# Patient Record
Sex: FEMALE | Race: OTHER | HISPANIC OR LATINO
[De-identification: names, ages, dates, MRNs, and addresses within clinical notes are randomized per-mention and may not be internally consistent; named-entity substitution may affect disease eponyms.]

---

## 2017-01-09 ENCOUNTER — RX RENEWAL (OUTPATIENT)
Age: 47
End: 2017-01-09

## 2017-01-31 ENCOUNTER — APPOINTMENT (OUTPATIENT)
Dept: INFECTIOUS DISEASE | Facility: CLINIC | Age: 47
End: 2017-01-31

## 2017-02-28 ENCOUNTER — LABORATORY RESULT (OUTPATIENT)
Age: 47
End: 2017-02-28

## 2017-02-28 ENCOUNTER — APPOINTMENT (OUTPATIENT)
Dept: INFECTIOUS DISEASE | Facility: CLINIC | Age: 47
End: 2017-02-28

## 2017-02-28 ENCOUNTER — MED ADMIN CHARGE (OUTPATIENT)
Age: 47
End: 2017-02-28

## 2017-02-28 ENCOUNTER — OUTPATIENT (OUTPATIENT)
Dept: OUTPATIENT SERVICES | Facility: HOSPITAL | Age: 47
LOS: 1 days | End: 2017-02-28
Payer: COMMERCIAL

## 2017-02-28 VITALS
TEMPERATURE: 98.1 F | OXYGEN SATURATION: 97 % | HEART RATE: 80 BPM | WEIGHT: 161 LBS | DIASTOLIC BLOOD PRESSURE: 73 MMHG | BODY MASS INDEX: 30.4 KG/M2 | SYSTOLIC BLOOD PRESSURE: 113 MMHG | HEIGHT: 61 IN

## 2017-02-28 DIAGNOSIS — B20 HUMAN IMMUNODEFICIENCY VIRUS [HIV] DISEASE: ICD-10-CM

## 2017-02-28 LAB
24R-OH-CALCIDIOL SERPL-MCNC: 39.3 NG/ML — SIGNIFICANT CHANGE UP (ref 30–100)
AFP-TM SERPL-MCNC: 1.8 NG/ML — SIGNIFICANT CHANGE UP
ALBUMIN SERPL ELPH-MCNC: 4.2 G/DL — SIGNIFICANT CHANGE UP (ref 3.3–5)
ALP SERPL-CCNC: 72 U/L — SIGNIFICANT CHANGE UP (ref 40–120)
ALT FLD-CCNC: 24 U/L — SIGNIFICANT CHANGE UP (ref 10–45)
ANION GAP SERPL CALC-SCNC: 18 MMOL/L — HIGH (ref 5–17)
AST SERPL-CCNC: 30 U/L — SIGNIFICANT CHANGE UP (ref 10–40)
BASOPHILS # BLD AUTO: 0.02 K/UL — SIGNIFICANT CHANGE UP (ref 0–0.2)
BASOPHILS NFR BLD AUTO: 0.4 % — SIGNIFICANT CHANGE UP (ref 0–2)
BILIRUB SERPL-MCNC: 0.3 MG/DL — SIGNIFICANT CHANGE UP (ref 0.2–1.2)
BUN SERPL-MCNC: 19 MG/DL — SIGNIFICANT CHANGE UP (ref 7–23)
CALCIUM SERPL-MCNC: 10.1 MG/DL — SIGNIFICANT CHANGE UP (ref 8.4–10.5)
CHLORIDE SERPL-SCNC: 101 MMOL/L — SIGNIFICANT CHANGE UP (ref 96–108)
CHOLEST SERPL-MCNC: 191 MG/DL — SIGNIFICANT CHANGE UP (ref 10–199)
CO2 SERPL-SCNC: 21 MMOL/L — LOW (ref 22–31)
CREAT ?TM UR-MCNC: 154 MG/DL — SIGNIFICANT CHANGE UP
CREAT SERPL-MCNC: 1.01 MG/DL — SIGNIFICANT CHANGE UP (ref 0.5–1.3)
EOSINOPHIL # BLD AUTO: 0.15 K/UL — SIGNIFICANT CHANGE UP (ref 0–0.5)
EOSINOPHIL NFR BLD AUTO: 2.7 % — SIGNIFICANT CHANGE UP (ref 0–6)
GLUCOSE SERPL-MCNC: 80 MG/DL — SIGNIFICANT CHANGE UP (ref 70–99)
HBA1C BLD-MCNC: 5.9 % — HIGH (ref 4–5.6)
HCT VFR BLD CALC: 38.9 % — SIGNIFICANT CHANGE UP (ref 34.5–45)
HCV AB S/CO SERPL IA: 0.19 S/CO — SIGNIFICANT CHANGE UP
HCV AB SERPL-IMP: SIGNIFICANT CHANGE UP
HDLC SERPL-MCNC: 58 MG/DL — SIGNIFICANT CHANGE UP (ref 40–125)
HGB BLD-MCNC: 13 G/DL — SIGNIFICANT CHANGE UP (ref 11.5–15.5)
IMM GRANULOCYTES NFR BLD AUTO: 0 % — SIGNIFICANT CHANGE UP (ref 0–1.5)
LIPID PNL WITH DIRECT LDL SERPL: 123 MG/DL — SIGNIFICANT CHANGE UP
LYMPHOCYTES # BLD AUTO: 3.42 K/UL — HIGH (ref 1–3.3)
LYMPHOCYTES # BLD AUTO: 61.6 % — HIGH (ref 13–44)
MCHC RBC-ENTMCNC: 30.4 PG — SIGNIFICANT CHANGE UP (ref 27–34)
MCHC RBC-ENTMCNC: 33.4 GM/DL — SIGNIFICANT CHANGE UP (ref 32–36)
MCV RBC AUTO: 91.1 FL — SIGNIFICANT CHANGE UP (ref 80–100)
MONOCYTES # BLD AUTO: 0.55 K/UL — SIGNIFICANT CHANGE UP (ref 0–0.9)
MONOCYTES NFR BLD AUTO: 9.9 % — SIGNIFICANT CHANGE UP (ref 2–14)
NEUTROPHILS # BLD AUTO: 1.41 K/UL — LOW (ref 1.8–7.4)
NEUTROPHILS NFR BLD AUTO: 25.4 % — LOW (ref 43–77)
PLATELET # BLD AUTO: 310 K/UL — SIGNIFICANT CHANGE UP (ref 150–400)
POTASSIUM SERPL-MCNC: 4.4 MMOL/L — SIGNIFICANT CHANGE UP (ref 3.5–5.3)
POTASSIUM SERPL-SCNC: 4.4 MMOL/L — SIGNIFICANT CHANGE UP (ref 3.5–5.3)
PROT ?TM UR-MCNC: 12 MG/DL — SIGNIFICANT CHANGE UP (ref 0–12)
PROT SERPL-MCNC: 8.3 G/DL — SIGNIFICANT CHANGE UP (ref 6–8.3)
PROT/CREAT UR-RTO: 0.1 RATIO — SIGNIFICANT CHANGE UP (ref 0–0.2)
RBC # BLD: 4.27 M/UL — SIGNIFICANT CHANGE UP (ref 3.8–5.2)
RBC # FLD: 14.7 % — HIGH (ref 10.3–14.5)
SODIUM SERPL-SCNC: 140 MMOL/L — SIGNIFICANT CHANGE UP (ref 135–145)
T PALLIDUM AB TITR SER: NEGATIVE — SIGNIFICANT CHANGE UP
TOTAL CHOLESTEROL/HDL RATIO MEASUREMENT: 3.3 RATIO — SIGNIFICANT CHANGE UP (ref 3.3–7.1)
TRIGL SERPL-MCNC: 48 MG/DL — SIGNIFICANT CHANGE UP (ref 10–149)
WBC # BLD: 5.55 K/UL — SIGNIFICANT CHANGE UP (ref 3.8–10.5)
WBC # FLD AUTO: 5.55 K/UL — SIGNIFICANT CHANGE UP (ref 3.8–10.5)

## 2017-03-01 ENCOUNTER — FORM ENCOUNTER (OUTPATIENT)
Age: 47
End: 2017-03-01

## 2017-03-01 LAB
4/8 RATIO: 0.2 RATIO — LOW (ref 0.9–3.6)
ABS CD8: 1018 /UL — HIGH (ref 136–757)
C TRACH RRNA SPEC QL NAA+PROBE: SIGNIFICANT CHANGE UP
CD3 BLASTS SPEC-ACNC: 1216 /UL — SIGNIFICANT CHANGE UP (ref 799–2171)
CD3 BLASTS SPEC-ACNC: 41 % — LOW (ref 59–85)
CD4 %: 7 % — LOW (ref 36–65)
CD8 %: 34 % — SIGNIFICANT CHANGE UP (ref 11–36)
HBV DNA # SERPL NAA+PROBE: SIGNIFICANT CHANGE UP IU/ML
HBV DNA SERPL NAA+PROBE-LOG#: SIGNIFICANT CHANGE UP LOGIU/ML
HIV1 RNA # SERPL NAA+PROBE: 45 — SIGNIFICANT CHANGE UP
HIV1 RNA SERPL NAA+PROBE-LOG#: 1.65 LG10COP/ML — SIGNIFICANT CHANGE UP
M TB TUBERC IFN-G BLD QL: 0.03 IU/ML — SIGNIFICANT CHANGE UP
M TB TUBERC IFN-G BLD QL: 0.07 IU/ML — SIGNIFICANT CHANGE UP
M TB TUBERC IFN-G BLD QL: NEGATIVE — SIGNIFICANT CHANGE UP
MITOGEN IGNF BCKGRD COR BLD-ACNC: >10 IU/ML — SIGNIFICANT CHANGE UP
N GONORRHOEA RRNA SPEC QL NAA+PROBE: SIGNIFICANT CHANGE UP
SPECIMEN SOURCE: SIGNIFICANT CHANGE UP
T-CELL CD4 SUBSET PNL BLD: 203 /UL — LOW (ref 489–1457)

## 2017-03-02 ENCOUNTER — APPOINTMENT (OUTPATIENT)
Dept: MAMMOGRAPHY | Facility: IMAGING CENTER | Age: 47
End: 2017-03-02

## 2017-03-02 DIAGNOSIS — Z12.31 ENCOUNTER FOR SCREENING MAMMOGRAM FOR MALIGNANT NEOPLASM OF BREAST: ICD-10-CM

## 2017-03-02 DIAGNOSIS — Z23 ENCOUNTER FOR IMMUNIZATION: ICD-10-CM

## 2017-03-02 PROCEDURE — 90834 PSYTX W PT 45 MINUTES: CPT

## 2017-03-02 PROCEDURE — 87517 HEPATITIS B DNA QUANT: CPT

## 2017-03-02 PROCEDURE — 77063 BREAST TOMOSYNTHESIS BI: CPT | Mod: 26

## 2017-03-02 PROCEDURE — 80061 LIPID PANEL: CPT

## 2017-03-02 PROCEDURE — 83036 HEMOGLOBIN GLYCOSYLATED A1C: CPT

## 2017-03-02 PROCEDURE — 86360 T CELL ABSOLUTE COUNT/RATIO: CPT

## 2017-03-02 PROCEDURE — 87536 HIV-1 QUANT&REVRSE TRNSCRPJ: CPT

## 2017-03-02 PROCEDURE — 36415 COLL VENOUS BLD VENIPUNCTURE: CPT

## 2017-03-02 PROCEDURE — 86480 TB TEST CELL IMMUN MEASURE: CPT

## 2017-03-02 PROCEDURE — 84156 ASSAY OF PROTEIN URINE: CPT

## 2017-03-02 PROCEDURE — 77063 BREAST TOMOSYNTHESIS BI: CPT

## 2017-03-02 PROCEDURE — 82306 VITAMIN D 25 HYDROXY: CPT

## 2017-03-02 PROCEDURE — 90471 IMMUNIZATION ADMIN: CPT

## 2017-03-02 PROCEDURE — 80053 COMPREHEN METABOLIC PANEL: CPT

## 2017-03-02 PROCEDURE — 82105 ALPHA-FETOPROTEIN SERUM: CPT

## 2017-03-02 PROCEDURE — G0463: CPT | Mod: 25

## 2017-03-02 PROCEDURE — 86780 TREPONEMA PALLIDUM: CPT

## 2017-03-02 PROCEDURE — 86803 HEPATITIS C AB TEST: CPT

## 2017-03-02 PROCEDURE — 90734 MENACWYD/MENACWYCRM VACC IM: CPT

## 2017-03-02 PROCEDURE — 77067 SCR MAMMO BI INCL CAD: CPT

## 2017-03-02 PROCEDURE — G0202: CPT | Mod: 26

## 2017-03-06 ENCOUNTER — FORM ENCOUNTER (OUTPATIENT)
Age: 47
End: 2017-03-06

## 2017-03-06 ENCOUNTER — APPOINTMENT (OUTPATIENT)
Age: 47
End: 2017-03-06

## 2017-03-07 ENCOUNTER — OUTPATIENT (OUTPATIENT)
Dept: OUTPATIENT SERVICES | Facility: HOSPITAL | Age: 47
LOS: 1 days | End: 2017-03-07
Payer: COMMERCIAL

## 2017-03-07 ENCOUNTER — APPOINTMENT (OUTPATIENT)
Dept: MAMMOGRAPHY | Facility: IMAGING CENTER | Age: 47
End: 2017-03-07

## 2017-03-07 DIAGNOSIS — Z00.8 ENCOUNTER FOR OTHER GENERAL EXAMINATION: ICD-10-CM

## 2017-03-07 PROCEDURE — 77065 DX MAMMO INCL CAD UNI: CPT

## 2017-03-20 ENCOUNTER — APPOINTMENT (OUTPATIENT)
Dept: OPHTHALMOLOGY | Facility: CLINIC | Age: 47
End: 2017-03-20

## 2017-03-23 ENCOUNTER — MEDICATION RENEWAL (OUTPATIENT)
Age: 47
End: 2017-03-23

## 2017-05-02 ENCOUNTER — APPOINTMENT (OUTPATIENT)
Dept: DERMATOLOGY | Facility: CLINIC | Age: 47
End: 2017-05-02

## 2017-05-02 VITALS — SYSTOLIC BLOOD PRESSURE: 126 MMHG | DIASTOLIC BLOOD PRESSURE: 80 MMHG

## 2017-05-11 ENCOUNTER — APPOINTMENT (OUTPATIENT)
Dept: DERMATOLOGY | Facility: HOSPITAL | Age: 47
End: 2017-05-11

## 2017-06-05 ENCOUNTER — APPOINTMENT (OUTPATIENT)
Dept: DERMATOLOGY | Facility: CLINIC | Age: 47
End: 2017-06-05

## 2017-06-06 ENCOUNTER — APPOINTMENT (OUTPATIENT)
Dept: INFECTIOUS DISEASE | Facility: CLINIC | Age: 47
End: 2017-06-06

## 2017-06-20 ENCOUNTER — RX RENEWAL (OUTPATIENT)
Age: 47
End: 2017-06-20

## 2017-07-17 ENCOUNTER — RX RENEWAL (OUTPATIENT)
Age: 47
End: 2017-07-17

## 2017-07-27 ENCOUNTER — RX RENEWAL (OUTPATIENT)
Age: 47
End: 2017-07-27

## 2017-08-08 ENCOUNTER — OUTPATIENT (OUTPATIENT)
Dept: OUTPATIENT SERVICES | Facility: HOSPITAL | Age: 47
LOS: 1 days | End: 2017-08-08
Payer: COMMERCIAL

## 2017-08-08 ENCOUNTER — LABORATORY RESULT (OUTPATIENT)
Age: 47
End: 2017-08-08

## 2017-08-08 ENCOUNTER — MED ADMIN CHARGE (OUTPATIENT)
Age: 47
End: 2017-08-08

## 2017-08-08 ENCOUNTER — APPOINTMENT (OUTPATIENT)
Dept: INFECTIOUS DISEASE | Facility: CLINIC | Age: 47
End: 2017-08-08
Payer: MEDICARE

## 2017-08-08 VITALS
DIASTOLIC BLOOD PRESSURE: 82 MMHG | OXYGEN SATURATION: 96 % | HEIGHT: 61 IN | HEART RATE: 84 BPM | TEMPERATURE: 97.1 F | SYSTOLIC BLOOD PRESSURE: 115 MMHG | RESPIRATION RATE: 16 BRPM | WEIGHT: 163 LBS | BODY MASS INDEX: 30.78 KG/M2

## 2017-08-08 DIAGNOSIS — B20 HUMAN IMMUNODEFICIENCY VIRUS [HIV] DISEASE: ICD-10-CM

## 2017-08-08 PROCEDURE — 90734 MENACWYD/MENACWYCRM VACC IM: CPT

## 2017-08-08 PROCEDURE — G0463: CPT | Mod: 25

## 2017-08-08 PROCEDURE — 90471 IMMUNIZATION ADMIN: CPT

## 2017-08-08 PROCEDURE — 99214 OFFICE O/P EST MOD 30 MIN: CPT

## 2017-08-10 DIAGNOSIS — B18.1 CHRONIC VIRAL HEPATITIS B WITHOUT DELTA-AGENT: ICD-10-CM

## 2017-08-16 ENCOUNTER — OTHER (OUTPATIENT)
Age: 47
End: 2017-08-16

## 2017-09-07 ENCOUNTER — MEDICATION RENEWAL (OUTPATIENT)
Age: 47
End: 2017-09-07

## 2017-09-07 ENCOUNTER — RX RENEWAL (OUTPATIENT)
Age: 47
End: 2017-09-07

## 2017-09-19 ENCOUNTER — LABORATORY RESULT (OUTPATIENT)
Age: 47
End: 2017-09-19

## 2017-09-19 ENCOUNTER — APPOINTMENT (OUTPATIENT)
Dept: INFECTIOUS DISEASE | Facility: CLINIC | Age: 47
End: 2017-09-19
Payer: MEDICARE

## 2017-09-19 ENCOUNTER — OUTPATIENT (OUTPATIENT)
Dept: OUTPATIENT SERVICES | Facility: HOSPITAL | Age: 47
LOS: 1 days | End: 2017-09-19
Payer: COMMERCIAL

## 2017-09-19 VITALS
TEMPERATURE: 97.3 F | HEART RATE: 68 BPM | BODY MASS INDEX: 30.4 KG/M2 | OXYGEN SATURATION: 100 % | DIASTOLIC BLOOD PRESSURE: 66 MMHG | HEIGHT: 61 IN | WEIGHT: 161 LBS | SYSTOLIC BLOOD PRESSURE: 109 MMHG

## 2017-09-19 DIAGNOSIS — B20 HUMAN IMMUNODEFICIENCY VIRUS [HIV] DISEASE: ICD-10-CM

## 2017-09-19 PROCEDURE — 99214 OFFICE O/P EST MOD 30 MIN: CPT

## 2017-09-19 PROCEDURE — 90686 IIV4 VACC NO PRSV 0.5 ML IM: CPT

## 2017-09-19 PROCEDURE — G0008: CPT

## 2017-09-19 PROCEDURE — G0463: CPT | Mod: 25

## 2017-09-19 PROCEDURE — 90834 PSYTX W PT 45 MINUTES: CPT

## 2017-09-28 DIAGNOSIS — Z23 ENCOUNTER FOR IMMUNIZATION: ICD-10-CM

## 2017-09-28 DIAGNOSIS — B18.1 CHRONIC VIRAL HEPATITIS B WITHOUT DELTA-AGENT: ICD-10-CM

## 2017-10-26 ENCOUNTER — RX RENEWAL (OUTPATIENT)
Age: 47
End: 2017-10-26

## 2017-11-14 ENCOUNTER — LABORATORY RESULT (OUTPATIENT)
Age: 47
End: 2017-11-14

## 2017-11-14 ENCOUNTER — APPOINTMENT (OUTPATIENT)
Dept: INFECTIOUS DISEASE | Facility: CLINIC | Age: 47
End: 2017-11-14
Payer: MEDICARE

## 2017-11-14 ENCOUNTER — OUTPATIENT (OUTPATIENT)
Dept: OUTPATIENT SERVICES | Facility: HOSPITAL | Age: 47
LOS: 1 days | End: 2017-11-14
Payer: COMMERCIAL

## 2017-11-14 VITALS
TEMPERATURE: 97.1 F | WEIGHT: 160 LBS | BODY MASS INDEX: 30.21 KG/M2 | HEIGHT: 61 IN | HEART RATE: 84 BPM | OXYGEN SATURATION: 100 % | DIASTOLIC BLOOD PRESSURE: 75 MMHG | SYSTOLIC BLOOD PRESSURE: 110 MMHG

## 2017-11-14 DIAGNOSIS — B20 HUMAN IMMUNODEFICIENCY VIRUS [HIV] DISEASE: ICD-10-CM

## 2017-11-14 LAB
HCT VFR BLD CALC: 38.7 % — SIGNIFICANT CHANGE UP (ref 34.5–45)
HGB BLD-MCNC: 12.8 G/DL — SIGNIFICANT CHANGE UP (ref 11.5–15.5)
MCHC RBC-ENTMCNC: 30.8 PG — SIGNIFICANT CHANGE UP (ref 27–34)
MCHC RBC-ENTMCNC: 33.1 GM/DL — SIGNIFICANT CHANGE UP (ref 32–36)
MCV RBC AUTO: 93.3 FL — SIGNIFICANT CHANGE UP (ref 80–100)
PLATELET # BLD AUTO: 348 K/UL — SIGNIFICANT CHANGE UP (ref 150–400)
RBC # BLD: 4.15 M/UL — SIGNIFICANT CHANGE UP (ref 3.8–5.2)
RBC # FLD: 14.8 % — HIGH (ref 10.3–14.5)
WBC # BLD: 6.59 K/UL — SIGNIFICANT CHANGE UP (ref 3.8–10.5)
WBC # FLD AUTO: 6.59 K/UL — SIGNIFICANT CHANGE UP (ref 3.8–10.5)

## 2017-11-14 PROCEDURE — G0463: CPT

## 2017-11-14 PROCEDURE — 87536 HIV-1 QUANT&REVRSE TRNSCRPJ: CPT

## 2017-11-14 PROCEDURE — 82306 VITAMIN D 25 HYDROXY: CPT

## 2017-11-14 PROCEDURE — 87517 HEPATITIS B DNA QUANT: CPT

## 2017-11-14 PROCEDURE — 86360 T CELL ABSOLUTE COUNT/RATIO: CPT

## 2017-11-14 PROCEDURE — 85027 COMPLETE CBC AUTOMATED: CPT

## 2017-11-14 PROCEDURE — 99214 OFFICE O/P EST MOD 30 MIN: CPT

## 2017-11-14 PROCEDURE — 80053 COMPREHEN METABOLIC PANEL: CPT

## 2017-11-15 LAB
24R-OH-CALCIDIOL SERPL-MCNC: 25.4 NG/ML — LOW (ref 30–80)
4/8 RATIO: 0.22 RATIO — LOW (ref 0.9–3.6)
ABS CD8: 1061 /UL — HIGH (ref 136–757)
ALBUMIN SERPL ELPH-MCNC: 4.3 G/DL — SIGNIFICANT CHANGE UP (ref 3.3–5)
ALP SERPL-CCNC: 69 U/L — SIGNIFICANT CHANGE UP (ref 40–120)
ALT FLD-CCNC: 28 U/L — SIGNIFICANT CHANGE UP (ref 10–45)
ANION GAP SERPL CALC-SCNC: 15 MMOL/L — SIGNIFICANT CHANGE UP (ref 5–17)
AST SERPL-CCNC: 28 U/L — SIGNIFICANT CHANGE UP (ref 10–40)
BILIRUB SERPL-MCNC: 0.3 MG/DL — SIGNIFICANT CHANGE UP (ref 0.2–1.2)
BUN SERPL-MCNC: 17 MG/DL — SIGNIFICANT CHANGE UP (ref 7–23)
CALCIUM SERPL-MCNC: 10.4 MG/DL — SIGNIFICANT CHANGE UP (ref 8.4–10.5)
CD3 BLASTS SPEC-ACNC: 1299 /UL — SIGNIFICANT CHANGE UP (ref 799–2171)
CD3 BLASTS SPEC-ACNC: 45 % — LOW (ref 59–85)
CD4 %: 8 % — LOW (ref 36–65)
CD8 %: 36 % — SIGNIFICANT CHANGE UP (ref 11–36)
CHLORIDE SERPL-SCNC: 103 MMOL/L — SIGNIFICANT CHANGE UP (ref 96–108)
CO2 SERPL-SCNC: 23 MMOL/L — SIGNIFICANT CHANGE UP (ref 22–31)
CREAT SERPL-MCNC: 0.93 MG/DL — SIGNIFICANT CHANGE UP (ref 0.5–1.3)
GLUCOSE SERPL-MCNC: 75 MG/DL — SIGNIFICANT CHANGE UP (ref 70–99)
HBV DNA # SERPL NAA+PROBE: SIGNIFICANT CHANGE UP
HBV DNA SERPL NAA+PROBE-LOG#: SIGNIFICANT CHANGE UP LOGIU/ML
HIV-1 VIRAL LOAD RESULT: ABNORMAL
HIV1 RNA # SERPL NAA+PROBE: SIGNIFICANT CHANGE UP
HIV1 RNA SER-IMP: SIGNIFICANT CHANGE UP
HIV1 RNA SERPL NAA+PROBE-ACNC: ABNORMAL
HIV1 RNA SERPL NAA+PROBE-LOG#: ABNORMAL LG COP/ML
POTASSIUM SERPL-MCNC: 4.8 MMOL/L — SIGNIFICANT CHANGE UP (ref 3.5–5.3)
POTASSIUM SERPL-SCNC: 4.8 MMOL/L — SIGNIFICANT CHANGE UP (ref 3.5–5.3)
PROT SERPL-MCNC: 8.5 G/DL — HIGH (ref 6–8.3)
SODIUM SERPL-SCNC: 141 MMOL/L — SIGNIFICANT CHANGE UP (ref 135–145)
T-CELL CD4 SUBSET PNL BLD: 238 /UL — LOW (ref 489–1457)

## 2017-11-24 ENCOUNTER — FORM ENCOUNTER (OUTPATIENT)
Age: 47
End: 2017-11-24

## 2017-11-25 ENCOUNTER — OUTPATIENT (OUTPATIENT)
Dept: OUTPATIENT SERVICES | Facility: HOSPITAL | Age: 47
LOS: 1 days | End: 2017-11-25
Payer: COMMERCIAL

## 2017-11-25 ENCOUNTER — APPOINTMENT (OUTPATIENT)
Dept: ULTRASOUND IMAGING | Facility: IMAGING CENTER | Age: 47
End: 2017-11-25
Payer: MEDICARE

## 2017-11-25 DIAGNOSIS — B18.1 CHRONIC VIRAL HEPATITIS B WITHOUT DELTA-AGENT: ICD-10-CM

## 2017-11-25 PROCEDURE — 76705 ECHO EXAM OF ABDOMEN: CPT

## 2017-11-25 PROCEDURE — 76705 ECHO EXAM OF ABDOMEN: CPT | Mod: 26

## 2017-11-28 DIAGNOSIS — B18.1 CHRONIC VIRAL HEPATITIS B WITHOUT DELTA-AGENT: ICD-10-CM

## 2017-11-29 ENCOUNTER — MEDICATION RENEWAL (OUTPATIENT)
Age: 47
End: 2017-11-29

## 2017-11-30 ENCOUNTER — MEDICATION RENEWAL (OUTPATIENT)
Age: 47
End: 2017-11-30

## 2017-12-19 ENCOUNTER — APPOINTMENT (OUTPATIENT)
Dept: INFECTIOUS DISEASE | Facility: CLINIC | Age: 47
End: 2017-12-19
Payer: MEDICARE

## 2017-12-19 ENCOUNTER — OUTPATIENT (OUTPATIENT)
Dept: OUTPATIENT SERVICES | Facility: HOSPITAL | Age: 47
LOS: 1 days | End: 2017-12-19
Payer: COMMERCIAL

## 2017-12-19 ENCOUNTER — LABORATORY RESULT (OUTPATIENT)
Age: 47
End: 2017-12-19

## 2017-12-19 VITALS
OXYGEN SATURATION: 100 % | DIASTOLIC BLOOD PRESSURE: 79 MMHG | HEIGHT: 61 IN | WEIGHT: 157 LBS | HEART RATE: 80 BPM | SYSTOLIC BLOOD PRESSURE: 113 MMHG | BODY MASS INDEX: 29.64 KG/M2 | TEMPERATURE: 97.8 F

## 2017-12-19 DIAGNOSIS — B20 HUMAN IMMUNODEFICIENCY VIRUS [HIV] DISEASE: ICD-10-CM

## 2017-12-19 PROCEDURE — 87517 HEPATITIS B DNA QUANT: CPT

## 2017-12-19 PROCEDURE — 99214 OFFICE O/P EST MOD 30 MIN: CPT

## 2017-12-19 PROCEDURE — 86360 T CELL ABSOLUTE COUNT/RATIO: CPT

## 2017-12-19 PROCEDURE — G0463: CPT

## 2017-12-19 PROCEDURE — 87536 HIV-1 QUANT&REVRSE TRNSCRPJ: CPT

## 2017-12-20 LAB
4/8 RATIO: 0.23 RATIO — LOW (ref 0.9–3.6)
ABS CD8: 1382 /UL — HIGH (ref 136–757)
CD3 BLASTS SPEC-ACNC: 1681 /UL — SIGNIFICANT CHANGE UP (ref 799–2171)
CD3 BLASTS SPEC-ACNC: 48 % — LOW (ref 59–85)
CD4 %: 9 % — LOW (ref 36–65)
CD8 %: 39 % — HIGH (ref 11–36)
HBV DNA # SERPL NAA+PROBE: SIGNIFICANT CHANGE UP
HBV DNA SERPL NAA+PROBE-LOG#: SIGNIFICANT CHANGE UP LOGIU/ML
HIV-1 VIRAL LOAD RESULT: ABNORMAL
HIV1 RNA # SERPL NAA+PROBE: SIGNIFICANT CHANGE UP
HIV1 RNA SER-IMP: SIGNIFICANT CHANGE UP
HIV1 RNA SERPL NAA+PROBE-ACNC: ABNORMAL
HIV1 RNA SERPL NAA+PROBE-LOG#: ABNORMAL LG COP/ML
T-CELL CD4 SUBSET PNL BLD: 312 /UL — LOW (ref 489–1457)

## 2017-12-29 ENCOUNTER — APPOINTMENT (OUTPATIENT)
Dept: OPHTHALMOLOGY | Facility: CLINIC | Age: 47
End: 2017-12-29
Payer: COMMERCIAL

## 2017-12-29 PROCEDURE — 92014 COMPRE OPH EXAM EST PT 1/>: CPT

## 2017-12-29 PROCEDURE — 92015 DETERMINE REFRACTIVE STATE: CPT

## 2018-01-01 ENCOUNTER — FORM ENCOUNTER (OUTPATIENT)
Age: 48
End: 2018-01-01

## 2018-01-02 ENCOUNTER — OUTPATIENT (OUTPATIENT)
Dept: OUTPATIENT SERVICES | Facility: HOSPITAL | Age: 48
LOS: 1 days | End: 2018-01-02
Payer: COMMERCIAL

## 2018-01-02 ENCOUNTER — APPOINTMENT (OUTPATIENT)
Dept: MAMMOGRAPHY | Facility: IMAGING CENTER | Age: 48
End: 2018-01-02
Payer: MEDICARE

## 2018-01-02 DIAGNOSIS — B18.1 CHRONIC VIRAL HEPATITIS B WITHOUT DELTA-AGENT: ICD-10-CM

## 2018-01-02 PROCEDURE — G0279: CPT

## 2018-01-02 PROCEDURE — G0279: CPT | Mod: 26

## 2018-01-02 PROCEDURE — 77066 DX MAMMO INCL CAD BI: CPT

## 2018-01-02 PROCEDURE — 77066 DX MAMMO INCL CAD BI: CPT | Mod: 26

## 2018-01-25 ENCOUNTER — RX RENEWAL (OUTPATIENT)
Age: 48
End: 2018-01-25

## 2018-03-23 ENCOUNTER — RX RENEWAL (OUTPATIENT)
Age: 48
End: 2018-03-23

## 2018-03-27 ENCOUNTER — OUTPATIENT (OUTPATIENT)
Dept: OUTPATIENT SERVICES | Facility: HOSPITAL | Age: 48
LOS: 1 days | End: 2018-03-27
Payer: COMMERCIAL

## 2018-03-27 ENCOUNTER — LABORATORY RESULT (OUTPATIENT)
Age: 48
End: 2018-03-27

## 2018-03-27 ENCOUNTER — APPOINTMENT (OUTPATIENT)
Dept: INFECTIOUS DISEASE | Facility: CLINIC | Age: 48
End: 2018-03-27
Payer: MEDICARE

## 2018-03-27 ENCOUNTER — APPOINTMENT (OUTPATIENT)
Dept: INFECTIOUS DISEASE | Facility: CLINIC | Age: 48
End: 2018-03-27

## 2018-03-27 VITALS
OXYGEN SATURATION: 99 % | WEIGHT: 158 LBS | HEART RATE: 102 BPM | SYSTOLIC BLOOD PRESSURE: 126 MMHG | BODY MASS INDEX: 29.83 KG/M2 | HEIGHT: 61 IN | DIASTOLIC BLOOD PRESSURE: 88 MMHG | TEMPERATURE: 97.1 F

## 2018-03-27 DIAGNOSIS — B20 HUMAN IMMUNODEFICIENCY VIRUS [HIV] DISEASE: ICD-10-CM

## 2018-03-27 LAB
24R-OH-CALCIDIOL SERPL-MCNC: 31 NG/ML — SIGNIFICANT CHANGE UP (ref 30–80)
ALBUMIN SERPL ELPH-MCNC: 4.1 G/DL — SIGNIFICANT CHANGE UP (ref 3.3–5)
ALP SERPL-CCNC: 77 U/L — SIGNIFICANT CHANGE UP (ref 40–120)
ALT FLD-CCNC: 26 U/L — SIGNIFICANT CHANGE UP (ref 10–45)
ANION GAP SERPL CALC-SCNC: 14 MMOL/L — SIGNIFICANT CHANGE UP (ref 5–17)
APPEARANCE UR: CLEAR — SIGNIFICANT CHANGE UP
AST SERPL-CCNC: 31 U/L — SIGNIFICANT CHANGE UP (ref 10–40)
BACTERIA # UR AUTO: ABNORMAL
BILIRUB SERPL-MCNC: 0.4 MG/DL — SIGNIFICANT CHANGE UP (ref 0.2–1.2)
BILIRUB UR-MCNC: NEGATIVE — SIGNIFICANT CHANGE UP
BUN SERPL-MCNC: 18 MG/DL — SIGNIFICANT CHANGE UP (ref 7–23)
C TRACH RRNA SPEC QL NAA+PROBE: SIGNIFICANT CHANGE UP
CALCIUM SERPL-MCNC: 9.8 MG/DL — SIGNIFICANT CHANGE UP (ref 8.4–10.5)
CHLORIDE SERPL-SCNC: 102 MMOL/L — SIGNIFICANT CHANGE UP (ref 96–108)
CHOLEST SERPL-MCNC: 220 MG/DL — HIGH (ref 10–199)
CO2 SERPL-SCNC: 23 MMOL/L — SIGNIFICANT CHANGE UP (ref 22–31)
COLOR SPEC: YELLOW — SIGNIFICANT CHANGE UP
CREAT ?TM UR-MCNC: 152 MG/DL — SIGNIFICANT CHANGE UP
CREAT SERPL-MCNC: 0.83 MG/DL — SIGNIFICANT CHANGE UP (ref 0.5–1.3)
DIFF PNL FLD: NEGATIVE — SIGNIFICANT CHANGE UP
EPI CELLS # UR: 6 /HPF — HIGH (ref 0–5)
GLUCOSE SERPL-MCNC: 80 MG/DL — SIGNIFICANT CHANGE UP (ref 70–99)
GLUCOSE UR QL: NEGATIVE MG/DL — SIGNIFICANT CHANGE UP
HBA1C BLD-MCNC: 5.5 % — SIGNIFICANT CHANGE UP (ref 4–5.6)
HCT VFR BLD CALC: 36.3 % — SIGNIFICANT CHANGE UP (ref 34.5–45)
HCV AB S/CO SERPL IA: 0.15 S/CO — SIGNIFICANT CHANGE UP
HCV AB SERPL-IMP: SIGNIFICANT CHANGE UP
HDLC SERPL-MCNC: 54 MG/DL — SIGNIFICANT CHANGE UP (ref 40–125)
HGB BLD-MCNC: 12.4 G/DL — SIGNIFICANT CHANGE UP (ref 11.5–15.5)
HYALINE CASTS # UR AUTO: 6 /LPF — SIGNIFICANT CHANGE UP (ref 0–7)
KETONES UR-MCNC: NEGATIVE — SIGNIFICANT CHANGE UP
LEUKOCYTE ESTERASE UR-ACNC: NEGATIVE — SIGNIFICANT CHANGE UP
LIPID PNL WITH DIRECT LDL SERPL: 152 MG/DL — HIGH
MCHC RBC-ENTMCNC: 31.2 PG — SIGNIFICANT CHANGE UP (ref 27–34)
MCHC RBC-ENTMCNC: 34.2 GM/DL — SIGNIFICANT CHANGE UP (ref 32–36)
MCV RBC AUTO: 91.2 FL — SIGNIFICANT CHANGE UP (ref 80–100)
N GONORRHOEA RRNA SPEC QL NAA+PROBE: SIGNIFICANT CHANGE UP
NITRITE UR-MCNC: NEGATIVE — SIGNIFICANT CHANGE UP
NRBC # BLD: 0 /100 WBCS — SIGNIFICANT CHANGE UP (ref 0–0)
PH UR: 5.5 — SIGNIFICANT CHANGE UP (ref 5–8)
PHOSPHATE SERPL-MCNC: 2.8 MG/DL — SIGNIFICANT CHANGE UP (ref 2.5–4.5)
PLATELET # BLD AUTO: 324 K/UL — SIGNIFICANT CHANGE UP (ref 150–400)
POTASSIUM SERPL-MCNC: 4.4 MMOL/L — SIGNIFICANT CHANGE UP (ref 3.5–5.3)
POTASSIUM SERPL-SCNC: 4.4 MMOL/L — SIGNIFICANT CHANGE UP (ref 3.5–5.3)
PROT ?TM UR-MCNC: 14 MG/DL — HIGH (ref 0–12)
PROT SERPL-MCNC: 8.5 G/DL — HIGH (ref 6–8.3)
PROT UR-MCNC: NEGATIVE MG/DL — SIGNIFICANT CHANGE UP
PROT/CREAT UR-RTO: 0.1 RATIO — SIGNIFICANT CHANGE UP (ref 0–0.2)
RBC # BLD: 3.98 M/UL — SIGNIFICANT CHANGE UP (ref 3.8–5.2)
RBC # FLD: 14.9 % — HIGH (ref 10.3–14.5)
RBC CASTS # UR COMP ASSIST: 8 /HPF — HIGH (ref 0–4)
SODIUM SERPL-SCNC: 139 MMOL/L — SIGNIFICANT CHANGE UP (ref 135–145)
SP GR SPEC: 1.03 — HIGH (ref 1.01–1.02)
SPECIMEN SOURCE: SIGNIFICANT CHANGE UP
T PALLIDUM AB TITR SER: NEGATIVE — SIGNIFICANT CHANGE UP
TOTAL CHOLESTEROL/HDL RATIO MEASUREMENT: 4.1 RATIO — SIGNIFICANT CHANGE UP (ref 3.3–7.1)
TRIGL SERPL-MCNC: 71 MG/DL — SIGNIFICANT CHANGE UP (ref 10–149)
UROBILINOGEN FLD QL: NEGATIVE MG/DL — SIGNIFICANT CHANGE UP
WBC # BLD: 9.84 K/UL — SIGNIFICANT CHANGE UP (ref 3.8–10.5)
WBC # FLD AUTO: 9.84 K/UL — SIGNIFICANT CHANGE UP (ref 3.8–10.5)
WBC UR QL: 1 /HPF — SIGNIFICANT CHANGE UP (ref 0–5)

## 2018-03-27 PROCEDURE — 99214 OFFICE O/P EST MOD 30 MIN: CPT

## 2018-03-27 PROCEDURE — 80053 COMPREHEN METABOLIC PANEL: CPT

## 2018-03-27 PROCEDURE — 80061 LIPID PANEL: CPT

## 2018-03-27 PROCEDURE — G0463: CPT

## 2018-03-27 PROCEDURE — 86803 HEPATITIS C AB TEST: CPT

## 2018-03-27 PROCEDURE — 90834 PSYTX W PT 45 MINUTES: CPT | Mod: 25

## 2018-03-27 PROCEDURE — 84156 ASSAY OF PROTEIN URINE: CPT

## 2018-03-27 PROCEDURE — 84100 ASSAY OF PHOSPHORUS: CPT

## 2018-03-27 PROCEDURE — 86480 TB TEST CELL IMMUN MEASURE: CPT

## 2018-03-27 PROCEDURE — 83036 HEMOGLOBIN GLYCOSYLATED A1C: CPT

## 2018-03-27 PROCEDURE — 85027 COMPLETE CBC AUTOMATED: CPT

## 2018-03-27 PROCEDURE — 87517 HEPATITIS B DNA QUANT: CPT

## 2018-03-27 PROCEDURE — 82306 VITAMIN D 25 HYDROXY: CPT

## 2018-03-27 PROCEDURE — 86780 TREPONEMA PALLIDUM: CPT

## 2018-03-27 PROCEDURE — 86360 T CELL ABSOLUTE COUNT/RATIO: CPT

## 2018-03-27 PROCEDURE — 87536 HIV-1 QUANT&REVRSE TRNSCRPJ: CPT

## 2018-03-27 PROCEDURE — 81001 URINALYSIS AUTO W/SCOPE: CPT

## 2018-03-28 LAB
4/8 RATIO: 0.24 RATIO — LOW (ref 0.9–3.6)
ABS CD8: 1151 /UL — HIGH (ref 136–757)
CD3 BLASTS SPEC-ACNC: 1406 /UL — SIGNIFICANT CHANGE UP (ref 799–2171)
CD3 BLASTS SPEC-ACNC: 39 % — LOW (ref 59–85)
CD4 %: 8 % — LOW (ref 36–65)
CD8 %: 32 % — SIGNIFICANT CHANGE UP (ref 11–36)
HBV DNA # SERPL NAA+PROBE: SIGNIFICANT CHANGE UP
HBV DNA SERPL NAA+PROBE-LOG#: SIGNIFICANT CHANGE UP LOGIU/ML
HIV-1 VIRAL LOAD RESULT: ABNORMAL
HIV1 RNA # SERPL NAA+PROBE: SIGNIFICANT CHANGE UP
HIV1 RNA SER-IMP: SIGNIFICANT CHANGE UP
HIV1 RNA SERPL NAA+PROBE-ACNC: ABNORMAL
HIV1 RNA SERPL NAA+PROBE-LOG#: ABNORMAL LG COP/ML
M TB TUBERC IFN-G BLD QL: 0.1 IU/ML — SIGNIFICANT CHANGE UP
M TB TUBERC IFN-G BLD QL: 0.61 IU/ML — SIGNIFICANT CHANGE UP
M TB TUBERC IFN-G BLD QL: NEGATIVE — SIGNIFICANT CHANGE UP
MITOGEN IGNF BCKGRD COR BLD-ACNC: >10 IU/ML — SIGNIFICANT CHANGE UP
T-CELL CD4 SUBSET PNL BLD: 272 /UL — LOW (ref 489–1457)

## 2018-04-02 DIAGNOSIS — B18.1 CHRONIC VIRAL HEPATITIS B WITHOUT DELTA-AGENT: ICD-10-CM

## 2018-04-06 ENCOUNTER — APPOINTMENT (OUTPATIENT)
Dept: INFECTIOUS DISEASE | Facility: CLINIC | Age: 48
End: 2018-04-06

## 2018-04-06 ENCOUNTER — OUTPATIENT (OUTPATIENT)
Dept: OUTPATIENT SERVICES | Facility: HOSPITAL | Age: 48
LOS: 1 days | End: 2018-04-06
Payer: COMMERCIAL

## 2018-04-06 ENCOUNTER — RESULT REVIEW (OUTPATIENT)
Age: 48
End: 2018-04-06

## 2018-04-06 DIAGNOSIS — B20 HUMAN IMMUNODEFICIENCY VIRUS [HIV] DISEASE: ICD-10-CM

## 2018-04-06 PROCEDURE — 88175 CYTOPATH C/V AUTO FLUID REDO: CPT

## 2018-04-06 PROCEDURE — 87624 HPV HI-RISK TYP POOLED RSLT: CPT

## 2018-04-06 PROCEDURE — G0463: CPT

## 2018-04-07 LAB
C TRACH RRNA SPEC QL NAA+PROBE: SIGNIFICANT CHANGE UP
HPV HIGH+LOW RISK DNA PNL CVX: SIGNIFICANT CHANGE UP
N GONORRHOEA RRNA SPEC QL NAA+PROBE: SIGNIFICANT CHANGE UP
SPECIMEN SOURCE: SIGNIFICANT CHANGE UP

## 2018-04-10 DIAGNOSIS — Z01.419 ENCOUNTER FOR GYNECOLOGICAL EXAMINATION (GENERAL) (ROUTINE) WITHOUT ABNORMAL FINDINGS: ICD-10-CM

## 2018-04-10 DIAGNOSIS — N60.19 DIFFUSE CYSTIC MASTOPATHY OF UNSPECIFIED BREAST: ICD-10-CM

## 2018-04-11 LAB — CYTOLOGY SPEC DOC CYTO: SIGNIFICANT CHANGE UP

## 2018-05-22 ENCOUNTER — RX RENEWAL (OUTPATIENT)
Age: 48
End: 2018-05-22

## 2018-06-19 ENCOUNTER — RX RENEWAL (OUTPATIENT)
Age: 48
End: 2018-06-19

## 2018-06-22 ENCOUNTER — APPOINTMENT (OUTPATIENT)
Dept: OPHTHALMOLOGY | Facility: CLINIC | Age: 48
End: 2018-06-22
Payer: MEDICARE

## 2018-06-22 DIAGNOSIS — B20 HUMAN IMMUNODEFICIENCY VIRUS [HIV] DISEASE: ICD-10-CM

## 2018-06-22 PROCEDURE — 92014 COMPRE OPH EXAM EST PT 1/>: CPT

## 2018-06-29 ENCOUNTER — RX RENEWAL (OUTPATIENT)
Age: 48
End: 2018-06-29

## 2018-07-03 ENCOUNTER — LABORATORY RESULT (OUTPATIENT)
Age: 48
End: 2018-07-03

## 2018-07-03 ENCOUNTER — OUTPATIENT (OUTPATIENT)
Dept: OUTPATIENT SERVICES | Facility: HOSPITAL | Age: 48
LOS: 1 days | End: 2018-07-03
Payer: COMMERCIAL

## 2018-07-03 ENCOUNTER — APPOINTMENT (OUTPATIENT)
Dept: INFECTIOUS DISEASE | Facility: CLINIC | Age: 48
End: 2018-07-03
Payer: MEDICARE

## 2018-07-03 VITALS
WEIGHT: 159 LBS | DIASTOLIC BLOOD PRESSURE: 81 MMHG | TEMPERATURE: 98 F | RESPIRATION RATE: 18 BRPM | HEART RATE: 94 BPM | HEIGHT: 61 IN | SYSTOLIC BLOOD PRESSURE: 118 MMHG | OXYGEN SATURATION: 98 % | BODY MASS INDEX: 30.02 KG/M2

## 2018-07-03 DIAGNOSIS — E78.5 HYPERLIPIDEMIA, UNSPECIFIED: ICD-10-CM

## 2018-07-03 DIAGNOSIS — B20 HUMAN IMMUNODEFICIENCY VIRUS [HIV] DISEASE: ICD-10-CM

## 2018-07-03 PROCEDURE — 99214 OFFICE O/P EST MOD 30 MIN: CPT

## 2018-07-03 PROCEDURE — G0463: CPT

## 2018-07-09 ENCOUNTER — FORM ENCOUNTER (OUTPATIENT)
Age: 48
End: 2018-07-09

## 2018-07-10 ENCOUNTER — APPOINTMENT (OUTPATIENT)
Dept: ULTRASOUND IMAGING | Facility: CLINIC | Age: 48
End: 2018-07-10
Payer: MEDICARE

## 2018-07-10 ENCOUNTER — OUTPATIENT (OUTPATIENT)
Dept: OUTPATIENT SERVICES | Facility: HOSPITAL | Age: 48
LOS: 1 days | End: 2018-07-10
Payer: COMMERCIAL

## 2018-07-10 DIAGNOSIS — B20 HUMAN IMMUNODEFICIENCY VIRUS [HIV] DISEASE: ICD-10-CM

## 2018-07-10 DIAGNOSIS — Z00.8 ENCOUNTER FOR OTHER GENERAL EXAMINATION: ICD-10-CM

## 2018-07-10 PROCEDURE — 76705 ECHO EXAM OF ABDOMEN: CPT

## 2018-07-10 PROCEDURE — 76705 ECHO EXAM OF ABDOMEN: CPT | Mod: 26

## 2018-08-07 ENCOUNTER — LABORATORY RESULT (OUTPATIENT)
Age: 48
End: 2018-08-07

## 2018-08-07 ENCOUNTER — APPOINTMENT (OUTPATIENT)
Dept: INFECTIOUS DISEASE | Facility: CLINIC | Age: 48
End: 2018-08-07
Payer: MEDICARE

## 2018-08-07 ENCOUNTER — OUTPATIENT (OUTPATIENT)
Dept: OUTPATIENT SERVICES | Facility: HOSPITAL | Age: 48
LOS: 1 days | End: 2018-08-07
Payer: COMMERCIAL

## 2018-08-07 VITALS
SYSTOLIC BLOOD PRESSURE: 112 MMHG | OXYGEN SATURATION: 98 % | HEIGHT: 61 IN | TEMPERATURE: 97.3 F | WEIGHT: 162 LBS | HEART RATE: 71 BPM | BODY MASS INDEX: 30.58 KG/M2 | DIASTOLIC BLOOD PRESSURE: 72 MMHG

## 2018-08-07 DIAGNOSIS — B20 HUMAN IMMUNODEFICIENCY VIRUS [HIV] DISEASE: ICD-10-CM

## 2018-08-07 LAB
ALBUMIN SERPL ELPH-MCNC: 4.1 G/DL — SIGNIFICANT CHANGE UP (ref 3.3–5)
ALP SERPL-CCNC: 77 U/L — SIGNIFICANT CHANGE UP (ref 30–120)
ALT FLD-CCNC: 28 U/L — SIGNIFICANT CHANGE UP (ref 10–45)
ANION GAP SERPL CALC-SCNC: 13 MMOL/L — SIGNIFICANT CHANGE UP (ref 5–17)
AST SERPL-CCNC: 25 U/L — SIGNIFICANT CHANGE UP (ref 10–40)
BILIRUB SERPL-MCNC: 0.3 MG/DL — SIGNIFICANT CHANGE UP (ref 0.2–1.2)
BUN SERPL-MCNC: 14 MG/DL — SIGNIFICANT CHANGE UP (ref 7–23)
CALCIUM SERPL-MCNC: 9.5 MG/DL — SIGNIFICANT CHANGE UP (ref 8.4–10.5)
CHLORIDE SERPL-SCNC: 103 MMOL/L — SIGNIFICANT CHANGE UP (ref 96–108)
CO2 SERPL-SCNC: 21 MMOL/L — LOW (ref 22–31)
CREAT SERPL-MCNC: 0.75 MG/DL — SIGNIFICANT CHANGE UP (ref 0.5–1.3)
GLUCOSE SERPL-MCNC: 85 MG/DL — SIGNIFICANT CHANGE UP (ref 70–99)
HCT VFR BLD CALC: 37 % — SIGNIFICANT CHANGE UP (ref 34.5–45)
HGB BLD-MCNC: 12.2 G/DL — SIGNIFICANT CHANGE UP (ref 11.5–15.5)
HIV-1 VIRAL LOAD RESULT: ABNORMAL
HIV1 RNA # SERPL NAA+PROBE: SIGNIFICANT CHANGE UP
HIV1 RNA SER-IMP: SIGNIFICANT CHANGE UP
HIV1 RNA SERPL NAA+PROBE-ACNC: ABNORMAL
HIV1 RNA SERPL NAA+PROBE-LOG#: ABNORMAL LG COP/ML
MCHC RBC-ENTMCNC: 30.3 PG — SIGNIFICANT CHANGE UP (ref 27–34)
MCHC RBC-ENTMCNC: 33 GM/DL — SIGNIFICANT CHANGE UP (ref 32–36)
MCV RBC AUTO: 92 FL — SIGNIFICANT CHANGE UP (ref 80–100)
PLATELET # BLD AUTO: 360 K/UL — SIGNIFICANT CHANGE UP (ref 150–400)
POTASSIUM SERPL-MCNC: 4.2 MMOL/L — SIGNIFICANT CHANGE UP (ref 3.5–5.3)
POTASSIUM SERPL-SCNC: 4.2 MMOL/L — SIGNIFICANT CHANGE UP (ref 3.5–5.3)
PROT SERPL-MCNC: 7.9 G/DL — SIGNIFICANT CHANGE UP (ref 6–8.3)
RBC # BLD: 4.02 M/UL — SIGNIFICANT CHANGE UP (ref 3.8–5.2)
RBC # FLD: 14.7 % — HIGH (ref 10.3–14.5)
SODIUM SERPL-SCNC: 137 MMOL/L — SIGNIFICANT CHANGE UP (ref 135–145)
WBC # BLD: 7.38 K/UL — SIGNIFICANT CHANGE UP (ref 3.8–10.5)
WBC # FLD AUTO: 7.38 K/UL — SIGNIFICANT CHANGE UP (ref 3.8–10.5)

## 2018-08-07 PROCEDURE — 80053 COMPREHEN METABOLIC PANEL: CPT

## 2018-08-07 PROCEDURE — 85027 COMPLETE CBC AUTOMATED: CPT

## 2018-08-07 PROCEDURE — 99214 OFFICE O/P EST MOD 30 MIN: CPT

## 2018-08-07 PROCEDURE — 86360 T CELL ABSOLUTE COUNT/RATIO: CPT

## 2018-08-07 PROCEDURE — G0463: CPT

## 2018-08-07 PROCEDURE — 87536 HIV-1 QUANT&REVRSE TRNSCRPJ: CPT

## 2018-08-08 LAB
4/8 RATIO: 0.26 RATIO — LOW (ref 0.9–3.6)
ABS CD8: 1245 /UL — HIGH (ref 136–757)
CD3 BLASTS SPEC-ACNC: 1565 /UL — SIGNIFICANT CHANGE UP (ref 799–2171)
CD3 BLASTS SPEC-ACNC: 41 % — LOW (ref 59–85)
CD4 %: 8 % — LOW (ref 36–65)
CD8 %: 32 % — SIGNIFICANT CHANGE UP (ref 11–36)
T-CELL CD4 SUBSET PNL BLD: 321 /UL — LOW (ref 489–1457)

## 2018-10-16 ENCOUNTER — APPOINTMENT (OUTPATIENT)
Dept: INFECTIOUS DISEASE | Facility: CLINIC | Age: 48
End: 2018-10-16
Payer: MEDICARE

## 2018-10-16 ENCOUNTER — OUTPATIENT (OUTPATIENT)
Dept: OUTPATIENT SERVICES | Facility: HOSPITAL | Age: 48
LOS: 1 days | End: 2018-10-16
Payer: COMMERCIAL

## 2018-10-16 VITALS
BODY MASS INDEX: 30.78 KG/M2 | SYSTOLIC BLOOD PRESSURE: 115 MMHG | OXYGEN SATURATION: 100 % | WEIGHT: 163 LBS | HEART RATE: 77 BPM | TEMPERATURE: 97.2 F | DIASTOLIC BLOOD PRESSURE: 72 MMHG | HEIGHT: 61 IN

## 2018-10-16 DIAGNOSIS — B20 HUMAN IMMUNODEFICIENCY VIRUS [HIV] DISEASE: ICD-10-CM

## 2018-10-16 PROCEDURE — G0008: CPT

## 2018-10-16 PROCEDURE — 99214 OFFICE O/P EST MOD 30 MIN: CPT

## 2018-10-16 PROCEDURE — 90686 IIV4 VACC NO PRSV 0.5 ML IM: CPT

## 2018-10-16 PROCEDURE — G0463: CPT | Mod: 25

## 2018-10-16 RX ORDER — HYPROMELLOSE 0 G/G
0.3 GEL OPHTHALMIC
Qty: 1 | Refills: 5 | Status: DISCONTINUED | COMMUNITY
Start: 2018-06-22 | End: 2018-10-16

## 2018-10-16 RX ORDER — DEXTRAN 70, HYPROMELLOSE 2910 3; 1 MG/ML; MG/ML
0.1-0.3 SOLUTION/ DROPS OPHTHALMIC
Qty: 10 | Refills: 0 | Status: DISCONTINUED | COMMUNITY
Start: 2018-06-22 | End: 2018-10-16

## 2018-10-28 DIAGNOSIS — Z23 ENCOUNTER FOR IMMUNIZATION: ICD-10-CM

## 2018-11-20 ENCOUNTER — APPOINTMENT (OUTPATIENT)
Dept: INFECTIOUS DISEASE | Facility: CLINIC | Age: 48
End: 2018-11-20

## 2018-12-10 ENCOUNTER — RX RENEWAL (OUTPATIENT)
Age: 48
End: 2018-12-10

## 2018-12-10 ENCOUNTER — MOBILE ON CALL (OUTPATIENT)
Age: 48
End: 2018-12-10

## 2018-12-10 ENCOUNTER — MEDICATION RENEWAL (OUTPATIENT)
Age: 48
End: 2018-12-10

## 2018-12-21 ENCOUNTER — APPOINTMENT (OUTPATIENT)
Dept: OPHTHALMOLOGY | Facility: CLINIC | Age: 48
End: 2018-12-21

## 2019-01-05 ENCOUNTER — EMERGENCY (EMERGENCY)
Facility: HOSPITAL | Age: 49
LOS: 1 days | Discharge: HOME | End: 2019-01-05
Attending: EMERGENCY MEDICINE
Payer: MEDICAID

## 2019-01-05 VITALS
HEART RATE: 87 BPM | RESPIRATION RATE: 20 BRPM | OXYGEN SATURATION: 98 % | TEMPERATURE: 98 F | DIASTOLIC BLOOD PRESSURE: 63 MMHG | SYSTOLIC BLOOD PRESSURE: 100 MMHG

## 2019-01-05 VITALS
OXYGEN SATURATION: 97 % | HEART RATE: 110 BPM | SYSTOLIC BLOOD PRESSURE: 120 MMHG | WEIGHT: 119.93 LBS | DIASTOLIC BLOOD PRESSURE: 79 MMHG | TEMPERATURE: 98 F | HEIGHT: 64 IN | RESPIRATION RATE: 20 BRPM

## 2019-01-05 LAB
ALBUMIN SERPL ELPH-MCNC: 3.9 G/DL — SIGNIFICANT CHANGE UP (ref 3.3–5)
ALP SERPL-CCNC: 64 U/L — SIGNIFICANT CHANGE UP (ref 40–120)
ALT FLD-CCNC: 24 U/L — SIGNIFICANT CHANGE UP (ref 10–45)
ANION GAP SERPL CALC-SCNC: 14 MMOL/L — SIGNIFICANT CHANGE UP (ref 5–17)
APPEARANCE UR: ABNORMAL
AST SERPL-CCNC: 26 U/L — SIGNIFICANT CHANGE UP (ref 10–40)
BACTERIA # UR AUTO: ABNORMAL
BASOPHILS # BLD AUTO: 0 K/UL — SIGNIFICANT CHANGE UP (ref 0–0.2)
BASOPHILS NFR BLD AUTO: 0.1 % — SIGNIFICANT CHANGE UP (ref 0–2)
BILIRUB SERPL-MCNC: 0.6 MG/DL — SIGNIFICANT CHANGE UP (ref 0.2–1.2)
BILIRUB UR-MCNC: NEGATIVE — SIGNIFICANT CHANGE UP
BUN SERPL-MCNC: 13 MG/DL — SIGNIFICANT CHANGE UP (ref 7–23)
CALCIUM SERPL-MCNC: 9.6 MG/DL — SIGNIFICANT CHANGE UP (ref 8.4–10.5)
CHLORIDE SERPL-SCNC: 102 MMOL/L — SIGNIFICANT CHANGE UP (ref 96–108)
CO2 SERPL-SCNC: 21 MMOL/L — LOW (ref 22–31)
COLOR SPEC: YELLOW — SIGNIFICANT CHANGE UP
CREAT SERPL-MCNC: 0.8 MG/DL — SIGNIFICANT CHANGE UP (ref 0.5–1.3)
DIFF PNL FLD: ABNORMAL
EOSINOPHIL # BLD AUTO: 0.2 K/UL — SIGNIFICANT CHANGE UP (ref 0–0.5)
EOSINOPHIL NFR BLD AUTO: 1.1 % — SIGNIFICANT CHANGE UP (ref 0–6)
EPI CELLS # UR: 1 /HPF — SIGNIFICANT CHANGE UP
GLUCOSE SERPL-MCNC: 97 MG/DL — SIGNIFICANT CHANGE UP (ref 70–99)
GLUCOSE UR QL: NEGATIVE — SIGNIFICANT CHANGE UP
HCT VFR BLD CALC: 36.6 % — SIGNIFICANT CHANGE UP (ref 34.5–45)
HGB BLD-MCNC: 12.8 G/DL — SIGNIFICANT CHANGE UP (ref 11.5–15.5)
HYALINE CASTS # UR AUTO: 10 /LPF — HIGH (ref 0–2)
KETONES UR-MCNC: ABNORMAL
LDH SERPL L TO P-CCNC: 276 U/L — HIGH (ref 50–242)
LEUKOCYTE ESTERASE UR-ACNC: ABNORMAL
LYMPHOCYTES # BLD AUTO: 21.7 % — SIGNIFICANT CHANGE UP (ref 13–44)
LYMPHOCYTES # BLD AUTO: 3.1 K/UL — SIGNIFICANT CHANGE UP (ref 1–3.3)
MCHC RBC-ENTMCNC: 31.2 PG — SIGNIFICANT CHANGE UP (ref 27–34)
MCHC RBC-ENTMCNC: 35 GM/DL — SIGNIFICANT CHANGE UP (ref 32–36)
MCV RBC AUTO: 89.2 FL — SIGNIFICANT CHANGE UP (ref 80–100)
MONOCYTES # BLD AUTO: 0.9 K/UL — SIGNIFICANT CHANGE UP (ref 0–0.9)
MONOCYTES NFR BLD AUTO: 6.6 % — SIGNIFICANT CHANGE UP (ref 2–14)
NEUTROPHILS # BLD AUTO: 10.2 K/UL — HIGH (ref 1.8–7.4)
NEUTROPHILS NFR BLD AUTO: 70.6 % — SIGNIFICANT CHANGE UP (ref 43–77)
NITRITE UR-MCNC: POSITIVE
PH UR: 7.5 — SIGNIFICANT CHANGE UP (ref 5–8)
PLAT MORPH BLD: NORMAL — SIGNIFICANT CHANGE UP
PLATELET # BLD AUTO: 391 K/UL — SIGNIFICANT CHANGE UP (ref 150–400)
POTASSIUM SERPL-MCNC: 4.4 MMOL/L — SIGNIFICANT CHANGE UP (ref 3.5–5.3)
POTASSIUM SERPL-SCNC: 4.4 MMOL/L — SIGNIFICANT CHANGE UP (ref 3.5–5.3)
PROT SERPL-MCNC: 8.2 G/DL — SIGNIFICANT CHANGE UP (ref 6–8.3)
PROT UR-MCNC: ABNORMAL
RAPID RVP RESULT: SIGNIFICANT CHANGE UP
RBC # BLD: 4.11 M/UL — SIGNIFICANT CHANGE UP (ref 3.8–5.2)
RBC # FLD: 11.3 % — SIGNIFICANT CHANGE UP (ref 10.3–14.5)
RBC BLD AUTO: SIGNIFICANT CHANGE UP
RBC CASTS # UR COMP ASSIST: 22 /HPF — HIGH (ref 0–4)
S PYO AG SPEC QL IA: NEGATIVE — SIGNIFICANT CHANGE UP
SODIUM SERPL-SCNC: 137 MMOL/L — SIGNIFICANT CHANGE UP (ref 135–145)
SP GR SPEC: 1.02 — SIGNIFICANT CHANGE UP (ref 1.01–1.02)
UROBILINOGEN FLD QL: NEGATIVE — SIGNIFICANT CHANGE UP
WBC # BLD: 14.4 K/UL — HIGH (ref 3.8–10.5)
WBC # FLD AUTO: 14.4 K/UL — HIGH (ref 3.8–10.5)
WBC UR QL: 166 /HPF — HIGH (ref 0–5)

## 2019-01-05 PROCEDURE — 87486 CHLMYD PNEUM DNA AMP PROBE: CPT

## 2019-01-05 PROCEDURE — 80053 COMPREHEN METABOLIC PANEL: CPT

## 2019-01-05 PROCEDURE — 99284 EMERGENCY DEPT VISIT MOD MDM: CPT

## 2019-01-05 PROCEDURE — 87581 M.PNEUMON DNA AMP PROBE: CPT

## 2019-01-05 PROCEDURE — 99284 EMERGENCY DEPT VISIT MOD MDM: CPT | Mod: 25

## 2019-01-05 PROCEDURE — 93005 ELECTROCARDIOGRAM TRACING: CPT

## 2019-01-05 PROCEDURE — 87081 CULTURE SCREEN ONLY: CPT

## 2019-01-05 PROCEDURE — 71045 X-RAY EXAM CHEST 1 VIEW: CPT | Mod: 26

## 2019-01-05 PROCEDURE — 87880 STREP A ASSAY W/OPTIC: CPT

## 2019-01-05 PROCEDURE — 71045 X-RAY EXAM CHEST 1 VIEW: CPT

## 2019-01-05 PROCEDURE — 83615 LACTATE (LD) (LDH) ENZYME: CPT

## 2019-01-05 PROCEDURE — 87633 RESP VIRUS 12-25 TARGETS: CPT

## 2019-01-05 PROCEDURE — 94640 AIRWAY INHALATION TREATMENT: CPT

## 2019-01-05 PROCEDURE — 96374 THER/PROPH/DIAG INJ IV PUSH: CPT

## 2019-01-05 PROCEDURE — 87798 DETECT AGENT NOS DNA AMP: CPT

## 2019-01-05 PROCEDURE — 85027 COMPLETE CBC AUTOMATED: CPT

## 2019-01-05 PROCEDURE — 96375 TX/PRO/DX INJ NEW DRUG ADDON: CPT

## 2019-01-05 PROCEDURE — 81001 URINALYSIS AUTO W/SCOPE: CPT

## 2019-01-05 RX ORDER — IPRATROPIUM/ALBUTEROL SULFATE 18-103MCG
3 AEROSOL WITH ADAPTER (GRAM) INHALATION ONCE
Qty: 0 | Refills: 0 | Status: COMPLETED | OUTPATIENT
Start: 2019-01-05 | End: 2019-01-05

## 2019-01-05 RX ORDER — CEFTRIAXONE 500 MG/1
1 INJECTION, POWDER, FOR SOLUTION INTRAMUSCULAR; INTRAVENOUS ONCE
Qty: 0 | Refills: 0 | Status: COMPLETED | OUTPATIENT
Start: 2019-01-05 | End: 2019-01-05

## 2019-01-05 RX ORDER — CEPHALEXIN 500 MG
1 CAPSULE ORAL
Qty: 40 | Refills: 0
Start: 2019-01-05 | End: 2019-01-14

## 2019-01-05 RX ORDER — SODIUM CHLORIDE 9 MG/ML
2000 INJECTION INTRAMUSCULAR; INTRAVENOUS; SUBCUTANEOUS ONCE
Qty: 0 | Refills: 0 | Status: COMPLETED | OUTPATIENT
Start: 2019-01-05 | End: 2019-01-05

## 2019-01-05 RX ORDER — ONDANSETRON 8 MG/1
4 TABLET, FILM COATED ORAL ONCE
Qty: 0 | Refills: 0 | Status: COMPLETED | OUTPATIENT
Start: 2019-01-05 | End: 2019-01-05

## 2019-01-05 RX ADMIN — CEFTRIAXONE 100 GRAM(S): 500 INJECTION, POWDER, FOR SOLUTION INTRAMUSCULAR; INTRAVENOUS at 14:14

## 2019-01-05 RX ADMIN — Medication 3 MILLILITER(S): at 10:30

## 2019-01-05 RX ADMIN — SODIUM CHLORIDE 2000 MILLILITER(S): 9 INJECTION INTRAMUSCULAR; INTRAVENOUS; SUBCUTANEOUS at 10:43

## 2019-01-05 RX ADMIN — Medication 125 MILLIGRAM(S): at 10:42

## 2019-01-05 NOTE — ED ADULT NURSE REASSESSMENT NOTE - NS ED NURSE REASSESS COMMENT FT1
Pt aaox4. Lying comfortable in stretcher. No acute distress at this time. Updated on plan of care. Antibiotics given for UTI

## 2019-01-05 NOTE — ED PROVIDER NOTE - NSFOLLOWUPINSTRUCTIONS_ED_ALL_ED_FT
charo - follow up with id call monday , motrin 800mg every 8 hrs for pain, keflex 4x daily x 10 days and return w worsening symptomns

## 2019-01-05 NOTE — ED ADULT NURSE NOTE - NSIMPLEMENTINTERV_GEN_ALL_ED
Implemented All Universal Safety Interventions:  [place] to call system. Call bell, personal items and telephone within reach. Instruct patient to call for assistance. Room bathroom lighting operational. Non-slip footwear when patient is off stretcher. Physically safe environment: no spills, clutter or unnecessary equipment. Stretcher in lowest position, wheels locked, appropriate side rails in place.

## 2019-01-05 NOTE — ED PROVIDER NOTE - MEDICAL DECISION MAKING DETAILS
charo - 7 days uri s/s assoc w post tussive n/v - pt co cough w pain, also endorses dysuria - in setting of hiv - r/o infection, r/o pna cxr, r/o flu s/s cw viral syndrome -- cj=hk labs - ck ua - pt actively wheezing - will give nebs and steroids, reeval

## 2019-01-05 NOTE — ED PROVIDER NOTE - SECONDARY DIAGNOSIS.
Nausea and vomiting, intractability of vomiting not specified, unspecified vomiting type Pyelonephritis

## 2019-01-05 NOTE — ED PROVIDER NOTE - CARE PLAN
Principal Discharge DX:	Cough  Secondary Diagnosis:	Nausea and vomiting, intractability of vomiting not specified, unspecified vomiting type Principal Discharge DX:	Cough  Secondary Diagnosis:	Nausea and vomiting, intractability of vomiting not specified, unspecified vomiting type  Secondary Diagnosis:	Pyelonephritis

## 2019-01-05 NOTE — ED PROVIDER NOTE - PHYSICAL EXAMINATION
aaox3 nad ncat perrl eomi no sceral icterus , s1s2 tachy wheexing bl abd soft nt nd no cvat - no rash no c/c/e - mild adenapathy cervical bl op no exudate

## 2019-01-05 NOTE — ED PROVIDER NOTE - OBJECTIVE STATEMENT
48 f with hiv now viral load undetetable, unk last cd4 on antiretroviral onl.y no proph abx, with 1 week of cough prodecive sputum assoc with nausa and vomiting 2-3x - no fever, pt endorses sore throat and body aches , pos wheeziong, co chest and back pain only with cough no sick contacts no travel -- pt also endosrse pain on urination no freq no urgency

## 2019-01-05 NOTE — ED ADULT NURSE NOTE - OBJECTIVE STATEMENT
----- Message from Onesimo Jovel DO sent at 3/8/2018  7:53 AM CST -----  Labs look good   48F pt AxOx3 ambulatory to ED c/o cough congestion, chest tightness x1wk, worsening today. Pt states she had fever 3days ago but was relieved w/ Tylenol/Motrin at home. Pt tolerating PO intake well. Pt states chest tightness only w/ cough, productive cough w/ N/V. PMHx HIV, pt states viral load undetectable. Resp even, unlabored. +Productive cough noted. Afebrile. No resp distress noted. MD at bedside for eval. EKG @ bedside.

## 2019-01-05 NOTE — ED PROVIDER NOTE - PSH
History of Cholecystectomy    S/P Bilateral Tubal Ligation    S/P  Section    S/P PEG (Percutaneous Endoscopic Gastrostomy) Placement and Removal

## 2019-01-05 NOTE — ED PROVIDER NOTE - PMH
CMV Infection    Gallstones    Hepatitis B    Histoplasmosis    HIV (Human Immunodeficiency Virus Infection)    Renal Vein Thrombosis  2010, s/p Coumadin for 6 months  Strongyloidosis

## 2019-01-07 LAB
CULTURE RESULTS: SIGNIFICANT CHANGE UP
SPECIMEN SOURCE: SIGNIFICANT CHANGE UP

## 2019-01-08 ENCOUNTER — MOBILE ON CALL (OUTPATIENT)
Age: 49
End: 2019-01-08

## 2019-01-09 ENCOUNTER — MOBILE ON CALL (OUTPATIENT)
Age: 49
End: 2019-01-09

## 2019-01-15 ENCOUNTER — LABORATORY RESULT (OUTPATIENT)
Age: 49
End: 2019-01-15

## 2019-01-15 ENCOUNTER — APPOINTMENT (OUTPATIENT)
Dept: INFECTIOUS DISEASE | Facility: CLINIC | Age: 49
End: 2019-01-15

## 2019-01-15 ENCOUNTER — OUTPATIENT (OUTPATIENT)
Dept: OUTPATIENT SERVICES | Facility: HOSPITAL | Age: 49
LOS: 1 days | End: 2019-01-15
Payer: MEDICAID

## 2019-01-15 ENCOUNTER — APPOINTMENT (OUTPATIENT)
Dept: INFECTIOUS DISEASE | Facility: CLINIC | Age: 49
End: 2019-01-15
Payer: COMMERCIAL

## 2019-01-15 VITALS
HEIGHT: 61 IN | HEART RATE: 95 BPM | TEMPERATURE: 97.2 F | OXYGEN SATURATION: 98 % | WEIGHT: 156 LBS | BODY MASS INDEX: 29.45 KG/M2 | SYSTOLIC BLOOD PRESSURE: 118 MMHG | DIASTOLIC BLOOD PRESSURE: 80 MMHG

## 2019-01-15 DIAGNOSIS — B97.89 ACUTE UPPER RESPIRATORY INFECTION, UNSPECIFIED: ICD-10-CM

## 2019-01-15 DIAGNOSIS — J06.9 ACUTE UPPER RESPIRATORY INFECTION, UNSPECIFIED: ICD-10-CM

## 2019-01-15 DIAGNOSIS — B20 HUMAN IMMUNODEFICIENCY VIRUS [HIV] DISEASE: ICD-10-CM

## 2019-01-15 LAB
24R-OH-CALCIDIOL SERPL-MCNC: 76.1 NG/ML — SIGNIFICANT CHANGE UP (ref 30–80)
4/8 RATIO: 0.27 RATIO — LOW (ref 0.9–3.6)
ABS CD8: 868 /UL — HIGH (ref 142–740)
AFP-TM SERPL-MCNC: 1.6 NG/ML — SIGNIFICANT CHANGE UP
ALBUMIN SERPL ELPH-MCNC: 3.6 G/DL — SIGNIFICANT CHANGE UP (ref 3.3–5)
ALP SERPL-CCNC: 61 U/L — SIGNIFICANT CHANGE UP (ref 40–120)
ALT FLD-CCNC: 37 U/L — SIGNIFICANT CHANGE UP (ref 10–45)
ANION GAP SERPL CALC-SCNC: 12 MMOL/L — SIGNIFICANT CHANGE UP (ref 5–17)
AST SERPL-CCNC: 32 U/L — SIGNIFICANT CHANGE UP (ref 10–40)
BILIRUB SERPL-MCNC: 0.2 MG/DL — SIGNIFICANT CHANGE UP (ref 0.2–1.2)
BUN SERPL-MCNC: 13 MG/DL — SIGNIFICANT CHANGE UP (ref 7–23)
C TRACH RRNA SPEC QL NAA+PROBE: SIGNIFICANT CHANGE UP
CALCIUM SERPL-MCNC: 9.3 MG/DL — SIGNIFICANT CHANGE UP (ref 8.4–10.5)
CD3 BLASTS SPEC-ACNC: 1114 /UL — SIGNIFICANT CHANGE UP (ref 672–1870)
CD3 BLASTS SPEC-ACNC: 42 % — LOW (ref 59–83)
CD4 %: 9 % — LOW (ref 30–62)
CD8 %: 33 % — SIGNIFICANT CHANGE UP (ref 12–36)
CHLORIDE SERPL-SCNC: 103 MMOL/L — SIGNIFICANT CHANGE UP (ref 96–108)
CHOLEST SERPL-MCNC: 212 MG/DL — HIGH (ref 10–199)
CO2 SERPL-SCNC: 22 MMOL/L — SIGNIFICANT CHANGE UP (ref 22–31)
CREAT ?TM UR-MCNC: 180 MG/DL — SIGNIFICANT CHANGE UP
CREAT SERPL-MCNC: 0.76 MG/DL — SIGNIFICANT CHANGE UP (ref 0.5–1.3)
GLUCOSE SERPL-MCNC: 85 MG/DL — SIGNIFICANT CHANGE UP (ref 70–99)
HBA1C BLD-MCNC: 5.8 % — HIGH (ref 4–5.6)
HCT VFR BLD CALC: 35.8 % — SIGNIFICANT CHANGE UP (ref 34.5–45)
HCV AB S/CO SERPL IA: 0.14 S/CO — SIGNIFICANT CHANGE UP
HCV AB SERPL-IMP: SIGNIFICANT CHANGE UP
HDLC SERPL-MCNC: 47 MG/DL — LOW
HGB BLD-MCNC: 11.4 G/DL — LOW (ref 11.5–15.5)
LIPID PNL WITH DIRECT LDL SERPL: 149 MG/DL — HIGH
MCHC RBC-ENTMCNC: 29.6 PG — SIGNIFICANT CHANGE UP (ref 27–34)
MCHC RBC-ENTMCNC: 31.8 GM/DL — LOW (ref 32–36)
MCV RBC AUTO: 93 FL — SIGNIFICANT CHANGE UP (ref 80–100)
N GONORRHOEA RRNA SPEC QL NAA+PROBE: SIGNIFICANT CHANGE UP
PLATELET # BLD AUTO: 401 K/UL — HIGH (ref 150–400)
POTASSIUM SERPL-MCNC: 4.6 MMOL/L — SIGNIFICANT CHANGE UP (ref 3.5–5.3)
POTASSIUM SERPL-SCNC: 4.6 MMOL/L — SIGNIFICANT CHANGE UP (ref 3.5–5.3)
PROT ?TM UR-MCNC: 23 MG/DL — HIGH (ref 0–12)
PROT SERPL-MCNC: 7.3 G/DL — SIGNIFICANT CHANGE UP (ref 6–8.3)
PROT/CREAT UR-RTO: 0.1 RATIO — SIGNIFICANT CHANGE UP (ref 0–0.2)
RBC # BLD: 3.85 M/UL — SIGNIFICANT CHANGE UP (ref 3.8–5.2)
RBC # FLD: 14.3 % — SIGNIFICANT CHANGE UP (ref 10.3–14.5)
SODIUM SERPL-SCNC: 137 MMOL/L — SIGNIFICANT CHANGE UP (ref 135–145)
SPECIMEN SOURCE: SIGNIFICANT CHANGE UP
T PALLIDUM AB TITR SER: NEGATIVE — SIGNIFICANT CHANGE UP
T-CELL CD4 SUBSET PNL BLD: 231 /UL — LOW (ref 489–1457)
TOTAL CHOLESTEROL/HDL RATIO MEASUREMENT: 4.5 RATIO — SIGNIFICANT CHANGE UP (ref 3.3–7.1)
TRIGL SERPL-MCNC: 81 MG/DL — SIGNIFICANT CHANGE UP (ref 10–149)
WBC # BLD: 6.16 K/UL — SIGNIFICANT CHANGE UP (ref 3.8–10.5)
WBC # FLD AUTO: 6.16 K/UL — SIGNIFICANT CHANGE UP (ref 3.8–10.5)

## 2019-01-15 PROCEDURE — G0463: CPT

## 2019-01-15 PROCEDURE — 87536 HIV-1 QUANT&REVRSE TRNSCRPJ: CPT

## 2019-01-15 PROCEDURE — 86803 HEPATITIS C AB TEST: CPT

## 2019-01-15 PROCEDURE — 86360 T CELL ABSOLUTE COUNT/RATIO: CPT

## 2019-01-15 PROCEDURE — 87591 N.GONORRHOEAE DNA AMP PROB: CPT

## 2019-01-15 PROCEDURE — 86480 TB TEST CELL IMMUN MEASURE: CPT

## 2019-01-15 PROCEDURE — 85027 COMPLETE CBC AUTOMATED: CPT

## 2019-01-15 PROCEDURE — 84156 ASSAY OF PROTEIN URINE: CPT

## 2019-01-15 PROCEDURE — 82105 ALPHA-FETOPROTEIN SERUM: CPT

## 2019-01-15 PROCEDURE — 83036 HEMOGLOBIN GLYCOSYLATED A1C: CPT

## 2019-01-15 PROCEDURE — 80053 COMPREHEN METABOLIC PANEL: CPT

## 2019-01-15 PROCEDURE — 80061 LIPID PANEL: CPT

## 2019-01-15 PROCEDURE — 99214 OFFICE O/P EST MOD 30 MIN: CPT

## 2019-01-15 PROCEDURE — 87491 CHLMYD TRACH DNA AMP PROBE: CPT

## 2019-01-15 PROCEDURE — 86780 TREPONEMA PALLIDUM: CPT

## 2019-01-15 PROCEDURE — 82306 VITAMIN D 25 HYDROXY: CPT

## 2019-01-15 PROCEDURE — 82570 ASSAY OF URINE CREATININE: CPT

## 2019-01-15 PROCEDURE — 87517 HEPATITIS B DNA QUANT: CPT

## 2019-01-15 RX ORDER — EMTRICITABINE AND TENOFOVIR ALAFENAMIDE 200; 25 MG/1; MG/1
200-25 TABLET ORAL
Qty: 30 | Refills: 3 | Status: DISCONTINUED | COMMUNITY
Start: 2017-08-08 | End: 2019-01-15

## 2019-01-15 RX ORDER — DARUNAVIR ETHANOLATE AND COBICISTAT 800; 150 MG/1; MG/1
800-150 TABLET, FILM COATED ORAL
Qty: 30 | Refills: 3 | Status: DISCONTINUED | COMMUNITY
Start: 2017-08-08 | End: 2019-01-15

## 2019-01-16 LAB
GAMMA INTERFERON BACKGROUND BLD IA-ACNC: 0.07 IU/ML — SIGNIFICANT CHANGE UP
HBV DNA # SERPL NAA+PROBE: SIGNIFICANT CHANGE UP IU/ML
HBV DNA SERPL NAA+PROBE-LOG#: ABNORMAL LOGIU/ML
HIV-1 VIRAL LOAD RESULT: ABNORMAL
HIV1 RNA # SERPL NAA+PROBE: SIGNIFICANT CHANGE UP
HIV1 RNA SER-IMP: SIGNIFICANT CHANGE UP
HIV1 RNA SERPL NAA+PROBE-ACNC: ABNORMAL
HIV1 RNA SERPL NAA+PROBE-LOG#: ABNORMAL LG COP/ML
M TB IFN-G BLD-IMP: NEGATIVE — SIGNIFICANT CHANGE UP
M TB IFN-G CD4+ BCKGRND COR BLD-ACNC: 0.02 IU/ML — SIGNIFICANT CHANGE UP
M TB IFN-G CD4+CD8+ BCKGRND COR BLD-ACNC: 0.07 IU/ML — SIGNIFICANT CHANGE UP
QUANT TB PLUS MITOGEN MINUS NIL: 6.62 IU/ML — SIGNIFICANT CHANGE UP

## 2019-01-16 NOTE — REVIEW OF SYSTEMS
[Shortness Of Breath] : shortness of breath [Cough] : cough [Sputum] : coughing up ~M sputum [Negative] : Heme/Lymph [Earache] : no earache [Loss Of Hearing] : no hearing loss [Nasal Discharge] : no nasal discharge [Sore Throat] : no sore throat [Hoarseness] : no hoarseness [Wheezing] : no wheezing [SOB on Exertion] : no shortness of breath during exertion [Pleuritic Chest Pain] : no pleuritic chest pain [FreeTextEntry4] : +lip lesion on upper lip on left

## 2019-01-16 NOTE — HISTORY OF PRESENT ILLNESS
[FreeTextEntry1] : 48 year old female with PMH HIV/AIDS, Strongyloides Infection (s/p Treatment in 2014), Disseminated Histoplasmosis (s/p treatment in 2014 & 2015), Splenic Infarct, Renal Vein Thrombosis who presents for followup. Last seen in ID clinic on 10/16/18. Patient was seen in the [facility] ED on 1/5/19 for symptoms of cough, fatigue, chills. Notes cough started first ~2 weeks ago. Chills and fatigue started afterwards. Patient was taking motrin for the chills at home prior to presentation to the ED (did not take her temperature at home) In the ED WBC was 14.4 with 70.6% PMN. CXR was clear. RVP was negative. Diagnosed with likely viral URI not detected by RVP. Patient was noted to have wheezing so apparently someone recommended pulmonology referral. Patient also had dysuria with a U/A with pyuria. In the ED received a dose of Ceftriaxone and a dose of Solumedrol. Discharged with course of Keflex.\par \par Since the ED discharge she notes continued cough which is productive of clear sputum. Notes note more chills or fevers. Notes that she only had dysuria for the day she presented to the ED. Has been taking keflex but only taking once a day (not QID). Notes no dysuria at this time. Patient also noted missing ~1-2 weeks of ART prior to her ED visit due to nausea and vomiting with her medications. Has since restarted her ART without issue.  [Sexually Active] : The patient is not sexually active

## 2019-01-16 NOTE — END OF VISIT
[] : Fellow [FreeTextEntry3] : HIV, Hep B, Histo, H/O intermittent poor adherence. Missed 1-2 weeks of ARV recently due to N/V, now resolved. Check VL, Hep B PCR, encourage adherence, will call if Sx recur, can use zofran for nausea. [Time Spent: ___ minutes] : I have spent [unfilled] minutes of face to face time with the patient

## 2019-01-16 NOTE — PHYSICAL EXAM
[General Appearance - Alert] : alert [General Appearance - In No Acute Distress] : in no acute distress [Sclera] : the sclera and conjunctiva were normal [PERRL With Normal Accommodation] : pupils were equal in size, round, reactive to light [Extraocular Movements] : extraocular movements were intact [Outer Ear] : the ears and nose were normal in appearance [Oropharynx] : the oropharynx was normal with no thrush [Neck Appearance] : the appearance of the neck was normal [Neck Cervical Mass (___cm)] : no neck mass was observed [Jugular Venous Distention Increased] : there was no jugular-venous distention [Auscultation Breath Sounds / Voice Sounds] : lungs were clear to auscultation bilaterally [Heart Rate And Rhythm] : heart rate was normal and rhythm regular [Heart Sounds] : normal S1 and S2 [Heart Sounds Gallop] : no gallops [Murmurs] : no murmurs [Heart Sounds Pericardial Friction Rub] : no pericardial rub [Edema] : there was no peripheral edema [Bowel Sounds] : normal bowel sounds [Abdomen Soft] : soft [Abdomen Tenderness] : non-tender [] : no hepato-splenomegaly [Abdomen Mass (___ Cm)] : no abdominal mass palpated [No Palpable Adenopathy] : no palpable adenopathy [Musculoskeletal - Swelling] : no joint swelling [Nail Clubbing] : no clubbing  or cyanosis of the fingernails [Motor Tone] : muscle strength and tone were normal [Skin Color & Pigmentation] : normal skin color and pigmentation [No Focal Deficits] : no focal deficits [Affect] : the affect was normal [FreeTextEntry1] : +ulcerated/pustular lesion on upper lip on the left - consistent with herpes labialis

## 2019-01-16 NOTE — ASSESSMENT
[FreeTextEntry1] : 48 year-old female with PMH HIV/AIDS, Strongyloides Infection (s/p Treatment in 2014), Disseminated Histoplasmosis (s/p treatment in 2014 & 2015), Splenic Infarct, Renal Vein Thrombosis.\par \par HIV (CD4 321, VL UD - 8/7/18)\par --Treated with Truvada/Isentress until 9/2014. Long term intermittent adherence with ARV and thus had virologic breakthrough. She was nonsuppressive with M184V mutation. Regimen was changed to Truvada and boosted Prezista for higher barrier to resistance.\par --Switched from Truvada/Prezista/Norvir to Descovy/Prezcobix on 8/8/2017.\par --Switched from Descovy/Prezcobix to Symtuza on 10/2018\par --Missed 1-2 weeks of medication due to N/V - stressed importance of adherence\par --Continue Symtuza\par --Annual HIV labs\par \par Viral URI\par --CXR in ED clear. No rales/wheezes/rhonchi on pulm exam today\par --Robiitussin PRN for cough\par --CBC today\par \par Herpes Labialis\par --Valtrex 1g PO Q12H x5 days\par \par ?UTI\par --No symptoms and uncertain if truly UTI or simply pyuria. No symptoms at this time on subtherapeutic keflex.\par --Discontinue Keflex\par \par Hepatitis B\par --Check AFP\par --Check HBV DNA\par --RUQ US (7/10/18)\par --Fibroscan 3/6/2017 - Fibrosis Score 0 (No Fibrosis) - Steatosis Score 0 (No Steatosis)\par --Continue Symtuza as above\par \par Vitamin D Deficiency\par --25 Hydroxy Vitamin D 25.4\par --Continue Vtamin D 50,000U Q7D\par \par Health Maintenance - Immunizations\par --UTD\par --Influenza 10/16/18\par --Menactra 2/28/17, 8/8/17\par --Hepatitis A Immune\par --PPSV 7/28/15\par --PCV 3/10/15\par --Tdap 11/25/14. \par \par Health Care Maintenance - Other\par --Pap Smear - 4/11/18- Negative for Intraepithelial Lesion/Malignancy (negative q1yr x 3 years with negative HPV)\par --Mammography - 2018 - Reportedly WNL - will continue annual mammography\par --Obtain annual labwork\par \par Followup: 1 month (due to missed ART - maintaining close followup)

## 2019-01-25 ENCOUNTER — APPOINTMENT (OUTPATIENT)
Dept: ULTRASOUND IMAGING | Facility: CLINIC | Age: 49
End: 2019-01-25

## 2019-02-12 ENCOUNTER — LABORATORY RESULT (OUTPATIENT)
Age: 49
End: 2019-02-12

## 2019-02-12 ENCOUNTER — APPOINTMENT (OUTPATIENT)
Dept: INFECTIOUS DISEASE | Facility: CLINIC | Age: 49
End: 2019-02-12
Payer: MEDICARE

## 2019-02-12 ENCOUNTER — OUTPATIENT (OUTPATIENT)
Dept: OUTPATIENT SERVICES | Facility: HOSPITAL | Age: 49
LOS: 1 days | End: 2019-02-12
Payer: MEDICAID

## 2019-02-12 VITALS
BODY MASS INDEX: 29.45 KG/M2 | WEIGHT: 156 LBS | TEMPERATURE: 97 F | HEART RATE: 88 BPM | OXYGEN SATURATION: 100 % | SYSTOLIC BLOOD PRESSURE: 114 MMHG | DIASTOLIC BLOOD PRESSURE: 73 MMHG | HEIGHT: 61 IN

## 2019-02-12 DIAGNOSIS — B20 HUMAN IMMUNODEFICIENCY VIRUS [HIV] DISEASE: ICD-10-CM

## 2019-02-12 LAB
4/8 RATIO: 0.26 RATIO — LOW (ref 0.9–3.6)
ABS CD8: 994 /UL — HIGH (ref 142–740)
CD3 BLASTS SPEC-ACNC: 1263 /UL — SIGNIFICANT CHANGE UP (ref 672–1870)
CD3 BLASTS SPEC-ACNC: 45 % — LOW (ref 59–83)
CD4 %: 9 % — LOW (ref 30–62)
CD8 %: 35 % — SIGNIFICANT CHANGE UP (ref 12–36)
HCT VFR BLD CALC: 40 % — SIGNIFICANT CHANGE UP (ref 34.5–45)
HGB BLD-MCNC: 12.6 G/DL — SIGNIFICANT CHANGE UP (ref 11.5–15.5)
HIV-1 VIRAL LOAD RESULT: ABNORMAL
HIV1 RNA # SERPL NAA+PROBE: SIGNIFICANT CHANGE UP
HIV1 RNA SER-IMP: SIGNIFICANT CHANGE UP
HIV1 RNA SERPL NAA+PROBE-ACNC: ABNORMAL
HIV1 RNA SERPL NAA+PROBE-LOG#: ABNORMAL LG COP/ML
MCHC RBC-ENTMCNC: 29.6 PG — SIGNIFICANT CHANGE UP (ref 27–34)
MCHC RBC-ENTMCNC: 31.5 GM/DL — LOW (ref 32–36)
MCV RBC AUTO: 94.1 FL — SIGNIFICANT CHANGE UP (ref 80–100)
PLATELET # BLD AUTO: 452 K/UL — HIGH (ref 150–400)
RBC # BLD: 4.25 M/UL — SIGNIFICANT CHANGE UP (ref 3.8–5.2)
RBC # FLD: 15.5 % — HIGH (ref 10.3–14.5)
T-CELL CD4 SUBSET PNL BLD: 258 /UL — LOW (ref 489–1457)
WBC # BLD: 6.35 K/UL — SIGNIFICANT CHANGE UP (ref 3.8–10.5)
WBC # FLD AUTO: 6.35 K/UL — SIGNIFICANT CHANGE UP (ref 3.8–10.5)

## 2019-02-12 PROCEDURE — 80053 COMPREHEN METABOLIC PANEL: CPT

## 2019-02-12 PROCEDURE — 86360 T CELL ABSOLUTE COUNT/RATIO: CPT

## 2019-02-12 PROCEDURE — G0463: CPT

## 2019-02-12 PROCEDURE — 99214 OFFICE O/P EST MOD 30 MIN: CPT

## 2019-02-12 PROCEDURE — 87536 HIV-1 QUANT&REVRSE TRNSCRPJ: CPT

## 2019-02-12 PROCEDURE — 85027 COMPLETE CBC AUTOMATED: CPT

## 2019-02-12 NOTE — PHYSICAL EXAM
[General Appearance - Alert] : alert [General Appearance - In No Acute Distress] : in no acute distress [Sclera] : the sclera and conjunctiva were normal [PERRL With Normal Accommodation] : pupils were equal in size, round, reactive to light [Extraocular Movements] : extraocular movements were intact [Outer Ear] : the ears and nose were normal in appearance [Oropharynx] : the oropharynx was normal with no thrush [Neck Appearance] : the appearance of the neck was normal [Auscultation Breath Sounds / Voice Sounds] : lungs were clear to auscultation bilaterally [Heart Rate And Rhythm] : heart rate was normal and rhythm regular [Heart Sounds] : normal S1 and S2 [Heart Sounds Gallop] : no gallops [Murmurs] : no murmurs [Heart Sounds Pericardial Friction Rub] : no pericardial rub [Full Pulse] : the pedal pulses are present [Edema] : there was no peripheral edema [Bowel Sounds] : normal bowel sounds [Abdomen Soft] : soft [Abdomen Tenderness] : non-tender [No Palpable Adenopathy] : no palpable adenopathy [Musculoskeletal - Swelling] : no joint swelling [Nail Clubbing] : no clubbing  or cyanosis of the fingernails [Motor Tone] : muscle strength and tone were normal [Skin Color & Pigmentation] : normal skin color and pigmentation [] : no rash [No Focal Deficits] : no focal deficits [Affect] : the affect was normal

## 2019-02-13 LAB
ALBUMIN SERPL ELPH-MCNC: 4.2 G/DL — SIGNIFICANT CHANGE UP (ref 3.3–5)
ALP SERPL-CCNC: 67 U/L — SIGNIFICANT CHANGE UP (ref 40–120)
ALT FLD-CCNC: 24 U/L — SIGNIFICANT CHANGE UP (ref 10–45)
ANION GAP SERPL CALC-SCNC: 11 MMOL/L — SIGNIFICANT CHANGE UP (ref 5–17)
AST SERPL-CCNC: 25 U/L — SIGNIFICANT CHANGE UP (ref 10–40)
BILIRUB SERPL-MCNC: 0.3 MG/DL — SIGNIFICANT CHANGE UP (ref 0.2–1.2)
BUN SERPL-MCNC: 15 MG/DL — SIGNIFICANT CHANGE UP (ref 7–23)
CALCIUM SERPL-MCNC: 9.8 MG/DL — SIGNIFICANT CHANGE UP (ref 8.4–10.5)
CHLORIDE SERPL-SCNC: 103 MMOL/L — SIGNIFICANT CHANGE UP (ref 96–108)
CO2 SERPL-SCNC: 22 MMOL/L — SIGNIFICANT CHANGE UP (ref 22–31)
CREAT SERPL-MCNC: 0.81 MG/DL — SIGNIFICANT CHANGE UP (ref 0.5–1.3)
GLUCOSE SERPL-MCNC: 61 MG/DL — LOW (ref 70–99)
POTASSIUM SERPL-MCNC: 4.6 MMOL/L — SIGNIFICANT CHANGE UP (ref 3.5–5.3)
POTASSIUM SERPL-SCNC: 4.6 MMOL/L — SIGNIFICANT CHANGE UP (ref 3.5–5.3)
PROT SERPL-MCNC: 8 G/DL — SIGNIFICANT CHANGE UP (ref 6–8.3)
SODIUM SERPL-SCNC: 136 MMOL/L — SIGNIFICANT CHANGE UP (ref 135–145)

## 2019-02-14 NOTE — END OF VISIT
[] : Fellow [FreeTextEntry3] : HIV on Symtuza, Hep B +, had period of poor adherence, continue close followup and adherence support. [Time Spent: ___ minutes] : I have spent [unfilled] minutes of face to face time with the patient

## 2019-02-14 NOTE — ASSESSMENT
[FreeTextEntry1] : 48 year-old female with PMH HIV/AIDS, Strongyloides Infection (s/p Treatment in 2014), Disseminated Histoplasmosis (s/p treatment in 2014 & 2015), Splenic Infarct, Renal Vein Thrombosis.\par \par HIV (CD4 231, VL UD - 1/15/19)\par --Treated with Truvada/Isentress until 9/2014. Long term intermittent adherence with ARV and thus had virologic breakthrough. She was nonsuppressive with M184V mutation. Regimen was changed to Truvada and boosted Prezista for higher barrier to resistance.\par --Switched from Truvada/Prezista/Norvir to Descovy/Prezcobix on 8/8/2017.\par --Switched from Descovy/Prezcobix to Symtuza on 10/2018\par --Continue Symtuza\par --Quarterly HIV labs\par \par Hepatitis B\par --AFP 1.6 (1/16/19)\par --HBV DNA <15 (1/16/19)\par --RUQ US Ordered - Awaiting insurance approval\par --Fibroscan 3/6/2017 - Fibrosis Score 0 (No Fibrosis) - Steatosis Score 0 (No Steatosis)\par --Continue Symtuza as above\par \par Vitamin D Deficiency\par --25 Hydroxy Vitamin D 25.4\par --Continue Vtamin D 50,000U Q7D\par \par Health Maintenance - Immunizations\par --UTD\par --Influenza 10/16/18\par --Menactra 2/28/17, 8/8/17\par --Hepatitis A Immune\par --PPSV 7/28/15\par --PCV 3/10/15\par --Tdap 11/25/14. \par \par Health Care Maintenance - Other\par --Pap Smear - 4/11/18- Negative for Intraepithelial Lesion/Malignancy (negative q1yr x 3 years with negative HPV)\par --Mammography - 2018 - Reportedly WNL - will order for mammography\par \par Followup: 3 month \par

## 2019-02-14 NOTE — HISTORY OF PRESENT ILLNESS
[FreeTextEntry1] : 48 year old female with PMH HIV/AIDS, Strongyloides Infection (s/p Treatment in 2014), Disseminated Histoplasmosis (s/p treatment in 2014 & 2015), Splenic Infarct, Renal Vein Thrombosis who presents for followup. Last seen in ID clinic on  1/15/2019. Notes complete compliance with her ART since the last visit. Denies any side effect from her ART. Notes resolution of her nausea and vomiting noted at the last clinic visit.  [Sexually Active] : The patient is not sexually active

## 2019-04-15 ENCOUNTER — RX RENEWAL (OUTPATIENT)
Age: 49
End: 2019-04-15

## 2019-05-02 ENCOUNTER — FORM ENCOUNTER (OUTPATIENT)
Age: 49
End: 2019-05-02

## 2019-05-03 ENCOUNTER — APPOINTMENT (OUTPATIENT)
Dept: ULTRASOUND IMAGING | Facility: CLINIC | Age: 49
End: 2019-05-03
Payer: MEDICARE

## 2019-05-03 ENCOUNTER — OUTPATIENT (OUTPATIENT)
Dept: OUTPATIENT SERVICES | Facility: HOSPITAL | Age: 49
LOS: 1 days | End: 2019-05-03
Payer: COMMERCIAL

## 2019-05-03 ENCOUNTER — APPOINTMENT (OUTPATIENT)
Dept: MAMMOGRAPHY | Facility: CLINIC | Age: 49
End: 2019-05-03
Payer: MEDICARE

## 2019-05-03 DIAGNOSIS — B20 HUMAN IMMUNODEFICIENCY VIRUS [HIV] DISEASE: ICD-10-CM

## 2019-05-03 PROCEDURE — 76705 ECHO EXAM OF ABDOMEN: CPT | Mod: 26

## 2019-05-03 PROCEDURE — 77066 DX MAMMO INCL CAD BI: CPT | Mod: 26

## 2019-05-03 PROCEDURE — G0279: CPT | Mod: 26

## 2019-05-03 PROCEDURE — G0279: CPT

## 2019-05-03 PROCEDURE — 77066 DX MAMMO INCL CAD BI: CPT

## 2019-05-03 PROCEDURE — 76705 ECHO EXAM OF ABDOMEN: CPT

## 2019-05-14 ENCOUNTER — LABORATORY RESULT (OUTPATIENT)
Age: 49
End: 2019-05-14

## 2019-05-14 ENCOUNTER — OUTPATIENT (OUTPATIENT)
Dept: OUTPATIENT SERVICES | Facility: HOSPITAL | Age: 49
LOS: 1 days | End: 2019-05-14
Payer: COMMERCIAL

## 2019-05-14 ENCOUNTER — APPOINTMENT (OUTPATIENT)
Dept: INFECTIOUS DISEASE | Facility: CLINIC | Age: 49
End: 2019-05-14
Payer: MEDICARE

## 2019-05-14 VITALS
SYSTOLIC BLOOD PRESSURE: 120 MMHG | WEIGHT: 160 LBS | HEART RATE: 79 BPM | BODY MASS INDEX: 30.23 KG/M2 | OXYGEN SATURATION: 99 % | DIASTOLIC BLOOD PRESSURE: 73 MMHG | TEMPERATURE: 98.1 F

## 2019-05-14 DIAGNOSIS — B20 HUMAN IMMUNODEFICIENCY VIRUS [HIV] DISEASE: ICD-10-CM

## 2019-05-14 PROCEDURE — G0463: CPT

## 2019-05-14 PROCEDURE — 86360 T CELL ABSOLUTE COUNT/RATIO: CPT

## 2019-05-14 PROCEDURE — 87517 HEPATITIS B DNA QUANT: CPT

## 2019-05-14 PROCEDURE — 99214 OFFICE O/P EST MOD 30 MIN: CPT

## 2019-05-14 PROCEDURE — 85027 COMPLETE CBC AUTOMATED: CPT

## 2019-05-14 PROCEDURE — 80053 COMPREHEN METABOLIC PANEL: CPT

## 2019-05-14 PROCEDURE — 87536 HIV-1 QUANT&REVRSE TRNSCRPJ: CPT

## 2019-05-14 NOTE — REVIEW OF SYSTEMS
[Joint Pain] : joint pain [Negative] : Heme/Lymph [Joint Swelling] : no joint swelling [Limb Pain] : no limb pain [Joint Stiffness] : no joint stiffness [Limb Swelling] : no limb swelling

## 2019-05-14 NOTE — ASSESSMENT
[FreeTextEntry1] : 48 year-old female with PMH HIV/AIDS, Strongyloides Infection (s/p Treatment in 2014), Disseminated Histoplasmosis (s/p treatment in 2014 & 2015), Splenic Infarct, Renal Vein Thrombosis.\par \par HIV (CD4 258, VL UD - 2/12/19)\par --Treated with Truvada/Isentress until 9/2014. Long term intermittent adherence with ARV and thus had virologic breakthrough. She was nonsuppressive with M184V mutation. Regimen was changed to Truvada and boosted Prezista for higher barrier to resistance.\par --Switched from Truvada/Prezista/Norvir to Descovy/Prezcobix on 8/8/2017.\par --Switched from Descovy/Prezcobix to Symtuza on 10/2018\par --Continue Symtuza\par --Quarterly HIV labs\par \par Hepatitis B\par --AFP 1.6 (1/16/19)\par --HBV DNA <15 (1/16/19)\par --RUQ US - 5/3/19 - WNL\par --Fibroscan 3/6/2017 - Fibrosis Score 0 (No Fibrosis) - Steatosis Score 0 (No Steatosis)\par --Continue Symtuza as above\par --Check HBV Serum PCR\par \par Vitamin D Deficiency\par --25 Hydroxy Vitamin D 25.4\par --Continue Vtamin D 50,000U Q7D\par \par Health Maintenance - Immunizations\par --UTD\par --Influenza 10/16/18\par --Menactra 2/28/17, 8/8/17\par --Hepatitis A Immune\par --PPSV 7/28/15\par --PCV 3/10/15\par --Tdap 11/25/14. \par \par Health Care Maintenance - Other\par --Pap Smear - 4/11/18- Negative for Intraepithelial Lesion/Malignancy (negative q1yr x 3 years with negative HPV)\par --Mammography - 5/3/19  - WNL\par \par Followup: 3 month \par .

## 2019-05-14 NOTE — HISTORY OF PRESENT ILLNESS
[Sexually Active] : The patient is not sexually active [FreeTextEntry1] : 48 year old female with PMH HIV/AIDS, Strongyloides Infection (s/p Treatment in 2014), Disseminated Histoplasmosis (s/p treatment in 2014 & 2015), Splenic Infarct, Renal Vein Thrombosis who presents for followup. Last seen in ID clinic on 2/12/2019. Notes complete compliance with her ART since the last visit. Denies any side effect from her ART. \par \par Notes some right knee pain which is worse with walking up hills. Denies significant knee swelling. Has not taken any medication to help with the pain.

## 2019-05-14 NOTE — END OF VISIT
[] : Fellow [FreeTextEntry3] : HIV suppressed, HBV suppressed on ARV. Reports good adherence. Has some knee pain, likely associated with weight gain. Has deferred dietician referral at this time, will try to increase exercise. Continue close monitoring. [Time Spent: ___ minutes] : I have spent [unfilled] minutes of face to face time with the patient

## 2019-05-15 LAB
4/8 RATIO: 0.31 RATIO — LOW (ref 0.9–3.6)
ABS CD8: 951 /UL — HIGH (ref 142–740)
ALBUMIN SERPL ELPH-MCNC: 4.3 G/DL — SIGNIFICANT CHANGE UP (ref 3.3–5)
ALP SERPL-CCNC: 69 U/L — SIGNIFICANT CHANGE UP (ref 40–120)
ALT FLD-CCNC: 33 U/L — SIGNIFICANT CHANGE UP (ref 10–45)
ANION GAP SERPL CALC-SCNC: 11 MMOL/L — SIGNIFICANT CHANGE UP (ref 5–17)
AST SERPL-CCNC: 24 U/L — SIGNIFICANT CHANGE UP (ref 10–40)
BILIRUB SERPL-MCNC: 0.2 MG/DL — SIGNIFICANT CHANGE UP (ref 0.2–1.2)
BUN SERPL-MCNC: 13 MG/DL — SIGNIFICANT CHANGE UP (ref 7–23)
CALCIUM SERPL-MCNC: 9.7 MG/DL — SIGNIFICANT CHANGE UP (ref 8.4–10.5)
CD3 BLASTS SPEC-ACNC: 1252 /UL — SIGNIFICANT CHANGE UP (ref 672–1870)
CD3 BLASTS SPEC-ACNC: 42 % — LOW (ref 59–83)
CD4 %: 10 % — LOW (ref 30–62)
CD8 %: 32 % — SIGNIFICANT CHANGE UP (ref 12–36)
CHLORIDE SERPL-SCNC: 105 MMOL/L — SIGNIFICANT CHANGE UP (ref 96–108)
CO2 SERPL-SCNC: 26 MMOL/L — SIGNIFICANT CHANGE UP (ref 22–31)
CREAT SERPL-MCNC: 0.94 MG/DL — SIGNIFICANT CHANGE UP (ref 0.5–1.3)
GLUCOSE SERPL-MCNC: 92 MG/DL — SIGNIFICANT CHANGE UP (ref 70–99)
HBV DNA # SERPL NAA+PROBE: SIGNIFICANT CHANGE UP
HBV DNA SERPL NAA+PROBE-LOG#: SIGNIFICANT CHANGE UP LOGIU/ML
HCT VFR BLD CALC: 39.6 % — SIGNIFICANT CHANGE UP (ref 34.5–45)
HGB BLD-MCNC: 12.5 G/DL — SIGNIFICANT CHANGE UP (ref 11.5–15.5)
HIV-1 VIRAL LOAD RESULT: SIGNIFICANT CHANGE UP
HIV1 RNA # SERPL NAA+PROBE: SIGNIFICANT CHANGE UP
HIV1 RNA SER-IMP: SIGNIFICANT CHANGE UP
HIV1 RNA SERPL NAA+PROBE-ACNC: SIGNIFICANT CHANGE UP
HIV1 RNA SERPL NAA+PROBE-LOG#: SIGNIFICANT CHANGE UP LG COP/ML
MCHC RBC-ENTMCNC: 29.4 PG — SIGNIFICANT CHANGE UP (ref 27–34)
MCHC RBC-ENTMCNC: 31.6 GM/DL — LOW (ref 32–36)
MCV RBC AUTO: 93.2 FL — SIGNIFICANT CHANGE UP (ref 80–100)
PLATELET # BLD AUTO: 284 K/UL — SIGNIFICANT CHANGE UP (ref 150–400)
POTASSIUM SERPL-MCNC: 4.5 MMOL/L — SIGNIFICANT CHANGE UP (ref 3.5–5.3)
POTASSIUM SERPL-SCNC: 4.5 MMOL/L — SIGNIFICANT CHANGE UP (ref 3.5–5.3)
PROT SERPL-MCNC: 7.6 G/DL — SIGNIFICANT CHANGE UP (ref 6–8.3)
RBC # BLD: 4.25 M/UL — SIGNIFICANT CHANGE UP (ref 3.8–5.2)
RBC # FLD: 13.9 % — SIGNIFICANT CHANGE UP (ref 10.3–14.5)
SODIUM SERPL-SCNC: 142 MMOL/L — SIGNIFICANT CHANGE UP (ref 135–145)
T-CELL CD4 SUBSET PNL BLD: 291 /UL — LOW (ref 489–1457)
WBC # BLD: 5.25 K/UL — SIGNIFICANT CHANGE UP (ref 3.8–10.5)
WBC # FLD AUTO: 5.25 K/UL — SIGNIFICANT CHANGE UP (ref 3.8–10.5)

## 2019-06-19 ENCOUNTER — RX RENEWAL (OUTPATIENT)
Age: 49
End: 2019-06-19

## 2019-06-27 ENCOUNTER — RX RENEWAL (OUTPATIENT)
Age: 49
End: 2019-06-27

## 2019-07-17 ENCOUNTER — RX RENEWAL (OUTPATIENT)
Age: 49
End: 2019-07-17

## 2019-07-19 ENCOUNTER — APPOINTMENT (OUTPATIENT)
Dept: INFECTIOUS DISEASE | Facility: CLINIC | Age: 49
End: 2019-07-19

## 2019-08-20 ENCOUNTER — APPOINTMENT (OUTPATIENT)
Dept: INFECTIOUS DISEASE | Facility: CLINIC | Age: 49
End: 2019-08-20

## 2019-09-17 ENCOUNTER — APPOINTMENT (OUTPATIENT)
Dept: INFECTIOUS DISEASE | Facility: CLINIC | Age: 49
End: 2019-09-17

## 2020-11-04 ENCOUNTER — EMERGENCY (EMERGENCY)
Facility: HOSPITAL | Age: 50
LOS: 1 days | Discharge: HOME | End: 2020-11-04
Attending: STUDENT IN AN ORGANIZED HEALTH CARE EDUCATION/TRAINING PROGRAM
Payer: MEDICARE

## 2020-11-04 VITALS
WEIGHT: 160.06 LBS | RESPIRATION RATE: 16 BRPM | SYSTOLIC BLOOD PRESSURE: 132 MMHG | HEIGHT: 64 IN | OXYGEN SATURATION: 99 % | HEART RATE: 108 BPM | TEMPERATURE: 98 F | DIASTOLIC BLOOD PRESSURE: 80 MMHG

## 2020-11-04 VITALS
RESPIRATION RATE: 18 BRPM | DIASTOLIC BLOOD PRESSURE: 64 MMHG | SYSTOLIC BLOOD PRESSURE: 101 MMHG | HEART RATE: 70 BPM | TEMPERATURE: 98 F | OXYGEN SATURATION: 99 %

## 2020-11-04 LAB
ALBUMIN SERPL ELPH-MCNC: 3.2 G/DL — LOW (ref 3.3–5)
ALBUMIN SERPL ELPH-MCNC: 3.6 G/DL — SIGNIFICANT CHANGE UP (ref 3.3–5)
ALBUMIN SERPL ELPH-MCNC: 4 G/DL — SIGNIFICANT CHANGE UP (ref 3.3–5)
ALP SERPL-CCNC: 103 U/L — SIGNIFICANT CHANGE UP (ref 40–120)
ALP SERPL-CCNC: 114 U/L — SIGNIFICANT CHANGE UP (ref 40–120)
ALP SERPL-CCNC: 91 U/L — SIGNIFICANT CHANGE UP (ref 40–120)
ALT FLD-CCNC: 147 U/L — HIGH (ref 10–45)
ALT FLD-CCNC: 177 U/L — HIGH (ref 10–45)
ALT FLD-CCNC: 196 U/L — HIGH (ref 10–45)
ANION GAP SERPL CALC-SCNC: 12 MMOL/L — SIGNIFICANT CHANGE UP (ref 5–17)
ANION GAP SERPL CALC-SCNC: 9 MMOL/L — SIGNIFICANT CHANGE UP (ref 5–17)
ANION GAP SERPL CALC-SCNC: 9 MMOL/L — SIGNIFICANT CHANGE UP (ref 5–17)
APPEARANCE UR: ABNORMAL
AST SERPL-CCNC: 130 U/L — HIGH (ref 10–40)
AST SERPL-CCNC: 163 U/L — HIGH (ref 10–40)
AST SERPL-CCNC: 182 U/L — HIGH (ref 10–40)
BASE EXCESS BLDV CALC-SCNC: 1 MMOL/L — SIGNIFICANT CHANGE UP (ref -2–2)
BASOPHILS # BLD AUTO: 0 K/UL — SIGNIFICANT CHANGE UP (ref 0–0.2)
BASOPHILS NFR BLD AUTO: 0 % — SIGNIFICANT CHANGE UP (ref 0–2)
BILIRUB SERPL-MCNC: 0.5 MG/DL — SIGNIFICANT CHANGE UP (ref 0.2–1.2)
BILIRUB SERPL-MCNC: 0.5 MG/DL — SIGNIFICANT CHANGE UP (ref 0.2–1.2)
BILIRUB SERPL-MCNC: 0.7 MG/DL — SIGNIFICANT CHANGE UP (ref 0.2–1.2)
BILIRUB UR-MCNC: ABNORMAL
BUN SERPL-MCNC: 13 MG/DL — SIGNIFICANT CHANGE UP (ref 7–23)
BUN SERPL-MCNC: 13 MG/DL — SIGNIFICANT CHANGE UP (ref 7–23)
BUN SERPL-MCNC: 14 MG/DL — SIGNIFICANT CHANGE UP (ref 7–23)
CA-I SERPL-SCNC: 1.1 MMOL/L — LOW (ref 1.12–1.3)
CALCIUM SERPL-MCNC: 8.4 MG/DL — SIGNIFICANT CHANGE UP (ref 8.4–10.5)
CALCIUM SERPL-MCNC: 8.9 MG/DL — SIGNIFICANT CHANGE UP (ref 8.4–10.5)
CALCIUM SERPL-MCNC: 9.4 MG/DL — SIGNIFICANT CHANGE UP (ref 8.4–10.5)
CHLORIDE BLDV-SCNC: 110 MMOL/L — HIGH (ref 96–108)
CHLORIDE SERPL-SCNC: 103 MMOL/L — SIGNIFICANT CHANGE UP (ref 96–108)
CHLORIDE SERPL-SCNC: 105 MMOL/L — SIGNIFICANT CHANGE UP (ref 96–108)
CHLORIDE SERPL-SCNC: 106 MMOL/L — SIGNIFICANT CHANGE UP (ref 96–108)
CO2 BLDV-SCNC: 26 MMOL/L — SIGNIFICANT CHANGE UP (ref 22–30)
CO2 SERPL-SCNC: 21 MMOL/L — LOW (ref 22–31)
CO2 SERPL-SCNC: 22 MMOL/L — SIGNIFICANT CHANGE UP (ref 22–31)
CO2 SERPL-SCNC: 22 MMOL/L — SIGNIFICANT CHANGE UP (ref 22–31)
COLOR SPEC: ABNORMAL
CREAT SERPL-MCNC: 0.67 MG/DL — SIGNIFICANT CHANGE UP (ref 0.5–1.3)
CREAT SERPL-MCNC: 0.7 MG/DL — SIGNIFICANT CHANGE UP (ref 0.5–1.3)
CREAT SERPL-MCNC: 0.71 MG/DL — SIGNIFICANT CHANGE UP (ref 0.5–1.3)
DIFF PNL FLD: NEGATIVE — SIGNIFICANT CHANGE UP
EOSINOPHIL # BLD AUTO: 0.21 K/UL — SIGNIFICANT CHANGE UP (ref 0–0.5)
EOSINOPHIL NFR BLD AUTO: 3 % — SIGNIFICANT CHANGE UP (ref 0–6)
GAS PNL BLDV: 133 MMOL/L — LOW (ref 135–145)
GAS PNL BLDV: SIGNIFICANT CHANGE UP
GAS PNL BLDV: SIGNIFICANT CHANGE UP
GLUCOSE BLDV-MCNC: 99 MG/DL — SIGNIFICANT CHANGE UP (ref 70–99)
GLUCOSE SERPL-MCNC: 81 MG/DL — SIGNIFICANT CHANGE UP (ref 70–99)
GLUCOSE SERPL-MCNC: 85 MG/DL — SIGNIFICANT CHANGE UP (ref 70–99)
GLUCOSE SERPL-MCNC: 94 MG/DL — SIGNIFICANT CHANGE UP (ref 70–99)
GLUCOSE UR QL: NEGATIVE — SIGNIFICANT CHANGE UP
HCG UR QL: NEGATIVE — SIGNIFICANT CHANGE UP
HCO3 BLDV-SCNC: 25 MMOL/L — SIGNIFICANT CHANGE UP (ref 21–29)
HCT VFR BLD CALC: 33.9 % — LOW (ref 34.5–45)
HCT VFR BLDA CALC: 42 % — SIGNIFICANT CHANGE UP (ref 39–50)
HGB BLD CALC-MCNC: 13.6 G/DL — SIGNIFICANT CHANGE UP (ref 11.5–15.5)
HGB BLD-MCNC: 11.3 G/DL — LOW (ref 11.5–15.5)
KETONES UR-MCNC: ABNORMAL
LACTATE BLDV-MCNC: 1.9 MMOL/L — SIGNIFICANT CHANGE UP (ref 0.7–2)
LEUKOCYTE ESTERASE UR-ACNC: NEGATIVE — SIGNIFICANT CHANGE UP
LIDOCAIN IGE QN: 31 U/L — SIGNIFICANT CHANGE UP (ref 7–60)
LYMPHOCYTES # BLD AUTO: 5.23 K/UL — HIGH (ref 1–3.3)
LYMPHOCYTES # BLD AUTO: 73 % — HIGH (ref 13–44)
MANUAL SMEAR VERIFICATION: SIGNIFICANT CHANGE UP
MCHC RBC-ENTMCNC: 29.1 PG — SIGNIFICANT CHANGE UP (ref 27–34)
MCHC RBC-ENTMCNC: 33.3 GM/DL — SIGNIFICANT CHANGE UP (ref 32–36)
MCV RBC AUTO: 87.4 FL — SIGNIFICANT CHANGE UP (ref 80–100)
MONOCYTES # BLD AUTO: 0.29 K/UL — SIGNIFICANT CHANGE UP (ref 0–0.9)
MONOCYTES NFR BLD AUTO: 4 % — SIGNIFICANT CHANGE UP (ref 2–14)
NEUTROPHILS # BLD AUTO: 1.43 K/UL — LOW (ref 1.8–7.4)
NEUTROPHILS NFR BLD AUTO: 20 % — LOW (ref 43–77)
NITRITE UR-MCNC: NEGATIVE — SIGNIFICANT CHANGE UP
NRBC # BLD: 0 /100 — SIGNIFICANT CHANGE UP (ref 0–0)
PCO2 BLDV: 40 MMHG — SIGNIFICANT CHANGE UP (ref 35–50)
PH BLDV: 7.42 — SIGNIFICANT CHANGE UP (ref 7.35–7.45)
PH UR: 6.5 — SIGNIFICANT CHANGE UP (ref 5–8)
PLAT MORPH BLD: NORMAL — SIGNIFICANT CHANGE UP
PLATELET # BLD AUTO: 269 K/UL — SIGNIFICANT CHANGE UP (ref 150–400)
PO2 BLDV: 34 MMHG — SIGNIFICANT CHANGE UP (ref 25–45)
POTASSIUM BLDV-SCNC: 4.4 MMOL/L — SIGNIFICANT CHANGE UP (ref 3.5–5.3)
POTASSIUM SERPL-MCNC: 3.5 MMOL/L — SIGNIFICANT CHANGE UP (ref 3.5–5.3)
POTASSIUM SERPL-MCNC: 4.6 MMOL/L — SIGNIFICANT CHANGE UP (ref 3.5–5.3)
POTASSIUM SERPL-MCNC: 5.2 MMOL/L — SIGNIFICANT CHANGE UP (ref 3.5–5.3)
POTASSIUM SERPL-SCNC: 3.5 MMOL/L — SIGNIFICANT CHANGE UP (ref 3.5–5.3)
POTASSIUM SERPL-SCNC: 4.6 MMOL/L — SIGNIFICANT CHANGE UP (ref 3.5–5.3)
POTASSIUM SERPL-SCNC: 5.2 MMOL/L — SIGNIFICANT CHANGE UP (ref 3.5–5.3)
PROT SERPL-MCNC: 6.1 G/DL — SIGNIFICANT CHANGE UP (ref 6–8.3)
PROT SERPL-MCNC: 7.1 G/DL — SIGNIFICANT CHANGE UP (ref 6–8.3)
PROT SERPL-MCNC: 7.9 G/DL — SIGNIFICANT CHANGE UP (ref 6–8.3)
PROT UR-MCNC: ABNORMAL
RAPID RVP RESULT: SIGNIFICANT CHANGE UP
RBC # BLD: 3.88 M/UL — SIGNIFICANT CHANGE UP (ref 3.8–5.2)
RBC # FLD: 14.3 % — SIGNIFICANT CHANGE UP (ref 10.3–14.5)
RBC BLD AUTO: SIGNIFICANT CHANGE UP
SAO2 % BLDV: 65 % — LOW (ref 67–88)
SARS-COV-2 RNA SPEC QL NAA+PROBE: SIGNIFICANT CHANGE UP
SODIUM SERPL-SCNC: 136 MMOL/L — SIGNIFICANT CHANGE UP (ref 135–145)
SODIUM SERPL-SCNC: 136 MMOL/L — SIGNIFICANT CHANGE UP (ref 135–145)
SODIUM SERPL-SCNC: 137 MMOL/L — SIGNIFICANT CHANGE UP (ref 135–145)
SP GR SPEC: 1.04 — HIGH (ref 1.01–1.02)
TROPONIN T, HIGH SENSITIVITY RESULT: <6 NG/L — SIGNIFICANT CHANGE UP (ref 0–51)
UROBILINOGEN FLD QL: ABNORMAL
WBC # BLD: 7.16 K/UL — SIGNIFICANT CHANGE UP (ref 3.8–10.5)
WBC # FLD AUTO: 7.16 K/UL — SIGNIFICANT CHANGE UP (ref 3.8–10.5)

## 2020-11-04 PROCEDURE — 82947 ASSAY GLUCOSE BLOOD QUANT: CPT

## 2020-11-04 PROCEDURE — 74177 CT ABD & PELVIS W/CONTRAST: CPT | Mod: 26

## 2020-11-04 PROCEDURE — 84295 ASSAY OF SERUM SODIUM: CPT

## 2020-11-04 PROCEDURE — 84702 CHORIONIC GONADOTROPIN TEST: CPT

## 2020-11-04 PROCEDURE — 87086 URINE CULTURE/COLONY COUNT: CPT

## 2020-11-04 PROCEDURE — 83690 ASSAY OF LIPASE: CPT

## 2020-11-04 PROCEDURE — 96374 THER/PROPH/DIAG INJ IV PUSH: CPT | Mod: XU

## 2020-11-04 PROCEDURE — 81025 URINE PREGNANCY TEST: CPT

## 2020-11-04 PROCEDURE — 99284 EMERGENCY DEPT VISIT MOD MDM: CPT | Mod: 25

## 2020-11-04 PROCEDURE — 93005 ELECTROCARDIOGRAM TRACING: CPT

## 2020-11-04 PROCEDURE — 99285 EMERGENCY DEPT VISIT HI MDM: CPT

## 2020-11-04 PROCEDURE — 80053 COMPREHEN METABOLIC PANEL: CPT

## 2020-11-04 PROCEDURE — 96375 TX/PRO/DX INJ NEW DRUG ADDON: CPT

## 2020-11-04 PROCEDURE — 0225U NFCT DS DNA&RNA 21 SARSCOV2: CPT

## 2020-11-04 PROCEDURE — 74177 CT ABD & PELVIS W/CONTRAST: CPT

## 2020-11-04 PROCEDURE — 82330 ASSAY OF CALCIUM: CPT

## 2020-11-04 PROCEDURE — 82435 ASSAY OF BLOOD CHLORIDE: CPT

## 2020-11-04 PROCEDURE — 85014 HEMATOCRIT: CPT

## 2020-11-04 PROCEDURE — 84484 ASSAY OF TROPONIN QUANT: CPT

## 2020-11-04 PROCEDURE — 71045 X-RAY EXAM CHEST 1 VIEW: CPT | Mod: 26

## 2020-11-04 PROCEDURE — 71045 X-RAY EXAM CHEST 1 VIEW: CPT

## 2020-11-04 PROCEDURE — 85025 COMPLETE CBC W/AUTO DIFF WBC: CPT

## 2020-11-04 PROCEDURE — 82803 BLOOD GASES ANY COMBINATION: CPT

## 2020-11-04 PROCEDURE — 84132 ASSAY OF SERUM POTASSIUM: CPT

## 2020-11-04 PROCEDURE — 81001 URINALYSIS AUTO W/SCOPE: CPT

## 2020-11-04 PROCEDURE — 85018 HEMOGLOBIN: CPT

## 2020-11-04 PROCEDURE — 83605 ASSAY OF LACTIC ACID: CPT

## 2020-11-04 PROCEDURE — 93010 ELECTROCARDIOGRAM REPORT: CPT

## 2020-11-04 RX ORDER — FAMOTIDINE 10 MG/ML
20 INJECTION INTRAVENOUS ONCE
Refills: 0 | Status: COMPLETED | OUTPATIENT
Start: 2020-11-04 | End: 2020-11-04

## 2020-11-04 RX ORDER — ONDANSETRON 8 MG/1
4 TABLET, FILM COATED ORAL ONCE
Refills: 0 | Status: COMPLETED | OUTPATIENT
Start: 2020-11-04 | End: 2020-11-04

## 2020-11-04 RX ORDER — FAMOTIDINE 10 MG/ML
1 INJECTION INTRAVENOUS
Qty: 14 | Refills: 0
Start: 2020-11-04 | End: 2020-11-17

## 2020-11-04 RX ORDER — SODIUM CHLORIDE 9 MG/ML
1000 INJECTION INTRAMUSCULAR; INTRAVENOUS; SUBCUTANEOUS ONCE
Refills: 0 | Status: COMPLETED | OUTPATIENT
Start: 2020-11-04 | End: 2020-11-04

## 2020-11-04 RX ADMIN — Medication 30 MILLILITER(S): at 12:14

## 2020-11-04 RX ADMIN — FAMOTIDINE 20 MILLIGRAM(S): 10 INJECTION INTRAVENOUS at 12:14

## 2020-11-04 RX ADMIN — ONDANSETRON 4 MILLIGRAM(S): 8 TABLET, FILM COATED ORAL at 12:10

## 2020-11-04 RX ADMIN — SODIUM CHLORIDE 1000 MILLILITER(S): 9 INJECTION INTRAMUSCULAR; INTRAVENOUS; SUBCUTANEOUS at 12:09

## 2020-11-04 NOTE — ED PROVIDER NOTE - PROGRESS NOTE DETAILS
Patient is reassessed, states feeling much better at this time. Abdomen, soft non tender. Results discussed with patient, PO challenge and if tolerated, discharge with precautions. RGUJRAL Pt reevaluated, feeling well, abd soft NT, tolerating PO, would like to be discharged. labs, images and plan d/w pt. all questions answered. pt ready and stable for DC. -Eros Daley PA-C

## 2020-11-04 NOTE — ED ADULT NURSE NOTE - VIRAL LOAD - INTERNAL RECORD
COVID-19 Telephone Triage  Has the patient tested positive for Coronavirus within the past month or in the process of testing for Coronavirus now?    NO - Has the patient or anyone in the patient's household, or if the patient will require a person to accompany them in the exam room (patient is a minor, disabled, etc.) has that person tested positive for Coronavirus within the past month? No - Identify symptoms and obtain travel and exposure history:     Does the patient or any member of the patient's household, or the person who will accompany the patient to their appointment have a fever, sore throat, cough, shortness of breath, runny nose, congestion, nausea, vomiting, diarrhea, fatigue, body aches, headache shaking or chills or new onset of loss of taste or smell? no    In the past 14 days has the patient or the person who will accompany the patient to their appointment, had close contact with an individual outside of their household who has received a Coronavirus diagnosis or had contact with anyone who is in the process of testing? no  If all the questions in the screening were NO-   Clinic visit can proceed as scheduled or be scheduled per current scheduling protocols.     Pierce was advised of increased risk of COVID-19 exposure due to contact with communal spaces and additional persons. Patient Proceed with appointment as scheduled. .             If the patient needs to be seen in the ED for any reason:  Call the ED of patient’s choice to let them know of a patient with a positive COVID-19 screen is being sent to the ED for further evaluation.    Have the ED clarify the location the patient should go once they arrive at the ED. [place] of any instructions givento you by the ED.    Communicate instructions provided by the ED to the patient.  Inform the patient they must put on a mask once they arrive to the ED.     NOT DET.

## 2020-11-04 NOTE — ED PROVIDER NOTE - PHYSICAL EXAMINATION
GEN: Pt in NAD, A&O x3.  PSYCH: Affect appropriate.  EYES: Sclera white w/o injection.   ENT: Head NCAT. Mucous membranes dry. Neck supple FROM, negative kernig and brudzinski.  RESP: CTA b/l, no wheezes, rales, or rhonchi.   CARDIAC: RRR, clear distinct S1, S2, no appreciable murmurs.  ABD: Abdomen soft, +epigastric tenderness, no rebound or guarding. No CVAT b/l.  VASC: 2+ radial and dorsalis pedis pulses b/l. No edema or calf tenderness.  SKIN: No rashes on the trunk.

## 2020-11-04 NOTE — ED PROVIDER NOTE - OBJECTIVE STATEMENT
49 y/o F PMHx of HIV not currently on meds, ID Dr. Guillermo (last seen 05/2019; CD4-291; VL-UD), Histoplasmosis, Strongyloides, CMV Viremia, Hepatitis B Virus, and Renal Vein Thrombosis previously on Coumadin presents c/o abd pain, n/v x3 days. Pt notes pain in the epigastic region only with vomiting, nausea and vomiting associated with eating (~1hr after meals). Abd pain is "very strong" unable able to describe character, no radiation, provoked with vomit, relieved immediately after. No abd pain at this time. Pt also notes tightness in her shoulders and her head while vomiting which resolves after vomiting. Last BM this morning, normal Pt denies f/c, melena, hematochezia, hematemesis, urinary sxs, recent abx, flank pain, etoh use, known sick/COVID conitact, loss of taste/smell, abnl vaginal bleeding/vaginal DC, obstipation. LMP age 48. NO pain meds/antiemetic PTA. 51 y/o F PMHx of HIV (not currently on medications, ID clinic patient, pt reports last seen summer 2019 (August or September); CD4-291; VL-UD), Histoplasmosis, Strongyloides, CMV Viremia, Hepatitis B Virus, and Renal Vein Thrombosis 2010 previously on Coumadin presents c/o abd pain, n/v x3 days. Pt notes pain in the epigastic region only with vomiting, nausea and vomiting associated with eating (~1hr after meals). Abd pain is "very strong" unable able to describe character, no radiation, intermittent, provoked with vomit, relieved immediately after. No active abd pain at this time. Pt also notes tightness in her shoulders and her head while vomiting which resolves after vomiting. Last BM this morning, normal Pt denies f/c, melena, hematochezia, hematemesis, urinary sxs, recent abx, flank pain, etoh use, known sick/COVID conitact, loss of taste/smell, abnl vaginal bleeding/vaginal DC, obstipation. LMP age 48. NO pain meds/antiemetic PTA. 51 y/o F PMHx of HIV (not currently on medications, ID clinic patient, pt reports last seen summer 2019 (August or September); CD4-291; VL-UD), Histoplasmosis, Strongyloides, CMV Viremia, Hepatitis B Virus, and Renal Vein Thrombosis 2010 previously on Coumadin presents c/o abd pain, n/v x3 days. Pt notes pain in the epigastric- region only with vomiting, nausea and vomiting associated with eating (~1hr after meals). Abd pain is "very strong" unable able to describe character, no radiation, intermittent, provoked with vomit, relieved immediately after. No active abd pain at this time. Pt also notes tightness in her shoulders and her head while vomiting which resolves after vomiting. Last BM this morning, normal Pt denies f/c, melena, hematochezia, hematemesis, urinary sxs, recent abx, flank pain, etoh use, known sick/COVID contact, loss of taste/smell, abnl vaginal bleeding/vaginal DC, obstipation. LMP age 48. NO pain meds/antiemetic PTA.

## 2020-11-04 NOTE — ED ADULT NURSE REASSESSMENT NOTE - NS ED NURSE REASSESS COMMENT FT1
received report from Arely BARRIENTOS upon return from break coverage. pt resting comfortably in stretcher. A&Ox4. VSS. pt medicated. blood work at lab for analysis. pending urine sample. pt aware. CT scan ordered. NAD noted. plan of care discussed. safety and comfort measures maintained.

## 2020-11-04 NOTE — ED PROVIDER NOTE - ATTENDING CONTRIBUTION TO CARE
48 F w/ PMH of CMV, HIV unknown cd4, gallstones, cholecystecomy, tubal ligation, histoplasmosis, strongylogies, renal vein thrombosis on coumadin for 6 months, presents to the ER w/ nausea, vomiting and epigastric abd pain. Pt is currently off of HIV therapy due to inability to get medications from the . PT states that for the patst 24 hours, she has not been able to have any PO intake since every time she eats she is more nauseous. No cp, no sob, no lightheadedness, no diarrhea, no black/dark stools  I have reviewed the triage vital signs.  Const: Well-nourished, Well-developed, appearing stated age  Head: atraumatic, normocephalic  Eyes: no conjunctival injection and no scleral icterus  ENMT: Atraumatic external nose and ears, Moist mucus membranes  Neck: Symmetric, trachea midline,  CVS: +S1/S2, dorsalis pedis/radial pulse 2+ bilaterally  RESP: Unlabored respiratory effort, Clear to auscultation bilaterally  GI: Nontender/Nondistended, soft abdomen  MSK: Extremities w/o deformity or ttp   Skin: Warm, Dry w/ no rashes  Neuro: GCS=15, CNs II-XII grossly intact, motor in all 4 extremities equal, Sensation grossly intact   Psych: Awake, Alert, & Orientedx3;  Appropriate mood and affect, cooperative  Nontoxic appearing will have labs and reassessment.

## 2020-11-04 NOTE — ED PROVIDER NOTE - CLINICAL SUMMARY MEDICAL DECISION MAKING FREE TEXT BOX
50 F h/o HIV VL-UD, CD4-291, no on HAART currently, prior viremia, previous renal vein thrombosis (s/p coumadin x6mo), s/p cholecytectomy and b/l tubal ligation presents c/o n/v epigastric abd pain, unable to tolerate PO. No f/c, known sick/COVID contacts, or URI sxs. No urinary sxs or flank pain. Epigastric tenderness no rebound or guarding. Given complex medical hx will obtain CT abd/pelvis to r/o intraabdominal pathology including enteritis/duodenitis, r/o pancreatitis, possible PUD/gastritis, lower suspicoion for  pathology or atypical ACS, do not suspect aortic dissection. Plan for antiemetics, H2B, antacids, IV fluids, labs including troponin and lipase, CT abd/pelvis, upright CXR, and reevaluate. Dispo pending w/u. -Eros Daley PA-C 50 F h/o HIV VL-UD, CD4-291, no on HAART currently, prior viremia, previous renal vein thrombosis (s/p coumadin x6mo), s/p cholecytectomy and b/l tubal ligation presents c/o n/v epigastric abd pain, unable to tolerate PO. No f/c, known sick/COVID contacts, or URI sxs. No urinary sxs or flank pain. Epigastric tenderness no rebound or guarding. Given complex medical hx will obtain CT abd/pelvis to r/o intraabdominal pathology including enteritis/duodenitis, r/o pancreatitis, possible PUD/gastritis, lower suspicion for  pathology, atypical ACS, URI/COVID, do not suspect aortic dissection. Plan for antiemetics, H2B, antacids, IV fluids, labs including troponin and lipase, EKG, RVP/COVID, CT abd/pelvis, upright CXR, and reevaluate. Dispo pending w/u. -Eros Daley PA-C

## 2020-11-04 NOTE — ED PROVIDER NOTE - NSFOLLOWUPCLINICS_GEN_ALL_ED_FT
[facility] - Infectious Disease  Infectious Disease  [address], [place] [address]  Phone: [phone]  Fax:   Follow Up Time: 4-6 Days

## 2020-11-04 NOTE — ED PROVIDER NOTE - NSFOLLOWUPINSTRUCTIONS_ED_ALL_ED_FT
1) Follow-up with your primary care within 2-3 days      Follow-up with your Infectious disease specialist in the clinic within 1 week.      Bring all printed results to discuss including your elevated liver function test and abnormal urinalysis.    2) Take Pepcid as directed for gastritis.    3) Rest and drink plenty of fluids. Avoid spicy food, acidic foods (citrus, fruit juices, tomato sauce), coffee, tea, chocolate, alcohol.    4) Return to the ER if you experience any new or worsening symptoms including but not limited to severe abdominal pain, vomiting blood, bloody stools, loss of consciousness (fainting), chest pain, shortness of breath, fever >100.4, severe headache, or if any other concerns.

## 2020-11-04 NOTE — ED PROVIDER NOTE - PATIENT PORTAL LINK FT
You can access the FollowMyHealth Patient Portal offered by [facility] by registering at the following website: [email or web address]. By joining [employer]’s FollowMyHealth portal, you will also be able to view your health information using other applications (apps) compatible with our system.

## 2020-11-04 NOTE — ED ADULT NURSE NOTE - OBJECTIVE STATEMENT
49 y/o F PMHx of HIV (not currently on medications, ID clinic patient, pt reports last seen summer 2019 (August or September); Viral load undected , CMV  Hepatitis B Virus, and Renal Vein Thrombosis 2010 previously on Coumadin presents c/o abd pain, n/v x3 days. Pt notes pain in the epigastric region only with vomiting, nausea and vomiting associated with eating (~1hr after meals). Abd pain is "very strong" unable able to describe character, no radiation, intermittent, provoked with vomit, relieved immediately after. No active abd pain at this time. Pt also notes tightness in her shoulders and her head while vomiting which resolves after vomiting. Last BM this morning, normal  IVL placed and bloods sent as ordered Pt swabbed for RSV and Covid

## 2020-11-06 ENCOUNTER — NON-APPOINTMENT (OUTPATIENT)
Age: 50
End: 2020-11-06

## 2020-11-06 LAB
CULTURE RESULTS: SIGNIFICANT CHANGE UP
SPECIMEN SOURCE: SIGNIFICANT CHANGE UP

## 2020-11-07 NOTE — ED POST DISCHARGE NOTE - DETAILS
left message for pt for callback to inform her that urine culture contaminated and will need repeat outpt by her doctor left message for pt for callback to inform her that urine culture contaminated and will need repeat outpt by her doctor. v/m left via  #[phone] tosham to call back admin line with  David [phone] - Elvira Ott PA-C 11/10/20: Pt contacted using  [facility] ID #[id number], informed her of ucx contamination result and need to have repeat testing. Pt states she saw her doctor yesterday and already had repeat ua/ucx and blood work done. Pt appropriately followed up, no further ED management at this time. Pt appreciative of call. - Dougie Eugene PA-C

## 2020-11-08 ENCOUNTER — FORM ENCOUNTER (OUTPATIENT)
Age: 50
End: 2020-11-08

## 2020-11-09 ENCOUNTER — APPOINTMENT (OUTPATIENT)
Dept: INFECTIOUS DISEASE | Facility: CLINIC | Age: 50
End: 2020-11-09
Payer: COMMERCIAL

## 2020-11-09 ENCOUNTER — NON-APPOINTMENT (OUTPATIENT)
Age: 50
End: 2020-11-09

## 2020-11-09 VITALS
DIASTOLIC BLOOD PRESSURE: 83 MMHG | TEMPERATURE: 97.5 F | HEART RATE: 83 BPM | WEIGHT: 156 LBS | OXYGEN SATURATION: 100 % | HEIGHT: 61 IN | SYSTOLIC BLOOD PRESSURE: 116 MMHG | BODY MASS INDEX: 29.45 KG/M2

## 2020-11-09 DIAGNOSIS — Z12.31 ENCOUNTER FOR SCREENING MAMMOGRAM FOR MALIGNANT NEOPLASM OF BREAST: ICD-10-CM

## 2020-11-09 DIAGNOSIS — Z01.00 ENCOUNTER FOR EXAMINATION OF EYES AND VISION W/OUT ABNORMAL FINDINGS: ICD-10-CM

## 2020-11-09 DIAGNOSIS — Z01.20 ENCOUNTER FOR DENTAL EXAMINATION AND CLEANING W/OUT ABNORMAL FINDINGS: ICD-10-CM

## 2020-11-09 PROCEDURE — 99214 OFFICE O/P EST MOD 30 MIN: CPT | Mod: 25

## 2020-11-09 PROCEDURE — 99072 ADDL SUPL MATRL&STAF TM PHE: CPT

## 2020-11-09 PROCEDURE — 90686 IIV4 VACC NO PRSV 0.5 ML IM: CPT

## 2020-11-09 PROCEDURE — G0008: CPT

## 2020-11-09 NOTE — PHYSICAL EXAM
[General Appearance - Alert] : alert [General Appearance - In No Acute Distress] : in no acute distress [Sclera] : the sclera and conjunctiva were normal [PERRL With Normal Accommodation] : pupils were equal in size, round, reactive to light [Outer Ear] : the ears and nose were normal in appearance [Examination Of The Oral Cavity] : the lips and gums were normal [Oropharynx] : the oropharynx was normal with no thrush [Neck Appearance] : the appearance of the neck was normal [] : no respiratory distress [Auscultation Breath Sounds / Voice Sounds] : lungs were clear to auscultation bilaterally [Heart Rate And Rhythm] : heart rate was normal and rhythm regular [Heart Sounds] : normal S1 and S2 [Heart Sounds Gallop] : no gallops [Murmurs] : no murmurs [Heart Sounds Pericardial Friction Rub] : no pericardial rub [Bowel Sounds] : normal bowel sounds [Abdomen Soft] : soft [Abdomen Tenderness] : non-tender [No Palpable Adenopathy] : no palpable adenopathy [Musculoskeletal - Swelling] : no joint swelling [Motor Tone] : muscle strength and tone were normal [Skin Color & Pigmentation] : normal skin color and pigmentation [No Focal Deficits] : no focal deficits [Oriented To Time, Place, And Person] : oriented to person, place, and time [Affect] : the affect was normal

## 2020-11-09 NOTE — ASSESSMENT
[FreeTextEntry1] : *HIV:\par -Routine and annual labs today\par -Rx: Symtuza renewed and sent to [facility]\par -I discussed importance of adherence and continuity of care\par \par *Gastritis:\par -Rx: Zofran, use as directed\par -Continue use of Pepcid\par -Avoid spicy, greasy, heavy foods and beverages as directed by ER\par -JORJE diet discussed in detail\par -If symptoms still persist will refer to GI\par \par *HCM:\par -Flu vaccine today\par -GYN: last seen 2018, referral given \par -Dentist: 1 year ago, referral given \par -Eye exam: 1 year ago \par -Pt met with Priscilla from  \par -All questions answered\par \par *Follow up in 1 month  [Treatment Education] : treatment education [Treatment Adherence] : treatment adherence [Risk Reduction] : risk reduction [Medical Care Issues] : medical care issues [HIV Education] : HIV Education [Anticipatory Guidance] : anticipatory guidance

## 2020-11-09 NOTE — HISTORY OF PRESENT ILLNESS
[FreeTextEntry1] : 51 y/o female patient presents for annual visit. Patient has not been seen in clinic for 1 year due to not having insurance. She also has not been taking her Symtuza for about 11 months due to no insurance. She is getting assistance with [facility] in order for her to do labs and get her medication. She will be apart of a radha to be able to receive care. \par Patient was seen at [facility] on 11/4 for Gastritis. Her diagnostic testing was normal, she was prescribed Pepcid which she has been taking and it is effective. Today she complains of nausea without vomiting. Over the weekend she had some diarrhea and vomiting.   \par Otherwise she gives no other complaints or concerns. \par  [Sexually Active] : The patient is not sexually active

## 2020-11-10 LAB
25(OH)D3 SERPL-MCNC: 25.1 NG/ML
ALBUMIN SERPL ELPH-MCNC: 4.3 G/DL
ALP BLD-CCNC: 128 U/L
ALT SERPL-CCNC: 190 U/L
ANION GAP SERPL CALC-SCNC: 13 MMOL/L
APPEARANCE: ABNORMAL
AST SERPL-CCNC: 195 U/L
BACTERIA: NEGATIVE
BASOPHILS # BLD AUTO: 0.01 K/UL
BASOPHILS NFR BLD AUTO: 0.1 %
BILIRUB SERPL-MCNC: 0.5 MG/DL
BILIRUBIN URINE: NEGATIVE
BLOOD URINE: NEGATIVE
BUN SERPL-MCNC: 14 MG/DL
C TRACH RRNA SPEC QL NAA+PROBE: NOT DETECTED
CALCIUM OXALATE CRYSTALS: ABNORMAL
CALCIUM SERPL-MCNC: 9.6 MG/DL
CD3 CELLS # BLD: 1641 /UL
CD3 CELLS NFR BLD: 40 %
CD3+CD4+ CELLS # BLD: 191 /UL
CD3+CD4+ CELLS NFR BLD: 5 %
CD3+CD4+ CELLS/CD3+CD8+ CLL SPEC: 0.13 RATIO
CD3+CD8+ CELLS # SPEC: 1424 /UL
CD3+CD8+ CELLS NFR BLD: 35 %
CHLORIDE SERPL-SCNC: 104 MMOL/L
CHOLEST SERPL-MCNC: 176 MG/DL
CO2 SERPL-SCNC: 22 MMOL/L
COLOR: YELLOW
CREAT SERPL-MCNC: 0.71 MG/DL
EOSINOPHIL # BLD AUTO: 0.21 K/UL
EOSINOPHIL NFR BLD AUTO: 3.1 %
ESTIMATED AVERAGE GLUCOSE: 120 MG/DL
GLUCOSE QUALITATIVE U: NEGATIVE
GLUCOSE SERPL-MCNC: 90 MG/DL
HBA1C MFR BLD HPLC: 5.8 %
HCT VFR BLD CALC: 44.4 %
HCV AB SER QL: NONREACTIVE
HCV S/CO RATIO: 0.48 S/CO
HDLC SERPL-MCNC: 35 MG/DL
HGB BLD-MCNC: 14 G/DL
HYALINE CASTS: 0 /LPF
IMM GRANULOCYTES NFR BLD AUTO: 0.1 %
KETONES URINE: NORMAL
LDLC SERPL CALC-MCNC: 127 MG/DL
LEUKOCYTE ESTERASE URINE: NEGATIVE
LYMPHOCYTES # BLD AUTO: 3.9 K/UL
LYMPHOCYTES NFR BLD AUTO: 58.1 %
MAN DIFF?: NORMAL
MCHC RBC-ENTMCNC: 28.9 PG
MCHC RBC-ENTMCNC: 31.5 GM/DL
MCV RBC AUTO: 91.5 FL
MICROSCOPIC-UA: NORMAL
MONOCYTES # BLD AUTO: 0.54 K/UL
MONOCYTES NFR BLD AUTO: 8 %
N GONORRHOEA RRNA SPEC QL NAA+PROBE: NOT DETECTED
NEUTROPHILS # BLD AUTO: 2.04 K/UL
NEUTROPHILS NFR BLD AUTO: 30.6 %
NITRITE URINE: NEGATIVE
NONHDLC SERPL-MCNC: 142 MG/DL
PH URINE: 6
PLATELET # BLD AUTO: 297 K/UL
POTASSIUM SERPL-SCNC: 4.5 MMOL/L
PROT SERPL-MCNC: 7.9 G/DL
PROTEIN URINE: ABNORMAL
RBC # BLD: 4.85 M/UL
RBC # FLD: 15.4 %
RED BLOOD CELLS URINE: 4 /HPF
SODIUM SERPL-SCNC: 138 MMOL/L
SOURCE AMPLIFICATION: NORMAL
SPECIFIC GRAVITY URINE: 1.03
SQUAMOUS EPITHELIAL CELLS: 6 /HPF
T PALLIDUM AB SER QL IA: NEGATIVE
TRIGL SERPL-MCNC: 71 MG/DL
URINE COMMENTS: NORMAL
UROBILINOGEN URINE: NORMAL
WBC # FLD AUTO: 6.71 K/UL
WHITE BLOOD CELLS URINE: 2 /HPF

## 2020-11-12 ENCOUNTER — NON-APPOINTMENT (OUTPATIENT)
Age: 50
End: 2020-11-12

## 2020-11-12 LAB
HIV1 RNA # SERPL NAA+PROBE: ABNORMAL
HIV1 RNA # SERPL NAA+PROBE: NORMAL
M TB IFN-G BLD-IMP: NEGATIVE
QUANTIFERON TB PLUS MITOGEN MINUS NIL: 7.21 IU/ML
QUANTIFERON TB PLUS NIL: 0.08 IU/ML
QUANTIFERON TB PLUS TB1 MINUS NIL: 0.02 IU/ML
QUANTIFERON TB PLUS TB2 MINUS NIL: 0.14 IU/ML
VIRAL LOAD INTERP: NORMAL
VIRAL LOAD LOG: 4.14

## 2020-11-16 ENCOUNTER — NON-APPOINTMENT (OUTPATIENT)
Age: 50
End: 2020-11-16

## 2020-11-17 ENCOUNTER — NON-APPOINTMENT (OUTPATIENT)
Age: 50
End: 2020-11-17

## 2020-11-17 ENCOUNTER — APPOINTMENT (OUTPATIENT)
Dept: INFECTIOUS DISEASE | Facility: CLINIC | Age: 50
End: 2020-11-17
Payer: COMMERCIAL

## 2020-11-17 VITALS
HEIGHT: 61 IN | OXYGEN SATURATION: 98 % | BODY MASS INDEX: 29.45 KG/M2 | DIASTOLIC BLOOD PRESSURE: 79 MMHG | TEMPERATURE: 98.3 F | SYSTOLIC BLOOD PRESSURE: 119 MMHG | HEART RATE: 98 BPM | WEIGHT: 156 LBS

## 2020-11-17 PROCEDURE — 99214 OFFICE O/P EST MOD 30 MIN: CPT

## 2020-11-19 LAB
C TRACH RRNA SPEC QL NAA+PROBE: NOT DETECTED
HPV HIGH+LOW RISK DNA PNL CVX: NOT DETECTED
N GONORRHOEA RRNA SPEC QL NAA+PROBE: NOT DETECTED
SOURCE AMPLIFICATION: NORMAL

## 2020-11-21 ENCOUNTER — EMERGENCY (EMERGENCY)
Facility: HOSPITAL | Age: 50
LOS: 1 days | Discharge: HOME | End: 2020-11-21
Attending: STUDENT IN AN ORGANIZED HEALTH CARE EDUCATION/TRAINING PROGRAM
Payer: MEDICARE

## 2020-11-21 VITALS
DIASTOLIC BLOOD PRESSURE: 81 MMHG | OXYGEN SATURATION: 100 % | HEART RATE: 85 BPM | RESPIRATION RATE: 18 BRPM | TEMPERATURE: 98 F | SYSTOLIC BLOOD PRESSURE: 120 MMHG

## 2020-11-21 VITALS
TEMPERATURE: 98 F | OXYGEN SATURATION: 100 % | RESPIRATION RATE: 20 BRPM | HEART RATE: 107 BPM | SYSTOLIC BLOOD PRESSURE: 121 MMHG | HEIGHT: 64 IN | WEIGHT: 169.98 LBS | DIASTOLIC BLOOD PRESSURE: 89 MMHG

## 2020-11-21 LAB
ALBUMIN SERPL ELPH-MCNC: 3.6 G/DL — SIGNIFICANT CHANGE UP (ref 3.3–5)
ALP SERPL-CCNC: 94 U/L — SIGNIFICANT CHANGE UP (ref 40–120)
ALT FLD-CCNC: 166 U/L — HIGH (ref 10–45)
ANION GAP SERPL CALC-SCNC: 9 MMOL/L — SIGNIFICANT CHANGE UP (ref 5–17)
APPEARANCE UR: CLEAR — SIGNIFICANT CHANGE UP
AST SERPL-CCNC: 193 U/L — HIGH (ref 10–40)
BACTERIA # UR AUTO: ABNORMAL
BASOPHILS # BLD AUTO: 0.02 K/UL — SIGNIFICANT CHANGE UP (ref 0–0.2)
BASOPHILS NFR BLD AUTO: 0.3 % — SIGNIFICANT CHANGE UP (ref 0–2)
BILIRUB SERPL-MCNC: 0.8 MG/DL — SIGNIFICANT CHANGE UP (ref 0.2–1.2)
BILIRUB UR-MCNC: NEGATIVE — SIGNIFICANT CHANGE UP
BUN SERPL-MCNC: 16 MG/DL — SIGNIFICANT CHANGE UP (ref 7–23)
CALCIUM SERPL-MCNC: 9.2 MG/DL — SIGNIFICANT CHANGE UP (ref 8.4–10.5)
CHLORIDE SERPL-SCNC: 102 MMOL/L — SIGNIFICANT CHANGE UP (ref 96–108)
CO2 SERPL-SCNC: 24 MMOL/L — SIGNIFICANT CHANGE UP (ref 22–31)
COLOR SPEC: YELLOW — SIGNIFICANT CHANGE UP
CREAT SERPL-MCNC: 0.83 MG/DL — SIGNIFICANT CHANGE UP (ref 0.5–1.3)
DIFF PNL FLD: NEGATIVE — SIGNIFICANT CHANGE UP
EOSINOPHIL # BLD AUTO: 0.18 K/UL — SIGNIFICANT CHANGE UP (ref 0–0.5)
EOSINOPHIL NFR BLD AUTO: 2.7 % — SIGNIFICANT CHANGE UP (ref 0–6)
EPI CELLS # UR: 6 /HPF — HIGH
GAS PNL BLDV: SIGNIFICANT CHANGE UP
GLUCOSE SERPL-MCNC: 88 MG/DL — SIGNIFICANT CHANGE UP (ref 70–99)
GLUCOSE UR QL: NEGATIVE — SIGNIFICANT CHANGE UP
HCG SERPL-ACNC: 4 MIU/ML — SIGNIFICANT CHANGE UP
HCG UR QL: NEGATIVE — SIGNIFICANT CHANGE UP
HCT VFR BLD CALC: 38 % — SIGNIFICANT CHANGE UP (ref 34.5–45)
HGB BLD-MCNC: 12.4 G/DL — SIGNIFICANT CHANGE UP (ref 11.5–15.5)
HYALINE CASTS # UR AUTO: 1 /LPF — SIGNIFICANT CHANGE UP (ref 0–2)
IMM GRANULOCYTES NFR BLD AUTO: 0.2 % — SIGNIFICANT CHANGE UP (ref 0–1.5)
KETONES UR-MCNC: ABNORMAL
LEUKOCYTE ESTERASE UR-ACNC: ABNORMAL
LYMPHOCYTES # BLD AUTO: 4.35 K/UL — HIGH (ref 1–3.3)
LYMPHOCYTES # BLD AUTO: 65.6 % — HIGH (ref 13–44)
MCHC RBC-ENTMCNC: 28.7 PG — SIGNIFICANT CHANGE UP (ref 27–34)
MCHC RBC-ENTMCNC: 32.6 GM/DL — SIGNIFICANT CHANGE UP (ref 32–36)
MCV RBC AUTO: 88 FL — SIGNIFICANT CHANGE UP (ref 80–100)
MONOCYTES # BLD AUTO: 0.68 K/UL — SIGNIFICANT CHANGE UP (ref 0–0.9)
MONOCYTES NFR BLD AUTO: 10.3 % — SIGNIFICANT CHANGE UP (ref 2–14)
NEUTROPHILS # BLD AUTO: 1.39 K/UL — LOW (ref 1.8–7.4)
NEUTROPHILS NFR BLD AUTO: 20.9 % — LOW (ref 43–77)
NITRITE UR-MCNC: NEGATIVE — SIGNIFICANT CHANGE UP
NRBC # BLD: 0 /100 WBCS — SIGNIFICANT CHANGE UP (ref 0–0)
PH UR: 6.5 — SIGNIFICANT CHANGE UP (ref 5–8)
PLATELET # BLD AUTO: 282 K/UL — SIGNIFICANT CHANGE UP (ref 150–400)
POTASSIUM SERPL-MCNC: 5.1 MMOL/L — SIGNIFICANT CHANGE UP (ref 3.5–5.3)
POTASSIUM SERPL-SCNC: 5.1 MMOL/L — SIGNIFICANT CHANGE UP (ref 3.5–5.3)
PROT SERPL-MCNC: 7.4 G/DL — SIGNIFICANT CHANGE UP (ref 6–8.3)
PROT UR-MCNC: ABNORMAL
RBC # BLD: 4.32 M/UL — SIGNIFICANT CHANGE UP (ref 3.8–5.2)
RBC # FLD: 14.7 % — HIGH (ref 10.3–14.5)
RBC CASTS # UR COMP ASSIST: 1 /HPF — SIGNIFICANT CHANGE UP (ref 0–4)
SODIUM SERPL-SCNC: 135 MMOL/L — SIGNIFICANT CHANGE UP (ref 135–145)
SP GR SPEC: 1.02 — SIGNIFICANT CHANGE UP (ref 1.01–1.02)
UROBILINOGEN FLD QL: ABNORMAL
WBC # BLD: 6.63 K/UL — SIGNIFICANT CHANGE UP (ref 3.8–10.5)
WBC # FLD AUTO: 6.63 K/UL — SIGNIFICANT CHANGE UP (ref 3.8–10.5)
WBC UR QL: 8 /HPF — HIGH (ref 0–5)

## 2020-11-21 PROCEDURE — 80053 COMPREHEN METABOLIC PANEL: CPT

## 2020-11-21 PROCEDURE — 93976 VASCULAR STUDY: CPT | Mod: 26

## 2020-11-21 PROCEDURE — 87086 URINE CULTURE/COLONY COUNT: CPT

## 2020-11-21 PROCEDURE — 93005 ELECTROCARDIOGRAM TRACING: CPT

## 2020-11-21 PROCEDURE — 85025 COMPLETE CBC W/AUTO DIFF WBC: CPT

## 2020-11-21 PROCEDURE — 83690 ASSAY OF LIPASE: CPT

## 2020-11-21 PROCEDURE — 93010 ELECTROCARDIOGRAM REPORT: CPT

## 2020-11-21 PROCEDURE — 84702 CHORIONIC GONADOTROPIN TEST: CPT

## 2020-11-21 PROCEDURE — 80074 ACUTE HEPATITIS PANEL: CPT

## 2020-11-21 PROCEDURE — 96375 TX/PRO/DX INJ NEW DRUG ADDON: CPT

## 2020-11-21 PROCEDURE — 99285 EMERGENCY DEPT VISIT HI MDM: CPT

## 2020-11-21 PROCEDURE — 81025 URINE PREGNANCY TEST: CPT

## 2020-11-21 PROCEDURE — 99284 EMERGENCY DEPT VISIT MOD MDM: CPT | Mod: 25

## 2020-11-21 PROCEDURE — 81001 URINALYSIS AUTO W/SCOPE: CPT

## 2020-11-21 PROCEDURE — 96374 THER/PROPH/DIAG INJ IV PUSH: CPT

## 2020-11-21 PROCEDURE — 93976 VASCULAR STUDY: CPT

## 2020-11-21 RX ORDER — SODIUM CHLORIDE 9 MG/ML
1000 INJECTION INTRAMUSCULAR; INTRAVENOUS; SUBCUTANEOUS ONCE
Refills: 0 | Status: COMPLETED | OUTPATIENT
Start: 2020-11-21 | End: 2020-11-21

## 2020-11-21 RX ORDER — FAMOTIDINE 10 MG/ML
20 INJECTION INTRAVENOUS ONCE
Refills: 0 | Status: COMPLETED | OUTPATIENT
Start: 2020-11-21 | End: 2020-11-21

## 2020-11-21 RX ORDER — ONDANSETRON 8 MG/1
4 TABLET, FILM COATED ORAL ONCE
Refills: 0 | Status: COMPLETED | OUTPATIENT
Start: 2020-11-21 | End: 2020-11-21

## 2020-11-21 RX ADMIN — SODIUM CHLORIDE 1000 MILLILITER(S): 9 INJECTION INTRAMUSCULAR; INTRAVENOUS; SUBCUTANEOUS at 11:27

## 2020-11-21 RX ADMIN — FAMOTIDINE 20 MILLIGRAM(S): 10 INJECTION INTRAVENOUS at 11:16

## 2020-11-21 RX ADMIN — ONDANSETRON 4 MILLIGRAM(S): 8 TABLET, FILM COATED ORAL at 11:15

## 2020-11-21 RX ADMIN — Medication 30 MILLILITER(S): at 11:27

## 2020-11-21 NOTE — ED PROVIDER NOTE - PROGRESS NOTE DETAILS
Purcell DO: pt reassessed and feels improved, tolerated po, likely side effect of restarting haart therapy, will dc w/ zofran and outpt ID/ pmd f/u

## 2020-11-21 NOTE — ED PROVIDER NOTE - CLINICAL SUMMARY MEDICAL DECISION MAKING FREE TEXT BOX
51 y/o F PMHx of HIV (recently started Symtuza, CD4 191, [place] [address] in 11/2020), prior histoplasmosis, strongyloides, CMV viremia, HBV, and renal vein thrombosis (2010, previously on Coumadin), presented to ED with vomiting x 1 day. DDx includes gastritis, ART side effect. 51 y/o F PMHx of HIV (recently started Symtuza, CD4 191, [place] [address] in 11/2020), prior histoplasmosis, strongyloides, CMV viremia, HBV, and renal vein thrombosis (2010, previously on Coumadin), presented to ED with vomiting x 1 day. DDx includes gastritis, ART side effect. Will check labs, treat symptomatically. Will defer imaging given stability and lack of abdominal pain/tenderness. 49 y/o F PMHx of HIV (recently started Symtuza, CD4 191, VL [address] in 11/2020), prior histoplasmosis, strongyloides, CMV viremia, HBV, and renal vein thrombosis (2010, previously on Coumadin), presented to ED with vomiting x 1 day. DDx includes viral gastroenteritis, gastritis, vs ART side effect. Will check labs, treat symptomatically. Will defer imaging given stability and lack of abdominal pain/tenderness. 51 y/o F PMHx of HIV (recently started Symtuza, CD4 191, [place] [address] in 11/2020), prior histoplasmosis, strongyloides, CMV viremia, HBV, and renal vein thrombosis (2010, previously on Coumadin), presented to ED with vomiting x 2 days. DDx includes viral gastroenteritis, gastritis, vs ART side effect. Will check labs, treat symptomatically. Will defer imaging given stability and lack of abdominal pain/tenderness.

## 2020-11-21 NOTE — ED PROVIDER NOTE - OBJECTIVE STATEMENT
49 y/o F PMHx of HIV (recently started Symtuza, CD4 191, [place] [address] in 11/2020), prior histoplasmosis, strongyloides, CMV viremia, HBV, and renal vein thrombosis (2010, previously on Coumadin), presented to ED with vomiting x 1 day. Patient reports she had vomiting 3-4x yesterday and once today. Has had several loose BMs over last day. No abdominal pain, denies dysuria, fever/chills, CP, SOB, rash, or headache. Recently restarted Symtuza, was seen in ID clinic last week, labs notable for new transaminitis. 51 y/o F PMHx of HIV (recently started Symtuza, CD4 191, [place] [address] in 11/2020), prior histoplasmosis, strongyloides, CMV viremia, HBV, and renal vein thrombosis (2010, previously on Coumadin), presented to ED with vomiting x 2 days. Patient reports she had vomiting 3-4x yesterday and once today. Has had several loose BMs over last day. No abdominal pain, denies dysuria, fever/chills, CP, SOB, rash, or headache. Recently restarted Symtuza, was seen in ID clinic last week, labs notable for new transaminitis.

## 2020-11-21 NOTE — ED PROVIDER NOTE - ATTENDING CONTRIBUTION TO CARE
Purcell DO: I have personally performed a face to face medical and diagnostic evaluation of the patient. I have discussed with and reviewed the resident's note and agree with the History, ROS, Physical Exam and MDM unless otherwise indicated. A brief summary of my personal evaluation and impression can be found below.    51 y/o F PMHx of HIV (recently started Symtuza, CD4 191, [place] [address] in 11/2020), prior histoplasmosis, strongyloides, CMV viremia, HBV, and renal vein thrombosis (2010, previously on Coumadin), w/ nausea/vomiting x 2 days. On assessment well appearing, abd nontender, soft and nondistended. No symptoms suggestive of obstruction as pt with bowel movements and passing flatus. Emesis is not post prandial. Patient recent restarted on symtuza which can cause gastrointestinal side effects. No reported fevers. Outpt labs demonstrate slight increase in LFTs. Consider viral gastroenteritis, gastritis, vs ART side effect. Will check labs, treat symptomatically. Will obtain US to ensure no portal vein thrombosis. Defer CT scan given recent imaging 2 weeks ago for similar complain and lack of new abdominal pain/tenderness. If symptoms not relieved with supportive care, will consider expanding work up.

## 2020-11-21 NOTE — ED PROVIDER NOTE - CARE PROVIDER_API CALL
Ashish Starkey  INFECTIOUS DISEASE  [address] [place] [id number]  Phone: [phone]  Fax: [phone]  Follow Up Time:

## 2020-11-21 NOTE — ED PROVIDER NOTE - PATIENT PORTAL LINK FT
You can access the FollowMyHealth Patient Portal offered by [facility] by registering at the following website: [email or web address]. By joining [facility]’s FollowMyHealth portal, you will also be able to view your health information using other applications (apps) compatible with our system.

## 2020-11-21 NOTE — ED PROVIDER NOTE - NSFOLLOWUPINSTRUCTIONS_ED_ALL_ED_FT
Please follow up with your infectious disease doctor after discharge. Please  anti-nausea medication from your pharmacy.

## 2020-11-21 NOTE — ED ADULT NURSE NOTE - OBJECTIVE STATEMENT
51 yo female complaining of vomiting for 3 days, right flank pain (not at this moment) and dizziness. "I don't feel pain right now but I feel so much nausea and dizziness" Pt alerted and oriented x3, no signs of acute distress noted at this moment, vitals WNL. MD at the bedside with a , no edema noted at this moment. Pt denies pain a this moment. Pt able to ambulate without assistance, heart sounds normal,. lungs clear, abdomen soft, non distended. will continue to monitor closely

## 2020-11-21 NOTE — ED ADULT NURSE NOTE - NSIMPLEMENTINTERV_GEN_ALL_ED
Implemented All Universal Safety Interventions:  [place] to call system. Call bell, personal items and telephone within reach. Instruct patient to call for assistance. Room bathroom lighting operational. Non-slip footwear when patient is off stretcher. Physically safe environment: no spills, clutter or unnecessary equipment. Stretcher in lowest position, wheels locked, appropriate side rails in place.

## 2020-11-22 LAB
CULTURE RESULTS: SIGNIFICANT CHANGE UP
HAV IGM SER-ACNC: SIGNIFICANT CHANGE UP
HBV CORE IGM SER-ACNC: SIGNIFICANT CHANGE UP
HBV SURFACE AG SER-ACNC: REACTIVE
HCV AB S/CO SERPL IA: 0.33 S/CO — SIGNIFICANT CHANGE UP (ref 0–0.99)
HCV AB SERPL-IMP: SIGNIFICANT CHANGE UP
SPECIMEN SOURCE: SIGNIFICANT CHANGE UP

## 2020-11-22 NOTE — ED POST DISCHARGE NOTE - DETAILS
Spoke to pt w/  id [id number]. Discussed results. Pt asymptomatic and does not require retesting at this time. Advised to resample if sxs develop. - Blanca Cheng PA-C

## 2020-11-22 NOTE — ED POST DISCHARGE NOTE - REASON FOR FOLLOW-UP
Other Hep B surface Ag [employer], pt with known history of Hep B, no follow up required at this time.  Jaspal

## 2020-11-23 ENCOUNTER — NON-APPOINTMENT (OUTPATIENT)
Age: 50
End: 2020-11-23

## 2020-11-24 ENCOUNTER — NON-APPOINTMENT (OUTPATIENT)
Age: 50
End: 2020-11-24

## 2020-11-24 DIAGNOSIS — D73.5 INFARCTION OF SPLEEN: ICD-10-CM

## 2020-11-24 LAB — CYTOLOGY CVX/VAG DOC THIN PREP: ABNORMAL

## 2020-11-25 ENCOUNTER — NON-APPOINTMENT (OUTPATIENT)
Age: 50
End: 2020-11-25

## 2020-12-01 ENCOUNTER — NON-APPOINTMENT (OUTPATIENT)
Age: 50
End: 2020-12-01

## 2020-12-02 ENCOUNTER — LABORATORY RESULT (OUTPATIENT)
Age: 50
End: 2020-12-02

## 2020-12-02 ENCOUNTER — NON-APPOINTMENT (OUTPATIENT)
Age: 50
End: 2020-12-02

## 2020-12-02 ENCOUNTER — OUTPATIENT (OUTPATIENT)
Dept: OUTPATIENT SERVICES | Facility: HOSPITAL | Age: 50
LOS: 1 days | End: 2020-12-02
Payer: COMMERCIAL

## 2020-12-02 ENCOUNTER — APPOINTMENT (OUTPATIENT)
Dept: INFECTIOUS DISEASE | Facility: CLINIC | Age: 50
End: 2020-12-02

## 2020-12-02 VITALS
WEIGHT: 151 LBS | OXYGEN SATURATION: 98 % | BODY MASS INDEX: 28.51 KG/M2 | DIASTOLIC BLOOD PRESSURE: 76 MMHG | HEART RATE: 106 BPM | SYSTOLIC BLOOD PRESSURE: 104 MMHG | TEMPERATURE: 97.2 F | HEIGHT: 61 IN

## 2020-12-02 DIAGNOSIS — E55.9 VITAMIN D DEFICIENCY, UNSPECIFIED: ICD-10-CM

## 2020-12-02 DIAGNOSIS — B20 HUMAN IMMUNODEFICIENCY VIRUS [HIV] DISEASE: ICD-10-CM

## 2020-12-02 PROCEDURE — G0463: CPT

## 2020-12-02 RX ORDER — PROMETHAZINE HYDROCHLORIDE 12.5 MG/1
12.5 TABLET ORAL EVERY 6 HOURS
Qty: 28 | Refills: 0 | Status: DISCONTINUED | COMMUNITY
Start: 2020-11-19 | End: 2020-12-02

## 2020-12-02 RX ORDER — METRONIDAZOLE 500 MG/1
500 TABLET ORAL
Qty: 14 | Refills: 0 | Status: DISCONTINUED | COMMUNITY
Start: 2020-11-25 | End: 2020-12-02

## 2020-12-02 RX ADMIN — Medication 15 MILLIGRAM(S): at 01:00

## 2020-12-02 NOTE — ASSESSMENT
[FreeTextEntry1] : *HIV:\par -Routine labs today, check renal fxn, and LFT's \par -Continue Symtuza daily, adherence reiterated\par \par * Nausea & Vomiting:\par -Rx: Zofran refilled\par -Referral given for GI; pt does not have any insurance, I advised her she has been having vomiting episodes for too long of a duration and needs to be seen for underlying issue. \par -I discussed speaking with SW regarding Medicaid, needs to be seen ASAP\par \par *Vitamin D def:\par -Rx: Vitamin D [id number] weekly\par \par *BV:\par -I switched oral Flagyl to vaginal inserts nightly, patient could not tolerate the oral medications\par \par *All questions answered, I instructed her if she needs anything before next visit to contact me\par \par *Follow up in 1 month  [Treatment Education] : treatment education [Risk Reduction] : risk reduction [Medical Care Issues] : medical care issues [Anticipatory Guidance] : anticipatory guidance

## 2020-12-02 NOTE — HISTORY OF PRESENT ILLNESS
[FreeTextEntry1] : 49 y/o female patient presents for follow up visit. Patient is taking Symtuza daily without any missed doses. \par Patient still complains of nausea with vomiting. She was seen recently at [facility] on 11/21 for this issue. She had an US of abdomen and pelvis which should no evidence of portal vein thrombus. Her LFT's were also elevated. She was treated with IV Zofran, prescribed oral Zofran and discharged. Today she is asking for more Zofran as she is still having vomiting episodes about 2-3 times a day. She tries to eat lightly and it will come back up. \par She denies fever, chills, body aches, chest pain, headache, or dyspnea. \par

## 2020-12-03 DIAGNOSIS — N76.0 ACUTE VAGINITIS: ICD-10-CM

## 2020-12-03 DIAGNOSIS — E55.9 VITAMIN D DEFICIENCY, UNSPECIFIED: ICD-10-CM

## 2020-12-03 DIAGNOSIS — R11.2 NAUSEA WITH VOMITING, UNSPECIFIED: ICD-10-CM

## 2020-12-03 LAB
ALBUMIN SERPL ELPH-MCNC: 2.8 G/DL
ALP BLD-CCNC: 155 U/L
ALT SERPL-CCNC: 382 U/L
ANION GAP SERPL CALC-SCNC: 10 MMOL/L
AST SERPL-CCNC: 624 U/L
BASOPHILS # BLD AUTO: 0.01 K/UL
BASOPHILS NFR BLD AUTO: 0.1 %
BILIRUB SERPL-MCNC: 1.9 MG/DL
BUN SERPL-MCNC: 12 MG/DL
CALCIUM SERPL-MCNC: 8.4 MG/DL
CD3 CELLS # BLD: 1758 /UL
CD3 CELLS NFR BLD: 43 %
CD3+CD4+ CELLS # BLD: 226 /UL
CD3+CD4+ CELLS NFR BLD: 5 %
CD3+CD4+ CELLS/CD3+CD8+ CLL SPEC: 0.15 RATIO
CD3+CD8+ CELLS # SPEC: 1503 /UL
CD3+CD8+ CELLS NFR BLD: 37 %
CHLORIDE SERPL-SCNC: 99 MMOL/L
CO2 SERPL-SCNC: 22 MMOL/L
CREAT SERPL-MCNC: 0.84 MG/DL
EOSINOPHIL # BLD AUTO: 0.3 K/UL
EOSINOPHIL NFR BLD AUTO: 3.8 %
GLUCOSE SERPL-MCNC: 91 MG/DL
HCT VFR BLD CALC: 42.8 %
HGB BLD-MCNC: 14.4 G/DL
HIV1 RNA # SERPL NAA+PROBE: 71
HIV1 RNA # SERPL NAA+PROBE: ABNORMAL
IMM GRANULOCYTES NFR BLD AUTO: 0.1 %
LYMPHOCYTES # BLD AUTO: 4.98 K/UL
LYMPHOCYTES NFR BLD AUTO: 63.4 %
MAN DIFF?: NORMAL
MCHC RBC-ENTMCNC: 29.1 PG
MCHC RBC-ENTMCNC: 33.6 GM/DL
MCV RBC AUTO: 86.5 FL
MONOCYTES # BLD AUTO: 0.46 K/UL
MONOCYTES NFR BLD AUTO: 5.9 %
NEUTROPHILS # BLD AUTO: 2.09 K/UL
NEUTROPHILS NFR BLD AUTO: 26.7 %
PLATELET # BLD AUTO: 349 K/UL
POTASSIUM SERPL-SCNC: 5 MMOL/L
PROT SERPL-MCNC: 6.2 G/DL
RBC # BLD: 4.95 M/UL
RBC # FLD: 17.6 %
SODIUM SERPL-SCNC: 131 MMOL/L
VIRAL LOAD INTERP: NORMAL
VIRAL LOAD LOG: 1.85
WBC # FLD AUTO: 7.85 K/UL

## 2020-12-14 ENCOUNTER — NON-APPOINTMENT (OUTPATIENT)
Age: 50
End: 2020-12-14

## 2020-12-21 ENCOUNTER — NON-APPOINTMENT (OUTPATIENT)
Age: 50
End: 2020-12-21

## 2020-12-21 PROBLEM — J06.9 VIRAL URI WITH COUGH: Status: RESOLVED | Noted: 2019-01-15 | Resolved: 2020-12-21

## 2020-12-22 ENCOUNTER — NON-APPOINTMENT (OUTPATIENT)
Age: 50
End: 2020-12-22

## 2020-12-22 ENCOUNTER — INPATIENT (INPATIENT)
Facility: HOSPITAL | Age: 50
LOS: 21 days | Discharge: ANOTHER HOSPITAL | DRG: 974 | End: 2021-01-13
Attending: INTERNAL MEDICINE | Admitting: INTERNAL MEDICINE
Payer: MEDICARE

## 2020-12-22 VITALS
HEIGHT: 64 IN | HEART RATE: 130 BPM | WEIGHT: 162.92 LBS | TEMPERATURE: 98 F | RESPIRATION RATE: 20 BRPM | OXYGEN SATURATION: 99 %

## 2020-12-22 DIAGNOSIS — R74.01 ELEVATION OF LEVELS OF LIVER TRANSAMINASE LEVELS: ICD-10-CM

## 2020-12-22 LAB
4/8 RATIO: 0.21 RATIO — LOW (ref 0.9–3.6)
ABS CD8: 553 /UL — SIGNIFICANT CHANGE UP (ref 142–740)
ALBUMIN SERPL ELPH-MCNC: 1.1 G/DL — LOW (ref 3.3–5)
ALP SERPL-CCNC: 278 U/L — HIGH (ref 40–120)
ALT FLD-CCNC: 582 U/L — HIGH (ref 10–45)
ANION GAP SERPL CALC-SCNC: 7 MMOL/L — SIGNIFICANT CHANGE UP (ref 5–17)
APAP SERPL-MCNC: <15 UG/ML — SIGNIFICANT CHANGE UP (ref 10–30)
APPEARANCE UR: ABNORMAL
APTT BLD: 35.1 SEC — SIGNIFICANT CHANGE UP (ref 27.5–35.5)
AST SERPL-CCNC: 1621 U/L — HIGH (ref 10–40)
BACTERIA # UR AUTO: NEGATIVE — SIGNIFICANT CHANGE UP
BASE EXCESS BLDV CALC-SCNC: -0.7 MMOL/L — SIGNIFICANT CHANGE UP (ref -2–2)
BASOPHILS # BLD AUTO: 0.02 K/UL — SIGNIFICANT CHANGE UP (ref 0–0.2)
BASOPHILS NFR BLD AUTO: 0.3 % — SIGNIFICANT CHANGE UP (ref 0–2)
BILIRUB DIRECT SERPL-MCNC: 7.9 MG/DL — HIGH (ref 0–0.2)
BILIRUB INDIRECT FLD-MCNC: 1.7 MG/DL — HIGH (ref 0.2–1)
BILIRUB SERPL-MCNC: 7.9 MG/DL — HIGH (ref 0.2–1.2)
BILIRUB SERPL-MCNC: 9.6 MG/DL — HIGH (ref 0.2–1.2)
BILIRUB UR-MCNC: ABNORMAL
BLD GP AB SCN SERPL QL: NEGATIVE — SIGNIFICANT CHANGE UP
BUN SERPL-MCNC: 10 MG/DL — SIGNIFICANT CHANGE UP (ref 7–23)
CA-I SERPL-SCNC: 0.98 MMOL/L — LOW (ref 1.12–1.3)
CALCIUM SERPL-MCNC: 7.1 MG/DL — LOW (ref 8.4–10.5)
CD16+CD56+ CELLS NFR BLD: 18 % — SIGNIFICANT CHANGE UP (ref 5–23)
CD16+CD56+ CELLS NFR SPEC: 266 /UL — SIGNIFICANT CHANGE UP (ref 71–410)
CD19 BLASTS SPEC-ACNC: 17 % — SIGNIFICANT CHANGE UP (ref 6–24)
CD19 BLASTS SPEC-ACNC: 250 /UL — SIGNIFICANT CHANGE UP (ref 84–469)
CD3 BLASTS SPEC-ACNC: 61 % — SIGNIFICANT CHANGE UP (ref 59–83)
CD3 BLASTS SPEC-ACNC: 778 /UL — SIGNIFICANT CHANGE UP (ref 672–1870)
CD4 %: 10 % — LOW (ref 30–62)
CD8 %: 50 % — HIGH (ref 12–36)
CHLORIDE BLDV-SCNC: 105 MMOL/L — SIGNIFICANT CHANGE UP (ref 96–108)
CHLORIDE SERPL-SCNC: 99 MMOL/L — SIGNIFICANT CHANGE UP (ref 96–108)
CHLORIDE UR-SCNC: 50 MMOL/L — SIGNIFICANT CHANGE UP
CO2 BLDV-SCNC: 26 MMOL/L — SIGNIFICANT CHANGE UP (ref 22–30)
CO2 SERPL-SCNC: 21 MMOL/L — LOW (ref 22–31)
COLOR SPEC: ABNORMAL
CREAT ?TM UR-MCNC: 45 MG/DL — SIGNIFICANT CHANGE UP
CREAT SERPL-MCNC: 0.87 MG/DL — SIGNIFICANT CHANGE UP (ref 0.5–1.3)
CRP SERPL-MCNC: 1.53 MG/DL — HIGH (ref 0–0.4)
D DIMER BLD IA.RAPID-MCNC: 368 NG/ML DDU — HIGH
DIFF PNL FLD: NEGATIVE — SIGNIFICANT CHANGE UP
EOSINOPHIL # BLD AUTO: 0.27 K/UL — SIGNIFICANT CHANGE UP (ref 0–0.5)
EOSINOPHIL NFR BLD AUTO: 3.7 % — SIGNIFICANT CHANGE UP (ref 0–6)
EPI CELLS # UR: 2 /HPF — SIGNIFICANT CHANGE UP
GAS PNL BLDV: 124 MMOL/L — LOW (ref 135–145)
GAS PNL BLDV: SIGNIFICANT CHANGE UP
GLUCOSE BLDV-MCNC: 80 MG/DL — SIGNIFICANT CHANGE UP (ref 70–99)
GLUCOSE SERPL-MCNC: 81 MG/DL — SIGNIFICANT CHANGE UP (ref 70–99)
GLUCOSE UR QL: NEGATIVE — SIGNIFICANT CHANGE UP
HCG SERPL-ACNC: 4.6 MIU/ML — HIGH
HCO3 BLDV-SCNC: 24 MMOL/L — SIGNIFICANT CHANGE UP (ref 21–29)
HCT VFR BLD CALC: 44.3 % — SIGNIFICANT CHANGE UP (ref 34.5–45)
HCT VFR BLDA CALC: 45 % — SIGNIFICANT CHANGE UP (ref 39–50)
HGB BLD CALC-MCNC: 14.5 G/DL — SIGNIFICANT CHANGE UP (ref 11.5–15.5)
HGB BLD-MCNC: 15.8 G/DL — HIGH (ref 11.5–15.5)
HYALINE CASTS # UR AUTO: 2 /LPF — SIGNIFICANT CHANGE UP (ref 0–2)
IMM GRANULOCYTES NFR BLD AUTO: 0.6 % — SIGNIFICANT CHANGE UP (ref 0–1.5)
INR BLD: 1.52 RATIO — HIGH (ref 0.88–1.16)
KETONES UR-MCNC: NEGATIVE — SIGNIFICANT CHANGE UP
LACTATE BLDV-MCNC: 1.8 MMOL/L — SIGNIFICANT CHANGE UP (ref 0.7–2)
LDH SERPL L TO P-CCNC: 449 U/L — HIGH (ref 50–242)
LEUKOCYTE ESTERASE UR-ACNC: NEGATIVE — SIGNIFICANT CHANGE UP
LIDOCAIN IGE QN: 23 U/L — SIGNIFICANT CHANGE UP (ref 7–60)
LYMPHOCYTES # BLD AUTO: 3.37 K/UL — HIGH (ref 1–3.3)
LYMPHOCYTES # BLD AUTO: 46.4 % — HIGH (ref 13–44)
MCHC RBC-ENTMCNC: 30.6 PG — SIGNIFICANT CHANGE UP (ref 27–34)
MCHC RBC-ENTMCNC: 35.7 GM/DL — SIGNIFICANT CHANGE UP (ref 32–36)
MCV RBC AUTO: 85.9 FL — SIGNIFICANT CHANGE UP (ref 80–100)
MONOCYTES # BLD AUTO: 0.52 K/UL — SIGNIFICANT CHANGE UP (ref 0–0.9)
MONOCYTES NFR BLD AUTO: 7.2 % — SIGNIFICANT CHANGE UP (ref 2–14)
NEUTROPHILS # BLD AUTO: 3.05 K/UL — SIGNIFICANT CHANGE UP (ref 1.8–7.4)
NEUTROPHILS NFR BLD AUTO: 41.8 % — LOW (ref 43–77)
NITRITE UR-MCNC: NEGATIVE — SIGNIFICANT CHANGE UP
NRBC # BLD: 0 /100 WBCS — SIGNIFICANT CHANGE UP (ref 0–0)
OSMOLALITY SERPL: 272 MOSMOL/KG — LOW (ref 275–300)
OSMOLALITY UR: 256 MOS/KG — LOW (ref 300–900)
PCO2 BLDV: 44 MMHG — SIGNIFICANT CHANGE UP (ref 35–50)
PH BLDV: 7.36 — SIGNIFICANT CHANGE UP (ref 7.35–7.45)
PH UR: 7 — SIGNIFICANT CHANGE UP (ref 5–8)
PLATELET # BLD AUTO: 409 K/UL — HIGH (ref 150–400)
PO2 BLDV: 28 MMHG — SIGNIFICANT CHANGE UP (ref 25–45)
POTASSIUM BLDV-SCNC: 3.9 MMOL/L — SIGNIFICANT CHANGE UP (ref 3.5–5.3)
POTASSIUM SERPL-MCNC: 3.8 MMOL/L — SIGNIFICANT CHANGE UP (ref 3.5–5.3)
POTASSIUM SERPL-SCNC: 3.8 MMOL/L — SIGNIFICANT CHANGE UP (ref 3.5–5.3)
POTASSIUM UR-SCNC: 39 MMOL/L — SIGNIFICANT CHANGE UP
PROT ?TM UR-MCNC: 20 MG/DL — HIGH (ref 0–12)
PROT SERPL-MCNC: 4.1 G/DL — LOW (ref 6–8.3)
PROT UR-MCNC: NEGATIVE — SIGNIFICANT CHANGE UP
PROTHROM AB SERPL-ACNC: 17.9 SEC — HIGH (ref 10.6–13.6)
RBC # BLD: 5.16 M/UL — SIGNIFICANT CHANGE UP (ref 3.8–5.2)
RBC # FLD: 20.1 % — HIGH (ref 10.3–14.5)
RBC CASTS # UR COMP ASSIST: 4 /HPF — SIGNIFICANT CHANGE UP (ref 0–4)
RH IG SCN BLD-IMP: POSITIVE — SIGNIFICANT CHANGE UP
SALICYLATES SERPL-MCNC: <2 MG/DL — LOW (ref 15–30)
SAO2 % BLDV: 44 % — LOW (ref 67–88)
SARS-COV-2 RNA SPEC QL NAA+PROBE: DETECTED
SODIUM SERPL-SCNC: 127 MMOL/L — LOW (ref 135–145)
SODIUM UR-SCNC: 39 MMOL/L — SIGNIFICANT CHANGE UP
SP GR SPEC: 1.02 — SIGNIFICANT CHANGE UP (ref 1.01–1.02)
T-CELL CD4 SUBSET PNL BLD: 116 /UL — LOW (ref 489–1457)
URATE UR-MCNC: 23.4 MG/DL — SIGNIFICANT CHANGE UP
UROBILINOGEN FLD QL: NEGATIVE — SIGNIFICANT CHANGE UP
WBC # BLD: 7.27 K/UL — SIGNIFICANT CHANGE UP (ref 3.8–10.5)
WBC # FLD AUTO: 7.27 K/UL — SIGNIFICANT CHANGE UP (ref 3.8–10.5)
WBC UR QL: 1 /HPF — SIGNIFICANT CHANGE UP (ref 0–5)

## 2020-12-22 PROCEDURE — 71045 X-RAY EXAM CHEST 1 VIEW: CPT | Mod: 26

## 2020-12-22 PROCEDURE — 99223 1ST HOSP IP/OBS HIGH 75: CPT | Mod: GC

## 2020-12-22 PROCEDURE — 74177 CT ABD & PELVIS W/CONTRAST: CPT | Mod: 26

## 2020-12-22 PROCEDURE — 76705 ECHO EXAM OF ABDOMEN: CPT | Mod: 26

## 2020-12-22 PROCEDURE — 99285 EMERGENCY DEPT VISIT HI MDM: CPT

## 2020-12-22 RX ORDER — ONDANSETRON 8 MG/1
1 TABLET, FILM COATED ORAL
Qty: 20 | Refills: 0

## 2020-12-22 RX ORDER — ENOXAPARIN SODIUM 100 MG/ML
40 INJECTION SUBCUTANEOUS EVERY 12 HOURS
Refills: 0 | Status: DISCONTINUED | OUTPATIENT
Start: 2020-12-22 | End: 2021-01-01

## 2020-12-22 RX ORDER — FENTANYL CITRATE 50 UG/ML
50 INJECTION INTRAVENOUS ONCE
Refills: 0 | Status: DISCONTINUED | OUTPATIENT
Start: 2020-12-22 | End: 2020-12-22

## 2020-12-22 RX ORDER — BICTEGRAVIR SODIUM, EMTRICITABINE, AND TENOFOVIR ALAFENAMIDE FUMARATE 30; 120; 15 MG/1; MG/1; MG/1
1 TABLET ORAL DAILY
Refills: 0 | Status: DISCONTINUED | OUTPATIENT
Start: 2020-12-22 | End: 2020-12-23

## 2020-12-22 RX ORDER — SODIUM CHLORIDE 9 MG/ML
1000 INJECTION INTRAMUSCULAR; INTRAVENOUS; SUBCUTANEOUS ONCE
Refills: 0 | Status: COMPLETED | OUTPATIENT
Start: 2020-12-22 | End: 2020-12-22

## 2020-12-22 RX ORDER — ONDANSETRON 8 MG/1
4 TABLET, FILM COATED ORAL ONCE
Refills: 0 | Status: COMPLETED | OUTPATIENT
Start: 2020-12-22 | End: 2020-12-22

## 2020-12-22 RX ORDER — SODIUM CHLORIDE 9 MG/ML
1000 INJECTION INTRAMUSCULAR; INTRAVENOUS; SUBCUTANEOUS
Refills: 0 | Status: DISCONTINUED | OUTPATIENT
Start: 2020-12-22 | End: 2020-12-23

## 2020-12-22 RX ORDER — ONDANSETRON 8 MG/1
4 TABLET, FILM COATED ORAL EVERY 8 HOURS
Refills: 0 | Status: DISCONTINUED | OUTPATIENT
Start: 2020-12-22 | End: 2021-01-13

## 2020-12-22 RX ADMIN — SODIUM CHLORIDE 75 MILLILITER(S): 9 INJECTION INTRAMUSCULAR; INTRAVENOUS; SUBCUTANEOUS at 17:38

## 2020-12-22 RX ADMIN — FENTANYL CITRATE 50 MICROGRAM(S): 50 INJECTION INTRAVENOUS at 09:22

## 2020-12-22 RX ADMIN — FENTANYL CITRATE 50 MICROGRAM(S): 50 INJECTION INTRAVENOUS at 10:00

## 2020-12-22 RX ADMIN — ONDANSETRON 4 MILLIGRAM(S): 8 TABLET, FILM COATED ORAL at 19:21

## 2020-12-22 RX ADMIN — SODIUM CHLORIDE 1000 MILLILITER(S): 9 INJECTION INTRAMUSCULAR; INTRAVENOUS; SUBCUTANEOUS at 09:15

## 2020-12-22 RX ADMIN — ONDANSETRON 4 MILLIGRAM(S): 8 TABLET, FILM COATED ORAL at 09:14

## 2020-12-22 RX ADMIN — ENOXAPARIN SODIUM 40 MILLIGRAM(S): 100 INJECTION SUBCUTANEOUS at 17:37

## 2020-12-22 NOTE — ED PROVIDER NOTE - PROGRESS NOTE DETAILS
Attending Annetta Cox: pt afebrile rectally. blood work showed sig transaminitis and hyperbilirubinemia. pt with sig abdominal pain. pt denies any ingestions, consider possible medications. will need GI an dID eval. plan to admit Attending Annetta Cox: covid swab returned positive. will plcace in isolation

## 2020-12-22 NOTE — CONSULT NOTE ADULT - SUBJECTIVE AND OBJECTIVE BOX
· Subjective and Objective:   Patient is a 50 y old female who presents with a chief complaint of nausea and vomiting.     HPI:  The patient is a 50 year old female with past medical history of HIV, CMV, HBV, histoplasmosis, renal vein thrombosis s/p coumadin for 6 months, and strongyloidiasis who presents with abdominal pain. She reports constant upper abdominal pain described as constant in nature. She reports three episodes of non-bloody vomiting today without diarrhea. She denies recent travel, sick contacts, or pets at home. She denies fever. CBC showed reactive thrombocytosis. CMP showed hyponatremia with mild hypocalcemia. Elevated total bilirubinemia with cholestatic liver pattern was noted with elevated LFTs, AST/ALT > 2:1. Positive procalcitonin and beta HCG was detected. COVID-19 PCR was positive. HBsAg was positive. CD4 count was 116 with baseline CD4 count around 200-215. She reports visiting the Infectious disease clinic one month ago where she noted to have nausea and vomiting. She was born in [facility], but has been living in the [place] for some time.  CT abdomen and pelvis with contrast showed small bilateral pleural effusions, moderate volume ascites, new, and prominent submucosal fat deposition in the ascending, transverse, and proximal descending colon, suggestive of chronic inflammation. Abdominal ultrasound showed free fluid in the abdomen. CXR is clear. Blood cultures and urine cultures are pending. Infectious disease was consulted for positive COVID-19 PCR test and HIV management.       prior hospital charts reviewed [x]  primary team notes reviewed [x]  other consultant notes reviewed [x]    PAST MEDICAL & SURGICAL HISTORY:  Renal Vein Thrombosis  , s/p Coumadin for 6 months  CMV Infection  Hepatitis B  Strongyloidosis  Histoplasmosis  HIV (Human Immunodeficiency Virus Infection)  Gallstones  S/P  Section  S/P Bilateral Tubal Ligation  S/P PEG (Percutaneous Endoscopic Gastrostomy) Placement and Removal  History of Cholecystectomy        Allergies  No Known Allergies    ANTIMICROBIALS (past 90 days)  MEDICATIONS  (STANDING):          OTHER MEDS: MEDICATIONS  (STANDING):    SOCIAL HISTORY:  Denies smoking, alcohol, or recreational drug use    FAMILY HISTORY: Unknown     REVIEW OF SYSTEMS  [  ] ROS unobtainable because:    [x] All other systems negative except as noted below:	    Constitutional:  [ ] fever [ ] chills  [ ] weight loss  [ ] weakness  Skin:  [ ] rash [ ] phlebitis	  Eyes: [ ] icterus [ ] pain  [ ] discharge	  ENMT: [ ] sore throat  [ ] thrush [ ] ulcers [ ] exudates  Respiratory: [ ] dyspnea [ ] hemoptysis [ ] cough [ ] sputum	  Cardiovascular:  [ ] chest pain [ ] palpitations [ ] edema	  Gastrointestinal:  [x] nausea [x] vomiting [x] diarrhea [ ] constipation [x] pain	  Genitourinary:  [ ] dysuria [ ] frequency [ ] hematuria [ ] discharge [ ] flank pain  [ ] incontinence  Musculoskeletal:  [ ] myalgias [ ] arthralgias [ ] arthritis  [ ] back pain  Neurological:  [ ] headache [ ] seizures  [ ] confusion/altered mental status  Psychiatric:  [ ] anxiety [ ] depression	  Hematology/Lymphatics:  [ ] lymphadenopathy  Endocrine:  [ ] adrenal [ ] thyroid  Allergic/Immunologic:	 [ ] transplant [ ] seasonal    Vital Signs Last 24 Hrs  T(F): 97.4 (20 @ 14:11), Max: 99.7 (20 @ 09:17)  Vital Signs Last 24 Hrs  HR: 100 (20 @ 14:11) (100 - 130)  BP: 106/70 (20 @ 14:11) (91/69 - 106/70)  RR: 18 (20 @ 14:11)  SpO2: 100% (20 @ 14:11) (96% - 100%)  Wt(kg): --    PHYSICAL EXAM:  Constitutional: non-toxic, no distress  HEAD/EYES: anicteric, no conjunctival injection  ENT:  supple, no thrush  Cardiovascular:  normal S1, S2, no murmur, no edema  Respiratory:  clear BS bilaterally, no wheezes, no rales  GI:  soft, non-tender, normal bowel sounds, + former PEG site without purulence   :  no salinas, no CVA tenderness  Musculoskeletal:  no synovitis, normal ROM  Neurologic: awake and alert, normal strength, no focal findings  Skin:  no rash, no erythema, no phlebitis  Heme/Onc: no lymphadenopathy   Psychiatric:  awake, alert, appropriate mood                            15.8   7.27  )-----------( 409      ( 22 Dec 2020 09:09 )             44.3   12-    127<L>  |  99  |  10  ----------------------------<  81  3.8   |  21<L>  |  0.87    Ca    7.1<L>      22 Dec 2020 13:57    TPro  4.1<L>  /  Alb  1.1<L>  /  TBili  7.9<H>  /  DBili  x   /  AST  1621<H>  /  ALT  582<H>  /  AlkPhos  278<H>      Urinalysis Basic - ( 22 Dec 2020 10:45 )    Color: Dark Yellow / Appearance: Slightly Turbid / S.022 / pH: x  Gluc: x / Ketone: Negative  / Bili: Large / Urobili: Negative   Blood: x / Protein: Negative / Nitrite: Negative   Leuk Esterase: Negative / RBC: 4 /hpf / WBC 1 /HPF   Sq Epi: x / Non Sq Epi: 2 /hpf / Bacteria: Negative    MICROBIOLOGY:    + HBsAg  + COVID-19 PCR  CD4 count: 116  Blood cultures x 2 pending  Urine cultures pending  Tick borne panel, hepatitis panel, leptospirosis Ab, CMV PCR and HIV RNA pending         RADIOLOGY:    < from: Xray Chest 1 View AP/PA (20 @ 11:08) >  Impression:    The heart is normal in size. Thelungs are clear. No pleural effusion. No pneumothorax. No free air is seen in the peritoneal cavity. No acute bony pathology could be identified.    < end of copied text >  < from: US Abdomen Limited (ED) (20 @ 09:45) >    INTERPRETATION:A Focused Assessment with Sonography for Trauma was performed (FAST).  Thurston's Pouch contained     free fluid  The Splenorenal Recess contained  free fluid  The Pelvis contained      free fluid  Bilateral pleural effusions seen  The IVC was flat  The spleen appeared heterogenous    IMPRESSION: Free fluid seen within the abdomen.    < end of copied text >    < from: CT Abdomen and Pelvis w/ IV Cont (20 @ 09:45) >  IMPRESSION:  Small bilateral pleural effusions.    Moderate volume ascites, new since 2020.    Prominent submucosal fat deposition in the ascending, transverse, and proximal descending colon, suggestive of chronic inflammation.    Nonspecific mesenteric and pelvic lymphadenopathy, unchanged.        < end of copied text >   : Mr. Aldridge ID# [id number]      · Subjective and Objective:   Patient is a 50 y old female who presents with a chief complaint of nausea and vomiting.     HPI:  The patient is a 50 year old female with past medical history of HIV, CMV, HBV, histoplasmosis, renal vein thrombosis s/p coumadin for 6 months, and strongyloidiasis who presents with abdominal pain. She reports constant upper abdominal pain described as constant in nature. She reports three episodes of non-bloody vomiting today without diarrhea. She denies recent travel, sick contacts, or pets at home. She denies fever. CBC showed reactive thrombocytosis. CMP showed hyponatremia with mild hypocalcemia. Elevated total bilirubinemia with cholestatic liver pattern was noted with elevated LFTs, AST/ALT > 2:1. Positive procalcitonin and beta HCG was detected. COVID-19 PCR was positive. HBsAg was positive. CD4 count was 116 with baseline CD4 count around 200-215. She reports visiting the Infectious disease clinic one month ago where she noted to have nausea and vomiting. She was born in [facility], but has been living in the [place] for some time.  CT abdomen and pelvis with contrast showed small bilateral pleural effusions, moderate volume ascites, new, and prominent submucosal fat deposition in the ascending, transverse, and proximal descending colon, suggestive of chronic inflammation. Abdominal ultrasound showed free fluid in the abdomen. CXR is clear. Blood cultures and urine cultures are pending. Infectious disease was consulted for positive COVID-19 PCR test and HIV management.       prior hospital charts reviewed [x]  primary team notes reviewed [x]  other consultant notes reviewed [x]    PAST MEDICAL & SURGICAL HISTORY:  Renal Vein Thrombosis  , s/p Coumadin for 6 months  CMV Infection  Hepatitis B  Strongyloidosis  Histoplasmosis  HIV (Human Immunodeficiency Virus Infection)  Gallstones  S/P  Section  S/P Bilateral Tubal Ligation  S/P PEG (Percutaneous Endoscopic Gastrostomy) Placement and Removal  History of Cholecystectomy        Allergies  No Known Allergies    ANTIMICROBIALS (past 90 days)  MEDICATIONS  (STANDING):          OTHER MEDS: MEDICATIONS  (STANDING):    SOCIAL HISTORY:  Denies smoking, alcohol, or recreational drug use    FAMILY HISTORY: Unknown     REVIEW OF SYSTEMS  [  ] ROS unobtainable because:    [x] All other systems negative except as noted below:	    Constitutional:  [ ] fever [ ] chills  [ ] weight loss  [ ] weakness  Skin:  [ ] rash [ ] phlebitis	  Eyes: [ ] icterus [ ] pain  [ ] discharge	  ENMT: [ ] sore throat  [ ] thrush [ ] ulcers [ ] exudates  Respiratory: [ ] dyspnea [ ] hemoptysis [ ] cough [ ] sputum	  Cardiovascular:  [ ] chest pain [ ] palpitations [ ] edema	  Gastrointestinal:  [x] nausea [x] vomiting [x] diarrhea [ ] constipation [x] pain	  Genitourinary:  [ ] dysuria [ ] frequency [ ] hematuria [ ] discharge [ ] flank pain  [ ] incontinence  Musculoskeletal:  [ ] myalgias [ ] arthralgias [ ] arthritis  [ ] back pain  Neurological:  [ ] headache [ ] seizures  [ ] confusion/altered mental status  Psychiatric:  [ ] anxiety [ ] depression	  Hematology/Lymphatics:  [ ] lymphadenopathy  Endocrine:  [ ] adrenal [ ] thyroid  Allergic/Immunologic:	 [ ] transplant [ ] seasonal    Vital Signs Last 24 Hrs  T(F): 97.4 (20 @ 14:11), Max: 99.7 (20 @ 09:17)  Vital Signs Last 24 Hrs  HR: 100 (20 @ 14:11) (100 - 130)  BP: 106/70 (20 @ 14:11) (91/69 - 106/70)  RR: 18 (20 @ 14:11)  SpO2: 100% (20 @ 14:11) (96% - 100%)  Wt(kg): --    PHYSICAL EXAM:  Constitutional: non-toxic, no distress  HEAD/EYES: anicteric, no conjunctival injection  ENT:  supple, no thrush  Cardiovascular:  normal S1, S2, no murmur, no edema  Respiratory:  clear BS bilaterally, no wheezes, no rales  GI:  soft, non-tender, normal bowel sounds, + former PEG site without purulence   :  no salinas, no CVA tenderness  Musculoskeletal:  no synovitis, normal ROM  Neurologic: awake and alert, normal strength, no focal findings  Skin:  no rash, no erythema, no phlebitis  Heme/Onc: no lymphadenopathy   Psychiatric:  awake, alert, appropriate mood                            15.8   7.27  )-----------( 409      ( 22 Dec 2020 09:09 )             44.3   12    127<L>  |  99  |  10  ----------------------------<  81  3.8   |  21<L>  |  0.87    Ca    7.1<L>      22 Dec 2020 13:57    TPro  4.1<L>  /  Alb  1.1<L>  /  TBili  7.9<H>  /  DBili  x   /  AST  1621<H>  /  ALT  582<H>  /  AlkPhos  278<H>  12    Urinalysis Basic - ( 22 Dec 2020 10:45 )    Color: Dark Yellow / Appearance: Slightly Turbid / S.022 / pH: x  Gluc: x / Ketone: Negative  / Bili: Large / Urobili: Negative   Blood: x / Protein: Negative / Nitrite: Negative   Leuk Esterase: Negative / RBC: 4 /hpf / WBC 1 /HPF   Sq Epi: x / Non Sq Epi: 2 /hpf / Bacteria: Negative    MICROBIOLOGY:    + HBsAg  + COVID-19 PCR  CD4 count: 116  Blood cultures x 2 pending  Urine cultures pending  Tick borne panel, hepatitis panel, leptospirosis Ab, CMV PCR and HIV RNA pending         RADIOLOGY:    < from: Xray Chest 1 View AP/PA (20 @ 11:08) >  Impression:    The heart is normal in size. Thelungs are clear. No pleural effusion. No pneumothorax. No free air is seen in the peritoneal cavity. No acute bony pathology could be identified.    < end of copied text >  < from: US Abdomen Limited (ED) (20 @ 09:45) >    INTERPRETATION:A Focused Assessment with Sonography for Trauma was performed (FAST).  Thurston's Pouch contained     free fluid  The Splenorenal Recess contained  free fluid  The Pelvis contained      free fluid  Bilateral pleural effusions seen  The IVC was flat  The spleen appeared heterogenous    IMPRESSION: Free fluid seen within the abdomen.    < end of copied text >    < from: CT Abdomen and Pelvis w/ IV Cont (20 @ 09:45) >  IMPRESSION:  Small bilateral pleural effusions.    Moderate volume ascites, new since 2020.    Prominent submucosal fat deposition in the ascending, transverse, and proximal descending colon, suggestive of chronic inflammation.    Nonspecific mesenteric and pelvic lymphadenopathy, unchanged.        < end of copied text >

## 2020-12-22 NOTE — ED ADULT NURSE NOTE - OBJECTIVE STATEMENT
49 y/o female patient presents ambulatory to ED 51 y/o female patient presents ambulatory to ED with c/o abdominal pain associated with nausea and vomiting x 4 days. 49 y/o female patient presents ambulatory to ED with c/o abdominal pain associated with nausea and 3 episodes of vomiting x 4 days. Patient states the pain is located in the right upper quadrant of abdomen; patient noticed she was turning yellow which brought her to the ED. Patient denies diarrhea; afebrile in ED. NO recent travel or sick contacts at home. PMHX HIV, hepatitis 51 y/o female patient presents ambulatory to ED with c/o abdominal pain associated with nausea and 3 episodes of vomiting x 4 days. Patient states the pain is located in the right upper quadrant of abdomen; patient noticed she was "turning yellow" which brought her to the ED. Patient denies diarrhea; afebrile in ED. NO recent travel or sick contacts at home. PMHX HIV, hepatitis. LMP approx 1 year

## 2020-12-22 NOTE — H&P ADULT - HISTORY OF PRESENT ILLNESS
49 y/o female h/o HIV, hepatitis in the past s/p cholecystecomy presenting with abdominal pain. pain located in upper abdomen and constant. no fevers or chills. pt reports 3 episode of nonbloody vomiting today. no diarrhea. no recent travel or sick contacts. no fevers at home. has not tried anything for pain. noticed that she was turning yellow and came to the ED. no new medications. no recent falls or trauma. no sick contacts

## 2020-12-22 NOTE — ED ADULT NURSE NOTE - ED STAT RN HANDOFF DETAILS
Bedside report given to on coming nurse ANDREINA, Understands pmh, medications given and plan of care for patient. Patient in stable condition, vital signs updated, has no complaints at this time and has been updated on care plan. Explained to patient that it is change of shift and new nurse is taking over, pt verbalized understanding.

## 2020-12-22 NOTE — ED ADULT NURSE REASSESSMENT NOTE - NS ED NURSE REASSESS COMMENT FT1
Patient ambulated to the bathroom with steady gait and without difficulty. Patient resting comfortably in bed, VSS, pain controlled with medication.

## 2020-12-22 NOTE — ED PROVIDER NOTE - CLINICAL SUMMARY MEDICAL DECISION MAKING FREE TEXT BOX
Attending Annetta Cox: 49 y/o female h/o HIV, hepatitis in the past s/p cholecystectomy presenting with epigastric abdominal pain, jaundice for 5 days. Pt hemodynamically stable, afebrile. Tenderness of RUQ on exam with jaundice and dark urine noted concerning for biliary obstruction, possible choledocholithiasis, cancer. No signs of cholangitis. Will get labs, UA, hemolysis labs, CT A/P, admit 49 y/o female h/o HIV, hepatitis in the past s/p cholecystectomy presenting with epigastric abdominal pain, jaundice for 5 days. Pt hemodynamically stable, afebrile. Tenderness of RUQ on exam with jaundice and dark urine noted concerning for biliary obstruction, possible choledocholithiasis, cancer. No signs of cholangitis. Will get labs, UA, hemolysis labs, CT A/P, admit 51 y/o female h/o HIV, hepatitis in the past s/p cholecystectomy presenting with epigastric abdominal pain, jaundice for 5 days. Pt hemodynamically stable, afebrile. Tenderness of RUQ on exam with jaundice and dark urine noted concerning for biliary obstruction, possible choledocholithiasis, cancer. No signs of cholangitis. Will get labs, UA, hemolysis labs, CT A/P, admit  Attending Annetta Cox: 51 y/o female h/o hepatitis in the past, was previuosly being treated however off medications due to insurance issues, h/o HIV presenting with abdominal pain and n/v and developing jaundice. upon arrival pt afebrile with abdominal discomfort. sclera icteric. blood work performed showing worsening transmaninits and hyperbilirubinemia. pt denies any ingestion or sig tylenol use. concern for psosible hepatitis infection. will send levels and d/w Gi hepatology. ct scan performed showing evidence of ascites. no clear pocket for drainage at this time and less likely SBP based on exam. pt cultured as is immunosuppressed. ID consulted as well. pt will need admission. consider NAC, infectious work up and viral studies pending

## 2020-12-22 NOTE — H&P ADULT - NSHPLABSRESULTS_GEN_ALL_CORE
LABS:                        15.8   7.27  )-----------( 409      ( 22 Dec 2020 09:09 )             44.3         127<L>  |  99  |  10  ----------------------------<  81  3.8   |  21<L>  |  0.87    Ca    7.1<L>      22 Dec 2020 13:57    TPro  4.1<L>  /  Alb  1.1<L>  /  TBili  7.9<H>  /  DBili  x   /  AST  1621<H>  /  ALT  582<H>  /  AlkPhos  278<H>      PT/INR - ( 22 Dec 2020 09:09 )   PT: 17.9 sec;   INR: 1.52 ratio         PTT - ( 22 Dec 2020 09:09 )  PTT:35.1 sec  Urinalysis Basic - ( 22 Dec 2020 10:45 )    Color: Dark Yellow / Appearance: Slightly Turbid / S.022 / pH: x  Gluc: x / Ketone: Negative  / Bili: Large / Urobili: Negative   Blood: x / Protein: Negative / Nitrite: Negative   Leuk Esterase: Negative / RBC: 4 /hpf / WBC 1 /HPF   Sq Epi: x / Non Sq Epi: 2 /hpf / Bacteria: Negative            RADIOLOGY & ADDITIONAL TESTS:

## 2020-12-22 NOTE — CONSULT NOTE ADULT - ASSESSMENT
Assessment:  The patient is a 50 year old female with past medical history of HIV, CMV, HBV, histoplasmosis, renal vein thrombosis s/p coumadin for 6 months, and strongyloidiasis who presents with abdominal pain. She reports constant upper abdominal pain described as constant in nature. She reports three episodes of non-bloody vomiting today without diarrhea. She denies recent travel, sick contacts, or pets at home. She denies fever. CBC showed reactive thrombocytosis. CMP showed hyponatremia with mild hypocalcemia. She was found to be COVID-19 PCR positive. Infectious disease was consulted for HIV and COVID-19 management.      Plan:  # COVID-19 disease  - Obtain liver consult stat - worsening transaminitis likely related to COVID-19 exacerbating pre-existing hepatitis B infection   - Send COVID-19 Ab  - Send CRP, Ferritin, D-dimer today and trend q48 hours  - Monitor oxygen saturation - patient saturating well on room air  - Await final blood and urine cultures    # AIDS  - In light of significant transaminitis, hold symtuza and start oral biktarvy today   - CD4 116 consistent with AIDS  - RNA viral load in process - will clarify with liver transplant in terms of opportunistic infection prophylaxis in light of worsening transaminitis   - Send HIV genosure resistance testing  - Follow leptospirosis Ab, Tick borne panel    # History of HBV infection  - Send HBV resistance testing  - HBV DNA and viral load  - Follow rest of hepatitis panel  - Send tylelenol and salicylate levels    # History of CMV viremia  - CMV PCR pending   - Trend CBC and CMP daily  - Monitor fever curve  - ID will continue to follow  - Plan of care discussed with primary team    Case seen and discussed with MD Yvon Perea MD PGY-4   Fellow, Infectious Diseases   Pager: [phone]  If no response, after 5pm and on weekends: Call [phone]

## 2020-12-22 NOTE — ED PROVIDER NOTE - PHYSICAL EXAMINATION
Attending Annetta Cox: Gen: NAD, heent: atrauamtic, eomi, sclera icteric, mmm, op pink, uvula midline, neck; nttp, no nuchal rigidity, chest: nttp, no crepitus, cv: tachycardic, lungs: ctab, abd: soft, ttp epigastric and RUQ area, no peritoneal signs, +BS, no guarding, ext: wwp, neg homans, skin: multiple plantar warts to right finger tips, neuro: awake and alert, following commands, speech clear, sensation and strength intact, no focal deficits

## 2020-12-22 NOTE — H&P ADULT - NSICDXPASTSURGICALHX_GEN_ALL_CORE_FT
PAST SURGICAL HISTORY:  History of Cholecystectomy     S/P Bilateral Tubal Ligation     S/P  Section     S/P PEG (Percutaneous Endoscopic Gastrostomy) Placement and Removal

## 2020-12-22 NOTE — H&P ADULT - NSICDXPASTMEDICALHX_GEN_ALL_CORE_FT
PAST MEDICAL HISTORY:  CMV Infection     Gallstones     Hepatitis B     Histoplasmosis     HIV (Human Immunodeficiency Virus Infection)     Renal Vein Thrombosis 2010, s/p Coumadin for 6 months    Strongyloidosis

## 2020-12-22 NOTE — H&P ADULT - ASSESSMENT
The patient is a 50 year old female with past medical history of HIV, CMV, HBV, histoplasmosis, renal vein thrombosis s/p coumadin for 6 months, and strongyloidiasis who presents with abdominal pain. She reports constant upper abdominal pain described as constant in nature. She reports three episodes of non-bloody vomiting today without diarrhea. She denies recent travel, sick contacts, or pets at home. She denies fever. CBC showed reactive thrombocytosis. CMP showed hyponatremia with mild hypocalcemia. She was found to be COVID-19 PCR positive.     jaundice  - may be 2/2 covid  - abdominal US  - GI consult was called by ER    HIV  - ID following    DVT px  - Lovenox bid      The patient is a 50 year old female with past medical history of HIV, CMV, HBV, histoplasmosis, renal vein thrombosis s/p coumadin for 6 months, and strongyloidiasis who presents with abdominal pain. She reports constant upper abdominal pain described as constant in nature. She reports three episodes of non-bloody vomiting today without diarrhea. She denies recent travel, sick contacts, or pets at home. She denies fever. CBC showed reactive thrombocytosis. CMP showed hyponatremia with mild hypocalcemia. She was found to be COVID-19 PCR positive.     jaundice  - may be 2/2 covid  - abdominal US  - GI consult was called by ER    hyponatremia  - urine lyres and osm  - start iv fluids    HIV  - ID following    DVT px  - Lovenox bid     The patient is a 50 year old female with past medical history of HIV, CMV, HBV, histoplasmosis, renal vein thrombosis s/p coumadin for 6 months, and strongyloidiasis who presents with abdominal pain. She reports constant upper abdominal pain described as constant in nature. She reports three episodes of non-bloody vomiting today without diarrhea. She denies recent travel, sick contacts, or pets at home. She denies fever. CBC showed reactive thrombocytosis. CMP showed hyponatremia with mild hypocalcemia. She was found to be COVID-19 PCR positive.     Jaundice  - may be 2/2 covid  - abdominal US  - GI consult placed. F/u recs    Hyponatremia  - urine lyres and osm  - start iv fluids    HIV  - ID following  - Hold symtuza, c/w oral biktarvy    DVT px  - Lovenox bid

## 2020-12-22 NOTE — CONSULT NOTE ADULT - SUBJECTIVE AND OBJECTIVE BOX
Patient is a 50 y old female who presents with a chief complaint of     HPI:  The patient is a 50 year old female with past medical history of HIV, CMV, HBV, histoplasmosis, renal vein thrombosis s/p coumadin for 6 months, and strongyloidiasis who presents with abdominal pain. She reports constant upper abdominal pain described as constant in nature. She reports three episodes of non-bloody vomiting today without diarrhea. She denies recent travel, sick contacts, or pets at home. She denies fever. CBC showed reactive thrombocytosis. CMP showed hyponatremia with mild hypocalcemia. Elevated total bilirubinemia with cholestatic liver pattern was noted with elevated LFTs, AST/ALT > 2:1. Positive procalcitonin and beta HCG was detected. COVID-19 PCR was positive. HBsAg was positive. CD4 count was 116. CT abdomen and pelvis with contrast showed small bilateral pleural effusions, moderate volume ascites, new, and prominent submucosal fat deposition in the ascending, transverse, and proximal descending colon, suggestive of chronic inflammation. Abdominal ultrasound showed free fluid in the abdomen. CXR is clear.       prior hospital charts reviewed [x]  primary team notes reviewed [x]  other consultant notes reviewed [x]    PAST MEDICAL & SURGICAL HISTORY:  Renal Vein Thrombosis  , s/p Coumadin for 6 months  CMV Infection  Hepatitis B  Strongyloidosis  Histoplasmosis  HIV (Human Immunodeficiency Virus Infection)  Gallstones  S/P  Section  S/P Bilateral Tubal Ligation  S/P PEG (Percutaneous Endoscopic Gastrostomy) Placement and Removal  History of Cholecystectomy        Allergies  No Known Allergies    ANTIMICROBIALS (past 90 days)  MEDICATIONS  (STANDING):          OTHER MEDS: MEDICATIONS  (STANDING):    SOCIAL HISTORY:  Denies smoking, alcohol, or recreational drug use    FAMILY HISTORY: Unknown     REVIEW OF SYSTEMS  [  ] ROS unobtainable because:    [x] All other systems negative except as noted below:	    Constitutional:  [ ] fever [ ] chills  [ ] weight loss  [ ] weakness  Skin:  [ ] rash [ ] phlebitis	  Eyes: [ ] icterus [ ] pain  [ ] discharge	  ENMT: [ ] sore throat  [ ] thrush [ ] ulcers [ ] exudates  Respiratory: [ ] dyspnea [ ] hemoptysis [ ] cough [ ] sputum	  Cardiovascular:  [ ] chest pain [ ] palpitations [ ] edema	  Gastrointestinal:  [x] nausea [x] vomiting [x] diarrhea [ ] constipation [x] pain	  Genitourinary:  [ ] dysuria [ ] frequency [ ] hematuria [ ] discharge [ ] flank pain  [ ] incontinence  Musculoskeletal:  [ ] myalgias [ ] arthralgias [ ] arthritis  [ ] back pain  Neurological:  [ ] headache [ ] seizures  [ ] confusion/altered mental status  Psychiatric:  [ ] anxiety [ ] depression	  Hematology/Lymphatics:  [ ] lymphadenopathy  Endocrine:  [ ] adrenal [ ] thyroid  Allergic/Immunologic:	 [ ] transplant [ ] seasonal    Vital Signs Last 24 Hrs  T(F): 97.4 (20 @ 14:11), Max: 99.7 (20 @ 09:17)  Vital Signs Last 24 Hrs  HR: 100 (20 @ 14:11) (100 - 130)  BP: 106/70 (20 @ 14:11) (91/69 - 106/70)  RR: 18 (20 @ 14:11)  SpO2: 100% (20 @ 14:11) (96% - 100%)  Wt(kg): --    PHYSICAL EXAM:  Constitutional: non-toxic, no distress  HEAD/EYES: anicteric, no conjunctival injection  ENT:  supple, no thrush  Cardiovascular:   normal S1, S2, no murmur, no edema  Respiratory:  clear BS bilaterally, no wheezes, no rales  GI:  soft, non-tender, normal bowel sounds  :  no salinas, no CVA tenderness  Musculoskeletal:  no synovitis, normal ROM  Neurologic: awake and alert, normal strength, no focal findings  Skin:  no rash, no erythema, no phlebitis  Heme/Onc: no lymphadenopathy   Psychiatric:  awake, alert, appropriate mood                            15.8   7.27  )-----------( 409      ( 22 Dec 2020 09:09 )             44.3       127<L>  |  99  |  10  ----------------------------<  81  3.8   |  21<L>  |  0.87    Ca    7.1<L>      22 Dec 2020 13:57    TPro  4.1<L>  /  Alb  1.1<L>  /  TBili  7.9<H>  /  DBili  x   /  AST  1621<H>  /  ALT  582<H>  /  AlkPhos  278<H>  12    Urinalysis Basic - ( 22 Dec 2020 10:45 )    Color: Dark Yellow / Appearance: Slightly Turbid / S.022 / pH: x  Gluc: x / Ketone: Negative  / Bili: Large / Urobili: Negative   Blood: x / Protein: Negative / Nitrite: Negative   Leuk Esterase: Negative / RBC: 4 /hpf / WBC 1 /HPF   Sq Epi: x / Non Sq Epi: 2 /hpf / Bacteria: Negative    MICROBIOLOGY:    + HBsAg  + COVID-19 PCR  CD4 count: 116  HIV VL detected          RADIOLOGY:    < from: Xray Chest 1 View AP/PA (20 @ 11:08) >  Impression:    The heart is normal in size. Thelungs are clear. No pleural effusion. No pneumothorax. No free air is seen in the peritoneal cavity. No acute bony pathology could be identified.    < end of copied text >  < from: US Abdomen Limited (ED) (20 @ 09:45) >    INTERPRETATION:A Focused Assessment with Sonography for Trauma was performed (FAST).  Thurston's Pouch contained     free fluid  The Splenorenal Recess contained  free fluid  The Pelvis contained      free fluid  Bilateral pleural effusions seen  The IVC was flat  The spleen appeared heterogenous    IMPRESSION: Free fluid seen within the abdomen.    < end of copied text >    < from: CT Abdomen and Pelvis w/ IV Cont (20 @ 09:45) >  IMPRESSION:  Small bilateral pleural effusions.    Moderate volume ascites, new since 2020.    Prominent submucosal fat deposition in the ascending, transverse, and proximal descending colon, suggestive of chronic inflammation.    Nonspecific mesenteric and pelvic lymphadenopathy, unchanged.        < end of copied text >   Patient is a 50 y old female who presents with a chief complaint of nausea and vomiting.     HPI:  The patient is a 50 year old female with past medical history of HIV, CMV, HBV, histoplasmosis, renal vein thrombosis s/p coumadin for 6 months, and strongyloidiasis who presents with abdominal pain. She reports constant upper abdominal pain described as constant in nature. She reports three episodes of non-bloody vomiting today without diarrhea. She denies recent travel, sick contacts, or pets at home. She denies fever. CBC showed reactive thrombocytosis. CMP showed hyponatremia with mild hypocalcemia. Elevated total bilirubinemia with cholestatic liver pattern was noted with elevated LFTs, AST/ALT > 2:1. Positive procalcitonin and beta HCG was detected. COVID-19 PCR was positive. HBsAg was positive. CD4 count was 116 with baseline CD4 count around 200-215. She reports visiting the Infectious disease clinic one month ago where she noted to have nausea and vomiting. She was born in [facility], but has been living in the [place] for some time.  CT abdomen and pelvis with contrast showed small bilateral pleural effusions, moderate volume ascites, new, and prominent submucosal fat deposition in the ascending, transverse, and proximal descending colon, suggestive of chronic inflammation. Abdominal ultrasound showed free fluid in the abdomen. CXR is clear. Blood cultures and urine cultures are pending. Infectious disease was consulted for positive COVID-19 PCR test and HIV management.       prior hospital charts reviewed [x]  primary team notes reviewed [x]  other consultant notes reviewed [x]    PAST MEDICAL & SURGICAL HISTORY:  Renal Vein Thrombosis  , s/p Coumadin for 6 months  CMV Infection  Hepatitis B  Strongyloidosis  Histoplasmosis  HIV (Human Immunodeficiency Virus Infection)  Gallstones  S/P  Section  S/P Bilateral Tubal Ligation  S/P PEG (Percutaneous Endoscopic Gastrostomy) Placement and Removal  History of Cholecystectomy        Allergies  No Known Allergies    ANTIMICROBIALS (past 90 days)  MEDICATIONS  (STANDING):          OTHER MEDS: MEDICATIONS  (STANDING):    SOCIAL HISTORY:  Denies smoking, alcohol, or recreational drug use    FAMILY HISTORY: Unknown     REVIEW OF SYSTEMS  [  ] ROS unobtainable because:    [x] All other systems negative except as noted below:	    Constitutional:  [ ] fever [ ] chills  [ ] weight loss  [ ] weakness  Skin:  [ ] rash [ ] phlebitis	  Eyes: [ ] icterus [ ] pain  [ ] discharge	  ENMT: [ ] sore throat  [ ] thrush [ ] ulcers [ ] exudates  Respiratory: [ ] dyspnea [ ] hemoptysis [ ] cough [ ] sputum	  Cardiovascular:  [ ] chest pain [ ] palpitations [ ] edema	  Gastrointestinal:  [x] nausea [x] vomiting [x] diarrhea [ ] constipation [x] pain	  Genitourinary:  [ ] dysuria [ ] frequency [ ] hematuria [ ] discharge [ ] flank pain  [ ] incontinence  Musculoskeletal:  [ ] myalgias [ ] arthralgias [ ] arthritis  [ ] back pain  Neurological:  [ ] headache [ ] seizures  [ ] confusion/altered mental status  Psychiatric:  [ ] anxiety [ ] depression	  Hematology/Lymphatics:  [ ] lymphadenopathy  Endocrine:  [ ] adrenal [ ] thyroid  Allergic/Immunologic:	 [ ] transplant [ ] seasonal    Vital Signs Last 24 Hrs  T(F): 97.4 (20 @ 14:11), Max: 99.7 (20 @ 09:17)  Vital Signs Last 24 Hrs  HR: 100 (20 @ 14:11) (100 - 130)  BP: 106/70 (20 @ 14:11) (91/69 - 106/70)  RR: 18 (20 @ 14:11)  SpO2: 100% (20 @ 14:11) (96% - 100%)  Wt(kg): --    PHYSICAL EXAM:  Constitutional: non-toxic, no distress  HEAD/EYES: anicteric, no conjunctival injection  ENT:  supple, no thrush  Cardiovascular:  normal S1, S2, no murmur, no edema  Respiratory:  clear BS bilaterally, no wheezes, no rales  GI:  soft, non-tender, normal bowel sounds, + former PEG site without purulence   :  no salinas, no CVA tenderness  Musculoskeletal:  no synovitis, normal ROM  Neurologic: awake and alert, normal strength, no focal findings  Skin:  no rash, no erythema, no phlebitis  Heme/Onc: no lymphadenopathy   Psychiatric:  awake, alert, appropriate mood                            15.8   7.27  )-----------( 409      ( 22 Dec 2020 09:09 )             44.3   12    127<L>  |  99  |  10  ----------------------------<  81  3.8   |  21<L>  |  0.87    Ca    7.1<L>      22 Dec 2020 13:57    TPro  4.1<L>  /  Alb  1.1<L>  /  TBili  7.9<H>  /  DBili  x   /  AST  1621<H>  /  ALT  582<H>  /  AlkPhos  278<H>      Urinalysis Basic - ( 22 Dec 2020 10:45 )    Color: Dark Yellow / Appearance: Slightly Turbid / S.022 / pH: x  Gluc: x / Ketone: Negative  / Bili: Large / Urobili: Negative   Blood: x / Protein: Negative / Nitrite: Negative   Leuk Esterase: Negative / RBC: 4 /hpf / WBC 1 /HPF   Sq Epi: x / Non Sq Epi: 2 /hpf / Bacteria: Negative    MICROBIOLOGY:    + HBsAg  + COVID-19 PCR  CD4 count: 116  HIV VL detected        RADIOLOGY:    < from: Xray Chest 1 View AP/PA (20 @ 11:08) >  Impression:    The heart is normal in size. Thelungs are clear. No pleural effusion. No pneumothorax. No free air is seen in the peritoneal cavity. No acute bony pathology could be identified.    < end of copied text >  < from: US Abdomen Limited (ED) (20 @ 09:45) >    INTERPRETATION:A Focused Assessment with Sonography for Trauma was performed (FAST).  Thurston's Pouch contained     free fluid  The Splenorenal Recess contained  free fluid  The Pelvis contained      free fluid  Bilateral pleural effusions seen  The IVC was flat  The spleen appeared heterogenous    IMPRESSION: Free fluid seen within the abdomen.    < end of copied text >    < from: CT Abdomen and Pelvis w/ IV Cont (20 @ 09:45) >  IMPRESSION:  Small bilateral pleural effusions.    Moderate volume ascites, new since 2020.    Prominent submucosal fat deposition in the ascending, transverse, and proximal descending colon, suggestive of chronic inflammation.    Nonspecific mesenteric and pelvic lymphadenopathy, unchanged.        < end of copied text >

## 2020-12-22 NOTE — ED PROVIDER NOTE - OBJECTIVE STATEMENT
Attending Annetta Cox: 49 y/o female h/o HIV, hepatitis in the past s/p cholecystecomy presenting with abdominal pain. pain located in upper abdomen and constant. no fevers or chills. pt reports 3 episode of nonbloody vomiting today. no diarrhea. no recent travel or sick contacts. no fevers at home. has not tried anything for pain. noticed that she was turning yellow and came to the ED. no new medications. no recent falls or trauma. no sick contacts

## 2020-12-22 NOTE — ED PROVIDER NOTE - ATTENDING CONTRIBUTION TO CARE
Attending MD Annetta Cox:  I personally have seen and examined this patient.  Resident note reviewed and agree on plan of care and except where noted.  See HPI, PE, and MDM for details.

## 2020-12-22 NOTE — CONSULT NOTE ADULT - ASSESSMENT
Assessment:  The patient is a 50 year old female with past medical history of HIV, CMV, HBV, histoplasmosis, renal vein thrombosis s/p coumadin for 6 months, and strongyloidiasis who presents with abdominal pain. She reports constant upper abdominal pain described as constant in nature. She reports three episodes of non-bloody vomiting today without diarrhea. She denies recent travel, sick contacts, or pets at home. She denies fever. CBC showed reactive thrombocytosis. CMP showed hyponatremia with mild hypocalcemia. She was found to be COVID-19 PCR positive. Infectious disease was consulted for HIV and COVID-19 management.      Plan:  # COVID-19 disease        # HIV infection        # History of CMV and HBV infection        Yvon Culp MD PGY-4   Fellow, Infectious Diseases   Pager: [phone]  If no response, after 5pm and on weekends: Call [phone]   Assessment:  The patient is a 50 year old female with past medical history of HIV, CMV, HBV, histoplasmosis, renal vein thrombosis s/p coumadin for 6 months, and strongyloidiasis who presents with abdominal pain. She reports constant upper abdominal pain described as constant in nature. She reports three episodes of non-bloody vomiting today without diarrhea. She denies recent travel, sick contacts, or pets at home. She denies fever. CBC showed reactive thrombocytosis. CMP showed hyponatremia with mild hypocalcemia. She was found to be COVID-19 PCR positive. Infectious disease was consulted for HIV and COVID-19 management.      Plan:  # COVID-19 disease  - Obtain liver consult stat - worsening transaminitis likely related to COVID-19 exacerbating pre-existing hepatitis B infection   - Send COVID-19 Ab  - Send CRP, Ferritin, D-dimer today and trend q48 hours  - Monitor oxygen saturation - patient saturating well on room air  - Await final blood and urine cultures    # AIDS  - In light of significant transaminitis, hold symtuza and start oral biktarvy today   - CD4 116 consistent with AIDS  - RNA viral load in process - will clarify with liver transplant in terms of opportunistic infection prophylaxis in light of worsening transaminitis   - Send HIV genosure resistance testing  - Follow leptospirosis Ab, Tick borne panel    # History of HBV infection  - Send HBV resistance testing  - HBV DNA and viral load  - Follow rest of hepatitis panel  - Send tylelenol and salicylate levels    # History of CMV viremia  - CMV PCR pending   - Trend CBC and CMP daily  - Monitor fever curve  - ID will continue to follow  - Plan of care discussed with primary team    Case seen and discussed with MD vYon Perea MD PGY-4   Fellow, Infectious Diseases   Pager: [phone]  If no response, after 5pm and on weekends: Call [phone]

## 2020-12-23 ENCOUNTER — NON-APPOINTMENT (OUTPATIENT)
Age: 50
End: 2020-12-23

## 2020-12-23 ENCOUNTER — TRANSCRIPTION ENCOUNTER (OUTPATIENT)
Age: 50
End: 2020-12-23

## 2020-12-23 ENCOUNTER — APPOINTMENT (OUTPATIENT)
Dept: GASTROENTEROLOGY | Facility: CLINIC | Age: 50
End: 2020-12-23

## 2020-12-23 LAB
ALBUMIN SERPL ELPH-MCNC: 1 G/DL — LOW (ref 3.3–5)
ALBUMIN SERPL ELPH-MCNC: 1.1 G/DL — LOW (ref 3.3–5)
ALBUMIN SERPL ELPH-MCNC: 1.1 G/DL — LOW (ref 3.3–5)
ALBUMIN SERPL ELPH-MCNC: 1.4 G/DL — LOW (ref 3.3–5)
ALP SERPL-CCNC: 293 U/L — HIGH (ref 40–120)
ALP SERPL-CCNC: 297 U/L — HIGH (ref 40–120)
ALP SERPL-CCNC: 314 U/L — HIGH (ref 40–120)
ALP SERPL-CCNC: 319 U/L — HIGH (ref 40–120)
ALT FLD-CCNC: 596 U/L — HIGH (ref 10–45)
ALT FLD-CCNC: 600 U/L — HIGH (ref 10–45)
ALT FLD-CCNC: 615 U/L — HIGH (ref 10–45)
ALT FLD-CCNC: 630 U/L — HIGH (ref 10–45)
AMMONIA BLD-MCNC: 49 UMOL/L — SIGNIFICANT CHANGE UP (ref 11–55)
ANION GAP SERPL CALC-SCNC: 10 MMOL/L — SIGNIFICANT CHANGE UP (ref 5–17)
ANION GAP SERPL CALC-SCNC: 10 MMOL/L — SIGNIFICANT CHANGE UP (ref 5–17)
ANION GAP SERPL CALC-SCNC: 6 MMOL/L — SIGNIFICANT CHANGE UP (ref 5–17)
ANION GAP SERPL CALC-SCNC: 9 MMOL/L — SIGNIFICANT CHANGE UP (ref 5–17)
APTT BLD: 43.5 SEC — HIGH (ref 27.5–35.5)
AST SERPL-CCNC: 1701 U/L — HIGH (ref 10–40)
AST SERPL-CCNC: 1723 U/L — HIGH (ref 10–40)
AST SERPL-CCNC: 1848 U/L — HIGH (ref 10–40)
AST SERPL-CCNC: 1854 U/L — HIGH (ref 10–40)
BASOPHILS # BLD AUTO: 0.01 K/UL — SIGNIFICANT CHANGE UP (ref 0–0.2)
BASOPHILS NFR BLD AUTO: 0.1 % — SIGNIFICANT CHANGE UP (ref 0–2)
BILIRUB DIRECT SERPL-MCNC: 6.4 MG/DL — HIGH (ref 0–0.2)
BILIRUB DIRECT SERPL-MCNC: 6.8 MG/DL — HIGH (ref 0–0.2)
BILIRUB DIRECT SERPL-MCNC: 7.2 MG/DL — HIGH (ref 0–0.2)
BILIRUB INDIRECT FLD-MCNC: 1.8 MG/DL — HIGH (ref 0.2–1)
BILIRUB SERPL-MCNC: 7.4 MG/DL — HIGH (ref 0.2–1.2)
BILIRUB SERPL-MCNC: 8.1 MG/DL — HIGH (ref 0.2–1.2)
BILIRUB SERPL-MCNC: 8.2 MG/DL — HIGH (ref 0.2–1.2)
BILIRUB SERPL-MCNC: 8.5 MG/DL — HIGH (ref 0.2–1.2)
BUN SERPL-MCNC: 11 MG/DL — SIGNIFICANT CHANGE UP (ref 7–23)
CALCIUM SERPL-MCNC: 6.8 MG/DL — LOW (ref 8.4–10.5)
CALCIUM SERPL-MCNC: 7 MG/DL — LOW (ref 8.4–10.5)
CALCIUM SERPL-MCNC: 7.2 MG/DL — LOW (ref 8.4–10.5)
CALCIUM SERPL-MCNC: 7.2 MG/DL — LOW (ref 8.4–10.5)
CHLORIDE SERPL-SCNC: 98 MMOL/L — SIGNIFICANT CHANGE UP (ref 96–108)
CHLORIDE SERPL-SCNC: 98 MMOL/L — SIGNIFICANT CHANGE UP (ref 96–108)
CHLORIDE SERPL-SCNC: 99 MMOL/L — SIGNIFICANT CHANGE UP (ref 96–108)
CHLORIDE SERPL-SCNC: 99 MMOL/L — SIGNIFICANT CHANGE UP (ref 96–108)
CO2 SERPL-SCNC: 15 MMOL/L — LOW (ref 22–31)
CO2 SERPL-SCNC: 18 MMOL/L — LOW (ref 22–31)
CO2 SERPL-SCNC: 20 MMOL/L — LOW (ref 22–31)
CO2 SERPL-SCNC: 22 MMOL/L — SIGNIFICANT CHANGE UP (ref 22–31)
CREAT SERPL-MCNC: 0.73 MG/DL — SIGNIFICANT CHANGE UP (ref 0.5–1.3)
CREAT SERPL-MCNC: 0.78 MG/DL — SIGNIFICANT CHANGE UP (ref 0.5–1.3)
CREAT SERPL-MCNC: 0.88 MG/DL — SIGNIFICANT CHANGE UP (ref 0.5–1.3)
CREAT SERPL-MCNC: 0.91 MG/DL — SIGNIFICANT CHANGE UP (ref 0.5–1.3)
CULTURE RESULTS: SIGNIFICANT CHANGE UP
EOSINOPHIL # BLD AUTO: 0.24 K/UL — SIGNIFICANT CHANGE UP (ref 0–0.5)
EOSINOPHIL NFR BLD AUTO: 3.3 % — SIGNIFICANT CHANGE UP (ref 0–6)
FERRITIN SERPL-MCNC: 708 NG/ML — HIGH (ref 15–150)
FOLATE SERPL-MCNC: 16.6 NG/ML — SIGNIFICANT CHANGE UP
GLUCOSE BLDC GLUCOMTR-MCNC: 183 MG/DL — HIGH (ref 70–99)
GLUCOSE SERPL-MCNC: 110 MG/DL — HIGH (ref 70–99)
GLUCOSE SERPL-MCNC: 72 MG/DL — SIGNIFICANT CHANGE UP (ref 70–99)
GLUCOSE SERPL-MCNC: 91 MG/DL — SIGNIFICANT CHANGE UP (ref 70–99)
GLUCOSE SERPL-MCNC: 92 MG/DL — SIGNIFICANT CHANGE UP (ref 70–99)
HAPTOGLOB SERPL-MCNC: 46 MG/DL — SIGNIFICANT CHANGE UP (ref 34–200)
HAV IGM SER-ACNC: SIGNIFICANT CHANGE UP
HBV CORE AB SER-ACNC: REACTIVE
HBV CORE IGM SER-ACNC: ABNORMAL
HBV CORE IGM SER-ACNC: ABNORMAL
HBV DNA # SERPL NAA+PROBE: SIGNIFICANT CHANGE UP IU/ML
HBV DNA SERPL NAA+PROBE-LOG#: 8.17 LOG10IU/ML — SIGNIFICANT CHANGE UP
HBV SURFACE AB SER-ACNC: <3 MIU/ML — LOW
HBV SURFACE AB SER-ACNC: SIGNIFICANT CHANGE UP
HBV SURFACE AG SER-ACNC: REACTIVE
HCT VFR BLD CALC: 40.5 % — SIGNIFICANT CHANGE UP (ref 34.5–45)
HCT VFR BLD CALC: 41.4 % — SIGNIFICANT CHANGE UP (ref 34.5–45)
HCT VFR BLD CALC: 42.3 % — SIGNIFICANT CHANGE UP (ref 34.5–45)
HCV AB S/CO SERPL IA: 0.08 S/CO — SIGNIFICANT CHANGE UP (ref 0–0.99)
HCV AB SERPL-IMP: SIGNIFICANT CHANGE UP
HGB BLD-MCNC: 13.8 G/DL — SIGNIFICANT CHANGE UP (ref 11.5–15.5)
HGB BLD-MCNC: 13.9 G/DL — SIGNIFICANT CHANGE UP (ref 11.5–15.5)
HGB BLD-MCNC: 14.3 G/DL — SIGNIFICANT CHANGE UP (ref 11.5–15.5)
HIV-1 VIRAL LOAD RESULT: ABNORMAL
HIV1 RNA # SERPL NAA+PROBE: 57 — SIGNIFICANT CHANGE UP
HIV1 RNA SER-IMP: SIGNIFICANT CHANGE UP
HIV1 RNA SERPL NAA+PROBE-ACNC: ABNORMAL
HIV1 RNA SERPL NAA+PROBE-LOG#: 1.76 — SIGNIFICANT CHANGE UP
IMM GRANULOCYTES NFR BLD AUTO: 0.5 % — SIGNIFICANT CHANGE UP (ref 0–1.5)
INR BLD: 1.99 RATIO — HIGH (ref 0.88–1.16)
INR BLD: 1.99 RATIO — HIGH (ref 0.88–1.16)
INR BLD: 2.03 RATIO — HIGH (ref 0.88–1.16)
LACTATE SERPL-SCNC: 1.7 MMOL/L — SIGNIFICANT CHANGE UP (ref 0.7–2)
LYMPHOCYTES # BLD AUTO: 3.29 K/UL — SIGNIFICANT CHANGE UP (ref 1–3.3)
LYMPHOCYTES # BLD AUTO: 45.2 % — HIGH (ref 13–44)
MAGNESIUM SERPL-MCNC: 1.6 MG/DL — SIGNIFICANT CHANGE UP (ref 1.6–2.6)
MAGNESIUM SERPL-MCNC: 1.6 MG/DL — SIGNIFICANT CHANGE UP (ref 1.6–2.6)
MAGNESIUM SERPL-MCNC: 1.7 MG/DL — SIGNIFICANT CHANGE UP (ref 1.6–2.6)
MCHC RBC-ENTMCNC: 29.4 PG — SIGNIFICANT CHANGE UP (ref 27–34)
MCHC RBC-ENTMCNC: 29.6 PG — SIGNIFICANT CHANGE UP (ref 27–34)
MCHC RBC-ENTMCNC: 30.3 PG — SIGNIFICANT CHANGE UP (ref 27–34)
MCHC RBC-ENTMCNC: 33.6 GM/DL — SIGNIFICANT CHANGE UP (ref 32–36)
MCHC RBC-ENTMCNC: 33.8 GM/DL — SIGNIFICANT CHANGE UP (ref 32–36)
MCHC RBC-ENTMCNC: 34.1 GM/DL — SIGNIFICANT CHANGE UP (ref 32–36)
MCV RBC AUTO: 87.5 FL — SIGNIFICANT CHANGE UP (ref 80–100)
MCV RBC AUTO: 87.6 FL — SIGNIFICANT CHANGE UP (ref 80–100)
MCV RBC AUTO: 89 FL — SIGNIFICANT CHANGE UP (ref 80–100)
MONOCYTES # BLD AUTO: 0.66 K/UL — SIGNIFICANT CHANGE UP (ref 0–0.9)
MONOCYTES NFR BLD AUTO: 9.1 % — SIGNIFICANT CHANGE UP (ref 2–14)
NEUTROPHILS # BLD AUTO: 3.04 K/UL — SIGNIFICANT CHANGE UP (ref 1.8–7.4)
NEUTROPHILS NFR BLD AUTO: 41.8 % — LOW (ref 43–77)
NRBC # BLD: 0 /100 WBCS — SIGNIFICANT CHANGE UP (ref 0–0)
PHOSPHATE SERPL-MCNC: 3.2 MG/DL — SIGNIFICANT CHANGE UP (ref 2.5–4.5)
PHOSPHATE SERPL-MCNC: 3.4 MG/DL — SIGNIFICANT CHANGE UP (ref 2.5–4.5)
PHOSPHATE SERPL-MCNC: 3.9 MG/DL — SIGNIFICANT CHANGE UP (ref 2.5–4.5)
PLATELET # BLD AUTO: 279 K/UL — SIGNIFICANT CHANGE UP (ref 150–400)
PLATELET # BLD AUTO: 369 K/UL — SIGNIFICANT CHANGE UP (ref 150–400)
PLATELET # BLD AUTO: 384 K/UL — SIGNIFICANT CHANGE UP (ref 150–400)
POTASSIUM SERPL-MCNC: 3.8 MMOL/L — SIGNIFICANT CHANGE UP (ref 3.5–5.3)
POTASSIUM SERPL-MCNC: 3.9 MMOL/L — SIGNIFICANT CHANGE UP (ref 3.5–5.3)
POTASSIUM SERPL-MCNC: 4 MMOL/L — SIGNIFICANT CHANGE UP (ref 3.5–5.3)
POTASSIUM SERPL-MCNC: 4.3 MMOL/L — SIGNIFICANT CHANGE UP (ref 3.5–5.3)
POTASSIUM SERPL-SCNC: 3.8 MMOL/L — SIGNIFICANT CHANGE UP (ref 3.5–5.3)
POTASSIUM SERPL-SCNC: 3.9 MMOL/L — SIGNIFICANT CHANGE UP (ref 3.5–5.3)
POTASSIUM SERPL-SCNC: 4 MMOL/L — SIGNIFICANT CHANGE UP (ref 3.5–5.3)
POTASSIUM SERPL-SCNC: 4.3 MMOL/L — SIGNIFICANT CHANGE UP (ref 3.5–5.3)
PROT SERPL-MCNC: 4 G/DL — LOW (ref 6–8.3)
PROT SERPL-MCNC: 4.1 G/DL — LOW (ref 6–8.3)
PROT SERPL-MCNC: 4.2 G/DL — LOW (ref 6–8.3)
PROT SERPL-MCNC: 4.3 G/DL — LOW (ref 6–8.3)
PROTHROM AB SERPL-ACNC: 23.1 SEC — HIGH (ref 10.6–13.6)
PROTHROM AB SERPL-ACNC: 23.1 SEC — HIGH (ref 10.6–13.6)
PROTHROM AB SERPL-ACNC: 23.5 SEC — HIGH (ref 10.6–13.6)
RBC # BLD: 4.55 M/UL — SIGNIFICANT CHANGE UP (ref 3.8–5.2)
RBC # BLD: 4.73 M/UL — SIGNIFICANT CHANGE UP (ref 3.8–5.2)
RBC # BLD: 4.83 M/UL — SIGNIFICANT CHANGE UP (ref 3.8–5.2)
RBC # FLD: 20.1 % — HIGH (ref 10.3–14.5)
RBC # FLD: 20.4 % — HIGH (ref 10.3–14.5)
RBC # FLD: 21.3 % — HIGH (ref 10.3–14.5)
SODIUM SERPL-SCNC: 124 MMOL/L — LOW (ref 135–145)
SODIUM SERPL-SCNC: 126 MMOL/L — LOW (ref 135–145)
SODIUM SERPL-SCNC: 126 MMOL/L — LOW (ref 135–145)
SODIUM SERPL-SCNC: 128 MMOL/L — LOW (ref 135–145)
SPECIMEN SOURCE: SIGNIFICANT CHANGE UP
TSH SERPL-MCNC: 3.09 UIU/ML — SIGNIFICANT CHANGE UP (ref 0.27–4.2)
VIT B12 SERPL-MCNC: >2000 PG/ML — HIGH (ref 232–1245)
WBC # BLD: 5.8 K/UL — SIGNIFICANT CHANGE UP (ref 3.8–10.5)
WBC # BLD: 7.28 K/UL — SIGNIFICANT CHANGE UP (ref 3.8–10.5)
WBC # BLD: 7.43 K/UL — SIGNIFICANT CHANGE UP (ref 3.8–10.5)
WBC # FLD AUTO: 5.8 K/UL — SIGNIFICANT CHANGE UP (ref 3.8–10.5)
WBC # FLD AUTO: 7.28 K/UL — SIGNIFICANT CHANGE UP (ref 3.8–10.5)
WBC # FLD AUTO: 7.43 K/UL — SIGNIFICANT CHANGE UP (ref 3.8–10.5)

## 2020-12-23 PROCEDURE — 99223 1ST HOSP IP/OBS HIGH 75: CPT | Mod: GC

## 2020-12-23 PROCEDURE — 93010 ELECTROCARDIOGRAM REPORT: CPT

## 2020-12-23 PROCEDURE — 99255 IP/OBS CONSLTJ NEW/EST HI 80: CPT | Mod: GC

## 2020-12-23 PROCEDURE — 99232 SBSQ HOSP IP/OBS MODERATE 35: CPT

## 2020-12-23 RX ORDER — FENTANYL CITRATE 50 UG/ML
50 INJECTION INTRAVENOUS ONCE
Refills: 0 | Status: DISCONTINUED | OUTPATIENT
Start: 2020-12-23 | End: 2020-12-23

## 2020-12-23 RX ORDER — ONDANSETRON 8 MG/1
4 TABLET, FILM COATED ORAL ONCE
Refills: 0 | Status: COMPLETED | OUTPATIENT
Start: 2020-12-23 | End: 2020-12-23

## 2020-12-23 RX ORDER — ENTECAVIR 0.5 MG/1
0.5 TABLET ORAL DAILY
Refills: 0 | Status: DISCONTINUED | OUTPATIENT
Start: 2020-12-23 | End: 2021-01-01

## 2020-12-23 RX ORDER — ACETYLCYSTEINE 200 MG/ML
7 VIAL (ML) MISCELLANEOUS ONCE
Refills: 0 | Status: COMPLETED | OUTPATIENT
Start: 2020-12-23 | End: 2020-12-24

## 2020-12-23 RX ORDER — DOLUTEGRAVIR SODIUM 25 MG/1
50 TABLET, FILM COATED ORAL DAILY
Refills: 0 | Status: DISCONTINUED | OUTPATIENT
Start: 2020-12-23 | End: 2021-01-13

## 2020-12-23 RX ORDER — PANTOPRAZOLE SODIUM 20 MG/1
40 TABLET, DELAYED RELEASE ORAL
Refills: 0 | Status: DISCONTINUED | OUTPATIENT
Start: 2020-12-23 | End: 2021-01-01

## 2020-12-23 RX ORDER — SODIUM CHLORIDE 9 MG/ML
500 INJECTION INTRAMUSCULAR; INTRAVENOUS; SUBCUTANEOUS ONCE
Refills: 0 | Status: COMPLETED | OUTPATIENT
Start: 2020-12-23 | End: 2020-12-23

## 2020-12-23 RX ORDER — MIDODRINE HYDROCHLORIDE 2.5 MG/1
10 TABLET ORAL ONCE
Refills: 0 | Status: COMPLETED | OUTPATIENT
Start: 2020-12-23 | End: 2020-12-23

## 2020-12-23 RX ORDER — ACETYLCYSTEINE 200 MG/ML
3.7 VIAL (ML) MISCELLANEOUS ONCE
Refills: 0 | Status: COMPLETED | OUTPATIENT
Start: 2020-12-23 | End: 2020-12-23

## 2020-12-23 RX ORDER — SODIUM CHLORIDE 9 MG/ML
1000 INJECTION INTRAMUSCULAR; INTRAVENOUS; SUBCUTANEOUS
Refills: 0 | Status: DISCONTINUED | OUTPATIENT
Start: 2020-12-23 | End: 2020-12-23

## 2020-12-23 RX ORDER — EMTRICITABINE AND TENOFOVIR DISOPROXIL FUMARATE 200; 300 MG/1; MG/1
1 TABLET, FILM COATED ORAL DAILY
Refills: 0 | Status: DISCONTINUED | OUTPATIENT
Start: 2020-12-23 | End: 2021-01-13

## 2020-12-23 RX ORDER — PANTOPRAZOLE SODIUM 20 MG/1
40 TABLET, DELAYED RELEASE ORAL ONCE
Refills: 0 | Status: COMPLETED | OUTPATIENT
Start: 2020-12-23 | End: 2020-12-23

## 2020-12-23 RX ORDER — MORPHINE SULFATE 50 MG/1
2 CAPSULE, EXTENDED RELEASE ORAL ONCE
Refills: 0 | Status: DISCONTINUED | OUTPATIENT
Start: 2020-12-23 | End: 2020-12-23

## 2020-12-23 RX ORDER — SODIUM CHLORIDE 9 MG/ML
1000 INJECTION INTRAMUSCULAR; INTRAVENOUS; SUBCUTANEOUS ONCE
Refills: 0 | Status: DISCONTINUED | OUTPATIENT
Start: 2020-12-23 | End: 2020-12-23

## 2020-12-23 RX ORDER — ONDANSETRON 8 MG/1
4 TABLET, FILM COATED ORAL ONCE
Refills: 0 | Status: DISCONTINUED | OUTPATIENT
Start: 2020-12-23 | End: 2020-12-23

## 2020-12-23 RX ORDER — ALBUMIN HUMAN 25 %
100 VIAL (ML) INTRAVENOUS EVERY 8 HOURS
Refills: 0 | Status: COMPLETED | OUTPATIENT
Start: 2020-12-23 | End: 2020-12-25

## 2020-12-23 RX ORDER — IBUPROFEN 200 MG
600 TABLET ORAL EVERY 6 HOURS
Refills: 0 | Status: DISCONTINUED | OUTPATIENT
Start: 2020-12-23 | End: 2021-01-02

## 2020-12-23 RX ORDER — ACETYLCYSTEINE 200 MG/ML
11 VIAL (ML) MISCELLANEOUS ONCE
Refills: 0 | Status: COMPLETED | OUTPATIENT
Start: 2020-12-23 | End: 2020-12-23

## 2020-12-23 RX ADMIN — ENOXAPARIN SODIUM 40 MILLIGRAM(S): 100 INJECTION SUBCUTANEOUS at 17:29

## 2020-12-23 RX ADMIN — PANTOPRAZOLE SODIUM 40 MILLIGRAM(S): 20 TABLET, DELAYED RELEASE ORAL at 21:22

## 2020-12-23 RX ADMIN — SODIUM CHLORIDE 500 MILLILITER(S): 9 INJECTION INTRAMUSCULAR; INTRAVENOUS; SUBCUTANEOUS at 16:21

## 2020-12-23 RX ADMIN — ENOXAPARIN SODIUM 40 MILLIGRAM(S): 100 INJECTION SUBCUTANEOUS at 05:48

## 2020-12-23 RX ADMIN — PANTOPRAZOLE SODIUM 40 MILLIGRAM(S): 20 TABLET, DELAYED RELEASE ORAL at 17:29

## 2020-12-23 RX ADMIN — SODIUM CHLORIDE 75 MILLILITER(S): 9 INJECTION INTRAMUSCULAR; INTRAVENOUS; SUBCUTANEOUS at 05:48

## 2020-12-23 RX ADMIN — Medication 600 MILLIGRAM(S): at 17:29

## 2020-12-23 RX ADMIN — ONDANSETRON 4 MILLIGRAM(S): 8 TABLET, FILM COATED ORAL at 05:48

## 2020-12-23 RX ADMIN — MIDODRINE HYDROCHLORIDE 10 MILLIGRAM(S): 2.5 TABLET ORAL at 21:21

## 2020-12-23 RX ADMIN — SODIUM CHLORIDE 500 MILLILITER(S): 9 INJECTION INTRAMUSCULAR; INTRAVENOUS; SUBCUTANEOUS at 21:22

## 2020-12-23 RX ADMIN — ENTECAVIR 0.5 MILLIGRAM(S): 0.5 TABLET ORAL at 13:20

## 2020-12-23 RX ADMIN — BICTEGRAVIR SODIUM, EMTRICITABINE, AND TENOFOVIR ALAFENAMIDE FUMARATE 1 TABLET(S): 30; 120; 15 TABLET ORAL at 13:20

## 2020-12-23 RX ADMIN — ONDANSETRON 4 MILLIGRAM(S): 8 TABLET, FILM COATED ORAL at 15:22

## 2020-12-23 RX ADMIN — Medication 305 GRAM(S): at 17:57

## 2020-12-23 RX ADMIN — ONDANSETRON 4 MILLIGRAM(S): 8 TABLET, FILM COATED ORAL at 21:21

## 2020-12-23 RX ADMIN — Medication 30 MILLILITER(S): at 13:23

## 2020-12-23 RX ADMIN — Medication 50 MILLILITER(S): at 21:39

## 2020-12-23 RX ADMIN — Medication 129.63 GRAM(S): at 20:07

## 2020-12-23 NOTE — RAPID RESPONSE TEAM SUMMARY - NSSITUATIONBACKGROUNDRRT_GEN_ALL_CORE
The patient is a 50 year old female with past medical history of HIV, CMV, HBV, histoplasmosis, renal vein thrombosis s/p coumadin for 6 months, and strongyloidiasis who presents with abdominal pain. She reports constant upper abdominal pain described as constant in nature. She reports three episodes of non-bloody vomiting today without diarrhea. She denies recent travel, sick contacts, or pets at home. She denies fever. CBC showed reactive thrombocytosis. CMP showed hyponatremia with mild hypocalcemia. She was found to be COVID-19 PCR positive.     RRT called for hypotension. Patient alert and awake. Responding appropriately to questions. Started 1L fluid bolus. Endorsing nausea. Gave Zofran 4 mg, protonix 40 mg and midodrine 10 mg. Repeat BP improved to 106/77.

## 2020-12-23 NOTE — DISCHARGE NOTE PROVIDER - CARE PROVIDERS DIRECT ADDRESSES
[email or web address]

## 2020-12-23 NOTE — DISCHARGE NOTE PROVIDER - HOSPITAL COURSE
HPI:   49 y/o female h/o HIV, hepatitis in the past s/p cholecystectomy presenting with abdominal pain. pain located in upper abdomen and constant. no fevers or chills. pt reports 3 episode of nonbloody vomiting today. no diarrhea. no recent travel or sick contacts. no fevers at home. has not tried anything for pain. noticed that she was turning yellow and came to the ED. no new medications. no recent falls or trauma. no sick contacts (22 Dec 2020 16:03)     HPI:   49 y/o female h/o HIV, hepatitis in the past s/p cholecystectomy presenting with abdominal pain. pain located in upper abdomen and constant. no fevers or chills. pt reports 3 episode of nonbloody vomiting today. no diarrhea. no recent travel or sick contacts. no fevers at home. has not tried anything for pain. noticed that she was turning yellow and came to the ED. no new medications. no recent falls or trauma. no sick contacts (22 Dec 2020 16:03)    Hospital Course:   Patient with predominantly hepatocellular liver injury, with AST >1800, Alt 600. Also w/ evidence of hepatic synthetic function, with elevated INR, decreased albumin. Etiology likely from HBV, which was previously being treated w/ Symtuza (tenofovir 10mg qd) but patient had been off medication for almost 1 year (until 11/2020) due to insurance lapse. Her outpatient viral load >400,000,000 corroborates this. Hepatitis delta coinfection has been ruled out as outpatient. Patient will require immediate treatment of her HBV, now on biktarvy (tenofovir 25mg). Currently patient is not in acute liver failure, she is mentating well, able to report months backwards in [place] but is having worsening INR which is concerning. She was continued on biktarvy per ID and entecavir for HBV per hepatology.      HPI:   49 y/o female h/o HIV, hepatitis in the past s/p cholecystectomy presenting with abdominal pain. pain located in upper abdomen and constant. no fevers or chills. pt reports 3 episode of nonbloody vomiting today. no diarrhea. no recent travel or sick contacts. no fevers at home. has not tried anything for pain. noticed that she was turning yellow and came to the ED. no new medications. no recent falls or trauma. no sick contacts (22 Dec 2020 16:03)    Hospital Course:   Patient with predominantly hepatocellular liver injury, with AST >1800, Alt 600. Also w/ evidence of hepatic synthetic function, with elevated INR, decreased albumin. Etiology likely from HBV, which was previously being treated w/ Symtuza (tenofovir 10mg qd) but patient had been off medication for almost 1 year (until 11/2020) due to insurance lapse. Her outpatient viral load >400,000,000 corroborates this. Hepatitis delta coinfection has been ruled out as outpatient. Patient will require immediate treatment of her HBV, now on biktarvy (tenofovir 25mg). Currently patient is not in acute liver failure, she is mentating well, able to report months backwards in [place]ian but is having worsening INR which is concerning. She was continued on biktarvy per ID and entecavir for HBV per hepatology. N-acetylcysteine and albumin started. Hypotensive and tachycardic requiring potential transfer to [facility] ICU.    HPI:   49 y/o female h/o HIV, hepatitis in the past s/p cholecystectomy presenting with abdominal pain. pain located in upper abdomen and constant. no fevers or chills. pt reports 3 episode of nonbloody vomiting today. no diarrhea. no recent travel or sick contacts. no fevers at home. has not tried anything for pain. noticed that she was turning yellow and came to the ED. no new medications. no recent falls or trauma. no sick contacts (22 Dec 2020 16:03)    Hospital Course:   Patient with predominantly hepatocellular liver injury, with AST >1800, Alt 600. Also w/ evidence of hepatic synthetic dysfunction, with elevated INR, and decreased albumin. Etiology likely from HBV, which was previously being treated w/ Symtuza (tenofovir 10mg qd) but patient had been off medication for almost 1 year (until 11/2020) due to insurance lapse. Her outpatient viral load >400,000,000 corroborates this. Unclear role of COVID-19 infection and HIV co-infection. Hepatitis delta coinfection has been ruled out as outpatient. Patient will require immediate treatment of her HBV. Now on Truvada, Tivicay and entecavir for HIV/HBV infections. Currently patient is mentating well, able to report months backwards in [place] but is having worsening INR which is concerning for acute liver failure. N-acetylcysteine protocol was initiated, and IVF and albumin were given for hypotension and tachycardia. Uptriage of care was required due to difficulty obtaining IV access and for requirement of frequent monitoring/lab work. Patient will be transferred to [facility] MICU for further management.    HPI:   49 y/o female h/o HIV, hepatitis in the past s/p cholecystectomy presenting with abdominal pain. pain located in upper abdomen and constant. no fevers or chills. pt reports 3 episode of nonbloody vomiting today. no diarrhea. no recent travel or sick contacts. no fevers at home. has not tried anything for pain. noticed that she was turning yellow and came to the ED. no new medications. no recent falls or trauma. no sick contacts (22 Dec 2020 16:03)    Hospital Course:   Patient with predominantly hepatocellular liver injury, with AST >1800, Alt 600. Also w/ evidence of hepatic synthetic dysfunction, with elevated INR, and decreased albumin. Etiology likely from HBV, which was previously being treated w/ Symtuza (tenofovir 10mg qd) but patient had been off medication for almost 1 year (until 11/2020) due to insurance lapse. Her outpatient viral load >400,000,000 corroborates this. Unclear role of COVID-19 infection and HIV co-infection. Hepatitis delta coinfection has been ruled out as outpatient. Patient will require immediate treatment of her HBV. Now on Truvada, Tivicay and entecavir for HIV/HBV infections. Patient is mentating well, but is having worsening INR which is concerning for acute liver failure. N-acetylcysteine protocol was initiated, and IVF and albumin were given for hypotension and tachycardia. Uptriage of care was required due to difficulty obtaining IV access and for requirement of frequent monitoring/lab work. Patient required a PICC line to be placed due to difficulty with access. Her LFTs began downtrending with continued NAC. HPI:   49 y/o female h/o HIV, hepatitis in the past s/p cholecystectomy presenting with abdominal pain. pain located in upper abdomen and constant. no fevers or chills. pt reports 3 episode of nonbloody vomiting today. no diarrhea. no recent travel or sick contacts. no fevers at home. has not tried anything for pain. noticed that she was turning yellow and came to the ED. no new medications. no recent falls or trauma. no sick contacts (22 Dec 2020 16:03)    Hospital Course:   Patient with predominantly hepatocellular liver injury, with AST >1800, Alt 600. Also w/ evidence of hepatic synthetic dysfunction, with elevated INR, and decreased albumin. Etiology likely from HBV, which was previously being treated w/ Symtuza (tenofovir 10mg qd) but patient had been off medication for almost 1 year (until 11/2020) due to insurance lapse. Her outpatient viral load >400,000,000 corroborates this. Unclear role of COVID-19 infection and HIV co-infection. Hepatitis delta coinfection has been ruled out as outpatient. Patient will require immediate treatment of her HBV. Now on Truvada, Tivicay and entecavir for HIV/HBV infections. Patient is mentating well, but is having worsening INR which is concerning for acute liver failure. N-acetylcysteine protocol was initiated (12/23- ), and IVF and albumin were given for hypotension and tachycardia. Uptriage of care was required due to difficulty obtaining IV access and for requirement of frequent monitoring/lab work. Patient required a PICC line to be placed due to difficulty with access. Her LFTs began downtrending until 12/26, when they started rising. Given worsening labs, c/f SBP, started on vanc (12/30-1/1), zosyn (12/30-1/3). She had a para 12/31- 120 mL removed and cx grew Klebsiella oxytoca/Raoutella ornithinolytica. On 1/1 her labs were c/f DIC and she noted multiple bloody BM, BRBPR noted on exam. She was given cryo, FFP, pRBC as appropriate. Sensitivities came back on ascites cx 1/3 and she was transitioned to ceftriaxone (1/3- ). Repeat para 1/5 removed 2.7 L. Pt clinically improved and repeat Hep B PCR showed _____. HPI:   51 y/o female h/o HIV, hepatitis in the past s/p cholecystectomy presenting with abdominal pain. pain located in upper abdomen and constant. no fevers or chills. pt reports 3 episode of nonbloody vomiting today. no diarrhea. no recent travel or sick contacts. no fevers at home. has not tried anything for pain. noticed that she was turning yellow and came to the ED. no new medications. no recent falls or trauma. no sick contacts (22 Dec 2020 16:03)    Hospital Course:   Patient with predominantly hepatocellular liver injury, with AST >1800, Alt 600. Also w/ evidence of hepatic synthetic dysfunction, with elevated INR, and decreased albumin. Etiology likely from HBV, which was previously being treated w/ Symtuza (tenofovir 10mg qd) but patient had been off medication for almost 1 year (until 11/2020) due to insurance lapse. Her outpatient viral load >400,000,000 corroborates this. Unclear role of COVID-19 infection and HIV co-infection. Hepatitis delta coinfection has been ruled out as outpatient. Patient will require immediate treatment of her HBV. Now on Truvada, Tivicay and entecavir for HIV/HBV infections. Patient is mentating well, but is having worsening INR which is concerning for acute liver failure. N-acetylcysteine protocol was initiated (12/23- ), and IVF and albumin were given for hypotension and tachycardia. Uptriage of care was required due to difficulty obtaining IV access and for requirement of frequent monitoring/lab work. Patient required a PICC line to be placed due to difficulty with access. Her LFTs began downtrending until 12/26, when they started rising. Given worsening labs, c/f SBP, started on vanc (12/30-1/1), zosyn (12/30-1/3). She had a para 12/31- 120 mL removed and cx grew Klebsiella oxytoca/Raoutella ornithinolytica. On 1/1 her labs were c/f DIC and she noted multiple bloody BM, BRBPR noted on exam. She was given cryo, FFP, pRBC as appropriate. Sensitivities came back on ascites cx 1/3 and she was transitioned to ceftriaxone (1/3-1/6) and broadened to cefepime (1/6- ). Repeat para 1/5 removed 2.7 L. Pt clinically improved and repeat Hep B PCR showed viral load decrease to 21 million copies (down from >400 million outpt). Pt continued to clinically improve and hepatology noted pt may be OLT candidate via [facility] at participating center. HPI:   49 y/o female h/o HIV, hepatitis in the past s/p cholecystectomy presenting with abdominal pain. pain located in upper abdomen and constant. no fevers or chills. pt reports 3 episode of nonbloody vomiting today. no diarrhea. no recent travel or sick contacts. no fevers at home. has not tried anything for pain. noticed that she was turning yellow and came to the ED. no new medications. no recent falls or trauma. no sick contacts (22 Dec 2020 16:03)    Hospital Course:   Patient with predominantly hepatocellular liver injury, with AST >1800, Alt 600. Also w/ evidence of hepatic synthetic dysfunction, with elevated INR, and decreased albumin. Etiology likely from HBV, which was previously being treated w/ Symtuza (tenofovir 10mg qd) but patient had been off medication for almost 1 year (until 11/2020) due to insurance lapse. Her outpatient viral load >400,000,000 corroborates this. Unclear role of COVID-19 infection and HIV co-infection. Hepatitis delta coinfection has been ruled out as outpatient. Patient will require immediate treatment of her HBV. Now on Truvada, Tivicay and entecavir for HIV/HBV infections. Patient is mentating well, but is having worsening INR which is concerning for acute liver failure. N-acetylcysteine protocol was initiated on 12/23 and continued through admission, w/ pt currently on regimen of IV Acetadote 7g in D5 1000 ml qd at 4 AM, infuse over 16 hours since 1/5. IVF and albumin were given for hypotension and tachycardia. Uptriage of care was required due to difficulty obtaining IV access and for requirement of frequent monitoring/lab work. Patient required a PICC line to be placed due to difficulty with access. Her LFTs began downtrending until 12/26, when they started rising. Given worsening labs, c/f SBP, started on vanc (12/30-1/1), zosyn (12/30-1/3). She had a para 12/31- 120 mL removed and cx grew Klebsiella oxytoca/Raoutella ornithinolytica. On 1/1 her labs were c/f DIC and she noted multiple bloody BM, BRBPR noted on exam. She was given cryo, FFP, pRBC as appropriate, w/ fibrinogen trended and cryo being given if fibrinogen <150 and FFP if continuing to bleed. Sensitivities came back on ascites cx 1/3 and she was transitioned to ceftriaxone (1/3-1/6) and broadened to cefepime (1/6- 1/10). Repeat para 1/5 removed 2.7 L. Pt clinically improved and repeat Hep B PCR showed viral load decrease to 21 million copies (down from >400 million outpt). Pt noted to develop rapid LE swelling 2/2 third spacing, w/ 2+ pitting edema, and she was started on IV lasix 80 mg BID. Pt continued to clinically improve and hepatology noted pt may be OLT candidate via [facility] at participating center. Pt was accepted to [facility] on 1/11 for possible liver transplantation, and is stable for transfer to their facility at this time.

## 2020-12-23 NOTE — DISCHARGE NOTE PROVIDER - NSDCCPCAREPLAN_GEN_ALL_CORE_FT
PRINCIPAL DISCHARGE DIAGNOSIS  Diagnosis: Transaminitis  Assessment and Plan of Treatment:       SECONDARY DISCHARGE DIAGNOSES  Diagnosis: Shortness of breath with exposure to COVID-19 virus  Assessment and Plan of Treatment:     Diagnosis: Hyperbilirubinemia  Assessment and Plan of Treatment:      PRINCIPAL DISCHARGE DIAGNOSIS  Diagnosis: Transaminitis  Assessment and Plan of Treatment:       SECONDARY DISCHARGE DIAGNOSES  Diagnosis: Shortness of breath with exposure to COVID-19 virus  Assessment and Plan of Treatment:      PRINCIPAL DISCHARGE DIAGNOSIS  Diagnosis: Sepsis with acute liver failure  Assessment and Plan of Treatment: You came to the hospital with significantly elevated liver enzymes, and there was a concern for liver failure. Your hepatitis B virus load was very high on your outpatient test, and it was thought the liver injury was due to reactivation of your hepatitis B viral infection. You also have COVID-19 infection, and HIV infection, and it was unclear whether these infections also contributed to your liver injury. You required frequent testing and monitoring for you liver function, and it was deemed necessary to transfer you to a medical intensive care unit for further management. Please take medications as prescribed for your hepatitis B infection.      SECONDARY DISCHARGE DIAGNOSES  Diagnosis: Sepsis due to COVID-19  Assessment and Plan of Treatment: You are tested positive for COVID-19 infection, but didn't require oxygen use while in the hospital. Due to your liver injury and active hepatitis infection, you can not receive treatments for the COVID-19 infection. You will be further monitored in a medical ICU setting for your liver condition, as well as your COVID-19 infection.    Diagnosis: AIDS  Assessment and Plan of Treatment: You have AIDS, and your CD4 cell count is low. Your AIDS medications were changed while in the hospital because of the liver injury. Please take medications as prescribed, and follow up with the infectious disease clinic as scheduled to further monitor and manage your disease.     PRINCIPAL DISCHARGE DIAGNOSIS  Diagnosis: Sepsis with acute liver failure  Assessment and Plan of Treatment: You came to the hospital with significantly elevated liver enzymes, and there was a concern for liver failure. Your hepatitis B virus load was very high on your outpatient test, and it was thought the liver injury was due to reactivation of your hepatitis B viral infection. You also have COVID-19 infection, and HIV infection, and it was unclear whether these infections also contributed to your liver injury. You required frequent testing and monitoring for your liver function, but your liver enzymes began downtrending and are now stable. Please continue to take medications as prescribed for your hepatitis B infection.      SECONDARY DISCHARGE DIAGNOSES  Diagnosis: AIDS  Assessment and Plan of Treatment: You have AIDS, and your CD4 cell count is low. Your AIDS medications were changed while in the hospital because of the liver injury. Please take medications as prescribed, and follow up with the infectious disease clinic as scheduled to further monitor and manage your disease.    Diagnosis: Sepsis due to COVID-19  Assessment and Plan of Treatment: You are tested positive for COVID-19 infection, but didn't require oxygen use while in the hospital. Due to your liver injury and active hepatitis infection, you did not receive treatments for the COVID-19 infection. Your respiratory status was stable on room air. Please continue to follow protocols and safely socially distance to prevent the spread of COVID-19 infection. If you feel shortness of breath, difficulty breathing, fever or chills, please return to the Emergency Room.     PRINCIPAL DISCHARGE DIAGNOSIS  Diagnosis: Sepsis with acute liver failure  Assessment and Plan of Treatment: You came to the hospital with significantly elevated liver enzymes, and there was a concern for liver failure. Your hepatitis B virus load was very high on your outpatient test, and it was thought the liver injury was due to reactivation of your hepatitis B viral infection. You also have COVID-19 infection, and HIV infection, and it was unclear whether these infections also contributed to your liver injury. You required frequent testing and monitoring for your liver function, but your liver enzymes began downtrending and are now stable. Please continue to take medications as prescribed for your hepatitis B infection. You were also determined to be a liver transplant candidate, and at this time, you will be transferred to [place] for liver transplantation.      SECONDARY DISCHARGE DIAGNOSES  Diagnosis: AIDS  Assessment and Plan of Treatment: You have AIDS, and your CD4 cell count is low. Your AIDS medications were changed while in the hospital because of the liver injury. Please take medications as prescribed, and follow up with the infectious disease clinic as scheduled to further monitor and manage your disease.    Diagnosis: Sepsis due to COVID-19  Assessment and Plan of Treatment: You are tested positive for COVID-19 infection, but didn't require oxygen use while in the hospital. Due to your liver injury and active hepatitis infection, you did not receive treatments for the COVID-19 infection. Your respiratory status was stable on room air. Please continue to follow protocols and safely socially distance to prevent the spread of COVID-19 infection. If you feel shortness of breath, difficulty breathing, fever or chills, please return to the Emergency Room.

## 2020-12-23 NOTE — PROGRESS NOTE ADULT - SUBJECTIVE AND OBJECTIVE BOX
INFECTIOUS DISEASES FOLLOW UP-- Hollie Mcadams  [phone]    This is a follow up note for this  50yFemale with  High liver transaminase level  no significant improvement in LFTS  mentation is fine/baseline A and O x3  breathing room air without difficulty        ROS:  CONSTITUTIONAL:  No fever,   CARDIOVASCULAR:  No chest pain or palpitations  RESPIRATORY:  No dyspnea at rest  GASTROINTESTINAL:  c/o nausea, no vomiting no diarrhea  GENITOURINARY:  No dysuria  NEUROLOGIC:  No headache,     Allergies    No Known Allergies    Intolerances        ANTIBIOTICS/RELEVANT:  antimicrobials  dolutegravir 50 milliGRAM(s) Oral daily  emtricitabine 200 mG/tenofovir 300 mG (TRUVADA) 1 Tablet(s) Oral daily  entecavir 0.5 milliGRAM(s) Oral daily    immunologic:    OTHER:  acetylcysteine IVPB 3.7 Gram(s) IV Intermittent once  acetylcysteine IVPB 7 Gram(s) IV Intermittent once  albumin human 25% IVPB 100 milliLiter(s) IV Intermittent every 8 hours  aluminum hydroxide/magnesium hydroxide/simethicone Suspension 30 milliLiter(s) Oral every 6 hours PRN  enoxaparin Injectable 40 milliGRAM(s) SubCutaneous every 12 hours  ibuprofen  Tablet. 600 milliGRAM(s) Oral every 6 hours PRN  ondansetron Injectable 4 milliGRAM(s) IV Push every 8 hours PRN  pantoprazole    Tablet 40 milliGRAM(s) Oral before breakfast      Objective:  Vital Signs Last 24 Hrs  T(C): 36.7 (23 Dec 2020 17:32), Max: 36.7 (22 Dec 2020 21:17)  T(F): 98 (23 Dec 2020 17:32), Max: 98 (22 Dec 2020 21:17)  HR: 101 (23 Dec 2020 17:32) (71 - 116)  BP: 100/67 (23 Dec 2020 17:32) (86/67 - 102/72)  BP(mean): --  RR: 20 (23 Dec 2020 16:15) (18 - 20)  SpO2: 100% (23 Dec 2020 17:32) (99% - 100%)    PHYSICAL EXAM:  Constitutional:no acute distress breathing RA  Eyes:PIPO, EOMI, icteric  Ear/Nose/Throat: no oral lesions, 	  Respiratory: clear BL  Cardiovascular: S1S2  Gastrointestinal:soft, (+) BS,  tender RUQ  Extremities:no e/e/c  No Lymphadenopathy  IV sites not inflammed.  unable to place a second IV for albumin infusion    LABS:                        13.8   5.80  )-----------( 279      ( 23 Dec 2020 16:46 )             40.5         124<L>  |  99  |  11  ----------------------------<  110<H>  3.9   |  15<L>  |  0.73    Ca    6.8<L>      23 Dec 2020 16:46  Phos  3.4       Mg     1.6         TPro  4.1<L>  /  Alb  1.0<L>  /  TBili  7.4<H>  /  DBili  x   /  AST  1723<H>  /  ALT  596<H>  /  AlkPhos  297<H>      PT/INR - ( 23 Dec 2020 18:07 )   PT: 23.5 sec;   INR: 2.03 ratio         PTT - ( 23 Dec 2020 18:07 )  PTT:43.5 sec  Urinalysis Basic - ( 22 Dec 2020 10:45 )    Color: Dark Yellow / Appearance: Slightly Turbid / S.022 / pH: x  Gluc: x / Ketone: Negative  / Bili: Large / Urobili: Negative   Blood: x / Protein: Negative / Nitrite: Negative   Leuk Esterase: Negative / RBC: 4 /hpf / WBC 1 /HPF   Sq Epi: x / Non Sq Epi: 2 /hpf / Bacteria: Negative        MICROBIOLOGY:    HIV-1 RNA Quantitative, Viral Load:   57 (20 @ 14:37)    hep B out pt PCR was in the many millions        RECENT CULTURES:   @ 18:26  .Urine Clean Catch (Midstream)  --  --  --    <10,000 CFU/mL Normal Urogenital Celestina  --   @ 13:03  .Blood Blood  --  --  --    No growth to date.  --      RADIOLOGY & ADDITIONAL STUDIES:    < from: Xray Chest 1 View AP/PA (20 @ 11:08) >  Impression:    The heart is normal in size. Thelungs are clear. No pleural effusion. No pneumothorax. No free air is seen in the peritoneal cavity. No acute bony pathology could be identified.    < end of copied text >

## 2020-12-23 NOTE — CONSULT NOTE ADULT - SUBJECTIVE AND OBJECTIVE BOX
Chief Complaint:  Patient is a 50y old  Female who presents with a chief complaint of abdominal pain (23 Dec 2020 07:34)      HPI:  49 yo F w/ PMHx HIV/AIDS, HBV, previous renal vein thrombus s/p coumadin x 6m (~), histoplasmosis presenting w/ abdominal pain x 4 days. Patient reports abdominal pain x 4 days, initially in epigastric region, and now migrating to RUQ. Associated w/ some nausea and vomiting. Also noticed during this time jaundice. Denies any fever, no chills, no cough, no diarrhea or constipation. Denies any sick contacts. Denies any herbal supplements, no tylenol, no NSAIDs. Has known history of HIV, unclear of history of HBV.     Patient not entirely clear about her medical history. She reports being diagnosed with HIV about 10 years ago after  for her child was complicated by hemorrhage requiring blood transfusions. 6 months later, her PCP told her she has HIV, likely acquired from transfusion. Denies any IVDU, monogamous with . At around that time she became very ill, malnourished. She weighed as low as 75 lbs. She had renal vein thrombus and was treated w/ coumadin for 6 months. She also had CMV and histoplasmosis, all likely attributed to poorly controlled AIDS. She had a PEG tube placed and her nutrition improved. She established care with infectious disease, and has been treated for her HIV. More recently, she was taking Symtuza, which was treating both her HIV and her known HBV (contains tenofovir 10mg qd). However between 2019-2020 patient was lost to follow up and was not taking her Symtuza due to no insurance. She presented to [facility] ED  for abdominal pain, was diagnosed with gastritis and prescribed pepcid and discharged. She then reestablished care with ID (2020) and was restarted on symtuza. Her liver enzymes from the ED visit 20 were elevated to AST//166. On follow up clinic visit with ID, she had her HBV viral load checked () was >400,000,000.     On presentation to ED, patient tachcyardic (), BP wnl. Patient diagnosed w/ COVID. Labs notable for hyponatremia (128), Cr 1.02. AST/ALT 1814/621, Alk phos 330, T bili 9.6, alb 1.3. Procal elevated, HBVsAg positive. Underwent CT A/P notable for normal liver, normal bile duct, submucosal fat deposition in colon, and ascites. Patient started on biktarvy and admitted to medicine.     On interview patient reports abdominal pain is improving since presentation, though still present. Having a couple of episodes of nausea and vomiting since admission, nonbloody.     Allergies:  No Known Allergies      Home Medications:    Hospital Medications:  bictegravir 50 mG/emtricitabine 200 mG/tenofovir alafenamide 25 mG (BIKTARVY) 1 Tablet(s) Oral daily  enoxaparin Injectable 40 milliGRAM(s) SubCutaneous every 12 hours  ondansetron Injectable 4 milliGRAM(s) IV Push every 8 hours PRN  sodium chloride 0.9%. 1000 milliLiter(s) IV Continuous <Continuous>      PMHX/PSHX:  Renal Vein Thrombosis    CMV Infection    Hepatitis B    Strongyloidosis    Histoplasmosis    HIV (Human Immunodeficiency Virus Infection)    Gallstones    S/P  Section    S/P Bilateral Tubal Ligation    S/P PEG (Percutaneous Endoscopic Gastrostomy) Placement and Removal    History of Cholecystectomy        Family history:      Denies family history of colon cancer/polyps, stomach cancer/polyps, pancreatic cancer/masses, liver cancer/disease, ovarian cancer and endometrial cancer.    Social History:   Tob: Denies  EtOH: Denies  Illicit Drugs: Denies    ROS:     General:  No wt loss, fevers, chills, night sweats, fatigue  Eyes:  Good vision, no reported pain  ENT:  No sore throat, pain, runny nose, dysphagia  CV:  No pain, palpitations, hypo/hypertension  Pulm:  No dyspnea, cough, tachypnea, wheezing  GI:  No pain, No nausea, No vomiting, No diarrhea, No constipation, No weight loss, No fever, No pruritis, No rectal bleeding, No tarry stools, No dysphagia,  :  No pain, bleeding, incontinence, nocturia  Muscle:  No pain, weakness  Neuro:  No weakness, tingling, memory problems  Psych:  No fatigue, insomnia, mood problems, depression  Endocrine:  No polyuria, polydipsia, cold/heat intolerance  Heme:  No petechiae, ecchymosis, easy bruisability  Skin:  No rash, tattoos, scars, edema    PHYSICAL EXAM:     GENERAL:  No acute distress  HEENT:  Normocephalic/atraumatic, + scleral icterus  CHEST:  no resp distress  HEART:  Regular rate and rhythm,  ABDOMEN:  Soft, distended, ttp in RUQ/epigastric, no r/g  EXTREMITIES: No cyanosis, clubbing, or edema  SKIN:  No rash/erythema/ecchymoses/petechiae/wounds/abscess/warm/dry  NEURO:  Alert and oriented x 3, no asterixis, normal serial 7s    Vital Signs:  Vital Signs Last 24 Hrs  T(C): 36.4 (23 Dec 2020 04:46), Max: 36.7 (22 Dec 2020 21:17)  T(F): 97.6 (23 Dec 2020 04:46), Max: 98 (22 Dec 2020 21:17)  HR: 94 (23 Dec 2020 04:46) (94 - 113)  BP: 101/71 (23 Dec 2020 04:46) (90/68 - 106/76)  BP(mean): --  RR: 18 (23 Dec 2020 04:46) (18 - 18)  SpO2: 100% (23 Dec 2020 04:46) (99% - 100%)  Daily Height in cm: 165.1 (22 Dec 2020 15:59)    Daily Weight in k.1 (22 Dec 2020 15:59)    LABS:                        15.8   7.27  )-----------( 409      ( 22 Dec 2020 09:09 )             44.3       12-    128<L>  |  99  |  11  ----------------------------<  72  4.3   |  20<L>  |  0.91    Ca    7.2<L>      23 Dec 2020 07:08  Phos  3.9     12  Mg     1.6     12    TPro  4.0<L>  /  Alb  1.1<L>  /  TBili  8.2<H>  /  DBili  6.4<H>  /  AST  1701<H>  /  ALT  600<H>  /  AlkPhos  293<H>      LIVER FUNCTIONS - ( 23 Dec 2020 07:08 )  Alb: 1.1 g/dL / Pro: 4.0 g/dL / ALK PHOS: 293 U/L / ALT: 600 U/L / AST: 1701 U/L / GGT: x           PT/INR - ( 22 Dec 2020 09:09 )   PT: 17.9 sec;   INR: 1.52 ratio         PTT - ( 22 Dec 2020 09:09 )  PTT:35.1 sec  Urinalysis Basic - ( 22 Dec 2020 10:45 )    Color: Dark Yellow / Appearance: Slightly Turbid / S.022 / pH: x  Gluc: x / Ketone: Negative  / Bili: Large / Urobili: Negative   Blood: x / Protein: Negative / Nitrite: Negative   Leuk Esterase: Negative / RBC: 4 /hpf / WBC 1 /HPF   Sq Epi: x / Non Sq Epi: 2 /hpf / Bacteria: Negative                              15.8   7.27  )-----------( 409      ( 22 Dec 2020 09:09 )             44.3       Imaging:  CT A/P  FINDINGS:  LOWER CHEST: Small bilateral pleural effusions, new since 2020. Partially imaged calcified subcarinal lymph nodes.    LIVER: Within normal limits.  BILE DUCTS: Normal caliber.  GALLBLADDER: Status post cholecystectomy.  SPLEEN: Heterogeneous peripherally calcified spleen, unchanged in appearance. A small residual splenule is noted.  PANCREAS: Within normal limits.  ADRENALS: Within normal limits.  KIDNEYS/URETERS: No hydronephrosis. Subcentimeter hypodense foci, too small to characterize, but unchanged.    BLADDER: Underdistended.  REPRODUCTIVE ORGANS: Uterus and adnexa are within normal limits.    BOWEL: No bowel obstruction. Appendix is within normal limits. Scattered colonic diverticulosis. Prominent submucosal fat deposition in the ascending, transverse, and proximal descending colon, similar in appearance to prior CT dated 2020 and suggestive of chronic inflammation. Small hiatal hernia. Small periampullary duodenal diverticulum.  PERITONEUM: Moderate volume ascites.  VESSELS: Within normal limits.  RETROPERITONEUM/LYMPH NODES: Nonspecific mesenteric and pelvic lymph nodes are again noted. A previously noted left external iliac lymph node (2:95) measures 1 cm, previously 0.9 cm.  ABDOMINAL WALL: Subcutaneous edema.  BONES: Within normal limits.    IMPRESSION:  Small bilateral pleural effusions.    Moderate volume ascites, new since 2020.    Prominent submucosal fat deposition in the ascending, transverse, and proximal descending colon, suggestive of chronic inflammation.    Nonspecific mesenteric and pelvic lymphadenopathy, unchanged.           Chief Complaint:  Patient is a 50y old  Female who presents with a chief complaint of abdominal pain (23 Dec 2020 07:34)      HPI:  49 yo F w/ PMHx HIV/AIDS, HBV, previous renal vein thrombus s/p coumadin x 6m (~), histoplasmosis presenting w/ abdominal pain x 4 days. Patient reports abdominal pain x 4 days, initially in epigastric region, and now migrating to RUQ. Associated w/ some nausea and vomiting. Also noticed during this time jaundice. Denies any fever, no chills, no cough, no diarrhea or constipation. Denies any sick contacts. Denies any herbal supplements, no tylenol, no NSAIDs. Has known history of HIV, unclear of history of HBV.     Patient not entirely clear about her medical history. She reports being diagnosed with HIV about 10 years ago after  for her child was complicated by hemorrhage requiring blood transfusions. 6 months later, her PCP told her she has HIV, likely acquired from transfusion. Denies any IVDU, monogamous with . At around that time she became very ill, malnourished. She weighed as low as 75 lbs. She had renal vein thrombus and was treated w/ coumadin for 6 months. She also had CMV and histoplasmosis, all likely attributed to poorly controlled AIDS. She had a PEG tube placed and her nutrition improved. She established care with infectious disease, and has been treated for her HIV. More recently, she was taking Symtuza, which was treating both her HIV and her known HBV (contains tenofovir 10mg qd). However between 2019-2020 patient was lost to follow up and was not taking her Symtuza due to no insurance. She presented to [facility] ED  for abdominal pain, was diagnosed with gastritis and prescribed pepcid and discharged. She then reestablished care with ID (2020) and was restarted on symtuza. Her liver enzymes from the ED visit 20 were elevated to AST//166. On follow up clinic visit with ID, she had her HBV viral load checked () was >400,000,000.     On presentation to ED, patient tachcyardic (), BP wnl. Patient diagnosed w/ COVID. Labs notable for hyponatremia (128), Cr 1.02. AST/ALT 1814/621, Alk phos 330, T bili 9.6, alb 1.3. Procal elevated, HBVsAg positive. Underwent CT A/P notable for normal liver, normal bile duct, submucosal fat deposition in colon, and ascites. Patient started on biktarvy and admitted to medicine.     On interview patient reports abdominal pain is improving since presentation, though still present. Having a couple of episodes of nausea and vomiting since admission, nonbloody.     Allergies:  No Known Allergies      Home Medications:    Hospital Medications:  bictegravir 50 mG/emtricitabine 200 mG/tenofovir alafenamide 25 mG (BIKTARVY) 1 Tablet(s) Oral daily  enoxaparin Injectable 40 milliGRAM(s) SubCutaneous every 12 hours  ondansetron Injectable 4 milliGRAM(s) IV Push every 8 hours PRN  sodium chloride 0.9%. 1000 milliLiter(s) IV Continuous <Continuous>      PMHX/PSHX:  Renal Vein Thrombosis    CMV Infection    Hepatitis B    Strongyloidosis    Histoplasmosis    HIV (Human Immunodeficiency Virus Infection)    Gallstones    S/P  Section    S/P Bilateral Tubal Ligation    S/P PEG (Percutaneous Endoscopic Gastrostomy) Placement and Removal    History of Cholecystectomy        Family history:      Denies family history of colon cancer/polyps, stomach cancer/polyps, pancreatic cancer/masses, liver cancer/disease, ovarian cancer and endometrial cancer.    Social History:   Tob: Denies  EtOH: Denies  Illicit Drugs: Denies    ROS:     General:  No wt loss, fevers, chills, night sweats, fatigue  Eyes:  Good vision, no reported pain  ENT:  No sore throat, pain, runny nose, dysphagia  CV:  No pain, palpitations, hypo/hypertension  Pulm:  No dyspnea, cough, tachypnea, wheezing  GI:  No pain, No nausea, No vomiting, No diarrhea, No constipation, No weight loss, No fever, No pruritis, No rectal bleeding, No tarry stools, No dysphagia,  :  No pain, bleeding, incontinence, nocturia  Muscle:  No pain, weakness  Neuro:  No weakness, tingling, memory problems  Psych:  No fatigue, insomnia, mood problems, depression  Endocrine:  No polyuria, polydipsia, cold/heat intolerance  Heme:  No petechiae, ecchymosis, easy bruisability  Skin:  No rash, tattoos, scars, edema    PHYSICAL EXAM:     GENERAL:  No acute distress  HEENT:  Normocephalic/atraumatic, + scleral icterus  CHEST:  no resp distress  HEART:  Regular rate and rhythm,  ABDOMEN:  Soft, distended, ttp in RUQ/epigastric, no r/g  EXTREMITIES: No cyanosis, clubbing, or edema  SKIN:  No rash/erythema/ecchymoses/petechiae/wounds/abscess/warm/dry  NEURO:  Alert and oriented x 3, no asterixis, reported months backwards (forgot march)    Vital Signs:  Vital Signs Last 24 Hrs  T(C): 36.4 (23 Dec 2020 04:46), Max: 36.7 (22 Dec 2020 21:17)  T(F): 97.6 (23 Dec 2020 04:46), Max: 98 (22 Dec 2020 21:17)  HR: 94 (23 Dec 2020 04:46) (94 - 113)  BP: 101/71 (23 Dec 2020 04:46) (90/68 - 106/76)  BP(mean): --  RR: 18 (23 Dec 2020 04:46) (18 - 18)  SpO2: 100% (23 Dec 2020 04:46) (99% - 100%)  Daily Height in cm: 165.1 (22 Dec 2020 15:59)    Daily Weight in k.1 (22 Dec 2020 15:59)    LABS:                        15.8   7.27  )-----------( 409      ( 22 Dec 2020 09:09 )             44.3       12-23    128<L>  |  99  |  11  ----------------------------<  72  4.3   |  20<L>  |  0.91    Ca    7.2<L>      23 Dec 2020 07:08  Phos  3.9     12-  Mg     1.6     12-    TPro  4.0<L>  /  Alb  1.1<L>  /  TBili  8.2<H>  /  DBili  6.4<H>  /  AST  1701<H>  /  ALT  600<H>  /  AlkPhos  293<H>  12-23    LIVER FUNCTIONS - ( 23 Dec 2020 07:08 )  Alb: 1.1 g/dL / Pro: 4.0 g/dL / ALK PHOS: 293 U/L / ALT: 600 U/L / AST: 1701 U/L / GGT: x           PT/INR - ( 22 Dec 2020 09:09 )   PT: 17.9 sec;   INR: 1.52 ratio         PTT - ( 22 Dec 2020 09:09 )  PTT:35.1 sec  Urinalysis Basic - ( 22 Dec 2020 10:45 )    Color: Dark Yellow / Appearance: Slightly Turbid / S.022 / pH: x  Gluc: x / Ketone: Negative  / Bili: Large / Urobili: Negative   Blood: x / Protein: Negative / Nitrite: Negative   Leuk Esterase: Negative / RBC: 4 /hpf / WBC 1 /HPF   Sq Epi: x / Non Sq Epi: 2 /hpf / Bacteria: Negative                              15.8   7.27  )-----------( 409      ( 22 Dec 2020 09:09 )             44.3       Imaging:  CT A/P  FINDINGS:  LOWER CHEST: Small bilateral pleural effusions, new since 2020. Partially imaged calcified subcarinal lymph nodes.    LIVER: Within normal limits.  BILE DUCTS: Normal caliber.  GALLBLADDER: Status post cholecystectomy.  SPLEEN: Heterogeneous peripherally calcified spleen, unchanged in appearance. A small residual splenule is noted.  PANCREAS: Within normal limits.  ADRENALS: Within normal limits.  KIDNEYS/URETERS: No hydronephrosis. Subcentimeter hypodense foci, too small to characterize, but unchanged.    BLADDER: Underdistended.  REPRODUCTIVE ORGANS: Uterus and adnexa are within normal limits.    BOWEL: No bowel obstruction. Appendix is within normal limits. Scattered colonic diverticulosis. Prominent submucosal fat deposition in the ascending, transverse, and proximal descending colon, similar in appearance to prior CT dated 2020 and suggestive of chronic inflammation. Small hiatal hernia. Small periampullary duodenal diverticulum.  PERITONEUM: Moderate volume ascites.  VESSELS: Within normal limits.  RETROPERITONEUM/LYMPH NODES: Nonspecific mesenteric and pelvic lymph nodes are again noted. A previously noted left external iliac lymph node (2:95) measures 1 cm, previously 0.9 cm.  ABDOMINAL WALL: Subcutaneous edema.  BONES: Within normal limits.    IMPRESSION:  Small bilateral pleural effusions.    Moderate volume ascites, new since 2020.    Prominent submucosal fat deposition in the ascending, transverse, and proximal descending colon, suggestive of chronic inflammation.    Nonspecific mesenteric and pelvic lymphadenopathy, unchanged.           Chief Complaint:  Patient is a 50y old  Female who presents with a chief complaint of abdominal pain (23 Dec 2020 07:34)      HPI:  51 yo F w/ PMHx HIV/AIDS, HBV, previous renal vein thrombus s/p coumadin x 6m (~), histoplasmosis presenting w/ abdominal pain x 4 days. Patient reports abdominal pain x 4 days, initially in epigastric region, and now migrating to RUQ. Associated w/ some nausea and vomiting. Also noticed during this time jaundice. Denies any fever, no chills, no cough, no diarrhea or constipation. Denies any sick contacts. Denies any herbal supplements, no tylenol, no NSAIDs. Has known history of HIV, unclear of history of HBV.     Patient not entirely clear about her medical history. She reports being diagnosed with HIV about 10 years ago after  for her child was complicated by hemorrhage requiring blood transfusions. 6 months later, her PCP told her she has HIV, likely acquired from transfusion. Denies any IVDU, monogamous with . At around that time she became very ill, malnourished. She weighed as low as 75 lbs. She had renal vein thrombus and was treated w/ coumadin for 6 months. She also had CMV and histoplasmosis, all likely attributed to poorly controlled AIDS. She had a PEG tube placed and her nutrition improved. She established care with infectious disease, and has been treated for her HIV. More recently, she was taking Symtuza, which was treating both her HIV and her known HBV (contains tenofovir 10mg qd). However between 2019-2020 patient was lost to follow up and was not taking her Symtuza due to no insurance. She presented to [facility] ED  for abdominal pain, was diagnosed with gastritis and prescribed pepcid and discharged. She then reestablished care with ID (2020) and was restarted on symtuza. Her liver enzymes from the ED visit 20 were elevated to AST//166. On follow up clinic visit with ID, she had her HBV viral load checked () was >400,000,000.     On presentation to ED, patient tachcyardic (), BP wnl. Patient diagnosed w/ COVID. Labs notable for hyponatremia (128), Cr 1.02. AST/ALT 1814/621, Alk phos 330, T bili 9.6, alb 1.3. Procal elevated, HBVsAg positive. Underwent CT A/P notable for normal liver, normal bile duct, submucosal fat deposition in colon, and ascites. Patient started on biktarvy and admitted to medicine.     On interview patient reports abdominal pain is improving since presentation, though still present. Having a couple of episodes of nausea and vomiting since admission, nonbloody.     Allergies:  No Known Allergies      Home Medications:    Hospital Medications:  bictegravir 50 mG/emtricitabine 200 mG/tenofovir alafenamide 25 mG (BIKTARVY) 1 Tablet(s) Oral daily  enoxaparin Injectable 40 milliGRAM(s) SubCutaneous every 12 hours  ondansetron Injectable 4 milliGRAM(s) IV Push every 8 hours PRN  sodium chloride 0.9%. 1000 milliLiter(s) IV Continuous <Continuous>      PMHX/PSHX:  Renal Vein Thrombosis    CMV Infection    Hepatitis B    Strongyloidosis    Histoplasmosis    HIV (Human Immunodeficiency Virus Infection)    Gallstones    S/P  Section    S/P Bilateral Tubal Ligation    S/P PEG (Percutaneous Endoscopic Gastrostomy) Placement and Removal    History of Cholecystectomy        Family history:      Denies family history of colon cancer/polyps, stomach cancer/polyps, pancreatic cancer/masses, liver cancer/disease, ovarian cancer and endometrial cancer.    Social History:   Tob: Denies  EtOH: Denies  Illicit Drugs: Denies    ROS:     General:  No wt loss, fevers, chills, night sweats, fatigue  Eyes:  Good vision, no reported pain  ENT:  No sore throat, pain, runny nose, dysphagia  CV:  No pain, palpitations, hypo/hypertension  Pulm:  No dyspnea, cough, tachypnea, wheezing  GI:  No pain, No nausea, No vomiting, No diarrhea, No constipation, No weight loss, No fever, No pruritis, No rectal bleeding, No tarry stools, No dysphagia,  :  No pain, bleeding, incontinence, nocturia  Muscle:  No pain, weakness  Neuro:  No weakness, tingling, memory problems  Psych:  No fatigue, insomnia, mood problems, depression  Endocrine:  No polyuria, polydipsia, cold/heat intolerance  Heme:  No petechiae, ecchymosis, easy bruisability  Skin:  No rash, tattoos, scars, edema    PHYSICAL EXAM:     GENERAL:  No acute distress  HEENT:  Normocephalic/atraumatic, + scleral icterus  CHEST:  no resp distress  HEART:  Regular rate and rhythm,  ABDOMEN:  Soft, distended, ttp in RUQ/epigastric, no r/g  EXTREMITIES: No cyanosis, clubbing, or edema  SKIN:  No rash/erythema/ecchymoses/petechiae/wounds/abscess/warm/dry  NEURO:  Alert and oriented x 3, no asterixis, reported months backwards promptly but forgot March    Vital Signs:  Vital Signs Last 24 Hrs  T(C): 36.4 (23 Dec 2020 04:46), Max: 36.7 (22 Dec 2020 21:17)  T(F): 97.6 (23 Dec 2020 04:46), Max: 98 (22 Dec 2020 21:17)  HR: 94 (23 Dec 2020 04:46) (94 - 113)  BP: 101/71 (23 Dec 2020 04:46) (90/68 - 106/76)  BP(mean): --  RR: 18 (23 Dec 2020 04:46) (18 - 18)  SpO2: 100% (23 Dec 2020 04:46) (99% - 100%)  Daily Height in cm: 165.1 (22 Dec 2020 15:59)    Daily Weight in k.1 (22 Dec 2020 15:59)    LABS:                        15.8   7.27  )-----------( 409      ( 22 Dec 2020 09:09 )             44.3       12-23    128<L>  |  99  |  11  ----------------------------<  72  4.3   |  20<L>  |  0.91    Ca    7.2<L>      23 Dec 2020 07:08  Phos  3.9     12-  Mg     1.6     12-    TPro  4.0<L>  /  Alb  1.1<L>  /  TBili  8.2<H>  /  DBili  6.4<H>  /  AST  1701<H>  /  ALT  600<H>  /  AlkPhos  293<H>  12-23    LIVER FUNCTIONS - ( 23 Dec 2020 07:08 )  Alb: 1.1 g/dL / Pro: 4.0 g/dL / ALK PHOS: 293 U/L / ALT: 600 U/L / AST: 1701 U/L / GGT: x           PT/INR - ( 22 Dec 2020 09:09 )   PT: 17.9 sec;   INR: 1.52 ratio         PTT - ( 22 Dec 2020 09:09 )  PTT:35.1 sec  Urinalysis Basic - ( 22 Dec 2020 10:45 )    Color: Dark Yellow / Appearance: Slightly Turbid / S.022 / pH: x  Gluc: x / Ketone: Negative  / Bili: Large / Urobili: Negative   Blood: x / Protein: Negative / Nitrite: Negative   Leuk Esterase: Negative / RBC: 4 /hpf / WBC 1 /HPF   Sq Epi: x / Non Sq Epi: 2 /hpf / Bacteria: Negative                              15.8   7.27  )-----------( 409      ( 22 Dec 2020 09:09 )             44.3       Imaging:  CT A/P  FINDINGS:  LOWER CHEST: Small bilateral pleural effusions, new since 2020. Partially imaged calcified subcarinal lymph nodes.    LIVER: Within normal limits.  BILE DUCTS: Normal caliber.  GALLBLADDER: Status post cholecystectomy.  SPLEEN: Heterogeneous peripherally calcified spleen, unchanged in appearance. A small residual splenule is noted.  PANCREAS: Within normal limits.  ADRENALS: Within normal limits.  KIDNEYS/URETERS: No hydronephrosis. Subcentimeter hypodense foci, too small to characterize, but unchanged.    BLADDER: Underdistended.  REPRODUCTIVE ORGANS: Uterus and adnexa are within normal limits.    BOWEL: No bowel obstruction. Appendix is within normal limits. Scattered colonic diverticulosis. Prominent submucosal fat deposition in the ascending, transverse, and proximal descending colon, similar in appearance to prior CT dated 2020 and suggestive of chronic inflammation. Small hiatal hernia. Small periampullary duodenal diverticulum.  PERITONEUM: Moderate volume ascites.  VESSELS: Within normal limits.  RETROPERITONEUM/LYMPH NODES: Nonspecific mesenteric and pelvic lymph nodes are again noted. A previously noted left external iliac lymph node (2:95) measures 1 cm, previously 0.9 cm.  ABDOMINAL WALL: Subcutaneous edema.  BONES: Within normal limits.    IMPRESSION:  Small bilateral pleural effusions.    Moderate volume ascites, new since 2020.    Prominent submucosal fat deposition in the ascending, transverse, and proximal descending colon, suggestive of chronic inflammation.    Nonspecific mesenteric and pelvic lymphadenopathy, unchanged.

## 2020-12-23 NOTE — PROGRESS NOTE ADULT - ASSESSMENT
The patient is a 50 year old female with past medical history of HIV, CMV, HBV, histoplasmosis, renal vein thrombosis s/p coumadin for 6 months, and strongyloidiasis who presents with abdominal pain. She reports constant upper abdominal pain described as constant in nature. She reports three episodes of non-bloody vomiting today without diarrhea. She denies recent travel, sick contacts, or pets at home. She denies fever. CBC showed reactive thrombocytosis. CMP showed hyponatremia with mild hypocalcemia. She was found to be COVID-19 PCR positive.     Jaundice  - may be 2/2 covid  - f/u abdominal US  - GI/hepatology consult placed. F/u recs    Hyponatremia  - urine lyres and osm  - c/w iv fluids    HIV  - ID following. Appreciate recs  - Hold symtuza, c/w oral biktarvy    Prophylactic measures  - DVT ppx: Lovenox bid  - Diet: Vegetarian     The patient is a 50 year old female with past medical history of HIV, CMV, HBV, histoplasmosis, renal vein thrombosis s/p coumadin for 6 months, and strongyloidiasis who presents with abdominal pain. She reports constant upper abdominal pain described as constant in nature. She reports three episodes of non-bloody vomiting today without diarrhea. She denies recent travel, sick contacts, or pets at home. She denies fever. CBC showed reactive thrombocytosis. CMP showed hyponatremia with mild hypocalcemia. She was found to be COVID-19 PCR positive.     Jaundice  - may be 2/2 covid  - f/u abdominal US  - c/w entecavir, per hepatology    Hyponatremia  - urine lyres and osm  - c/w iv fluids    HIV  - ID following. Appreciate recs  - Hold symtuza, c/w oral biktarvy    Prophylactic measures  - DVT ppx: Lovenox bid  - Diet: Vegetarian     The patient is a 50 year old female with past medical history of HIV, CMV, HBV, histoplasmosis, renal vein thrombosis s/p coumadin for 6 months, and strongyloidiasis who presents with abdominal pain. She reports constant upper abdominal pain described as constant in nature. She reports three episodes of non-bloody vomiting today without diarrhea. She denies recent travel, sick contacts, or pets at home. She denies fever. CBC showed reactive thrombocytosis. CMP showed hyponatremia with mild hypocalcemia. She was found to be COVID-19 PCR positive.     acute liver injury - patient with predominantly hepatocellular liver injury, with AST >1800, Alt 600. Also w/ evidence of hepatic synthetic function, with elevated INR, decreased albumin. Etiology likely from HBV, which was previously being treated w/ Symtuza (tenofovir 10mg qd) but patient had been off medication for almost 1 year (until 11/2020) due to insurance lapse. Her outpatient viral load >400,000,000 corroborates this. Hepatitis delta coinfection has been ruled out as outpatient. Patient will require immediate treatment of her HBV, now on biktarvy (tenofovir 25mg). Currently patient is not in acute liver failure, she is mentating well, able to report months backwards in [place] but is having worsening INR which is concerning    Hyponatremia  - urine lyres and osm  - c/w iv fluids    AIDS-  needs ARV medications to help with her immunosuppression but need to change ARV meds. to ones metabolized by the kidney  continue the TAF for help controlling the Hep B infection  Biktarvy (bictegravir plus TAF)  viral load pending  If viral laod elevated will add Bactrim for PJP prophylaxis    COVID+  not eligible for Remdesivir secondary to her LFT abnormalities  could consider convalescent plasma if she decompensates  would send inflammatory markers  send QuantiFeron testing- given new onset ascites and abnormal appearing bowels  urine histo ag. in case recurrence  blood cultures x 2 sets    Prophylactic measures  - DVT ppx: Lovenox bid  - Diet: Vegetarian

## 2020-12-23 NOTE — DISCHARGE NOTE PROVIDER - PROVIDER TOKENS
PROVIDER:[TOKEN:[[id number]],FOLLOWUP:[1 week],ESTABLISHEDPATIENT:[T]],PROVIDER:[TOKEN:[[id number]],FOLLOWUP:[1 week],ESTABLISHEDPATIENT:[T]]

## 2020-12-23 NOTE — CONSULT NOTE ADULT - ASSESSMENT
Impression:  49 yo F w/ PMHx HIV/AIDS, HBV, previous histo, CMV, renal vein thrombus s/p coumadin (2010) admitted with abd pain x 4 days, found to have acute liver injury and covid +    # acute liver injury - patient with predominantly hepatocellular liver injury, with AST >1800, Alt 600. Also w/ evidence of hepatic synthetic function, with elevated INR, decreased albumin. Etiology likely from HBV, which was previously being treated w/ Symtuza (tenofovir 10mg qd) but patient had been off medication for almost 1 year (until 11/2020) due to insurance lapse. Her outpatient viral load >400,000,000 corroborates this. Hepatitis delta coinfection has been ruled out as outpatient. Patient will require immediate treatment of her HBV, and unclear if biktarvy (containing tenofovir 2.5mg qd) will be sufficient; likely will require higher dose of tenofovir and/or entecavir.   Currently patient is not in acute liver failure, she is mentating well, able to report months backwards in [place]. Will monitor closely    # HIV/AIDS - CD4 114, previous histo, CMV, has been off medications for 1 year until 11/2020    # COVID + - currently covid positive, satting well on room air    **INCOMPLETE NOTE&***    Recommendations:   - trend CMP, INR, Mg, Phos q 8 hours  - monitor mental status closely, please alert hepatology for any change in mental status  -  Impression:  49 yo F w/ PMHx HIV/AIDS, HBV, previous histo, CMV, renal vein thrombus s/p coumadin (2010) admitted with abd pain x 4 days, found to have acute liver injury and covid +    # acute liver injury - patient with predominantly hepatocellular liver injury, with AST >1800, Alt 600. Also w/ evidence of hepatic synthetic function, with elevated INR, decreased albumin. Etiology likely from HBV, which was previously being treated w/ Symtuza (tenofovir 10mg qd) but patient had been off medication for almost 1 year (until 11/2020) due to insurance lapse. Her outpatient viral load >400,000,000 corroborates this. Hepatitis delta coinfection has been ruled out as outpatient. Patient will require immediate treatment of her HBV, now on biktarvy (tenofovir 25mg). Currently patient is not in acute liver failure, she is mentating well, able to report months backwards in [place] but is having worsening INR which is concerning    # HIV/AIDS - CD4 114, previous histo, CMV, has been off medications for 1 year until 11/2020    # COVID + - currently covid positive, satting well on room air    Recommendations:   - trend CMP, INR, Mg, Phos, fibrinogen, vbg (lactate) stat, and then every 6 hours  - monitor mental status closely, please alert hepatology for any change in mental status  - c/w biktarvy and entecavir for HBV  - start NAC infusion for acute liver failure  - start pantoprazole 40mg BID  - hepatology to discuss with ICU role of uptriage either in [facility] for closer monitoring  - not transplant candidate at this time because of COVID  - rest of care per primary team    Thank you for this interesting consult. Hepatology will continue to follow.     Zenon Vinson, PGY4  Gastroenterology Fellow  Available on Microsoft Teams  [phone] ([facility] Short Range Pager)  [phone] (Long Range Pager)    After 5pm, please contact the on-call GI fellow. [phone]

## 2020-12-23 NOTE — PROGRESS NOTE ADULT - SUBJECTIVE AND OBJECTIVE BOX
PROGRESS NOTE:     Patient is a 50y old  Female who presents with a chief complaint of abdominal pain (22 Dec 2020 17:17)      OVERNIGHT EVENTS: JESSE  SUBJECTIVE: Patient was seen at bedside this AM. Patient reports mild epigastric pain that caused her to present to the hospital this admission. Pain is worse with palpation. Patient reports some nausea last night and reports one episode of emesis. Has resolved with zofran. Also reports acid reflux which occurs occasionally at home. Denies CP, HA, SOB. Is aware we are awaiting hepatology consult.   ADDITIONAL REVIEW OF SYSTEMS: see above       MEDICATIONS  (STANDING):  bictegravir 50 mG/emtricitabine 200 mG/tenofovir alafenamide 25 mG (BIKTARVY) 1 Tablet(s) Oral daily  enoxaparin Injectable 40 milliGRAM(s) SubCutaneous every 12 hours  sodium chloride 0.9%. 1000 milliLiter(s) (75 mL/Hr) IV Continuous <Continuous>    MEDICATIONS  (PRN):  ondansetron Injectable 4 milliGRAM(s) IV Push every 8 hours PRN Nausea and/or Vomiting    Allergies  No Known Allergies      Intolerances      PHYSICAL EXAM:  Vital Signs Last 24 Hrs  T(C): 36.4 (23 Dec 2020 04:46), Max: 37.6 (22 Dec 2020 09:17)  T(F): 97.6 (23 Dec 2020 04:46), Max: 99.7 (22 Dec 2020 09:17)  HR: 94 (23 Dec 2020 04:46) (94 - 130)  BP: 101/71 (23 Dec 2020 04:46) (90/68 - 106/76)  BP(mean): --  RR: 18 (23 Dec 2020 04:46) (17 - 20)  SpO2: 100% (23 Dec 2020 04:46) (96% - 100%)      GENERAL: No acute distress, well-developed  HEAD:  Atraumatic, Normocephalic  EYES: EOMI, PERRLA, conjunctiva and sclera clear  EARS: symmetrical, no tenderness, no discharge  NECK: Supple, no lymphadenopathy, no JVD  CHEST/LUNG: CTAB; No wheezes, rales, or rhonchi  HEART: Regular rate and rhythm; No murmurs, rubs, or gallops  ABDOMEN: Soft, non-tender, non-distended; normal bowel sounds, no organomegaly  EXTREMITIES:  2+ peripheral pulses b/l, No clubbing, cyanosis, or edema  NEUROLOGY: A&O x 3, no focal deficits  SKIN: No rashes or lesions    PATIENT DATA:  CAPILLARY BLOOD GLUCOSE        I&O's Summary      LABS:                        15.8   7.27  )-----------( 409      ( 22 Dec 2020 09:09 )             44.3         127<L>  |  99  |  10  ----------------------------<  81  3.8   |  21<L>  |  0.87    Ca    7.1<L>      22 Dec 2020 13:57    TPro  4.1<L>  /  Alb  1.1<L>  /  TBili  7.9<H>  /  DBili  x   /  AST  1621<H>  /  ALT  582<H>  /  AlkPhos  278<H>      PT/INR - ( 22 Dec 2020 09:09 )   PT: 17.9 sec;   INR: 1.52 ratio         PTT - ( 22 Dec 2020 09:09 )  PTT:35.1 sec  CARDIAC MARKERS ( 22 Dec 2020 09:09 )  x     / x     / 80 U/L / x     / x          Urinalysis Basic - ( 22 Dec 2020 10:45 )    Color: Dark Yellow / Appearance: Slightly Turbid / S.022 / pH: x  Gluc: x / Ketone: Negative  / Bili: Large / Urobili: Negative   Blood: x / Protein: Negative / Nitrite: Negative   Leuk Esterase: Negative / RBC: 4 /hpf / WBC 1 /HPF   Sq Epi: x / Non Sq Epi: 2 /hpf / Bacteria: Negative          RADIOLOGY & ADDITIONAL TESTS:  Pertinent & New Results:     COORDINATION OF CARE:   Consultants Status: [ON BOARD/ SIGNED OFF]  Consultants Spoke With:   Consults Pending:   Telemetry: YES/NO

## 2020-12-23 NOTE — PROVIDER CONTACT NOTE (CHANGE IN STATUS NOTIFICATION) - ASSESSMENT
A/o4; no resp distress noted. Zofran given per order. Will administer Motrin if tolerable. VS q4 monitored. Pt placed on continuous pulse ox for tachycardia.

## 2020-12-23 NOTE — DISCHARGE NOTE PROVIDER - NSDCFUADDINST_GEN_ALL_CORE_FT
When you are close to being discharged from [facility], please plan to schedule an appointment with the resident clinic at [address] [place] [address] ([address] [facility]). You can request an appointment with either Dr. Tia Land, Dr. Arely Deras or Dr. Gee Gutiérrez.

## 2020-12-23 NOTE — DISCHARGE NOTE PROVIDER - NSDCMRMEDTOKEN_GEN_ALL_CORE_FT
Vitamin D3 50,000 intl units (1250 mcg) oral capsule: 1 cap(s) orally once a week  Zofran 4 mg oral tablet: 1 tablet by mouth every 8 hours, As Needed    acetylcysteine 20% intravenous solution: 35 milliliter(s) intravenous   albumin human: 100 milliliter(s) intravenous every 6 hours, stop after 2 days  aluminum hydroxide-magnesium hydroxide 200 mg-200 mg/5 mL oral suspension: 30 milliliter(s) orally every 6 hours, As needed, Dyspepsia  atovaquone 750 mg/5 mL oral suspension: 10 milliliter(s) orally once a day  chlorhexidine 2% topical pad: 1 application topically once a day  dolutegravir 50 mg oral tablet: 1 tab(s) orally once a day  emtricitabine-tenofovir disoproxil 200 mg-300 mg oral tablet: 1 tab(s) orally once a day  furosemide 100 mg/100 mL-0.9% intravenous solution: 80 milliliter(s) intravenous every 12 hours  midodrine 10 mg oral tablet: 3 tab(s) orally 3 times a day  Multiple Vitamins oral tablet: 1 tab(s) orally once a day  pantoprazole 40 mg intravenous injection: 40 milligram(s) intravenous every 12 hours  simethicone 80 mg oral tablet, chewable: 1 tab(s) orally 4 times a day  thiamine 100 mg oral tablet: 1 tab(s) orally once a day  Zofran 4 mg oral tablet: 1 tablet by mouth every 8 hours, As Needed    acetylcysteine 20% intravenous solution: 35 milliliter(s) intravenous   albumin human: 100 milliliter(s) intravenous every 6 hours, stop after 2 days  aluminum hydroxide-magnesium hydroxide 200 mg-200 mg/5 mL oral suspension: 30 milliliter(s) orally every 6 hours, As needed, Dyspepsia  atovaquone 750 mg/5 mL oral suspension: 10 milliliter(s) orally once a day  chlorhexidine 2% topical pad: 1 application topically once a day  dolutegravir 50 mg oral tablet: 1 tab(s) orally once a day  emtricitabine-tenofovir disoproxil 200 mg-300 mg oral tablet: 1 tab(s) orally once a day  furosemide 100 mg/100 mL-0.9% intravenous solution: 80 milliliter(s) intravenous every 12 hours  midodrine 10 mg oral tablet: 3 tab(s) orally 3 times a day  Multiple Vitamins oral tablet: 1 tab(s) orally once a day  pantoprazole 40 mg intravenous injection: 40 milligram(s) intravenous every 12 hours  simethicone 80 mg oral tablet, chewable: 1 tab(s) orally 4 times a day  spironolactone 25 mg oral tablet: 4 tab(s) orally once a day  sulfamethoxazole-trimethoprim 400 mg-80 mg oral tablet: 1 tab(s) orally once a day  thiamine 100 mg oral tablet: 1 tab(s) orally once a day  Zofran 4 mg oral tablet: 1 tablet by mouth every 8 hours, As Needed

## 2020-12-23 NOTE — DISCHARGE NOTE PROVIDER - NSFOLLOWUPCLINICS_GEN_ALL_ED_FT
[facility] [place]  Internal Medicine  [address] [place] [address]  Phone: [phone]  Fax: [phone]  Follow Up Time: 1 week

## 2020-12-23 NOTE — RAPID RESPONSE TEAM SUMMARY - NSOTHERINTERVENTIONSRRT_GEN_ALL_CORE
EKG showed sinus tach    - MICU team consulted for central line given that the patient is hardstick and has PIV.

## 2020-12-23 NOTE — DISCHARGE NOTE PROVIDER - NSDCCPTREATMENT_GEN_ALL_CORE_FT
PRINCIPAL PROCEDURE  Procedure: CT abdomen and pelvis  Findings and Treatment: IMPRESSION:  Small bilateral pleural effusions.  Moderate volume ascites, new since 11/4/2020.  Prominent submucosal fat deposition in the ascending, transverse, and proximal descending colon, suggestive of chronic inflammation.  Nonspecific mesenteric and pelvic lymphadenopathy, unchanged.

## 2020-12-23 NOTE — PROGRESS NOTE ADULT - ASSESSMENT
Assessment:  The patient is a 50 year old female with past medical history of HIV, CMV, HBV, histoplasmosis, renal vein thrombosis s/p coumadin for 6 months, and strongyloidiasis who presents with abdominal pain. She reports constant upper abdominal pain described as constant in nature. She reports three episodes of non-bloody vomiting today without diarrhea. She denies recent travel, sick contacts, or pets at home. She denies fever. CBC showed reactive thrombocytosis. CMP showed hyponatremia with mild hypocalcemia. She was found to be COVID-19 PCR positive. Infectious disease was consulted for HIV and COVID-19 management.      Plan:  # History of HBV infection  Has chronic active hepatitis B with a breakthrough viral load and acute decompensation in her liver disease with hepatitis and poor synthetic function based upon her labs  concerning for potential for liver failure  Symtuza discontinued  some HIV literature that Turvada may be superior to TAF for controlling hepatitis B- will change ARV regimen to Truvad and Tivicay  spoke with Hepatology and will also add entecavir pending HBV resistance testing  Unclear if she will be a potential liver transplant candidate if her HIV is almost controlled    # COVID-19 disease   tolerating RA without supplemental oxygen and CXR NAD   unable to give patient remdesivir based upon her markedly elevated LFTS  -     # AIDS  - In light of significant transaminitis, discontinued the Symtuza and changed meds to Truvada/Tivicay  - CD4 116 consistent with AIDS  - RNA viral load in process - slightly detectable but cannot account for her acute decompensation in liver disease    # History of CMV viremia  - CMV PCR pending       Chencho Mcadams MD  [phone]  After 5pm/weekends [phone]

## 2020-12-23 NOTE — DISCHARGE NOTE PROVIDER - CARE PROVIDER_API CALL
Hollie Mcadams  INFECTIOUS DISEASE  [address] [place] [address]  Phone: [phone]  Fax: [phone]  Established Patient  Follow Up Time: 1 week    Allen Fraire)  Gastroenterology; Internal Medicine  [address] [place] [address]  Phone: [phone]  Fax: [phone]  Established Patient  Follow Up Time: 1 week

## 2020-12-24 LAB
ALBUMIN SERPL ELPH-MCNC: 1.4 G/DL — LOW (ref 3.3–5)
ALP SERPL-CCNC: 239 U/L — HIGH (ref 40–120)
ALT FLD-CCNC: 545 U/L — HIGH (ref 10–45)
ANION GAP SERPL CALC-SCNC: 10 MMOL/L — SIGNIFICANT CHANGE UP (ref 5–17)
AST SERPL-CCNC: 1530 U/L — HIGH (ref 10–40)
BILIRUB DIRECT SERPL-MCNC: 7.6 MG/DL — HIGH (ref 0–0.2)
BILIRUB SERPL-MCNC: 9.6 MG/DL — HIGH (ref 0.2–1.2)
BUN SERPL-MCNC: 8 MG/DL — SIGNIFICANT CHANGE UP (ref 7–23)
CALCIUM SERPL-MCNC: 7.3 MG/DL — LOW (ref 8.4–10.5)
CHLORIDE SERPL-SCNC: 99 MMOL/L — SIGNIFICANT CHANGE UP (ref 96–108)
CMV DNA CSF QL NAA+PROBE: SIGNIFICANT CHANGE UP
CO2 SERPL-SCNC: 19 MMOL/L — LOW (ref 22–31)
CREAT SERPL-MCNC: 0.7 MG/DL — SIGNIFICANT CHANGE UP (ref 0.5–1.3)
CRP SERPL-MCNC: 1.44 MG/DL — HIGH (ref 0–0.4)
D DIMER BLD IA.RAPID-MCNC: 261 NG/ML DDU — HIGH
FERRITIN SERPL-MCNC: 674 NG/ML — HIGH (ref 15–150)
GLUCOSE SERPL-MCNC: 112 MG/DL — HIGH (ref 70–99)
INR BLD: 2.11 RATIO — HIGH (ref 0.88–1.16)
POTASSIUM SERPL-MCNC: 3 MMOL/L — LOW (ref 3.5–5.3)
POTASSIUM SERPL-SCNC: 3 MMOL/L — LOW (ref 3.5–5.3)
PROT SERPL-MCNC: 4.1 G/DL — LOW (ref 6–8.3)
PROTHROM AB SERPL-ACNC: 24.4 SEC — HIGH (ref 10.6–13.6)
SARS-COV-2 IGG SERPL IA-ACNC: 2.2 RATIO — HIGH
SARS-COV-2 IGG SERPL QL IA: NEGATIVE — SIGNIFICANT CHANGE UP
SARS-COV-2 IGG SERPL QL IA: SIGNIFICANT CHANGE UP
SARS-COV-2 IGM SERPL IA-ACNC: <0.1 INDEX — SIGNIFICANT CHANGE UP
SARS-COV-2 IGM SERPL IA-ACNC: <0.1 INDEX — SIGNIFICANT CHANGE UP
SARS-COV-2 IGM SERPL IA-ACNC: <0.2 RATIO — SIGNIFICANT CHANGE UP
SODIUM SERPL-SCNC: 128 MMOL/L — LOW (ref 135–145)

## 2020-12-24 PROCEDURE — 99232 SBSQ HOSP IP/OBS MODERATE 35: CPT | Mod: GC

## 2020-12-24 PROCEDURE — 99232 SBSQ HOSP IP/OBS MODERATE 35: CPT

## 2020-12-24 PROCEDURE — 71045 X-RAY EXAM CHEST 1 VIEW: CPT | Mod: 26

## 2020-12-24 RX ORDER — POTASSIUM CHLORIDE 20 MEQ
20 PACKET (EA) ORAL
Refills: 0 | Status: COMPLETED | OUTPATIENT
Start: 2020-12-24 | End: 2020-12-24

## 2020-12-24 RX ORDER — ACETYLCYSTEINE 200 MG/ML
7 VIAL (ML) MISCELLANEOUS ONCE
Refills: 0 | Status: COMPLETED | OUTPATIENT
Start: 2020-12-24 | End: 2020-12-25

## 2020-12-24 RX ORDER — SODIUM CHLORIDE 9 MG/ML
1000 INJECTION INTRAMUSCULAR; INTRAVENOUS; SUBCUTANEOUS
Refills: 0 | Status: DISCONTINUED | OUTPATIENT
Start: 2020-12-24 | End: 2020-12-24

## 2020-12-24 RX ADMIN — PANTOPRAZOLE SODIUM 40 MILLIGRAM(S): 20 TABLET, DELAYED RELEASE ORAL at 06:03

## 2020-12-24 RX ADMIN — Medication 600 MILLIGRAM(S): at 16:25

## 2020-12-24 RX ADMIN — EMTRICITABINE AND TENOFOVIR DISOPROXIL FUMARATE 1 TABLET(S): 200; 300 TABLET, FILM COATED ORAL at 11:28

## 2020-12-24 RX ADMIN — Medication 20 MILLIEQUIVALENT(S): at 16:13

## 2020-12-24 RX ADMIN — ENOXAPARIN SODIUM 40 MILLIGRAM(S): 100 INJECTION SUBCUTANEOUS at 17:59

## 2020-12-24 RX ADMIN — Medication 20 MILLIEQUIVALENT(S): at 14:15

## 2020-12-24 RX ADMIN — Medication 50 MILLILITER(S): at 13:04

## 2020-12-24 RX ADMIN — Medication 50 MILLILITER(S): at 22:17

## 2020-12-24 RX ADMIN — Medication 64.69 GRAM(S): at 04:48

## 2020-12-24 RX ADMIN — Medication 50 MILLILITER(S): at 06:03

## 2020-12-24 RX ADMIN — Medication 600 MILLIGRAM(S): at 15:39

## 2020-12-24 RX ADMIN — DOLUTEGRAVIR SODIUM 50 MILLIGRAM(S): 25 TABLET, FILM COATED ORAL at 11:28

## 2020-12-24 RX ADMIN — ENOXAPARIN SODIUM 40 MILLIGRAM(S): 100 INJECTION SUBCUTANEOUS at 06:03

## 2020-12-24 RX ADMIN — ONDANSETRON 4 MILLIGRAM(S): 8 TABLET, FILM COATED ORAL at 06:10

## 2020-12-24 RX ADMIN — ENTECAVIR 0.5 MILLIGRAM(S): 0.5 TABLET ORAL at 11:28

## 2020-12-24 NOTE — DIETITIAN INITIAL EVALUATION ADULT. - REASON
Unable to conduct nutrition-focused physical exam at this time due to limited contact restrictions related to pt's medical condition and isolation precautions.

## 2020-12-24 NOTE — DIETITIAN INITIAL EVALUATION ADULT. - CONTINUE CURRENT NUTRITION CARE PLAN
Continue Regular diet as tolerated, Lacto-Ovo Vegetarian per pt preference. Monitor/adjust as needed./yes

## 2020-12-24 NOTE — PROGRESS NOTE ADULT - ASSESSMENT
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     #Acute liver injury   Patient with predominantly hepatocellular liver injury, with AST >1800, Alt 600. Also w/ evidence of hepatic synthetic function, with elevated INR, decreased albumin. Etiology likely from HBV, which was previously being treated w/ Symtuza (tenofovir 10mg qd) but patient had been off medication for almost 1 year (until 11/2020) due to insurance lapse. Her outpatient viral load >400,000,000 corroborates this. Hepatitis delta coinfection has been ruled out as outpatient. Patient will require immediate treatment of her HBV, now on biktarvy (tenofovir 25mg). Currently patient is not in acute liver failure, she is mentating well, able to report months backwards in [place] but is having worsening INR which is concerning    #Hyponatremia  - urine lyres and osm  - c/w iv fluids    #AIDS  needs ARV medications to help with her immunosuppression but need to change ARV meds. to ones metabolized by the kidney  continue the TAF for help controlling the Hep B infection  Biktarvy (bictegravir plus TAF)  viral load pending  If viral laod elevated will add Bactrim for PJP prophylaxis    #COVID+  not eligible for Remdesivir secondary to her LFT abnormalities  could consider convalescent plasma if she decompensates  would send inflammatory markers  send QuantiFeron testing- given new onset ascites and abnormal appearing bowels  urine histo ag. in case recurrence  blood cultures x 2 sets    #Prophylactic measures  - DVT ppx: Lovenox bid  - Diet: Vegetarian     50 year old female with past medical history of HIV, CMV, HBV, histoplasmosis, renal vein thrombosis s/p coumadin for 6 months, and strongyloidiasis who presents with abdominal pain found to be COVID-19 PCR positive.     #Acute liver injury   Patient with predominantly hepatocellular liver injury, with AST >1800, Alt 600. Also w/ evidence of hepatic synthetic function, with elevated INR, decreased albumin. Etiology likely from HBV, which was previously being treated w/ Symtuza (tenofovir 10mg qd) but patient had been off medication for almost 1 year (until 11/2020) due to insurance lapse. Her outpatient viral load >400,000,000 corroborates this. Hepatitis delta coinfection has been ruled out as outpatient. Patient will require immediate treatment of her HBV, now on biktarvy (tenofovir 25mg). Currently patient is not in acute liver failure, she is mentating well, but is having worsening INR which is concerning  - c/w biktarvy and entecavir for HBV  - c/w NAC infusion for acute liver failure    #Hyponatremia  - urine lyres and osm  - c/w iv fluids, albumin 25% 100 mL IV q8h x6    #AIDS  needs ARV medications to help with her immunosuppression but need to change ARV meds. to ones metabolized by the kidney  continue the TAF for help controlling the Hep B infection  Biktarvy (bictegravir plus TAF)  viral load pending  If viral load elevated will add Bactrim for PJP prophylaxis    #COVID+  not eligible for Remdesivir secondary to her LFT abnormalities  could consider convalescent plasma if she decompensates  would send inflammatory markers  send QuantiFeron testing- given new onset ascites and abnormal appearing bowels  urine histo ag. in case recurrence  blood cultures x 2 sets    #Prophylactic measures  - DVT ppx: Lovenox bid  - GERD: PPI daily  - Diet: Vegetarian

## 2020-12-24 NOTE — PROGRESS NOTE ADULT - SUBJECTIVE AND OBJECTIVE BOX
INFECTIOUS DISEASES FOLLOW UP-- Hollie Mcadams  [phone]    This is a follow up note for this  50yFemale with  High liver transaminase level with acute hepatitis B flare and a poor liver synthetic function    COVID +        ROS:  CONSTITUTIONAL:  No fever, poor appetite  CARDIOVASCULAR:  No chest pain or palpitations  RESPIRATORY:  No dyspnea  GASTROINTESTINAL:  No nausea, vomiting, diarrhea, or abdominal pain  GENITOURINARY:  No dysuria  NEUROLOGIC:  No headache,     Allergies    No Known Allergies    Intolerances        ANTIBIOTICS/RELEVANT:  antimicrobials  dolutegravir 50 milliGRAM(s) Oral daily  emtricitabine 200 mG/tenofovir 300 mG (TRUVADA) 1 Tablet(s) Oral daily  entecavir 0.5 milliGRAM(s) Oral daily    immunologic:    OTHER:  acetylcysteine IVPB 7 Gram(s) IV Intermittent once  albumin human 25% IVPB 100 milliLiter(s) IV Intermittent every 8 hours  aluminum hydroxide/magnesium hydroxide/simethicone Suspension 30 milliLiter(s) Oral every 6 hours PRN  enoxaparin Injectable 40 milliGRAM(s) SubCutaneous every 12 hours  ibuprofen  Tablet. 600 milliGRAM(s) Oral every 6 hours PRN  ondansetron Injectable 4 milliGRAM(s) IV Push every 8 hours PRN  pantoprazole    Tablet 40 milliGRAM(s) Oral before breakfast      Objective:  Vital Signs Last 24 Hrs  T(C): 36.5 (24 Dec 2020 16:13), Max: 37 (23 Dec 2020 23:38)  T(F): 97.7 (24 Dec 2020 16:13), Max: 98.6 (23 Dec 2020 23:38)  HR: 108 (24 Dec 2020 16:13) (95 - 130)  BP: 96/65 (24 Dec 2020 16:13) (78/56 - 119/66)  BP(mean): --  RR: 20 (24 Dec 2020 16:13) (16 - 20)  SpO2: 98% (24 Dec 2020 16:13) (97% - 100%)    PHYSICAL EXAM:  Constitutional:no acute distress, breathing room air  Eyes:PIPO, EOMI, less icteric  Ear/Nose/Throat: no oral lesions, 	  Respiratory: clear BL  Cardiovascular: S1S2  Gastrointestinal:soft, (+) BS, no tenderness  Extremities:no e/e/c  No Lymphadenopathy  IV sites not inflammed.    LABS:                        13.9   7.43  )-----------( 369      ( 23 Dec 2020 20:22 )             41.4     12-24    128<L>  |  99  |  8   ----------------------------<  112<H>  3.0<L>   |  19<L>  |  0.70    Ca    7.3<L>      24 Dec 2020 10:39  Phos  3.2     12-23  Mg     1.7     12-23    TPro  4.1<L>  /  Alb  1.4<L>  /  TBili  9.6<H>  /  DBili  7.6<H>  /  AST  1530<H>  /  ALT  545<H>  /  AlkPhos  239<H>  12-24    PT/INR - ( 24 Dec 2020 11:46 )   PT: 24.4 sec;   INR: 2.11 ratio         PTT - ( 23 Dec 2020 18:07 )  PTT:43.5 sec      MICROBIOLOGY:    COVID-19 IgG Antibody Interpretation: Negative: This test has not been reviewed by the FDA by the standard review  process. It has been authorized for emergency use by the FDA. [facility] has validated this test to be accurate.  Negative results do not rule out SARS-CoV-2 infection, particularly in  those who have been in recent contact with the virus. Follow-up testing  with a molecular diagnostic test should be considered to rule out  infection in these individuals.  Results from antibody testing should not be used as thesole basis to  diagnose or exclude SARS-CoV-2 infection, or to inform infection status.  Positive results may rarely be due to past or present infection with  non-SARS-CoV-2 coronavirus strains, such as coronavirus HKU1, NL63, OC43,  or 229E. [facility], through extensive validation  testing, has confirmed that this risk is minimal with this test. (12.23.20 @ 08:41)            RECENT CULTURES:  12-22 @ 18:26  .Urine Clean Catch (Midstream)  --  --  --    <10,000 CFU/mL Normal Urogenital Celestina  --  12-22 @ 13:03  .Blood Blood  --  --  --    No growth to date.  --      RADIOLOGY & ADDITIONAL STUDIES:  < from: Xray Chest 1 View- PORTABLE-Urgent (Xray Chest 1 View- PORTABLE-Urgent .) (12.24.20 @ 18:36) >    INTERPRETATION:  picc tip in the svc    < end of copied text >

## 2020-12-24 NOTE — DIETITIAN INITIAL EVALUATION ADULT. - OTHER INFO
Unable to conduct a face-to-face interview at this time due to limited contact restrictions related to pt's medical condition and isolation precautions. Subjective information obtained from ( ). Flowsheets suggest poor PO intake of breakfast this morning (12/24). Noted pt with nausea, ordered for Zofran PRN, Maalox, Protonix. Last BM yesterday (12/23) per flowsheets. No difficulties chewing or swallowing reported. NKFA per chart.    Weight history per [facility]: 160 pounds (5/5/2019), 156 pounds (11/17/2020), 151 pounds (12/3/2020). Dosing weight 161.1 pounds (12/22 bed scale) suggests wt gain. Edema noted, may be masking lower weight. Will continue to monitor and trend weights.

## 2020-12-24 NOTE — DIETITIAN INITIAL EVALUATION ADULT. - REASON INDICATOR FOR ASSESSMENT
Consult received for significant decrease of oral intake >3d PTA. Source: EMR, ( ). Pt is a 51 yo female with PMH of HIV, CMV, HBV, histoplasmosis, renal vein thrombosis s/p coumadin for 6 months, and strongyloidiasis, who presented with abdominal pain, found to be COVID-19 PCR positive, admitted 12/22.    Unable to conduct a face-to-face interview at this time due to limited contact restrictions related to pt's medical condition and isolation precautions. Consult received for significant decrease of oral intake >3d PTA. Source: EMR. Pt is a 51 yo female with PMH of HIV, CMV, HBV, histoplasmosis, renal vein thrombosis s/p coumadin for 6 months, and strongyloidiasis, who presented with abdominal pain, found to be COVID-19 PCR positive, admitted 12/22.    Unable to conduct a face-to-face interview at this time due to limited contact restrictions related to pt's medical condition and isolation precautions.

## 2020-12-24 NOTE — DIETITIAN INITIAL EVALUATION ADULT. - NUTRITIONGOAL OUTCOME1
Pt informed of normal labs. Faxed copy of PSA to Urology.
Pt to meet >75% of estimated nutritional needs during hospital stay.

## 2020-12-24 NOTE — PROGRESS NOTE ADULT - ASSESSMENT
Impression:  51 yo F w/ PMHx HIV/AIDS, HBV, previous histo, CMV, renal vein thrombus s/p coumadin (2010) admitted with abd pain x 4 days, found to have acute liver injury and covid +    # acute liver injury - patient with predominantly hepatocellular liver injury, with AST >1800, Alt 600. Also w/ evidence of hepatic synthetic function, with elevated INR, decreased albumin. Etiology likely from HBV, which was previously being treated w/ Symtuza (tenofovir 10mg qd) but patient had been off medication for almost 1 year (until 11/2020) due to insurance lapse. Her outpatient viral load >400,000,000 corroborates this. Hepatitis delta coinfection has been ruled out as outpatient. Patient will require immediate treatment of her HBV, was on biktarvy but switched to Truvada for TDF (as opposed to TAF), and additing double coverage with entecavir. Also on NAC. Currently w/o acute liver failure as she is mentating well    # hyponatremia - likely hypovolemia, on IVF and albumin    # HIV/AIDS - CD4 114, previous histo, CMV, has been off medications for 1 year until 11/2020    # COVID + - currently covid positive, satting well on room air    Recommendations:   - trend CMP, INR, Mg, Phos, fibrinogen, vbg (lactate) every 12 hours  - monitor mental status closely, please alert hepatology for any change in mental status  - c/w truvada and entecavir for HBV  - c/w NAC infusion for acute liver failure (100 mg/kg over 16 hours, will likely need to be reordered daily)  - albumin 100cc 25% q 8 hours  - IVF PRN  - c/w pantoprazole 40mg qd  - pending clinical course will discuss role of ICU, currently does not require it, however if clinical status worsens may need to transfer to [facility] for COVID ICU bed  - not transplant candidate at this time because of COVID and AIDS  - rest of care per primary team    Thank you for this interesting consult. Hepatology will continue to follow.     Zenon Vinson, PGY4  Gastroenterology Fellow  Available on Microsoft Teams  [phone] ([facility] Short Range Pager)  [phone] (Long Range Pager)    After 5pm, please contact the on-call GI fellow. [phone]

## 2020-12-24 NOTE — DIETITIAN INITIAL EVALUATION ADULT. - ORAL INTAKE PTA/DIET HISTORY
Unable to conduct a face-to-face interview at this time due to limited contact restrictions related to pt's medical condition and isolation precautions. Unable to reach pt or spouse (Gerard Sanchez [phone]) via phone x multiple attempts. Subjective information obtained from comprehensive chart review at this time. Per chart, pt taking vitamin D3 PTA.

## 2020-12-24 NOTE — PROGRESS NOTE ADULT - SUBJECTIVE AND OBJECTIVE BOX
Patient is a 50y old  Female who presents with a chief complaint of abdominal pain (24 Dec 2020 09:34)      SUBJECTIVE / OVERNIGHT EVENTS:      MEDICATIONS  (STANDING):  albumin human 25% IVPB 100 milliLiter(s) IV Intermittent every 8 hours  dolutegravir 50 milliGRAM(s) Oral daily  emtricitabine 200 mG/tenofovir 300 mG (TRUVADA) 1 Tablet(s) Oral daily  enoxaparin Injectable 40 milliGRAM(s) SubCutaneous every 12 hours  entecavir 0.5 milliGRAM(s) Oral daily  pantoprazole    Tablet 40 milliGRAM(s) Oral before breakfast    MEDICATIONS  (PRN):  aluminum hydroxide/magnesium hydroxide/simethicone Suspension 30 milliLiter(s) Oral every 6 hours PRN Dyspepsia  ibuprofen  Tablet. 600 milliGRAM(s) Oral every 6 hours PRN Mild Pain (1 - 3)  ondansetron Injectable 4 milliGRAM(s) IV Push every 8 hours PRN Nausea and/or Vomiting      Vital Signs Last 24 Hrs  T(C): 36.6 (24 Dec 2020 08:13), Max: 37 (23 Dec 2020 23:38)  T(F): 97.8 (24 Dec 2020 08:13), Max: 98.6 (23 Dec 2020 23:38)  HR: 99 (24 Dec 2020 08:13) (71 - 130)  BP: 93/62 (24 Dec 2020 08:13) (78/56 - 112/75)  BP(mean): --  RR: 16 (24 Dec 2020 08:13) (16 - 20)  SpO2: 100% (24 Dec 2020 08:13) (99% - 100%)  CAPILLARY BLOOD GLUCOSE      POCT Blood Glucose.: 183 mg/dL (23 Dec 2020 20:37)    I&O's Summary    23 Dec 2020 07:01  -  24 Dec 2020 07:00  --------------------------------------------------------  IN: 1920 mL / OUT: 0 mL / NET: 1920 mL        PHYSICAL EXAM:  GENERAL: NAD, well-developed  HEAD:  Atraumatic, Normocephalic  EYES: EOMI, PERRLA, conjunctiva and sclera clear  NECK: Supple, No JVD  CHEST/LUNG: Clear to auscultation bilaterally; No wheeze  HEART: Regular rate and rhythm; No murmurs, rubs, or gallops  ABDOMEN: Soft, Nontender, Nondistended; Bowel sounds present  EXTREMITIES:  2+ Peripheral Pulses, No clubbing, cyanosis, or edema  PSYCH: AAOx3  NEUROLOGY: non-focal  SKIN: No rashes or lesions    LABS:                        13.9   7.43  )-----------( 369      ( 23 Dec 2020 20:22 )             41.4     12-24    128<L>  |  99  |  8   ----------------------------<  112<H>  3.0<L>   |  19<L>  |  0.70    Ca    7.3<L>      24 Dec 2020 10:39  Phos  3.2     12-23  Mg     1.7     12-23    TPro  4.1<L>  /  Alb  1.4<L>  /  TBili  9.6<H>  /  DBili  7.6<H>  /  AST  1530<H>  /  ALT  545<H>  /  AlkPhos  239<H>  12-24    PT/INR - ( 23 Dec 2020 20:22 )   PT: 23.1 sec;   INR: 1.99 ratio         PTT - ( 23 Dec 2020 18:07 )  PTT:43.5 sec          RADIOLOGY & ADDITIONAL TESTS:    Imaging Personally Reviewed:    Consultant(s) Notes Reviewed:      Care Discussed with Consultants/Other Providers:

## 2020-12-24 NOTE — DIETITIAN INITIAL EVALUATION ADULT. - FLUID ACCUMULATION
2+ edema to b/l ankles as per flowsheets (assessed 12/23) 1+ generalized edema, 2+ edema to b/l ankles as per flowsheets

## 2020-12-24 NOTE — PROGRESS NOTE ADULT - ATTENDING COMMENTS
49 yo F with HIV/AIDS and chronic HBV, currently admitted with severe acute hepatocellular injury with concern for impending liver failure secondary to HBV reactivation (after being off HIV and HBV therapy for 11 months until 11/2020), also in the context of COVID-19 without respiratory symptoms or chest imaging findings. She is currently mentating normally without any definite evidence of hepatic encephalopathy, but with worsening liver synthetic function on labs.    Recommendations:  - Continue combination antiviral therapy with Truvada and entecavir.  - Continue N-acetylcysteine 100 mg/kg iv over 16 hours, repeatedly / continuously until there are signs of definite hepatic recovery with liver enzymes <1000 and INR <1.5.  - Continue albumin 25% 100 mL iv q8h x6 and additional IVF boluses as needed to maintain MAP ~70-75 mmHg.   - Continue once daily PPI.  - No acetaminophen or acetaminophen-containing products at any dose! Avoid hepatotoxic medications.  - Monitor labs q12h for now: CMP, direct bilirubin, CBC+diff, PT/INR, fibrinogen, lactate, ammonia.  - Monitor neurologic status closely, and immediately call our team if any changes (even if subtle).  - May require transfer to MICU for higher level of care if liver function continuing to decline. She would also need transfer to MICU if any altered mentation. Discussed yesterday with [facility] MICU team, and given very limited COVID-19 ICU bed availability currently at [facility], transfer to ICU would likely necessitate transfer to [facility].  - If she were to develop fulminant liver failure, she is unfortunately not a liver transplant candidate due to her AIDS and COVID-19.    Please don't hesitate to call with any questions or concerns.    Allen Fraire M.D., Ph.D.  Transplant Hepatology  Cell: [phone]

## 2020-12-24 NOTE — PROGRESS NOTE ADULT - ASSESSMENT
Assessment:  The patient is a 50 year old female with past medical history of HIV, CMV, HBV, histoplasmosis, renal vein thrombosis s/p coumadin for 6 months, and strongyloidiasis who presents with abdominal pain. She reports constant upper abdominal pain described as constant in nature. She reports three episodes of non-bloody vomiting today without diarrhea. She denies recent travel, sick contacts, or pets at home. She denies fever. CBC showed reactive thrombocytosis. CMP showed hyponatremia with mild hypocalcemia. She was found to be COVID-19 PCR positive. Infectious disease was consulted for HIV and COVID-19 management.      Plan:  # History of HBV infection  Has chronic active hepatitis B with a breakthrough viral load and acute decompensation in her liver disease with hepatitis and poor synthetic function based upon her labs  Hep viral load this admission over 100million copies/ml  concerning for potential for liver failure is decreasing as her LFTs are slowly improving  Symtuza discontinued  some HIV literature that Truvada may be superior to TAF for controlling hepatitis B- will change ARV regimen to Truvada and Tivicay  spoke with Hepatology and will also add entecavir pending HBV resistance testing      # COVID-19 disease   tolerating RA without supplemental oxygen and CXR NAD   unable to give patient remdesivir based upon her markedly elevated LFTS  -     # AIDS  - In light of significant transaminitis, discontinued the Symtuza and changed meds to Truvada/Tivicay  - CD4 116 consistent with AIDS  - RNA viral load in process - slightly detectable but cannot account for her acute decompensation in liver disease    # History of CMV viremia  - CMV PCR   -Cytomegalovirus By PCR:   183 Assay Range: 96 IU/mL to 1.88E+08 IU/mL  One IU is equal to 0.53 copies of CMV.  The limit of quantitation (LOQ) is 96 IU/mL. CMV DNA detected below  the LOQ will be reported as Detected:<96 IU/mL.    will hold off on Valganciclovir but repeat CMV viral load on 12/28        Chencho Mcadams MD  [phone]  After 5pm/weekends [phone]

## 2020-12-24 NOTE — PROGRESS NOTE ADULT - SUBJECTIVE AND OBJECTIVE BOX
PROGRESS NOTE:     Patient is a 50y old  Female who presents with a chief complaint of abdominal pain (23 Dec 2020 19:10)      OVERNIGHT EVENTS: Overnight, RRT was called for hypotension to 78/56 and tachycardic to 130. Patient was A&Ox3 but endorsed nausea. Given 1L bolus, zofran, protonix and midodrine 10mg. Repeat BP increased to 106/77.   SUBJECTIVE: This AM, patient was seen at bedside. Reports no abdominal pain or nausea at the moment. Patient reports that zofran and protonix helped improve symptoms last night. Patient is aware of plan to place second IV as patient is a hardstick and currently only has 1 PIV in LUE. Patient reports that past attempts has been painful and would like to avoid midline if possible. Denies, chills, HA, CP, SOB, n/v/c/d.    ADDITIONAL REVIEW OF SYSTEMS: see above       MEDICATIONS  (STANDING):  albumin human 25% IVPB 100 milliLiter(s) IV Intermittent every 8 hours  dolutegravir 50 milliGRAM(s) Oral daily  emtricitabine 200 mG/tenofovir 300 mG (TRUVADA) 1 Tablet(s) Oral daily  enoxaparin Injectable 40 milliGRAM(s) SubCutaneous every 12 hours  entecavir 0.5 milliGRAM(s) Oral daily  pantoprazole    Tablet 40 milliGRAM(s) Oral before breakfast    MEDICATIONS  (PRN):  aluminum hydroxide/magnesium hydroxide/simethicone Suspension 30 milliLiter(s) Oral every 6 hours PRN Dyspepsia  ibuprofen  Tablet. 600 milliGRAM(s) Oral every 6 hours PRN Mild Pain (1 - 3)  ondansetron Injectable 4 milliGRAM(s) IV Push every 8 hours PRN Nausea and/or Vomiting    Allergies  No Known Allergies      Intolerances      PHYSICAL EXAM:  Vital Signs Last 24 Hrs  T(C): 36.5 (24 Dec 2020 04:24), Max: 37 (23 Dec 2020 23:38)  T(F): 97.7 (24 Dec 2020 04:24), Max: 98.6 (23 Dec 2020 23:38)  HR: 102 (24 Dec 2020 04:24) (71 - 130)  BP: 106/76 (24 Dec 2020 04:24) (78/56 - 112/75)  BP(mean): --  RR: 18 (24 Dec 2020 04:24) (18 - 20)  SpO2: 99% (24 Dec 2020 04:24) (99% - 100%)      GENERAL: No acute distress, well-developed  HEAD:  Atraumatic, Normocephalic  EYES: EOMI, PERRLA, conjunctiva and sclera clear  EARS: symmetrical, no tenderness, no discharge  NECK: Supple, no lymphadenopathy, no JVD  CHEST/LUNG: CTAB; No wheezes, rales, or rhonchi  HEART: Regular rate and rhythm; No murmurs, rubs, or gallops  ABDOMEN: Soft, non-tender, non-distended; normal bowel sounds, no organomegaly  EXTREMITIES:  2+ peripheral pulses b/l, No clubbing, cyanosis, or edema  NEUROLOGY: A&O x 3, no focal deficits  SKIN: No rashes or lesions    PATIENT DATA:  CAPILLARY BLOOD GLUCOSE      POCT Blood Glucose.: 183 mg/dL (23 Dec 2020 20:37)    I&O's Summary    23 Dec 2020 07:01  -  24 Dec 2020 07:00  --------------------------------------------------------  IN: 1920 mL / OUT: 0 mL / NET: 1920 mL        LABS:                        13.9   7.43  )-----------( 369      ( 23 Dec 2020 20:22 )             41.4     12-    126<L>  |  98  |  11  ----------------------------<  91  3.8   |  18<L>  |  0.78    Ca    7.0<L>      23 Dec 2020 20:22  Phos  3.2       Mg     1.7         TPro  4.3<L>  /  Alb  1.1<L>  /  TBili  8.1<H>  /  DBili  6.8<H>  /  AST  1848<H>  /  ALT  630<H>  /  AlkPhos  319<H>  12-23    PT/INR - ( 23 Dec 2020 20:22 )   PT: 23.1 sec;   INR: 1.99 ratio         PTT - ( 23 Dec 2020 18:07 )  PTT:43.5 sec  CARDIAC MARKERS ( 22 Dec 2020 09:09 )  x     / x     / 80 U/L / x     / x          Urinalysis Basic - ( 22 Dec 2020 10:45 )    Color: Dark Yellow / Appearance: Slightly Turbid / S.022 / pH: x  Gluc: x / Ketone: Negative  / Bili: Large / Urobili: Negative   Blood: x / Protein: Negative / Nitrite: Negative   Leuk Esterase: Negative / RBC: 4 /hpf / WBC 1 /HPF   Sq Epi: x / Non Sq Epi: 2 /hpf / Bacteria: Negative        Culture - Urine (collected 22 Dec 2020 18:26)  Source: .Urine Clean Catch (Midstream)  Final Report (23 Dec 2020 16:12):    <10,000 CFU/mL Normal Urogenital Celestina    Culture - Blood (collected 22 Dec 2020 13:03)  Source: .Blood Blood  Preliminary Report (23 Dec 2020 14:25):    No growth to date.    Culture - Blood (collected 22 Dec 2020 13:03)  Source: .Blood Blood  Preliminary Report (23 Dec 2020 14:25):    No growth to date.        RADIOLOGY & ADDITIONAL TESTS:  Pertinent & New Results:     COORDINATION OF CARE:   Consultants Status: [ON BOARD/ SIGNED OFF]  Consultants Spoke With:   Consults Pending:   Telemetry: YES/NO

## 2020-12-24 NOTE — PROGRESS NOTE ADULT - SUBJECTIVE AND OBJECTIVE BOX
Chief Complaint:  Patient is a 50y old  Female who presents with a chief complaint of abdominal pain (24 Dec 2020 07:47)      Interval Events:   - yesterday afternoon patient w/ ongoing abdominal pain and tachycardia. Had two episodes of hypotension (4:30 pm SBP 80s treated w/ 500cc IVF and pain control; and 7pm SBP 80s treated w/ IVF and midodrine). Mentation normal during these episodes. Labs during this time showed stable INR (1.99), no change in liver enzymes (AST 1800, , T bili 8.1)  - ongoing issues getting IV access and blood draws  - on NAC  - this morning states pain is significantly improved    Allergies:  No Known Allergies      Hospital Medications:  albumin human 25% IVPB 100 milliLiter(s) IV Intermittent every 8 hours  aluminum hydroxide/magnesium hydroxide/simethicone Suspension 30 milliLiter(s) Oral every 6 hours PRN  dolutegravir 50 milliGRAM(s) Oral daily  emtricitabine 200 mG/tenofovir 300 mG (TRUVADA) 1 Tablet(s) Oral daily  enoxaparin Injectable 40 milliGRAM(s) SubCutaneous every 12 hours  entecavir 0.5 milliGRAM(s) Oral daily  ibuprofen  Tablet. 600 milliGRAM(s) Oral every 6 hours PRN  ondansetron Injectable 4 milliGRAM(s) IV Push every 8 hours PRN  pantoprazole    Tablet 40 milliGRAM(s) Oral before breakfast        PHYSICAL EXAM:   Vital Signs:  Vital Signs Last 24 Hrs  T(C): 36.6 (24 Dec 2020 08:13), Max: 37 (23 Dec 2020 23:38)  T(F): 97.8 (24 Dec 2020 08:13), Max: 98.6 (23 Dec 2020 23:38)  HR: 99 (24 Dec 2020 08:13) (71 - 130)  BP: 93/62 (24 Dec 2020 08:13) (78/56 - 112/75)  BP(mean): --  RR: 16 (24 Dec 2020 08:13) (16 - 20)  SpO2: 100% (24 Dec 2020 08:13) (99% - 100%)  Daily     Daily Weight in k.1 (23 Dec 2020 10:10)    GENERAL:  No acute distress  HEENT:  Normocephalic/atraumatic, + scleral icterus  CHEST:  no resp distress  HEART:  Regular rate and rhythm,  ABDOMEN:  Soft, distended,improved tenderness in RUQ  EXTREMITIES: No cyanosis, clubbing, or edema  SKIN:  No rash/erythema/ecchymoses/petechiae/wounds/abscess/warm/dry  NEURO:  Alert and oriented x 3, no asterixis, reported months backwards promptlywith no mistakes       LABS:                        13.9   7.43  )-----------( 369      ( 23 Dec 2020 20:22 )             41.4     Mean Cell Volume: 87.5 fl (- @ 20:22)        126<L>  |  98  |  11  ----------------------------<  91  3.8   |  18<L>  |  0.78    Ca    7.0<L>      23 Dec 2020 20:22  Phos  3.2       Mg     1.7         TPro  4.3<L>  /  Alb  1.1<L>  /  TBili  8.1<H>  /  DBili  6.8<H>  /  AST  1848<H>  /  ALT  630<H>  /  AlkPhos  319<H>      LIVER FUNCTIONS - ( 23 Dec 2020 20:22 )  Alb: 1.1 g/dL / Pro: 4.3 g/dL / ALK PHOS: 319 U/L / ALT: 630 U/L / AST: 1848 U/L / GGT: x           PT/INR - ( 23 Dec 2020 20:22 )   PT: 23.1 sec;   INR: 1.99 ratio         PTT - ( 23 Dec 2020 18:07 )  PTT:43.5 sec  Urinalysis Basic - ( 22 Dec 2020 10:45 )    Color: Dark Yellow / Appearance: Slightly Turbid / S.022 / pH: x  Gluc: x / Ketone: Negative  / Bili: Large / Urobili: Negative   Blood: x / Protein: Negative / Nitrite: Negative   Leuk Esterase: Negative / RBC: 4 /hpf / WBC 1 /HPF   Sq Epi: x / Non Sq Epi: 2 /hpf / Bacteria: Negative      Amylase Serum--      Lipase serum--       [id number]        Imaging:  CT A/P  FINDINGS:  LOWER CHEST: Small bilateral pleural effusions, new since 2020. Partially imaged calcified subcarinal lymph nodes.    LIVER: Within normal limits.  BILE DUCTS: Normal caliber.  GALLBLADDER: Status post cholecystectomy.  SPLEEN: Heterogeneous peripherally calcified spleen, unchanged in appearance. A small residual splenule is noted.  PANCREAS: Within normal limits.  ADRENALS: Within normal limits.  KIDNEYS/URETERS: No hydronephrosis. Subcentimeter hypodense foci, too small to characterize, but unchanged.    BLADDER: Underdistended.  REPRODUCTIVE ORGANS: Uterus and adnexa are within normal limits.    BOWEL: No bowel obstruction. Appendix is within normal limits. Scattered colonic diverticulosis. Prominent submucosal fat deposition in the ascending, transverse, and proximal descending colon, similar in appearance to prior CT dated 2020 and suggestive of chronic inflammation. Small hiatal hernia. Small periampullary duodenal diverticulum.  PERITONEUM: Moderate volume ascites.  VESSELS: Within normal limits.  RETROPERITONEUM/LYMPH NODES: Nonspecific mesenteric and pelvic lymph nodes are again noted. A previously noted left external iliac lymph node (2:95) measures 1 cm, previously 0.9 cm.  ABDOMINAL WALL: Subcutaneous edema.  BONES: Within normal limits.    IMPRESSION:  Small bilateral pleural effusions.    Moderate volume ascites, new since 2020.    Prominent submucosal fat deposition in the ascending, transverse, and proximal descending colon, suggestive of chronic inflammation.    Nonspecific mesenteric and pelvic lymphadenopathy, unchanged.

## 2020-12-25 LAB
ALBUMIN SERPL ELPH-MCNC: 2 G/DL — LOW (ref 3.3–5)
ALBUMIN SERPL ELPH-MCNC: 2.5 G/DL — LOW (ref 3.3–5)
ALP SERPL-CCNC: 150 U/L — HIGH (ref 40–120)
ALP SERPL-CCNC: 166 U/L — HIGH (ref 40–120)
ALT FLD-CCNC: 347 U/L — HIGH (ref 10–45)
ALT FLD-CCNC: 348 U/L — HIGH (ref 10–45)
ANION GAP SERPL CALC-SCNC: 7 MMOL/L — SIGNIFICANT CHANGE UP (ref 5–17)
ANION GAP SERPL CALC-SCNC: 8 MMOL/L — SIGNIFICANT CHANGE UP (ref 5–17)
AST SERPL-CCNC: 927 U/L — HIGH (ref 10–40)
AST SERPL-CCNC: 938 U/L — HIGH (ref 10–40)
BASOPHILS # BLD AUTO: 0.01 K/UL — SIGNIFICANT CHANGE UP (ref 0–0.2)
BASOPHILS NFR BLD AUTO: 0.2 % — SIGNIFICANT CHANGE UP (ref 0–2)
BILIRUB SERPL-MCNC: 10.6 MG/DL — HIGH (ref 0.2–1.2)
BILIRUB SERPL-MCNC: 8.8 MG/DL — HIGH (ref 0.2–1.2)
BLD GP AB SCN SERPL QL: NEGATIVE — SIGNIFICANT CHANGE UP
BUN SERPL-MCNC: 5 MG/DL — LOW (ref 7–23)
BUN SERPL-MCNC: 8 MG/DL — SIGNIFICANT CHANGE UP (ref 7–23)
CALCIUM SERPL-MCNC: 7.1 MG/DL — LOW (ref 8.4–10.5)
CALCIUM SERPL-MCNC: 7.5 MG/DL — LOW (ref 8.4–10.5)
CHLORIDE SERPL-SCNC: 102 MMOL/L — SIGNIFICANT CHANGE UP (ref 96–108)
CHLORIDE SERPL-SCNC: 99 MMOL/L — SIGNIFICANT CHANGE UP (ref 96–108)
CO2 SERPL-SCNC: 21 MMOL/L — LOW (ref 22–31)
CO2 SERPL-SCNC: 22 MMOL/L — SIGNIFICANT CHANGE UP (ref 22–31)
CREAT SERPL-MCNC: 0.55 MG/DL — SIGNIFICANT CHANGE UP (ref 0.5–1.3)
CREAT SERPL-MCNC: 0.6 MG/DL — SIGNIFICANT CHANGE UP (ref 0.5–1.3)
EOSINOPHIL # BLD AUTO: 0.27 K/UL — SIGNIFICANT CHANGE UP (ref 0–0.5)
EOSINOPHIL NFR BLD AUTO: 5.4 % — SIGNIFICANT CHANGE UP (ref 0–6)
FIBRINOGEN PPP-MCNC: 157 MG/DL — LOW (ref 290–520)
FIBRINOGEN PPP-MCNC: 164 MG/DL — LOW (ref 290–520)
GLUCOSE SERPL-MCNC: 120 MG/DL — HIGH (ref 70–99)
GLUCOSE SERPL-MCNC: 120 MG/DL — HIGH (ref 70–99)
HCT VFR BLD CALC: 27.8 % — LOW (ref 34.5–45)
HCT VFR BLD CALC: 27.9 % — LOW (ref 34.5–45)
HGB BLD-MCNC: 9.8 G/DL — LOW (ref 11.5–15.5)
HGB BLD-MCNC: 9.9 G/DL — LOW (ref 11.5–15.5)
IMM GRANULOCYTES NFR BLD AUTO: 0.6 % — SIGNIFICANT CHANGE UP (ref 0–1.5)
INR BLD: 2.32 RATIO — HIGH (ref 0.88–1.16)
INR BLD: 2.36 RATIO — HIGH (ref 0.88–1.16)
LACTATE BLDV-MCNC: 1.2 MMOL/L — SIGNIFICANT CHANGE UP (ref 0.7–2)
LACTATE BLDV-MCNC: 1.3 MMOL/L — SIGNIFICANT CHANGE UP (ref 0.7–2)
LACTATE BLDV-MCNC: 1.8 MMOL/L — SIGNIFICANT CHANGE UP (ref 0.7–2)
LDH SERPL L TO P-CCNC: 320 U/L — HIGH (ref 50–242)
LYMPHOCYTES # BLD AUTO: 1.72 K/UL — SIGNIFICANT CHANGE UP (ref 1–3.3)
LYMPHOCYTES # BLD AUTO: 34.3 % — SIGNIFICANT CHANGE UP (ref 13–44)
MAGNESIUM SERPL-MCNC: 1.7 MG/DL — SIGNIFICANT CHANGE UP (ref 1.6–2.6)
MAGNESIUM SERPL-MCNC: 1.9 MG/DL — SIGNIFICANT CHANGE UP (ref 1.6–2.6)
MCHC RBC-ENTMCNC: 30.2 PG — SIGNIFICANT CHANGE UP (ref 27–34)
MCHC RBC-ENTMCNC: 30.7 PG — SIGNIFICANT CHANGE UP (ref 27–34)
MCHC RBC-ENTMCNC: 35.1 GM/DL — SIGNIFICANT CHANGE UP (ref 32–36)
MCHC RBC-ENTMCNC: 35.6 GM/DL — SIGNIFICANT CHANGE UP (ref 32–36)
MCV RBC AUTO: 86.1 FL — SIGNIFICANT CHANGE UP (ref 80–100)
MCV RBC AUTO: 86.1 FL — SIGNIFICANT CHANGE UP (ref 80–100)
MONOCYTES # BLD AUTO: 0.45 K/UL — SIGNIFICANT CHANGE UP (ref 0–0.9)
MONOCYTES NFR BLD AUTO: 9 % — SIGNIFICANT CHANGE UP (ref 2–14)
NEUTROPHILS # BLD AUTO: 2.53 K/UL — SIGNIFICANT CHANGE UP (ref 1.8–7.4)
NEUTROPHILS NFR BLD AUTO: 50.5 % — SIGNIFICANT CHANGE UP (ref 43–77)
NRBC # BLD: 0 /100 WBCS — SIGNIFICANT CHANGE UP (ref 0–0)
NRBC # BLD: 0 /100 WBCS — SIGNIFICANT CHANGE UP (ref 0–0)
PHOSPHATE SERPL-MCNC: 1.3 MG/DL — LOW (ref 2.5–4.5)
PHOSPHATE SERPL-MCNC: 2.2 MG/DL — LOW (ref 2.5–4.5)
PLATELET # BLD AUTO: 249 K/UL — SIGNIFICANT CHANGE UP (ref 150–400)
PLATELET # BLD AUTO: 263 K/UL — SIGNIFICANT CHANGE UP (ref 150–400)
POTASSIUM SERPL-MCNC: 2.8 MMOL/L — CRITICAL LOW (ref 3.5–5.3)
POTASSIUM SERPL-MCNC: 3.3 MMOL/L — LOW (ref 3.5–5.3)
POTASSIUM SERPL-SCNC: 2.8 MMOL/L — CRITICAL LOW (ref 3.5–5.3)
POTASSIUM SERPL-SCNC: 3.3 MMOL/L — LOW (ref 3.5–5.3)
PROT SERPL-MCNC: 3.9 G/DL — LOW (ref 6–8.3)
PROT SERPL-MCNC: 4 G/DL — LOW (ref 6–8.3)
PROTHROM AB SERPL-ACNC: 26.8 SEC — HIGH (ref 10.6–13.6)
PROTHROM AB SERPL-ACNC: 27.2 SEC — HIGH (ref 10.6–13.6)
RBC # BLD: 3.23 M/UL — LOW (ref 3.8–5.2)
RBC # BLD: 3.24 M/UL — LOW (ref 3.8–5.2)
RBC # FLD: 19.2 % — HIGH (ref 10.3–14.5)
RBC # FLD: 19.3 % — HIGH (ref 10.3–14.5)
RH IG SCN BLD-IMP: POSITIVE — SIGNIFICANT CHANGE UP
SODIUM SERPL-SCNC: 127 MMOL/L — LOW (ref 135–145)
SODIUM SERPL-SCNC: 132 MMOL/L — LOW (ref 135–145)
WBC # BLD: 4.83 K/UL — SIGNIFICANT CHANGE UP (ref 3.8–10.5)
WBC # BLD: 5.01 K/UL — SIGNIFICANT CHANGE UP (ref 3.8–10.5)
WBC # FLD AUTO: 4.83 K/UL — SIGNIFICANT CHANGE UP (ref 3.8–10.5)
WBC # FLD AUTO: 5.01 K/UL — SIGNIFICANT CHANGE UP (ref 3.8–10.5)

## 2020-12-25 PROCEDURE — 99232 SBSQ HOSP IP/OBS MODERATE 35: CPT

## 2020-12-25 PROCEDURE — 99232 SBSQ HOSP IP/OBS MODERATE 35: CPT | Mod: GC

## 2020-12-25 RX ORDER — SODIUM,POTASSIUM PHOSPHATES 278-250MG
1 POWDER IN PACKET (EA) ORAL
Refills: 0 | Status: COMPLETED | OUTPATIENT
Start: 2020-12-25 | End: 2020-12-25

## 2020-12-25 RX ORDER — POTASSIUM CHLORIDE 20 MEQ
10 PACKET (EA) ORAL
Refills: 0 | Status: COMPLETED | OUTPATIENT
Start: 2020-12-25 | End: 2020-12-25

## 2020-12-25 RX ORDER — POTASSIUM PHOSPHATE, MONOBASIC POTASSIUM PHOSPHATE, DIBASIC 236; 224 MG/ML; MG/ML
30 INJECTION, SOLUTION INTRAVENOUS ONCE
Refills: 0 | Status: COMPLETED | OUTPATIENT
Start: 2020-12-25 | End: 2020-12-25

## 2020-12-25 RX ORDER — ACETYLCYSTEINE 200 MG/ML
7 VIAL (ML) MISCELLANEOUS ONCE
Refills: 0 | Status: COMPLETED | OUTPATIENT
Start: 2020-12-25 | End: 2020-12-25

## 2020-12-25 RX ORDER — POTASSIUM CHLORIDE 20 MEQ
40 PACKET (EA) ORAL EVERY 4 HOURS
Refills: 0 | Status: COMPLETED | OUTPATIENT
Start: 2020-12-25 | End: 2020-12-25

## 2020-12-25 RX ORDER — POTASSIUM CHLORIDE 20 MEQ
20 PACKET (EA) ORAL
Refills: 0 | Status: COMPLETED | OUTPATIENT
Start: 2020-12-25 | End: 2020-12-25

## 2020-12-25 RX ADMIN — Medication 100 MILLIEQUIVALENT(S): at 02:14

## 2020-12-25 RX ADMIN — ENOXAPARIN SODIUM 40 MILLIGRAM(S): 100 INJECTION SUBCUTANEOUS at 17:02

## 2020-12-25 RX ADMIN — Medication 20 MILLIEQUIVALENT(S): at 17:01

## 2020-12-25 RX ADMIN — DOLUTEGRAVIR SODIUM 50 MILLIGRAM(S): 25 TABLET, FILM COATED ORAL at 13:45

## 2020-12-25 RX ADMIN — PANTOPRAZOLE SODIUM 40 MILLIGRAM(S): 20 TABLET, DELAYED RELEASE ORAL at 06:21

## 2020-12-25 RX ADMIN — Medication 20 MILLIEQUIVALENT(S): at 14:27

## 2020-12-25 RX ADMIN — Medication 40 MILLIEQUIVALENT(S): at 02:14

## 2020-12-25 RX ADMIN — Medication 64.69 GRAM(S): at 03:28

## 2020-12-25 RX ADMIN — Medication 64.69 GRAM(S): at 18:17

## 2020-12-25 RX ADMIN — Medication 50 MILLILITER(S): at 13:46

## 2020-12-25 RX ADMIN — ENTECAVIR 0.5 MILLIGRAM(S): 0.5 TABLET ORAL at 13:45

## 2020-12-25 RX ADMIN — Medication 100 MILLIEQUIVALENT(S): at 04:22

## 2020-12-25 RX ADMIN — Medication 100 MILLIEQUIVALENT(S): at 03:28

## 2020-12-25 RX ADMIN — EMTRICITABINE AND TENOFOVIR DISOPROXIL FUMARATE 1 TABLET(S): 200; 300 TABLET, FILM COATED ORAL at 13:45

## 2020-12-25 RX ADMIN — Medication 1 PACKET(S): at 17:02

## 2020-12-25 RX ADMIN — Medication 20 MILLIEQUIVALENT(S): at 12:26

## 2020-12-25 RX ADMIN — Medication 40 MILLIEQUIVALENT(S): at 06:21

## 2020-12-25 RX ADMIN — ENOXAPARIN SODIUM 40 MILLIGRAM(S): 100 INJECTION SUBCUTANEOUS at 06:21

## 2020-12-25 RX ADMIN — Medication 50 MILLILITER(S): at 06:21

## 2020-12-25 RX ADMIN — POTASSIUM PHOSPHATE, MONOBASIC POTASSIUM PHOSPHATE, DIBASIC 83.33 MILLIMOLE(S): 236; 224 INJECTION, SOLUTION INTRAVENOUS at 13:03

## 2020-12-25 NOTE — PROGRESS NOTE ADULT - SUBJECTIVE AND OBJECTIVE BOX
Interval Events:   No new complaints  K is low 2.8  She complains of burning at the site of IV line     Hospital Medications:  acetylcysteine IVPB 7 Gram(s) IV Intermittent once  albumin human 25% IVPB 100 milliLiter(s) IV Intermittent every 8 hours  aluminum hydroxide/magnesium hydroxide/simethicone Suspension 30 milliLiter(s) Oral every 6 hours PRN  dolutegravir 50 milliGRAM(s) Oral daily  emtricitabine 200 mG/tenofovir 300 mG (TRUVADA) 1 Tablet(s) Oral daily  enoxaparin Injectable 40 milliGRAM(s) SubCutaneous every 12 hours  entecavir 0.5 milliGRAM(s) Oral daily  ibuprofen  Tablet. 600 milliGRAM(s) Oral every 6 hours PRN  ondansetron Injectable 4 milliGRAM(s) IV Push every 8 hours PRN  pantoprazole    Tablet 40 milliGRAM(s) Oral before breakfast  potassium phosphate / sodium phosphate Powder (PHOS-NaK) 1 Packet(s) Oral two times a day before meals          PHYSICAL EXAM:   Vital Signs:  Vital Signs Last 24 Hrs  T(C): 36.6 (25 Dec 2020 05:32), Max: 36.6 (24 Dec 2020 20:59)  T(F): 97.9 (25 Dec 2020 05:32), Max: 97.9 (24 Dec 2020 20:59)  HR: 95 (25 Dec 2020 05:32) (95 - 108)  BP: 105/73 (25 Dec 2020 05:32) (95/66 - 119/66)  BP(mean): --  RR: 18 (25 Dec 2020 05:32) (18 - 20)  SpO2: 98% (25 Dec 2020 05:32) (96% - 98%)  Daily     Daily     GENERAL:  No acute distress  HEENT:  Normocephalic/atraumatic, + scleral icterus  CHEST:  no resp distress  HEART:  Regular rate and rhythm,  ABDOMEN:  Soft, distended, non tender  EXTREMITIES: No cyanosis, clubbing, or edema  SKIN:  No rash/erythema/ecchymoses/petechiae/wounds/abscess/warm/dry  NEURO:  Alert and oriented x 3, no asterixis,    LABS:                        13.9   7.43  )-----------( 369      ( 23 Dec 2020 20:22 )             41.4       12-25    127<L>  |  99  |  8   ----------------------------<  120<H>  2.8<LL>   |  21<L>  |  0.60    Ca    7.1<L>      25 Dec 2020 00:17  Phos  2.2     12-25  Mg     1.7     12-25    TPro  3.9<L>  /  Alb  2.0<L>  /  TBili  8.8<H>  /  DBili  x   /  AST  927<H>  /  ALT  347<H>  /  AlkPhos  166<H>  12-25    LIVER FUNCTIONS - ( 25 Dec 2020 00:17 )  Alb: 2.0 g/dL / Pro: 3.9 g/dL / ALK PHOS: 166 U/L / ALT: 347 U/L / AST: 927 U/L / GGT: x           PT/INR - ( 25 Dec 2020 00:17 )   PT: 26.8 sec;   INR: 2.32 ratio         PTT - ( 23 Dec 2020 18:07 )  PTT:43.5 sec                            13.9   7.43  )-----------( 369      ( 23 Dec 2020 20:22 )             41.4                         13.8   5.80  )-----------( 279      ( 23 Dec 2020 16:46 )             40.5                         14.3   7.28  )-----------( 384      ( 23 Dec 2020 09:43 )             42.3       Imaging:

## 2020-12-25 NOTE — PROGRESS NOTE ADULT - ASSESSMENT
Assessment:  The patient is a 50 year old female with past medical history of HIV, CMV, HBV, histoplasmosis, renal vein thrombosis s/p coumadin for 6 months, and strongyloidiasis who presents with abdominal pain. She reports constant upper abdominal pain described as constant in nature. She reports three episodes of non-bloody vomiting today without diarrhea. She denies recent travel, sick contacts, or pets at home. She denies fever. CBC showed reactive thrombocytosis. CMP showed hyponatremia with mild hypocalcemia. She was found to be COVID-19 PCR positive. Infectious disease was consulted for HIV and COVID-19 management.      Plan:  # History of HBV infection  Has chronic active hepatitis B with a breakthrough viral load and acute decompensation in her liver disease with hepatitis and poor synthetic function based upon her labs  Hep viral load this admission over 100million copies/ml  Concern for potential for liver failure is decreasing as her LFTs are slowly improving  Symtuza discontinued  some HIV literature that Truvada may be superior to TAF for controlling hepatitis B- changed ARV regimen to Truvada and Tivicay and should continue new regimen at time of discharge   Hepatology  added entecavir pending HBV resistance testing      # COVID-19 disease   tolerating RA without supplemental oxygen and CXR NAD   unable to give patient remdesivir based upon her markedly elevated LFTS  stable on RA at this point  increase in LFTS/hepatitis component likely due to the COVID  -     # AIDS  - In light of significant transaminitis, discontinued the Symtuza and changed meds to Truvada/Tivicay  - CD4 116 consistent with AIDS  - RNA viral load in process - slightly detectable but cannot account for her acute decompensation in liver disease    # History of CMV viremia  - CMV PCR   -Cytomegalovirus By PCR:   183 Assay Range: 96 IU/mL to 1.88E+08 IU/mL  One IU is equal to 0.53 copies of CMV.  The limit of quantitation (LOQ) is 96 IU/mL. CMV DNA detected below  the LOQ will be reported as Detected:<96 IU/mL.    will hold off on Valganciclovir but repeat CMV viral load on 12/28        Chencho Mcadams MD  [phone]  After 5pm/weekends [phone]

## 2020-12-25 NOTE — PROGRESS NOTE ADULT - SUBJECTIVE AND OBJECTIVE BOX
PROGRESS NOTE:     Patient is a 50y old  Female who presents with a chief complaint of abdominal pain (24 Dec 2020 19:10)      OVERNIGHT EVENTS: Patient was hypotensive to 95/66 but no intervention required. Potassuim low at 2.8 so repleted.   SUBJECTIVE:  ADDITIONAL REVIEW OF SYSTEMS: see above       MEDICATIONS  (STANDING):  albumin human 25% IVPB 100 milliLiter(s) IV Intermittent every 8 hours  dolutegravir 50 milliGRAM(s) Oral daily  emtricitabine 200 mG/tenofovir 300 mG (TRUVADA) 1 Tablet(s) Oral daily  enoxaparin Injectable 40 milliGRAM(s) SubCutaneous every 12 hours  entecavir 0.5 milliGRAM(s) Oral daily  pantoprazole    Tablet 40 milliGRAM(s) Oral before breakfast    MEDICATIONS  (PRN):  aluminum hydroxide/magnesium hydroxide/simethicone Suspension 30 milliLiter(s) Oral every 6 hours PRN Dyspepsia  ibuprofen  Tablet. 600 milliGRAM(s) Oral every 6 hours PRN Mild Pain (1 - 3)  ondansetron Injectable 4 milliGRAM(s) IV Push every 8 hours PRN Nausea and/or Vomiting    Allergies  No Known Allergies      Intolerances      PHYSICAL EXAM:  Vital Signs Last 24 Hrs  T(C): 36.6 (25 Dec 2020 05:32), Max: 36.6 (24 Dec 2020 08:13)  T(F): 97.9 (25 Dec 2020 05:32), Max: 97.9 (24 Dec 2020 20:59)  HR: 95 (25 Dec 2020 05:32) (95 - 108)  BP: 105/73 (25 Dec 2020 05:32) (93/62 - 119/66)  BP(mean): --  RR: 18 (25 Dec 2020 05:32) (16 - 20)  SpO2: 98% (25 Dec 2020 05:32) (96% - 100%)      GENERAL: No acute distress, well-developed  HEAD:  Atraumatic, Normocephalic  EYES: EOMI, PERRLA, conjunctiva and sclera clear  EARS: symmetrical, no tenderness, no discharge  NECK: Supple, no lymphadenopathy, no JVD  CHEST/LUNG: CTAB; No wheezes, rales, or rhonchi  HEART: Regular rate and rhythm; No murmurs, rubs, or gallops  ABDOMEN: Soft, non-tender, non-distended; normal bowel sounds, no organomegaly  EXTREMITIES:  2+ peripheral pulses b/l, No clubbing, cyanosis. Mild edema bilateral LE  NEUROLOGY: A&O x 3, no focal deficits  SKIN: No rashes or lesions    PATIENT DATA:  CAPILLARY BLOOD GLUCOSE        I&O's Summary    24 Dec 2020 07:01  -  25 Dec 2020 07:00  --------------------------------------------------------  IN: 600 mL / OUT: 0 mL / NET: 600 mL        LABS:                        13.9   7.43  )-----------( 369      ( 23 Dec 2020 20:22 )             41.4     12-25    127<L>  |  99  |  8   ----------------------------<  120<H>  2.8<LL>   |  21<L>  |  0.60    Ca    7.1<L>      25 Dec 2020 00:17  Phos  2.2     12-25  Mg     1.7     12-25    TPro  3.9<L>  /  Alb  2.0<L>  /  TBili  8.8<H>  /  DBili  x   /  AST  927<H>  /  ALT  347<H>  /  AlkPhos  166<H>  12-25    PT/INR - ( 25 Dec 2020 00:17 )   PT: 26.8 sec;   INR: 2.32 ratio         PTT - ( 23 Dec 2020 18:07 )  PTT:43.5 sec          Culture - Urine (collected 22 Dec 2020 18:26)  Source: .Urine Clean Catch (Midstream)  Final Report (23 Dec 2020 16:12):    <10,000 CFU/mL Normal Urogenital Celestina    Culture - Blood (collected 22 Dec 2020 13:03)  Source: .Blood Blood  Preliminary Report (23 Dec 2020 14:25):    No growth to date.    Culture - Blood (collected 22 Dec 2020 13:03)  Source: .Blood Blood  Preliminary Report (23 Dec 2020 14:25):    No growth to date.        RADIOLOGY & ADDITIONAL TESTS:  Pertinent & New Results:     COORDINATION OF CARE:   Consultants Status: [ON BOARD/ SIGNED OFF]  Consultants Spoke With:   Consults Pending:   Telemetry: YES/NO PROGRESS NOTE:     Patient is a 50y old  Female who presents with a chief complaint of abdominal pain (24 Dec 2020 19:10)      OVERNIGHT EVENTS: Patient was hypotensive to 95/66 but no intervention required. Potassuim low at 2.8 so repleted.   SUBJECTIVE: Patient was seen at bedside this AM. Patient complains of no abdominal pain. No hematochezia, melena. Reports mild pain at PICC line inserted yesterday on right UE. Reports no HA, chills, fever, SOB. Patient reports mild edema in upper and lower extremities.   ADDITIONAL REVIEW OF SYSTEMS: see above       MEDICATIONS  (STANDING):  albumin human 25% IVPB 100 milliLiter(s) IV Intermittent every 8 hours  dolutegravir 50 milliGRAM(s) Oral daily  emtricitabine 200 mG/tenofovir 300 mG (TRUVADA) 1 Tablet(s) Oral daily  enoxaparin Injectable 40 milliGRAM(s) SubCutaneous every 12 hours  entecavir 0.5 milliGRAM(s) Oral daily  pantoprazole    Tablet 40 milliGRAM(s) Oral before breakfast    MEDICATIONS  (PRN):  aluminum hydroxide/magnesium hydroxide/simethicone Suspension 30 milliLiter(s) Oral every 6 hours PRN Dyspepsia  ibuprofen  Tablet. 600 milliGRAM(s) Oral every 6 hours PRN Mild Pain (1 - 3)  ondansetron Injectable 4 milliGRAM(s) IV Push every 8 hours PRN Nausea and/or Vomiting    Allergies  No Known Allergies      Intolerances      PHYSICAL EXAM:  Vital Signs Last 24 Hrs  T(C): 36.6 (25 Dec 2020 05:32), Max: 36.6 (24 Dec 2020 08:13)  T(F): 97.9 (25 Dec 2020 05:32), Max: 97.9 (24 Dec 2020 20:59)  HR: 95 (25 Dec 2020 05:32) (95 - 108)  BP: 105/73 (25 Dec 2020 05:32) (93/62 - 119/66)  BP(mean): --  RR: 18 (25 Dec 2020 05:32) (16 - 20)  SpO2: 98% (25 Dec 2020 05:32) (96% - 100%)      GENERAL: No acute distress, well-developed  HEAD:  Atraumatic, Normocephalic  EYES: EOMI, PERRLA, conjunctiva and sclera clear  EARS: symmetrical, no tenderness, no discharge  NECK: Supple, no lymphadenopathy, no JVD  CHEST/LUNG: CTAB; No wheezes, rales, or rhonchi  HEART: Regular rate and rhythm; No murmurs, rubs, or gallops  ABDOMEN: Soft, non-tender, non-distended; normal bowel sounds, no organomegaly  EXTREMITIES:  2+ peripheral pulses b/l, No clubbing, cyanosis. Mild edema bilateral LE  NEUROLOGY: A&O x 3, no focal deficits  SKIN: No rashes or lesions    PATIENT DATA:  CAPILLARY BLOOD GLUCOSE        I&O's Summary    24 Dec 2020 07:01  -  25 Dec 2020 07:00  --------------------------------------------------------  IN: 600 mL / OUT: 0 mL / NET: 600 mL        LABS:                        13.9   7.43  )-----------( 369      ( 23 Dec 2020 20:22 )             41.4     12-25    127<L>  |  99  |  8   ----------------------------<  120<H>  2.8<LL>   |  21<L>  |  0.60    Ca    7.1<L>      25 Dec 2020 00:17  Phos  2.2     12-25  Mg     1.7     12-25    TPro  3.9<L>  /  Alb  2.0<L>  /  TBili  8.8<H>  /  DBili  x   /  AST  927<H>  /  ALT  347<H>  /  AlkPhos  166<H>  12-25    PT/INR - ( 25 Dec 2020 00:17 )   PT: 26.8 sec;   INR: 2.32 ratio         PTT - ( 23 Dec 2020 18:07 )  PTT:43.5 sec          Culture - Urine (collected 22 Dec 2020 18:26)  Source: .Urine Clean Catch (Midstream)  Final Report (23 Dec 2020 16:12):    <10,000 CFU/mL Normal Urogenital Celestina    Culture - Blood (collected 22 Dec 2020 13:03)  Source: .Blood Blood  Preliminary Report (23 Dec 2020 14:25):    No growth to date.    Culture - Blood (collected 22 Dec 2020 13:03)  Source: .Blood Blood  Preliminary Report (23 Dec 2020 14:25):    No growth to date.        RADIOLOGY & ADDITIONAL TESTS:  Pertinent & New Results:     COORDINATION OF CARE:   Consultants Status: [ON BOARD/ SIGNED OFF]  Consultants Spoke With:   Consults Pending:   Telemetry: YES/NO PROGRESS NOTE:     Patient is a 50y old  Female who presents with a chief complaint of abdominal pain (24 Dec 2020 19:10)      OVERNIGHT EVENTS: Patient was hypotensive to 95/66 but no intervention required. Potassuim low at 2.8 so repleted.   SUBJECTIVE: Patient was seen at bedside this AM. Patient complains of no abdominal pain. No hematochezia, melena. Reports mild pain at PICC line inserted yesterday on right UE. Reports no HA, chills, fever, SOB. Patient reports mild edema in upper and lower extremities.   ADDITIONAL REVIEW OF SYSTEMS: see above       MEDICATIONS  (STANDING):  albumin human 25% IVPB 100 milliLiter(s) IV Intermittent every 8 hours  dolutegravir 50 milliGRAM(s) Oral daily  emtricitabine 200 mG/tenofovir 300 mG (TRUVADA) 1 Tablet(s) Oral daily  enoxaparin Injectable 40 milliGRAM(s) SubCutaneous every 12 hours  entecavir 0.5 milliGRAM(s) Oral daily  pantoprazole    Tablet 40 milliGRAM(s) Oral before breakfast    MEDICATIONS  (PRN):  aluminum hydroxide/magnesium hydroxide/simethicone Suspension 30 milliLiter(s) Oral every 6 hours PRN Dyspepsia  ibuprofen  Tablet. 600 milliGRAM(s) Oral every 6 hours PRN Mild Pain (1 - 3)  ondansetron Injectable 4 milliGRAM(s) IV Push every 8 hours PRN Nausea and/or Vomiting    Allergies  No Known Allergies      Intolerances      PHYSICAL EXAM:  Vital Signs Last 24 Hrs  T(C): 36.6 (25 Dec 2020 05:32), Max: 36.6 (24 Dec 2020 08:13)  T(F): 97.9 (25 Dec 2020 05:32), Max: 97.9 (24 Dec 2020 20:59)  HR: 95 (25 Dec 2020 05:32) (95 - 108)  BP: 105/73 (25 Dec 2020 05:32) (93/62 - 119/66)  BP(mean): --  RR: 18 (25 Dec 2020 05:32) (16 - 20)  SpO2: 98% (25 Dec 2020 05:32) (96% - 100%)      GENERAL: No acute distress, well-developed  HEAD:  Atraumatic, Normocephalic  EYES: EOMI, PERRLA, conjunctiva and sclera clear  EARS: symmetrical, no tenderness, no discharge  NECK: Supple, no lymphadenopathy, no JVD  CHEST/LUNG: CTAB; No wheezes, rales, or rhonchi  HEART: Regular rate and rhythm; No murmurs, rubs, or gallops  ABDOMEN: Soft, non-tender, mildly distended; normal bowel sounds, no organomegaly  EXTREMITIES:  2+ peripheral pulses b/l, No clubbing, cyanosis. Mild edema bilateral LE  NEUROLOGY: A&O x 3, no focal deficits  SKIN: No rashes or lesions    PATIENT DATA:  CAPILLARY BLOOD GLUCOSE        I&O's Summary    24 Dec 2020 07:01  -  25 Dec 2020 07:00  --------------------------------------------------------  IN: 600 mL / OUT: 0 mL / NET: 600 mL        LABS:                        13.9   7.43  )-----------( 369      ( 23 Dec 2020 20:22 )             41.4     12-25    127<L>  |  99  |  8   ----------------------------<  120<H>  2.8<LL>   |  21<L>  |  0.60    Ca    7.1<L>      25 Dec 2020 00:17  Phos  2.2     12-25  Mg     1.7     12-25    TPro  3.9<L>  /  Alb  2.0<L>  /  TBili  8.8<H>  /  DBili  x   /  AST  927<H>  /  ALT  347<H>  /  AlkPhos  166<H>  12-25    PT/INR - ( 25 Dec 2020 00:17 )   PT: 26.8 sec;   INR: 2.32 ratio         PTT - ( 23 Dec 2020 18:07 )  PTT:43.5 sec          Culture - Urine (collected 22 Dec 2020 18:26)  Source: .Urine Clean Catch (Midstream)  Final Report (23 Dec 2020 16:12):    <10,000 CFU/mL Normal Urogenital Celestina    Culture - Blood (collected 22 Dec 2020 13:03)  Source: .Blood Blood  Preliminary Report (23 Dec 2020 14:25):    No growth to date.    Culture - Blood (collected 22 Dec 2020 13:03)  Source: .Blood Blood  Preliminary Report (23 Dec 2020 14:25):    No growth to date.        RADIOLOGY & ADDITIONAL TESTS:  Pertinent & New Results:     COORDINATION OF CARE:   Consultants Status: [ON BOARD/ SIGNED OFF]  Consultants Spoke With:   Consults Pending:   Telemetry: YES/NO

## 2020-12-25 NOTE — PROGRESS NOTE ADULT - ASSESSMENT
49 yo F w/ PMHx HIV/AIDS, HBV, previous histo, CMV, renal vein thrombus s/p coumadin (2010) admitted with abd pain x 4 days, found to have acute liver injury and covid +    # Acute liver injury - patient with predominantly hepatocellular liver injury, initially with AST >1800, Alt 600. Also w/ evidence of hepatic synthetic function, with elevated INR, decreased albumin. 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   Her outpatient viral load >400,000,000 corroborates this. Hepatitis delta coinfection has been ruled out as outpatient.   Patient was on biktarvy but switched to Truvada for TDF (as opposed to TAF), and adding double coverage with entecavir.   Also on NAC. Currently w/o acute liver failure as she is mentating well    # hyponatremia - likely hypovolemia, on IVF and albumin    # HIV/AIDS - CD4 114, previous histo, CMV, has been off medications for 1 year until 11/2020    # COVID + - currently covid positive, O2 sat normal > 95% on room air    Recommendations:   - Continue to trend CMP, INR, Mg, Phos, fibrinogen, vbg (lactate) every 12 hours  - monitor mental status closely, please alert hepatology for any change in mental status  - c/w Truvada and entecavir for HBV  - c/w NAC infusion for acute liver failure (100 mg/kg over 16 hours, will likely need to be reordered daily)  - albumin 100cc 25% q 8 hours  - IVF PRN  - c/w pantoprazole 40mg qd  - pending clinical course will discuss role of ICU, currently does not require it, however if clinical status worsens may need to transfer to [facility] for COVID ICU bed  - not transplant candidate at this time because of COVID and AIDS  - rest of care per primary team    Brina Washburn   Gastroenterology Fellow  Pager: [phone]  Please call answering service [phone] / on-call GI fellow after 5pm and before 8am, and on weekends.

## 2020-12-25 NOTE — PROGRESS NOTE ADULT - ASSESSMENT
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     #Acute liver injury   Patient with predominantly hepatocellular liver injury, with AST >1800, Alt 600. Also w/ evidence of hepatic synthetic function, with elevated INR, decreased albumin. Etiology likely from HBV, which was previously being treated w/ Symtuza (tenofovir 10mg qd) but patient had been off medication for almost 1 year (until 11/2020) due to insurance lapse. Her outpatient viral load >400,000,000 corroborates this. Hepatitis delta coinfection has been ruled out as outpatient. Patient will require immediate treatment of her HBV, now on biktarvy (tenofovir 25mg). Currently patient is not in acute liver failure, she is mentating well, but is having worsening INR which is concerning  - c/w truvada and entecavir for HBV  - c/w NAC infusion for acute liver failure (100 mg/kg over 16 hours, will likely need to be reordered daily)  - albumin 100cc 25% q 8 hours  - IVF PRN    #Hyponatremia  - urine lyres and osm  - c/w iv fluids prn    #AIDS  needs ARV medications to help with her immunosuppression but need to change ARV meds. to ones metabolized by the kidney  continue the TAF for help controlling the Hep B infection  - In light of significant transaminitis, discontinued the Symtuza and changed meds to Truvada/Tivicay  - CD4 116 consistent with AIDS  - RNA viral load in process - slightly detectable but cannot account for her acute decompensation in liver disease  - If viral load elevated will add Bactrim for PJP prophylaxis    #COVID+  not eligible for Remdesivir secondary to her LFT abnormalities  could consider convalescent plasma if she decompensates  would send inflammatory markers  send QuantiFeron testing- given new onset ascites and abnormal appearing bowels  urine histo ag. in case recurrence  blood cultures x 2 sets    #CMV viremia   - hold off on Valganciclovir but repeat CMV viral load on 12/28    #Prophylactic measures  - DVT ppx: Lovenox bid  - GERD: pantoprazole 40mg qd  - Diet: Vegetarian     50 year old female with past medical history of HIV, CMV, HBV, histoplasmosis, renal vein thrombosis s/p coumadin for 6 months, and strongyloidiasis who presents with abdominal pain found to be COVID-19 PCR positive.     #Acute liver injury   Patient with predominantly hepatocellular liver injury, with AST >1800, Alt 600. Also w/ evidence of hepatic synthetic function, with elevated INR, decreased albumin. Etiology likely from HBV, which was previously being treated w/ Symtuza (tenofovir 10mg qd) but patient had been off medication for almost 1 year (until 11/2020) due to insurance lapse. Her outpatient viral load >400,000,000 corroborates this. Hepatitis delta coinfection has been ruled out as outpatient. Patient will require immediate treatment of her HBV, now on biktarvy (tenofovir 25mg). Currently patient is not in acute liver failure, she is mentating well, but is having worsening INR which is concerning. LFTs improving   - c/w truvada and entecavir for HBV  - c/w NAC infusion for acute liver failure (100 mg/kg over 16 hours, will likely need to be reordered daily)  - albumin 100cc 25% q 8 hours  - IVF PRN    #Hyponatremia  - urine lyres and osm  - c/w iv fluids prn    #AIDS  needs ARV medications to help with her immunosuppression but need to change ARV meds. to ones metabolized by the kidney  continue the TAF for help controlling the Hep B infection  - In light of significant transaminitis, discontinued the Symtuza and changed meds to Truvada/Tivicay  - CD4 116 consistent with AIDS  - RNA viral load in process - slightly detectable but cannot account for her acute decompensation in liver disease  - If viral load elevated will add Bactrim for PJP prophylaxis    #COVID+  not eligible for Remdesivir secondary to her LFT abnormalities  could consider convalescent plasma if she decompensates  would send inflammatory markers  send QuantiFeron testing- given new onset ascites and abnormal appearing bowels  urine histo ag. in case recurrence  blood cultures x 2 sets    #CMV viremia   - hold off on Valganciclovir but repeat CMV viral load on 12/28    #Prophylactic measures  - DVT ppx: Lovenox bid  - GERD: pantoprazole 40mg qd  - Diet: Vegetarian

## 2020-12-25 NOTE — PROGRESS NOTE ADULT - SUBJECTIVE AND OBJECTIVE BOX
INFECTIOUS DISEASES FOLLOW UP-- Hollienaa Mcadams  [phone]    This is a follow up note for this  50yFemale with  High liver transaminase level  feeling better- improving appetite still reports 'bloating'        ROS:  CONSTITUTIONAL:  No fever, good appetite  CARDIOVASCULAR:  No chest pain or palpitations  RESPIRATORY:  No dyspnea  GASTROINTESTINAL:  No nausea, vomiting, diarrhea, or abdominal pain  GENITOURINARY:  No dysuria  NEUROLOGIC:  No headache,     Allergies    No Known Allergies    Intolerances        ANTIBIOTICS/RELEVANT:  antimicrobials  dolutegravir 50 milliGRAM(s) Oral daily  emtricitabine 200 mG/tenofovir 300 mG (TRUVADA) 1 Tablet(s) Oral daily  entecavir 0.5 milliGRAM(s) Oral daily    immunologic:    OTHER:  acetylcysteine IVPB 7 Gram(s) IV Intermittent once  aluminum hydroxide/magnesium hydroxide/simethicone Suspension 30 milliLiter(s) Oral every 6 hours PRN  enoxaparin Injectable 40 milliGRAM(s) SubCutaneous every 12 hours  ibuprofen  Tablet. 600 milliGRAM(s) Oral every 6 hours PRN  ondansetron Injectable 4 milliGRAM(s) IV Push every 8 hours PRN  pantoprazole    Tablet 40 milliGRAM(s) Oral before breakfast      Objective:  Vital Signs Last 24 Hrs  T(C): 36.7 (25 Dec 2020 14:32), Max: 36.8 (25 Dec 2020 10:46)  T(F): 98.1 (25 Dec 2020 14:32), Max: 98.2 (25 Dec 2020 10:46)  HR: 100 (25 Dec 2020 14:32) (95 - 104)  BP: 99/69 (25 Dec 2020 14:32) (95/66 - 105/73)  BP(mean): --  RR: 18 (25 Dec 2020 14:32) (18 - 18)  SpO2: 99% (25 Dec 2020 14:32) (96% - 99%)    PHYSICAL EXAM:  Constitutional:no acute distress, afebrile  Eyes:PIPO, EOMI, icteric but less so  Ear/Nose/Throat: no oral lesions, 	  Respiratory: clear BL  Cardiovascular: S1S2  Gastrointestinal:soft, (+) BS, no tenderness  Extremities:no e/e/c double lumen RUE picc line  No Lymphadenopathy  IV sites not inflammed.    LABS:                        9.8    4.83  )-----------( 249      ( 25 Dec 2020 12:44 )             27.9     12-25    132<L>  |  102  |  5<L>  ----------------------------<  120<H>  3.3<L>   |  22  |  0.55    Ca    7.5<L>      25 Dec 2020 10:15  Phos  1.3     12-25  Mg     1.9     12-25    TPro  4.0<L>  /  Alb  2.5<L>  /  TBili  10.6<H>  /  DBili  x   /  AST  938<H>  /  ALT  348<H>  /  AlkPhos  150<H>  12-25    PT/INR - ( 25 Dec 2020 10:15 )   PT: 27.2 sec;   INR: 2.36 ratio         PTT - ( 23 Dec 2020 18:07 )  PTT:43.5 sec      MICROBIOLOGY:            RECENT CULTURES:  12-22 @ 18:26  .Urine Clean Catch (Midstream)  --  --  --    <10,000 CFU/mL Normal Urogenital Celestina  --  12-22 @ 13:03  .Blood Blood  --  --  --    No growth to date.  --      RADIOLOGY & ADDITIONAL STUDIES:    < from: Xray Chest 1 View- PORTABLE-Urgent (Xray Chest 1 View- PORTABLE-Urgent .) (12.24.20 @ 18:36) >  Impression:    The heart is normal in size. The lungs appear to be clear. A PICC line was placed on the right and the tip is in the superior vena cava. No pneumothorax.    < end of copied text >

## 2020-12-26 LAB
ALBUMIN SERPL ELPH-MCNC: 1.7 G/DL — LOW (ref 3.3–5)
ALBUMIN SERPL ELPH-MCNC: 2 G/DL — LOW (ref 3.3–5)
ALP SERPL-CCNC: 133 U/L — HIGH (ref 40–120)
ALP SERPL-CCNC: 144 U/L — HIGH (ref 40–120)
ALT FLD-CCNC: 282 U/L — HIGH (ref 10–45)
ALT FLD-CCNC: 319 U/L — HIGH (ref 10–45)
ANION GAP SERPL CALC-SCNC: 8 MMOL/L — SIGNIFICANT CHANGE UP (ref 5–17)
ANION GAP SERPL CALC-SCNC: 9 MMOL/L — SIGNIFICANT CHANGE UP (ref 5–17)
AST SERPL-CCNC: 758 U/L — HIGH (ref 10–40)
AST SERPL-CCNC: 933 U/L — HIGH (ref 10–40)
BASOPHILS # BLD AUTO: 0.02 K/UL — SIGNIFICANT CHANGE UP (ref 0–0.2)
BASOPHILS NFR BLD AUTO: 0.3 % — SIGNIFICANT CHANGE UP (ref 0–2)
BILIRUB SERPL-MCNC: 10.3 MG/DL — HIGH (ref 0.2–1.2)
BILIRUB SERPL-MCNC: 9.7 MG/DL — HIGH (ref 0.2–1.2)
BUN SERPL-MCNC: 5 MG/DL — LOW (ref 7–23)
BUN SERPL-MCNC: 6 MG/DL — LOW (ref 7–23)
CALCIUM SERPL-MCNC: 6.6 MG/DL — LOW (ref 8.4–10.5)
CALCIUM SERPL-MCNC: 6.7 MG/DL — LOW (ref 8.4–10.5)
CHLORIDE SERPL-SCNC: 100 MMOL/L — SIGNIFICANT CHANGE UP (ref 96–108)
CHLORIDE SERPL-SCNC: 98 MMOL/L — SIGNIFICANT CHANGE UP (ref 96–108)
CO2 SERPL-SCNC: 20 MMOL/L — LOW (ref 22–31)
CO2 SERPL-SCNC: 21 MMOL/L — LOW (ref 22–31)
CREAT SERPL-MCNC: 0.36 MG/DL — LOW (ref 0.5–1.3)
CREAT SERPL-MCNC: 0.48 MG/DL — LOW (ref 0.5–1.3)
CRP SERPL-MCNC: 1.25 MG/DL — HIGH (ref 0–0.4)
D DIMER BLD IA.RAPID-MCNC: <150 NG/ML DDU — SIGNIFICANT CHANGE UP
EOSINOPHIL # BLD AUTO: 0.32 K/UL — SIGNIFICANT CHANGE UP (ref 0–0.5)
EOSINOPHIL NFR BLD AUTO: 4.7 % — SIGNIFICANT CHANGE UP (ref 0–6)
FERRITIN SERPL-MCNC: 677 NG/ML — HIGH (ref 15–150)
FIBRINOGEN PPP-MCNC: 132 MG/DL — LOW (ref 290–520)
FIBRINOGEN PPP-MCNC: 138 MG/DL — LOW (ref 290–520)
GLUCOSE BLDC GLUCOMTR-MCNC: 118 MG/DL — HIGH (ref 70–99)
GLUCOSE SERPL-MCNC: 288 MG/DL — HIGH (ref 70–99)
GLUCOSE SERPL-MCNC: 325 MG/DL — HIGH (ref 70–99)
HCT VFR BLD CALC: 28.7 % — LOW (ref 34.5–45)
HGB BLD-MCNC: 10.3 G/DL — LOW (ref 11.5–15.5)
IMM GRANULOCYTES NFR BLD AUTO: 0.7 % — SIGNIFICANT CHANGE UP (ref 0–1.5)
INR BLD: 2.7 RATIO — HIGH (ref 0.88–1.16)
LACTATE BLDV-MCNC: 0.9 MMOL/L — SIGNIFICANT CHANGE UP (ref 0.7–2)
LYMPHOCYTES # BLD AUTO: 2.77 K/UL — SIGNIFICANT CHANGE UP (ref 1–3.3)
LYMPHOCYTES # BLD AUTO: 40.3 % — SIGNIFICANT CHANGE UP (ref 13–44)
MAGNESIUM SERPL-MCNC: 1.5 MG/DL — LOW (ref 1.6–2.6)
MAGNESIUM SERPL-MCNC: 1.7 MG/DL — SIGNIFICANT CHANGE UP (ref 1.6–2.6)
MCHC RBC-ENTMCNC: 30.9 PG — SIGNIFICANT CHANGE UP (ref 27–34)
MCHC RBC-ENTMCNC: 35.9 GM/DL — SIGNIFICANT CHANGE UP (ref 32–36)
MCV RBC AUTO: 86.2 FL — SIGNIFICANT CHANGE UP (ref 80–100)
MONOCYTES # BLD AUTO: 0.5 K/UL — SIGNIFICANT CHANGE UP (ref 0–0.9)
MONOCYTES NFR BLD AUTO: 7.3 % — SIGNIFICANT CHANGE UP (ref 2–14)
NEUTROPHILS # BLD AUTO: 3.22 K/UL — SIGNIFICANT CHANGE UP (ref 1.8–7.4)
NEUTROPHILS NFR BLD AUTO: 46.7 % — SIGNIFICANT CHANGE UP (ref 43–77)
NRBC # BLD: 0 /100 WBCS — SIGNIFICANT CHANGE UP (ref 0–0)
PHOSPHATE SERPL-MCNC: 2 MG/DL — LOW (ref 2.5–4.5)
PHOSPHATE SERPL-MCNC: 2.5 MG/DL — SIGNIFICANT CHANGE UP (ref 2.5–4.5)
PLATELET # BLD AUTO: 233 K/UL — SIGNIFICANT CHANGE UP (ref 150–400)
POTASSIUM SERPL-MCNC: 3.1 MMOL/L — LOW (ref 3.5–5.3)
POTASSIUM SERPL-MCNC: 3.3 MMOL/L — LOW (ref 3.5–5.3)
POTASSIUM SERPL-SCNC: 3.1 MMOL/L — LOW (ref 3.5–5.3)
POTASSIUM SERPL-SCNC: 3.3 MMOL/L — LOW (ref 3.5–5.3)
PROT SERPL-MCNC: 3.3 G/DL — LOW (ref 6–8.3)
PROT SERPL-MCNC: 3.5 G/DL — LOW (ref 6–8.3)
PROTHROM AB SERPL-ACNC: 30.9 SEC — HIGH (ref 10.6–13.6)
RBC # BLD: 3.33 M/UL — LOW (ref 3.8–5.2)
RBC # FLD: 20 % — HIGH (ref 10.3–14.5)
SODIUM SERPL-SCNC: 127 MMOL/L — LOW (ref 135–145)
SODIUM SERPL-SCNC: 129 MMOL/L — LOW (ref 135–145)
WBC # BLD: 6.88 K/UL — SIGNIFICANT CHANGE UP (ref 3.8–10.5)
WBC # FLD AUTO: 6.88 K/UL — SIGNIFICANT CHANGE UP (ref 3.8–10.5)

## 2020-12-26 PROCEDURE — 99232 SBSQ HOSP IP/OBS MODERATE 35: CPT | Mod: GC

## 2020-12-26 RX ORDER — SIMETHICONE 80 MG/1
80 TABLET, CHEWABLE ORAL
Refills: 0 | Status: DISCONTINUED | OUTPATIENT
Start: 2020-12-26 | End: 2021-01-13

## 2020-12-26 RX ORDER — SODIUM,POTASSIUM PHOSPHATES 278-250MG
1 POWDER IN PACKET (EA) ORAL THREE TIMES A DAY
Refills: 0 | Status: DISCONTINUED | OUTPATIENT
Start: 2020-12-26 | End: 2020-12-27

## 2020-12-26 RX ORDER — HYDROMORPHONE HYDROCHLORIDE 2 MG/ML
0.25 INJECTION INTRAMUSCULAR; INTRAVENOUS; SUBCUTANEOUS ONCE
Refills: 0 | Status: DISCONTINUED | OUTPATIENT
Start: 2020-12-26 | End: 2020-12-26

## 2020-12-26 RX ORDER — ACETYLCYSTEINE 200 MG/ML
7 VIAL (ML) MISCELLANEOUS ONCE
Refills: 0 | Status: COMPLETED | OUTPATIENT
Start: 2020-12-26 | End: 2020-12-26

## 2020-12-26 RX ORDER — PHYTONADIONE (VIT K1) 5 MG
10 TABLET ORAL DAILY
Refills: 0 | Status: COMPLETED | OUTPATIENT
Start: 2020-12-26 | End: 2020-12-28

## 2020-12-26 RX ORDER — MAGNESIUM OXIDE 400 MG ORAL TABLET 241.3 MG
400 TABLET ORAL ONCE
Refills: 0 | Status: COMPLETED | OUTPATIENT
Start: 2020-12-26 | End: 2020-12-26

## 2020-12-26 RX ORDER — POTASSIUM CHLORIDE 20 MEQ
40 PACKET (EA) ORAL ONCE
Refills: 0 | Status: COMPLETED | OUTPATIENT
Start: 2020-12-26 | End: 2020-12-26

## 2020-12-26 RX ORDER — POTASSIUM CHLORIDE 20 MEQ
20 PACKET (EA) ORAL
Refills: 0 | Status: DISCONTINUED | OUTPATIENT
Start: 2020-12-26 | End: 2020-12-26

## 2020-12-26 RX ORDER — KETOROLAC TROMETHAMINE 30 MG/ML
15 SYRINGE (ML) INJECTION ONCE
Refills: 0 | Status: DISCONTINUED | OUTPATIENT
Start: 2020-12-26 | End: 2020-12-26

## 2020-12-26 RX ADMIN — ENOXAPARIN SODIUM 40 MILLIGRAM(S): 100 INJECTION SUBCUTANEOUS at 18:24

## 2020-12-26 RX ADMIN — Medication 20 MILLIEQUIVALENT(S): at 08:02

## 2020-12-26 RX ADMIN — Medication 10 MILLIGRAM(S): at 18:24

## 2020-12-26 RX ADMIN — Medication 30 MILLILITER(S): at 08:02

## 2020-12-26 RX ADMIN — Medication 64.69 GRAM(S): at 09:10

## 2020-12-26 RX ADMIN — HYDROMORPHONE HYDROCHLORIDE 0.25 MILLIGRAM(S): 2 INJECTION INTRAMUSCULAR; INTRAVENOUS; SUBCUTANEOUS at 14:40

## 2020-12-26 RX ADMIN — Medication 1 PACKET(S): at 22:20

## 2020-12-26 RX ADMIN — Medication 600 MILLIGRAM(S): at 06:26

## 2020-12-26 RX ADMIN — ENTECAVIR 0.5 MILLIGRAM(S): 0.5 TABLET ORAL at 13:08

## 2020-12-26 RX ADMIN — Medication 1 PACKET(S): at 14:26

## 2020-12-26 RX ADMIN — Medication 40 MILLIEQUIVALENT(S): at 14:25

## 2020-12-26 RX ADMIN — Medication 15 MILLIGRAM(S): at 11:25

## 2020-12-26 RX ADMIN — EMTRICITABINE AND TENOFOVIR DISOPROXIL FUMARATE 1 TABLET(S): 200; 300 TABLET, FILM COATED ORAL at 13:08

## 2020-12-26 RX ADMIN — DOLUTEGRAVIR SODIUM 50 MILLIGRAM(S): 25 TABLET, FILM COATED ORAL at 13:08

## 2020-12-26 RX ADMIN — HYDROMORPHONE HYDROCHLORIDE 0.25 MILLIGRAM(S): 2 INJECTION INTRAMUSCULAR; INTRAVENOUS; SUBCUTANEOUS at 14:25

## 2020-12-26 RX ADMIN — MAGNESIUM OXIDE 400 MG ORAL TABLET 400 MILLIGRAM(S): 241.3 TABLET ORAL at 18:23

## 2020-12-26 RX ADMIN — Medication 20 MILLIEQUIVALENT(S): at 13:08

## 2020-12-26 RX ADMIN — PANTOPRAZOLE SODIUM 40 MILLIGRAM(S): 20 TABLET, DELAYED RELEASE ORAL at 06:25

## 2020-12-26 RX ADMIN — ENOXAPARIN SODIUM 40 MILLIGRAM(S): 100 INJECTION SUBCUTANEOUS at 06:26

## 2020-12-26 RX ADMIN — SIMETHICONE 80 MILLIGRAM(S): 80 TABLET, CHEWABLE ORAL at 22:00

## 2020-12-26 RX ADMIN — Medication 15 MILLIGRAM(S): at 11:46

## 2020-12-26 RX ADMIN — ONDANSETRON 4 MILLIGRAM(S): 8 TABLET, FILM COATED ORAL at 06:25

## 2020-12-26 NOTE — PROGRESS NOTE ADULT - ASSESSMENT
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     #Acute liver injury   Patient with predominantly hepatocellular liver injury, with AST >1800, Alt 600. Also w/ evidence of hepatic synthetic function, with elevated INR, decreased albumin. Etiology likely from HBV, which was previously being treated w/ Symtuza (tenofovir 10mg qd) but patient had been off medication for almost 1 year (until 11/2020) due to insurance lapse. Her outpatient viral load >400,000,000 corroborates this. Hepatitis delta coinfection has been ruled out as outpatient. Patient will require immediate treatment of her HBV, now on biktarvy (tenofovir 25mg). Currently patient is not in acute liver failure, she is mentating well, but is having worsening INR which is concerning. LFTs improving   - c/w truvada and entecavir for HBV  - c/w NAC infusion for acute liver failure (100 mg/kg over 16 hours, will likely need to be reordered daily)  - albumin 100cc 25% q 8 hours  - IVF PRN    #Hyponatremia  - urine lyres and osm  - c/w iv fluids prn    #AIDS  needs ARV medications to help with her immunosuppression but need to change ARV meds. to ones metabolized by the kidney  continue the TAF for help controlling the Hep B infection  - In light of significant transaminitis, discontinued the Symtuza and changed meds to Truvada/Tivicay  - CD4 116 consistent with AIDS  - RNA viral load in process - slightly detectable but cannot account for her acute decompensation in liver disease  - If viral load elevated will add Bactrim for PJP prophylaxis    #COVID+  not eligible for Remdesivir secondary to her LFT abnormalities  could consider convalescent plasma if she decompensates  would send inflammatory markers  send QuantiFeron testing- given new onset ascites and abnormal appearing bowels  urine histo ag. in case recurrence  blood cultures x 2 sets    #CMV viremia   - hold off on Valganciclovir but repeat CMV viral load on 12/28    #Prophylactic measures  - DVT ppx: Lovenox bid  - GERD: pantoprazole 40mg qd  - Diet: Vegetarian     50 year old female with past medical history of HIV, CMV, HBV, histoplasmosis, renal vein thrombosis s/p coumadin for 6 months, and strongyloidiasis who presents with abdominal pain found to be COVID-19 PCR positive.     #Acute liver injury   Patient with predominantly hepatocellular liver injury, with AST >1800, Alt 600. Also w/ evidence of hepatic synthetic function, with elevated INR, decreased albumin. Etiology likely from HBV, which was previously being treated w/ Symtuza (tenofovir 10mg qd) but patient had been off medication for almost 1 year (until 11/2020) due to insurance lapse. Her outpatient viral load >400,000,000 corroborates this. Hepatitis delta coinfection has been ruled out as outpatient. Patient will require immediate treatment of her HBV, now on biktarvy (tenofovir 25mg). Currently patient is not in acute liver failure, she is mentating well, but is having worsening INR which is concerning. LFTs improving   - c/w truvada and entecavir for HBV  - c/w NAC infusion for acute liver failure (100 mg/kg over 16 hours, will likely need to be reordered daily)  - albumin 100cc 25% q 8 hours  - IVF PRN    #Hyponatremia  - urine lyres and osm  - c/w iv fluids prn    #AIDS  needs ARV medications to help with her immunosuppression but need to change ARV meds to ones metabolized by the kidney  continue the TAF for help controlling the Hep B infection  - In light of significant transaminitis, discontinued the Symtuza and changed meds to Truvada/Tivicay  - CD4 116 consistent with AIDS  - RNA viral load in process - slightly detectable but cannot account for her acute decompensation in liver disease  - If viral load elevated will add Bactrim for PJP prophylaxis    #COVID+  not eligible for Remdesivir secondary to her LFT abnormalities  could consider convalescent plasma if she decompensates  would send inflammatory markers  send QuantiFeron testing- given new onset ascites and abnormal appearing bowels  urine histo ag. in case recurrence  blood cultures x 2 sets    #CMV viremia   - hold off on Valganciclovir but repeat CMV viral load on 12/28    #Prophylactic measures  - DVT ppx: Lovenox bid  - GERD: pantoprazole 40mg qd  - Diet: Vegetarian     50 year old female with past medical history of HIV, CMV, HBV, histoplasmosis, renal vein thrombosis s/p coumadin for 6 months, and strongyloidiasis who presents with abdominal pain found to be COVID-19 PCR positive.     #Acute liver injury   Patient with predominantly hepatocellular liver injury, with AST >1800, Alt 600. Also w/ evidence of hepatic synthetic function, with elevated INR, decreased albumin. Etiology likely from HBV, which was previously being treated w/ Symtuza (tenofovir 10mg qd) but patient had been off medication for almost 1 year (until 11/2020) due to insurance lapse. Her outpatient viral load >400,000,000 corroborates this. Hepatitis delta coinfection has been ruled out as outpatient. Patient will require immediate treatment of her HBV, now on biktarvy (tenofovir 25mg). Currently patient is not in acute liver failure, she is mentating well, but is having worsening INR which is concerning. LFTs improving   - c/w truvada and entecavir for HBV  - c/w NAC infusion for acute liver failure (100 mg/kg over 16 hours, will likely need to be reordered daily)  - albumin 100cc 25% q 8 hours  - IVF PRN  - vitamine K 10mg for 10 days (12/26 - 1/4)    #Hyponatremia  - urine lyres and osm  - c/w iv fluids prn    #AIDS  needs ARV medications to help with her immunosuppression but need to change ARV meds to ones metabolized by the kidney  continue the TAF for help controlling the Hep B infection  - In light of significant transaminitis, discontinued the Symtuza and changed meds to Truvada/Tivicay  - CD4 116 consistent with AIDS  - RNA viral load in process - slightly detectable but cannot account for her acute decompensation in liver disease  - If viral load elevated will add Bactrim for PJP prophylaxis    #COVID+  not eligible for Remdesivir secondary to her LFT abnormalities  could consider convalescent plasma if she decompensates  would send inflammatory markers  send QuantiFeron testing- given new onset ascites and abnormal appearing bowels  urine histo ag. in case recurrence  blood cultures x 2 sets    #CMV viremia   - hold off on Valganciclovir but repeat CMV viral load on 12/28    #Prophylactic measures  - DVT ppx: Lovenox bid  - GERD: pantoprazole 40mg qd  - Diet: Vegetarian     50 year old female with past medical history of HIV, CMV, HBV, histoplasmosis, renal vein thrombosis s/p coumadin for 6 months, and strongyloidiasis who presents with abdominal pain found to be COVID-19 PCR positive.     #Acute liver injury   Patient with predominantly hepatocellular liver injury, with AST >1800, Alt 600. Also w/ evidence of hepatic synthetic function, with elevated INR, decreased albumin. Etiology likely from HBV, which was previously being treated w/ Symtuza (tenofovir 10mg qd) but patient had been off medication for almost 1 year (until 11/2020) due to insurance lapse. Her outpatient viral load >400,000,000 corroborates this. Hepatitis delta coinfection has been ruled out as outpatient. Patient will require immediate treatment of her HBV, now on biktarvy (tenofovir 25mg). Currently patient is not in acute liver failure, she is mentating well, but is having worsening INR which is concerning. LFTs improving   - c/w truvada and entecavir for HBV  - c/w NAC infusion for acute liver failure (100 mg/kg over 16 hours, will likely need to be reordered daily)  - albumin 100cc 25% q 8 hours  - IVF PRN  - vitamin K 10mg for 10 days (12/26 - 1/4)    #Hyponatremia  - urine lyres and osm  - c/w iv fluids prn    #AIDS  needs ARV medications to help with her immunosuppression but need to change ARV meds to ones metabolized by the kidney  continue the TAF for help controlling the Hep B infection  - In light of significant transaminitis, discontinued the Symtuza and changed meds to Truvada/Tivicay  - CD4 116 consistent with AIDS  - RNA viral load in process - slightly detectable but cannot account for her acute decompensation in liver disease  - If viral load elevated will add Bactrim for PJP prophylaxis    #COVID+  not eligible for Remdesivir secondary to her LFT abnormalities  could consider convalescent plasma if she decompensates  would send inflammatory markers  send QuantiFeron testing- given new onset ascites and abnormal appearing bowels  urine histo ag. in case recurrence  blood cultures x 2 sets    #CMV viremia   - hold off on Valganciclovir but repeat CMV viral load on 12/28    #Prophylactic measures  - DVT ppx: Lovenox bid  - GERD: pantoprazole 40mg qd  - Diet: Vegetarian     50 year old female with past medical history of HIV, CMV, HBV, histoplasmosis, renal vein thrombosis s/p coumadin for 6 months, and strongyloidiasis who presents with abdominal pain found to be COVID-19 PCR positive.     #Acute liver injury   Patient with predominantly hepatocellular liver injury, with AST >1800, Alt 600. Also w/ evidence of hepatic synthetic function, with elevated INR, decreased albumin. Etiology likely from HBV, which was previously being treated w/ Symtuza (tenofovir 10mg qd) but patient had been off medication for almost 1 year (until 11/2020) due to insurance lapse. Her outpatient viral load >400,000,000 corroborates this. Hepatitis delta coinfection has been ruled out as outpatient. Patient will require immediate treatment of her HBV, now on biktarvy (tenofovir 25mg). Currently patient is not in acute liver failure, she is mentating well, but is having worsening INR which is concerning. LFTs improving   - c/w truvada and entecavir for HBV  - c/w NAC infusion for acute liver failure (100 mg/kg over 16 hours, will likely need to be reordered daily)  - albumin 100cc 25% q 8 hours  - IVF PRN  - vitamin K 10mg for 3 days (12/26 - 12/28)    #Hyponatremia  - urine lyres and osm  - c/w iv fluids prn    #AIDS  needs ARV medications to help with her immunosuppression but need to change ARV meds to ones metabolized by the kidney  continue the TAF for help controlling the Hep B infection  - In light of significant transaminitis, discontinued the Symtuza and changed meds to Truvada/Tivicay  - CD4 116 consistent with AIDS  - RNA viral load in process - slightly detectable but cannot account for her acute decompensation in liver disease  - If viral load elevated will add Bactrim for PJP prophylaxis    #COVID+  not eligible for Remdesivir secondary to her LFT abnormalities  could consider convalescent plasma if she decompensates  would send inflammatory markers  send QuantiFeron testing- given new onset ascites and abnormal appearing bowels  urine histo ag. in case recurrence  blood cultures x 2 sets    #CMV viremia   - hold off on Valganciclovir but repeat CMV viral load on 12/28    #Prophylactic measures  - DVT ppx: Lovenox bid  - GERD: pantoprazole 40mg qd  - Diet: Vegetarian

## 2020-12-26 NOTE — PROGRESS NOTE ADULT - SUBJECTIVE AND OBJECTIVE BOX
Interval Events:   No new complaints   Liver enzymes continue to improve but INR is up to 2.7    Hospital Medications:  acetylcysteine IVPB 7 Gram(s) IV Intermittent once  aluminum hydroxide/magnesium hydroxide/simethicone Suspension 30 milliLiter(s) Oral every 6 hours PRN  dolutegravir 50 milliGRAM(s) Oral daily  emtricitabine 200 mG/tenofovir 300 mG (TRUVADA) 1 Tablet(s) Oral daily  enoxaparin Injectable 40 milliGRAM(s) SubCutaneous every 12 hours  entecavir 0.5 milliGRAM(s) Oral daily  ibuprofen  Tablet. 600 milliGRAM(s) Oral every 6 hours PRN  ondansetron Injectable 4 milliGRAM(s) IV Push every 8 hours PRN  pantoprazole    Tablet 40 milliGRAM(s) Oral before breakfast  potassium chloride    Tablet ER 20 milliEquivalent(s) Oral every 2 hours  simethicone 80 milliGRAM(s) Chew four times a day       PHYSICAL EXAM:   Vital Signs:  Vital Signs Last 24 Hrs  T(C): 36.7 (26 Dec 2020 05:50), Max: 36.8 (25 Dec 2020 10:46)  T(F): 98 (26 Dec 2020 05:50), Max: 98.2 (25 Dec 2020 10:46)  HR: 105 (26 Dec 2020 05:50) (94 - 114)  BP: 106/74 (26 Dec 2020 05:50) (90/66 - 106/74)  BP(mean): --  RR: 18 (26 Dec 2020 05:50) (18 - 20)  SpO2: 97% (26 Dec 2020 05:50) (97% - 100%)  Daily     Daily     GENERAL:  No acute distress  HEENT:  Normocephalic/atraumatic, + scleral icterus  CHEST:  no resp distress  HEART:  Regular rate and rhythm,  ABDOMEN:  Soft, distended, non tender  EXTREMITIES: No cyanosis, clubbing, or edema  SKIN:  No rash/erythema/ecchymoses/petechiae/wounds/abscess/warm/dry  NEURO:  Alert and oriented x 3, no asterixis,      LABS:                        9.8    4.83  )-----------( 249      ( 25 Dec 2020 12:44 )             27.9     Mean Cell Volume: 86.1 fl (12-25-20 @ 12:44)    12-25    127<L>  |  98  |  5<L>  ----------------------------<  325<H>  3.1<L>   |  20<L>  |  0.48<L>    Ca    6.7<L>      25 Dec 2020 23:48  Phos  2.5     12-25  Mg     1.7     12-25    TPro  3.5<L>  /  Alb  2.0<L>  /  TBili  9.7<H>  /  DBili  x   /  AST  758<H>  /  ALT  282<H>  /  AlkPhos  133<H>  12-25    LIVER FUNCTIONS - ( 25 Dec 2020 23:48 )  Alb: 2.0 g/dL / Pro: 3.5 g/dL / ALK PHOS: 133 U/L / ALT: 282 U/L / AST: 758 U/L / GGT: x           PT/INR - ( 25 Dec 2020 23:48 )   PT: 30.9 sec;   INR: 2.70 ratio                                     9.8    4.83  )-----------( 249      ( 25 Dec 2020 12:44 )             27.9                         9.9    5.01  )-----------( 263      ( 25 Dec 2020 10:15 )             27.8                         13.9   7.43  )-----------( 369      ( 23 Dec 2020 20:22 )             41.4                         13.8   5.80  )-----------( 279      ( 23 Dec 2020 16:46 )             40.5                         14.3   7.28  )-----------( 384      ( 23 Dec 2020 09:43 )             42.3       Imaging:

## 2020-12-26 NOTE — PROGRESS NOTE ADULT - ASSESSMENT
49 yo F w/ PMHx HIV/AIDS, HBV, previous histo, CMV, renal vein thrombus s/p coumadin (2010) admitted with abd pain x 4 days, found to have acute liver injury and covid +    # Acute liver injury - patient with predominantly hepatocellular liver injury, initially with AST >1800, Alt 600. Also w/ evidence of hepatic synthetic function, with elevated INR, decreased albumin. 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   Her outpatient viral load >400,000,000 corroborates this. Hepatitis delta coinfection has been ruled out as outpatient.   Patient was on biktarvy but switched to Truvada for TDF (as opposed to TAF), and adding double coverage with entecavir.   Also on NAC. Currently w/o acute liver failure as she is mentating well    # hyponatremia - likely hypovolemia, on IVF and albumin    # HIV/AIDS - CD4 114, previous histo, CMV, has been off medications for 1 year until 11/2020    # COVID + - currently covid positive, O2 sat normal > 95% on room air    Recommendations:   - Vitamin K 10 mg for 3 days   - Continue to trend CMP, INR, Mg, Phos, fibrinogen, vbg (lactate) every 12 hours  - monitor mental status closely, please alert hepatology for any change in mental status  - c/w Truvada and entecavir for HBV  - c/w NAC infusion for acute liver failure (100 mg/kg over 16 hours, will likely need to be reordered daily)  - albumin 100cc 25% q 8 hours  - c/w pantoprazole 40mg qd  - pending clinical course will discuss role of ICU, currently does not require it, however if clinical status worsens may need to transfer to [facility] for COVID ICU bed  - not transplant candidate at this time because of COVID and AIDS  - rest of care per primary team    Brina Washburn   Gastroenterology Fellow  Pager: [phone]  Please call answering service [phone] / on-call GI fellow after 5pm and before 8am, and on weekends.

## 2020-12-26 NOTE — PROGRESS NOTE ADULT - SUBJECTIVE AND OBJECTIVE BOX
PROGRESS NOTE:     Patient is a 50y old  Female who presents with a chief complaint of abdominal pain (25 Dec 2020 17:42)      OVERNIGHT EVENTS:  SUBJECTIVE:  ADDITIONAL REVIEW OF SYSTEMS: see above       MEDICATIONS  (STANDING):  dolutegravir 50 milliGRAM(s) Oral daily  emtricitabine 200 mG/tenofovir 300 mG (TRUVADA) 1 Tablet(s) Oral daily  enoxaparin Injectable 40 milliGRAM(s) SubCutaneous every 12 hours  entecavir 0.5 milliGRAM(s) Oral daily  pantoprazole    Tablet 40 milliGRAM(s) Oral before breakfast    MEDICATIONS  (PRN):  aluminum hydroxide/magnesium hydroxide/simethicone Suspension 30 milliLiter(s) Oral every 6 hours PRN Dyspepsia  ibuprofen  Tablet. 600 milliGRAM(s) Oral every 6 hours PRN Mild Pain (1 - 3)  ondansetron Injectable 4 milliGRAM(s) IV Push every 8 hours PRN Nausea and/or Vomiting    Allergies  No Known Allergies      Intolerances      PHYSICAL EXAM:  Vital Signs Last 24 Hrs  T(C): 36.7 (26 Dec 2020 05:50), Max: 36.8 (25 Dec 2020 10:46)  T(F): 98 (26 Dec 2020 05:50), Max: 98.2 (25 Dec 2020 10:46)  HR: 105 (26 Dec 2020 05:50) (94 - 114)  BP: 106/74 (26 Dec 2020 05:50) (90/66 - 106/74)  BP(mean): --  RR: 18 (26 Dec 2020 05:50) (18 - 20)  SpO2: 97% (26 Dec 2020 05:50) (97% - 100%)      GENERAL: No acute distress, well-developed  HEAD:  Atraumatic, Normocephalic  EYES: EOMI, PERRLA, conjunctiva and sclera clear  EARS: symmetrical, no tenderness, no discharge  NECK: Supple, no lymphadenopathy, no JVD  CHEST/LUNG: CTAB; No wheezes, rales, or rhonchi  HEART: Regular rate and rhythm; No murmurs, rubs, or gallops  ABDOMEN: Soft, non-tender, non-distended; normal bowel sounds, no organomegaly  EXTREMITIES:  2+ peripheral pulses b/l, No clubbing, cyanosis, or edema  NEUROLOGY: A&O x 3, no focal deficits  SKIN: No rashes or lesions    PATIENT DATA:  CAPILLARY BLOOD GLUCOSE        I&O's Summary      LABS:                        9.8    4.83  )-----------( 249      ( 25 Dec 2020 12:44 )             27.9     12-25    127<L>  |  98  |  5<L>  ----------------------------<  325<H>  3.1<L>   |  20<L>  |  0.48<L>    Ca    6.7<L>      25 Dec 2020 23:48  Phos  2.5     12-25  Mg     1.7     12-25    TPro  3.5<L>  /  Alb  2.0<L>  /  TBili  9.7<H>  /  DBili  x   /  AST  758<H>  /  ALT  282<H>  /  AlkPhos  133<H>  12-25    PT/INR - ( 25 Dec 2020 23:48 )   PT: 30.9 sec;   INR: 2.70 ratio                     RADIOLOGY & ADDITIONAL TESTS:  Pertinent & New Results:     COORDINATION OF CARE:   Consultants Status: [ON BOARD/ SIGNED OFF]  Consultants Spoke With:   Consults Pending:   Telemetry: YES/NO PROGRESS NOTE:     Patient is a 50y old  Female who presents with a chief complaint of abdominal pain (25 Dec 2020 17:42)      OVERNIGHT EVENTS: JESSE.   SUBJECTIVE: Patient was seen at bedside this AM. Patient reports mild discomfort in abdomen due to gas and requests medication for gas. Tender to palpation. Patient has had bowel movement his AM. Reports pain on bilateral lower extremities due to ACE bandages to help with edema. Left hand is also edematous but keeping elevated without ACE bandages.   ADDITIONAL REVIEW OF SYSTEMS: see above       MEDICATIONS  (STANDING):  dolutegravir 50 milliGRAM(s) Oral daily  emtricitabine 200 mG/tenofovir 300 mG (TRUVADA) 1 Tablet(s) Oral daily  enoxaparin Injectable 40 milliGRAM(s) SubCutaneous every 12 hours  entecavir 0.5 milliGRAM(s) Oral daily  pantoprazole    Tablet 40 milliGRAM(s) Oral before breakfast    MEDICATIONS  (PRN):  aluminum hydroxide/magnesium hydroxide/simethicone Suspension 30 milliLiter(s) Oral every 6 hours PRN Dyspepsia  ibuprofen  Tablet. 600 milliGRAM(s) Oral every 6 hours PRN Mild Pain (1 - 3)  ondansetron Injectable 4 milliGRAM(s) IV Push every 8 hours PRN Nausea and/or Vomiting    Allergies  No Known Allergies      Intolerances      PHYSICAL EXAM:  Vital Signs Last 24 Hrs  T(C): 36.7 (26 Dec 2020 05:50), Max: 36.8 (25 Dec 2020 10:46)  T(F): 98 (26 Dec 2020 05:50), Max: 98.2 (25 Dec 2020 10:46)  HR: 105 (26 Dec 2020 05:50) (94 - 114)  BP: 106/74 (26 Dec 2020 05:50) (90/66 - 106/74)  BP(mean): --  RR: 18 (26 Dec 2020 05:50) (18 - 20)  SpO2: 97% (26 Dec 2020 05:50) (97% - 100%)      GENERAL: No acute distress, well-developed  HEAD:  Atraumatic, Normocephalic  EYES: EOMI, PERRLA, conjunctiva and sclera clear  EARS: symmetrical, no tenderness, no discharge  NECK: Supple, no lymphadenopathy, no JVD  CHEST/LUNG: CTAB; No wheezes, rales, or rhonchi  HEART: Regular rate and rhythm; No murmurs, rubs, or gallops  ABDOMEN: Soft, non-tender, non-distended; normal bowel sounds, no organomegaly  EXTREMITIES:  2+ peripheral pulses b/l, No clubbing, cyanosis, or edema  NEUROLOGY: A&O x 3, no focal deficits  SKIN: No rashes or lesions    PATIENT DATA:  CAPILLARY BLOOD GLUCOSE        I&O's Summary      LABS:                        9.8    4.83  )-----------( 249      ( 25 Dec 2020 12:44 )             27.9     12-25    127<L>  |  98  |  5<L>  ----------------------------<  325<H>  3.1<L>   |  20<L>  |  0.48<L>    Ca    6.7<L>      25 Dec 2020 23:48  Phos  2.5     12-25  Mg     1.7     12-25    TPro  3.5<L>  /  Alb  2.0<L>  /  TBili  9.7<H>  /  DBili  x   /  AST  758<H>  /  ALT  282<H>  /  AlkPhos  133<H>  12-25    PT/INR - ( 25 Dec 2020 23:48 )   PT: 30.9 sec;   INR: 2.70 ratio         RADIOLOGY & ADDITIONAL TESTS:  Pertinent & New Results:     COORDINATION OF CARE:   Consultants Status: [ON BOARD/ SIGNED OFF]  Consultants Spoke With:   Consults Pending:   Telemetry: YES/NO

## 2020-12-27 LAB
ALBUMIN SERPL ELPH-MCNC: 1.3 G/DL — LOW (ref 3.3–5)
ALBUMIN SERPL ELPH-MCNC: 1.8 G/DL — LOW (ref 3.3–5)
ALP SERPL-CCNC: 136 U/L — HIGH (ref 40–120)
ALP SERPL-CCNC: 157 U/L — HIGH (ref 40–120)
ALT FLD-CCNC: 401 U/L — HIGH (ref 10–45)
ALT FLD-CCNC: 414 U/L — HIGH (ref 10–45)
ANION GAP SERPL CALC-SCNC: 9 MMOL/L — SIGNIFICANT CHANGE UP (ref 5–17)
ANION GAP SERPL CALC-SCNC: 9 MMOL/L — SIGNIFICANT CHANGE UP (ref 5–17)
AST SERPL-CCNC: 1148 U/L — HIGH (ref 10–40)
AST SERPL-CCNC: 1200 U/L — HIGH (ref 10–40)
BILIRUB SERPL-MCNC: 10.6 MG/DL — HIGH (ref 0.2–1.2)
BILIRUB SERPL-MCNC: 12.3 MG/DL — HIGH (ref 0.2–1.2)
BUN SERPL-MCNC: 6 MG/DL — LOW (ref 7–23)
BUN SERPL-MCNC: 8 MG/DL — SIGNIFICANT CHANGE UP (ref 7–23)
CALCIUM SERPL-MCNC: 6.4 MG/DL — CRITICAL LOW (ref 8.4–10.5)
CALCIUM SERPL-MCNC: 7.1 MG/DL — LOW (ref 8.4–10.5)
CHLORIDE SERPL-SCNC: 101 MMOL/L — SIGNIFICANT CHANGE UP (ref 96–108)
CHLORIDE SERPL-SCNC: 99 MMOL/L — SIGNIFICANT CHANGE UP (ref 96–108)
CO2 SERPL-SCNC: 20 MMOL/L — LOW (ref 22–31)
CO2 SERPL-SCNC: 21 MMOL/L — LOW (ref 22–31)
CREAT SERPL-MCNC: 0.4 MG/DL — LOW (ref 0.5–1.3)
CREAT SERPL-MCNC: 0.54 MG/DL — SIGNIFICANT CHANGE UP (ref 0.5–1.3)
CULTURE RESULTS: SIGNIFICANT CHANGE UP
CULTURE RESULTS: SIGNIFICANT CHANGE UP
FIBRINOGEN PPP-MCNC: 151 MG/DL — LOW (ref 290–520)
FIBRINOGEN PPP-MCNC: 167 MG/DL — LOW (ref 290–520)
GLUCOSE SERPL-MCNC: 117 MG/DL — HIGH (ref 70–99)
GLUCOSE SERPL-MCNC: 270 MG/DL — HIGH (ref 70–99)
HCT VFR BLD CALC: 34.7 % — SIGNIFICANT CHANGE UP (ref 34.5–45)
HGB BLD-MCNC: 12.3 G/DL — SIGNIFICANT CHANGE UP (ref 11.5–15.5)
INR BLD: 1.98 RATIO — HIGH (ref 0.88–1.16)
INR BLD: 1.98 RATIO — HIGH (ref 0.88–1.16)
LACTATE BLDV-MCNC: 1.3 MMOL/L — SIGNIFICANT CHANGE UP (ref 0.7–2)
LACTATE BLDV-MCNC: 1.6 MMOL/L — SIGNIFICANT CHANGE UP (ref 0.7–2)
MAGNESIUM SERPL-MCNC: 1.6 MG/DL — SIGNIFICANT CHANGE UP (ref 1.6–2.6)
MAGNESIUM SERPL-MCNC: 1.7 MG/DL — SIGNIFICANT CHANGE UP (ref 1.6–2.6)
MCHC RBC-ENTMCNC: 30.3 PG — SIGNIFICANT CHANGE UP (ref 27–34)
MCHC RBC-ENTMCNC: 35.4 GM/DL — SIGNIFICANT CHANGE UP (ref 32–36)
MCV RBC AUTO: 85.5 FL — SIGNIFICANT CHANGE UP (ref 80–100)
NRBC # BLD: 0 /100 WBCS — SIGNIFICANT CHANGE UP (ref 0–0)
PHOSPHATE SERPL-MCNC: 1.8 MG/DL — LOW (ref 2.5–4.5)
PHOSPHATE SERPL-MCNC: 2 MG/DL — LOW (ref 2.5–4.5)
PLATELET # BLD AUTO: 267 K/UL — SIGNIFICANT CHANGE UP (ref 150–400)
POTASSIUM SERPL-MCNC: 3.4 MMOL/L — LOW (ref 3.5–5.3)
POTASSIUM SERPL-MCNC: 3.5 MMOL/L — SIGNIFICANT CHANGE UP (ref 3.5–5.3)
POTASSIUM SERPL-SCNC: 3.4 MMOL/L — LOW (ref 3.5–5.3)
POTASSIUM SERPL-SCNC: 3.5 MMOL/L — SIGNIFICANT CHANGE UP (ref 3.5–5.3)
PROT SERPL-MCNC: 3.2 G/DL — LOW (ref 6–8.3)
PROT SERPL-MCNC: 3.7 G/DL — LOW (ref 6–8.3)
PROTHROM AB SERPL-ACNC: 23 SEC — HIGH (ref 10.6–13.6)
PROTHROM AB SERPL-ACNC: 23 SEC — HIGH (ref 10.6–13.6)
RBC # BLD: 4.06 M/UL — SIGNIFICANT CHANGE UP (ref 3.8–5.2)
RBC # FLD: 20.9 % — HIGH (ref 10.3–14.5)
SODIUM SERPL-SCNC: 129 MMOL/L — LOW (ref 135–145)
SODIUM SERPL-SCNC: 130 MMOL/L — LOW (ref 135–145)
SPECIMEN SOURCE: SIGNIFICANT CHANGE UP
SPECIMEN SOURCE: SIGNIFICANT CHANGE UP
WBC # BLD: 10.04 K/UL — SIGNIFICANT CHANGE UP (ref 3.8–10.5)
WBC # FLD AUTO: 10.04 K/UL — SIGNIFICANT CHANGE UP (ref 3.8–10.5)

## 2020-12-27 PROCEDURE — 99232 SBSQ HOSP IP/OBS MODERATE 35: CPT | Mod: GC

## 2020-12-27 RX ORDER — SODIUM,POTASSIUM PHOSPHATES 278-250MG
1 POWDER IN PACKET (EA) ORAL
Refills: 0 | Status: COMPLETED | OUTPATIENT
Start: 2020-12-27 | End: 2020-12-27

## 2020-12-27 RX ORDER — KETOROLAC TROMETHAMINE 30 MG/ML
15 SYRINGE (ML) INJECTION ONCE
Refills: 0 | Status: DISCONTINUED | OUTPATIENT
Start: 2020-12-27 | End: 2020-12-27

## 2020-12-27 RX ORDER — ALBUMIN HUMAN 25 %
100 VIAL (ML) INTRAVENOUS EVERY 8 HOURS
Refills: 0 | Status: COMPLETED | OUTPATIENT
Start: 2020-12-27 | End: 2020-12-29

## 2020-12-27 RX ORDER — POTASSIUM CHLORIDE 20 MEQ
40 PACKET (EA) ORAL ONCE
Refills: 0 | Status: COMPLETED | OUTPATIENT
Start: 2020-12-27 | End: 2020-12-27

## 2020-12-27 RX ORDER — MAGNESIUM SULFATE 500 MG/ML
1 VIAL (ML) INJECTION ONCE
Refills: 0 | Status: COMPLETED | OUTPATIENT
Start: 2020-12-27 | End: 2020-12-27

## 2020-12-27 RX ORDER — ACETYLCYSTEINE 200 MG/ML
7 VIAL (ML) MISCELLANEOUS ONCE
Refills: 0 | Status: COMPLETED | OUTPATIENT
Start: 2020-12-27 | End: 2020-12-27

## 2020-12-27 RX ADMIN — Medication 15 MILLIGRAM(S): at 07:23

## 2020-12-27 RX ADMIN — Medication 1 PACKET(S): at 22:40

## 2020-12-27 RX ADMIN — DOLUTEGRAVIR SODIUM 50 MILLIGRAM(S): 25 TABLET, FILM COATED ORAL at 12:55

## 2020-12-27 RX ADMIN — Medication 40 MILLIEQUIVALENT(S): at 17:27

## 2020-12-27 RX ADMIN — ENTECAVIR 0.5 MILLIGRAM(S): 0.5 TABLET ORAL at 12:55

## 2020-12-27 RX ADMIN — Medication 1 PACKET(S): at 17:28

## 2020-12-27 RX ADMIN — ENOXAPARIN SODIUM 40 MILLIGRAM(S): 100 INJECTION SUBCUTANEOUS at 06:08

## 2020-12-27 RX ADMIN — Medication 1 PACKET(S): at 06:08

## 2020-12-27 RX ADMIN — Medication 1 PACKET(S): at 12:55

## 2020-12-27 RX ADMIN — Medication 10 MILLIGRAM(S): at 12:55

## 2020-12-27 RX ADMIN — Medication 64.69 GRAM(S): at 09:31

## 2020-12-27 RX ADMIN — SIMETHICONE 80 MILLIGRAM(S): 80 TABLET, CHEWABLE ORAL at 12:55

## 2020-12-27 RX ADMIN — PANTOPRAZOLE SODIUM 40 MILLIGRAM(S): 20 TABLET, DELAYED RELEASE ORAL at 06:08

## 2020-12-27 RX ADMIN — Medication 50 MILLILITER(S): at 14:03

## 2020-12-27 RX ADMIN — ONDANSETRON 4 MILLIGRAM(S): 8 TABLET, FILM COATED ORAL at 14:03

## 2020-12-27 RX ADMIN — Medication 15 MILLIGRAM(S): at 07:45

## 2020-12-27 RX ADMIN — Medication 15 MILLIGRAM(S): at 17:50

## 2020-12-27 RX ADMIN — Medication 100 GRAM(S): at 12:54

## 2020-12-27 RX ADMIN — EMTRICITABINE AND TENOFOVIR DISOPROXIL FUMARATE 1 TABLET(S): 200; 300 TABLET, FILM COATED ORAL at 12:55

## 2020-12-27 RX ADMIN — SIMETHICONE 80 MILLIGRAM(S): 80 TABLET, CHEWABLE ORAL at 06:08

## 2020-12-27 RX ADMIN — ENOXAPARIN SODIUM 40 MILLIGRAM(S): 100 INJECTION SUBCUTANEOUS at 17:27

## 2020-12-27 RX ADMIN — Medication 50 MILLILITER(S): at 22:38

## 2020-12-27 RX ADMIN — Medication 1 PACKET(S): at 09:32

## 2020-12-27 RX ADMIN — Medication 15 MILLIGRAM(S): at 17:27

## 2020-12-27 NOTE — PROGRESS NOTE ADULT - SUBJECTIVE AND OBJECTIVE BOX
Interval Events:   She had abdominal discomfort yesterday but has much improved.   No nausea, vomiting, diarrhea     Hospital Medications:  albumin human 25% IVPB 100 milliLiter(s) IV Intermittent every 8 hours  aluminum hydroxide/magnesium hydroxide/simethicone Suspension 30 milliLiter(s) Oral every 6 hours PRN  dolutegravir 50 milliGRAM(s) Oral daily  emtricitabine 200 mG/tenofovir 300 mG (TRUVADA) 1 Tablet(s) Oral daily  enoxaparin Injectable 40 milliGRAM(s) SubCutaneous every 12 hours  entecavir 0.5 milliGRAM(s) Oral daily  ibuprofen  Tablet. 600 milliGRAM(s) Oral every 6 hours PRN  magnesium sulfate  IVPB 1 Gram(s) IV Intermittent once  ondansetron Injectable 4 milliGRAM(s) IV Push every 8 hours PRN  pantoprazole    Tablet 40 milliGRAM(s) Oral before breakfast  phytonadione   Solution 10 milliGRAM(s) Oral daily  potassium phosphate / sodium phosphate Powder (PHOS-NaK) 1 Packet(s) Oral four times a day with meals  simethicone 80 milliGRAM(s) Chew four times a day      PHYSICAL EXAM:   Vital Signs:  Vital Signs Last 24 Hrs  T(C): 36.4 (27 Dec 2020 09:48), Max: 36.8 (26 Dec 2020 18:42)  T(F): 97.6 (27 Dec 2020 09:48), Max: 98.3 (27 Dec 2020 04:59)  HR: 125 (27 Dec 2020 09:48) (108 - 125)  BP: 98/63 (27 Dec 2020 09:48) (98/63 - 116/78)  BP(mean): --  RR: 20 (27 Dec 2020 09:48) (20 - 20)  SpO2: 98% (27 Dec 2020 09:48) (95% - 100%)  Daily     Daily     GENERAL:  No acute distress  HEENT:  Normocephalic/atraumatic, + scleral icterus  CHEST:  no resp distress  HEART:  Regular rate and rhythm,  ABDOMEN:  Soft, distended, non tender  EXTREMITIES: No cyanosis, clubbing, or edema  SKIN:  No rash/erythema/ecchymoses/petechiae/wounds/abscess/warm/dry  NEURO:  Alert and oriented x 3, no asterixis,      LABS:                        12.3   10.04 )-----------( 267      ( 27 Dec 2020 07:06 )             34.7     Mean Cell Volume: 85.5 fl (12-27-20 @ 07:06)    12-27    130<L>  |  101  |  6<L>  ----------------------------<  117<H>  3.5   |  20<L>  |  0.54    Ca    7.1<L>      27 Dec 2020 00:21  Phos  1.8     12-27  Mg     1.7     12-27    TPro  3.7<L>  /  Alb  1.8<L>  /  TBili  12.3<H>  /  DBili  x   /  AST  1148<H>  /  ALT  414<H>  /  AlkPhos  157<H>  12-27    LIVER FUNCTIONS - ( 27 Dec 2020 00:21 )  Alb: 1.8 g/dL / Pro: 3.7 g/dL / ALK PHOS: 157 U/L / ALT: 414 U/L / AST: 1148 U/L / GGT: x           PT/INR - ( 27 Dec 2020 00:21 )   PT: 23.0 sec;   INR: 1.98 ratio                                     12.3   10.04 )-----------( 267      ( 27 Dec 2020 07:06 )             34.7                         10.3   6.88  )-----------( 233      ( 26 Dec 2020 11:49 )             28.7                         9.8    4.83  )-----------( 249      ( 25 Dec 2020 12:44 )             27.9                         9.9    5.01  )-----------( 263      ( 25 Dec 2020 10:15 )             27.8       Imaging:

## 2020-12-27 NOTE — PROGRESS NOTE ADULT - ASSESSMENT
51 yo F w/ PMHx HIV/AIDS, HBV, previous histo, CMV, renal vein thrombus s/p coumadin (2010) admitted with abd pain x 4 days, found to have acute liver injury and covid +    # Acute liver injury - patient with predominantly hepatocellular liver injury, initially with AST >1800, Alt 600. Also w/ evidence of hepatic synthetic function, with elevated INR, decreased albumin. 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   Her outpatient viral load >400,000,000 corroborates this. Hepatitis delta coinfection has been ruled out as outpatient.   Patient was on biktarvy but switched to Truvada for TDF (as opposed to TAF), and adding double coverage with entecavir.   Also on NAC. Currently w/o acute liver failure as she is mentating well    # hyponatremia - likely hypovolemia, on IVF and albumin    # HIV/AIDS - CD4 114, previous histo, CMV, has been off medications for 1 year until 11/2020    # COVID + - currently covid positive, O2 sat normal > 95% on room air    Recommendations:   - Continue Vitamin K 10 mg for 3 days   - Continue to trend CMP, INR, Mg, Phos, fibrinogen, vbg (lactate) daily   - c/w Truvada and entecavir for HBV  - c/w NAC infusion for acute liver failure (100 mg/kg over 16 hours, will likely need to be reordered daily)  - albumin 100cc 25% q 8 hours  - c/w pantoprazole 40mg qd  - pending clinical course will discuss role of ICU, currently does not require it, however if clinical status worsens may need to transfer to [facility] for COVID ICU bed  - not transplant candidate at this time because of COVID and AIDS  - rest of care per primary team    Brina Washburn   Gastroenterology Fellow  Pager: [phone]  Please call answering service [phone] / on-call GI fellow after 5pm and before 8am, and on weekends. 51 yo F w/ PMHx HIV/AIDS, HBV, previous histo, CMV, renal vein thrombus s/p coumadin (2010) admitted with abd pain x 4 days, found to have acute liver injury and covid +    # Acute liver injury - patient with predominantly hepatocellular liver injury, initially with AST >1800, Alt 600. Also w/ evidence of hepatic synthetic function, with elevated INR, decreased albumin. 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   Her outpatient viral load >400,000,000 corroborates this. Hepatitis delta coinfection has been ruled out as outpatient.   Patient was on biktarvy but switched to Truvada for TDF (as opposed to TAF), and adding double coverage with entecavir.   Also on NAC. Currently without acute liver failure as she is mentating well and INR improving    # hyponatremia - likely hypovolemia, on IVF and albumin    # HIV/AIDS - CD4 114, previous histo, CMV, has been off medications for 1 year until 11/2020    # COVID + - currently covid positive, O2 sat normal > 95% on room air    Recommendations:   - Continue Vitamin K 10 mg for 3 days   - Continue to trend CMP, INR, Mg, Phos, fibrinogen, vbg (lactate) daily   - c/w Truvada and entecavir for HBV  - c/w NAC infusion for acute liver failure (100 mg/kg over 16 hours, will likely need to be reordered daily)  - albumin 100cc 25% q 8 hours  - c/w pantoprazole 40mg qd  - pending clinical course will discuss role of ICU, currently does not require it, however if clinical status worsens may need to transfer to [facility] for COVID ICU bed  - not transplant candidate at this time because of COVID and AIDS  - rest of care per primary team    Brina Washburn   Gastroenterology Fellow  Pager: [phone]  Please call answering service [phone] / on-call GI fellow after 5pm and before 8am, and on weekends.

## 2020-12-27 NOTE — PROGRESS NOTE ADULT - ATTENDING COMMENTS
Patient seen and examined with liver team. I agree with the plan as above.  Elevated AST and bilirubin more likely secondary to COVID as AST predominance is common in COVID. INR improving which is good sign for liver function improvement. We will continue to follow

## 2020-12-27 NOTE — PROGRESS NOTE ADULT - ASSESSMENT
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     #Acute liver injury   Patient with predominantly hepatocellular liver injury, with AST >1800, Alt 600. Also w/ evidence of hepatic synthetic function, with elevated INR, decreased albumin. Etiology likely from HBV, which was previously being treated w/ Symtuza (tenofovir 10mg qd) but patient had been off medication for almost 1 year (until 11/2020) due to insurance lapse. Her outpatient viral load >400,000,000 corroborates this. Hepatitis delta coinfection has been ruled out as outpatient. Patient will require immediate treatment of her HBV, now on biktarvy (tenofovir 25mg). Currently patient is not in acute liver failure, she is mentating well, but is having worsening INR which is concerning. LFTs improving   - c/w truvada and entecavir for HBV  - c/w NAC infusion for acute liver failure (100 mg/kg over 16 hours, will likely need to be reordered daily)  - albumin 100cc 25% q 8 hours  - IVF PRN  - vitamin K 10mg for 10 days (12/26 - 1/4)    #Hyponatremia  - urine lyres and osm  - c/w iv fluids prn    #AIDS  needs ARV medications to help with her immunosuppression but need to change ARV meds to ones metabolized by the kidney  continue the TAF for help controlling the Hep B infection  - In light of significant transaminitis, discontinued the Symtuza and changed meds to Truvada/Tivicay  - CD4 116 consistent with AIDS  - RNA viral load in process - slightly detectable but cannot account for her acute decompensation in liver disease  - If viral load elevated will add Bactrim for PJP prophylaxis    #COVID+  not eligible for Remdesivir secondary to her LFT abnormalities  could consider convalescent plasma if she decompensates  would send inflammatory markers  send QuantiFeron testing- given new onset ascites and abnormal appearing bowels  urine histo ag. in case recurrence  blood cultures x 2 sets    #CMV viremia   - hold off on Valganciclovir but repeat CMV viral load on 12/28    #Prophylactic measures  - DVT ppx: Lovenox bid  - GERD: pantoprazole 40mg qd  - Diet: Vegetarian     50 year old female with past medical history of HIV, CMV, HBV, histoplasmosis, renal vein thrombosis s/p coumadin for 6 months, and strongyloidiasis who presents with abdominal pain found to be COVID-19 PCR positive.     #Acute liver injury   Patient with predominantly hepatocellular liver injury, with AST >1800, Alt 600. Also w/ evidence of hepatic synthetic function, with elevated INR, decreased albumin. Etiology likely from HBV, which was previously being treated w/ Symtuza (tenofovir 10mg qd) but patient had been off medication for almost 1 year (until 11/2020) due to insurance lapse. Her outpatient viral load >400,000,000 corroborates this. Hepatitis delta coinfection has been ruled out as outpatient. Patient will require immediate treatment of her HBV, now on biktarvy (tenofovir 25mg). Currently patient is not in acute liver failure, she is mentating well, but is having worsening INR which is concerning. LFTs improving   - c/w truvada and entecavir for HBV  - c/w NAC infusion for acute liver failure (100 mg/kg over 16 hours, will likely need to be reordered daily)  - albumin 100cc 25% q 8 hours  - IVF PRN  - vitamin K 10mg for 10 days (12/26 - 1/4)    #Hyponatremia  - urine lyres and osm   - c/w iv fluids prn     #AIDS  needs ARV medications to help with her immunosuppression but need to change ARV meds to ones metabolized by the kidney  continue the TAF for help controlling the Hep B infection  - In light of significant transaminitis, discontinued the Symtuza and changed meds to Truvada/Tivicay  - CD4 116 consistent with AIDS  - RNA viral load in process - slightly detectable but cannot account for her acute decompensation in liver disease  - If viral load elevated will add Bactrim for PJP prophylaxis    #COVID+  not eligible for Remdesivir secondary to her LFT abnormalities  could consider convalescent plasma if she decompensates  would send inflammatory markers  send QuantiFeron testing- given new onset ascites and abnormal appearing bowels  urine histo ag. in case recurrence  blood cultures x 2 sets    #CMV viremia   - hold off on Valganciclovir but repeat CMV viral load on 12/28    #Prophylactic measures  - DVT ppx: Lovenox bid  - GERD: pantoprazole 40mg qd  - Diet: Vegetarian     50 year old female with past medical history of HIV, CMV, HBV, histoplasmosis, renal vein thrombosis s/p coumadin for 6 months, and strongyloidiasis who presents with abdominal pain found to be COVID-19 PCR positive.     #Acute liver injury   Patient with predominantly hepatocellular liver injury, with AST >1800, Alt 600. Also w/ evidence of hepatic synthetic function, with elevated INR, decreased albumin. Etiology likely from HBV, which was previously being treated w/ Symtuza (tenofovir 10mg qd) but patient had been off medication for almost 1 year (until 11/2020) due to insurance lapse. Her outpatient viral load >400,000,000 corroborates this. Hepatitis delta coinfection has been ruled out as outpatient. Patient will require immediate treatment of her HBV, now on biktarvy (tenofovir 25mg). Currently patient is not in acute liver failure, she is mentating well, but is having worsening INR which is concerning. LFTs improving   - c/w truvada and entecavir for HBV  - c/w NAC infusion for acute liver failure (100 mg/kg over 16 hours, will likely need to be reordered daily)  - albumin 100cc 25% q 8 hours  - IVF PRN  - vitamin K 10mg for 3 days (12/26 - 12/28)    #Hyponatremia  - urine lyres and osm   - c/w iv fluids prn     #AIDS  needs ARV medications to help with her immunosuppression but need to change ARV meds to ones metabolized by the kidney  continue the TAF for help controlling the Hep B infection  - In light of significant transaminitis, discontinued the Symtuza and changed meds to Truvada/Tivicay  - CD4 116 consistent with AIDS  - RNA viral load in process - slightly detectable but cannot account for her acute decompensation in liver disease  - If viral load elevated will add Bactrim for PJP prophylaxis    #COVID+  not eligible for Remdesivir secondary to her LFT abnormalities  could consider convalescent plasma if she decompensates  would send inflammatory markers  send QuantiFeron testing- given new onset ascites and abnormal appearing bowels  urine histo ag. in case recurrence  blood cultures x 2 sets    #CMV viremia   - hold off on Valganciclovir but repeat CMV viral load on 12/28    #Prophylactic measures  - DVT ppx: Lovenox bid  - GERD: pantoprazole 40mg qd  - Diet: Vegetarian

## 2020-12-27 NOTE — PROGRESS NOTE ADULT - SUBJECTIVE AND OBJECTIVE BOX
PROGRESS NOTE:     Patient is a 50y old  Female who presents with a chief complaint of abdominal pain (26 Dec 2020 08:44)      OVERNIGHT EVENTS: Complained of abdominal pain and was given toradol.   SUBJECTIVE:  ADDITIONAL REVIEW OF SYSTEMS: see above       MEDICATIONS  (STANDING):  acetylcysteine IVPB 7 Gram(s) IV Intermittent once  dolutegravir 50 milliGRAM(s) Oral daily  emtricitabine 200 mG/tenofovir 300 mG (TRUVADA) 1 Tablet(s) Oral daily  enoxaparin Injectable 40 milliGRAM(s) SubCutaneous every 12 hours  entecavir 0.5 milliGRAM(s) Oral daily  ketorolac   Injectable 15 milliGRAM(s) IV Push once  pantoprazole    Tablet 40 milliGRAM(s) Oral before breakfast  phytonadione   Solution 10 milliGRAM(s) Oral daily  potassium phosphate / sodium phosphate Powder (PHOS-NaK) 1 Packet(s) Oral three times a day  simethicone 80 milliGRAM(s) Chew four times a day    MEDICATIONS  (PRN):  aluminum hydroxide/magnesium hydroxide/simethicone Suspension 30 milliLiter(s) Oral every 6 hours PRN Dyspepsia  ibuprofen  Tablet. 600 milliGRAM(s) Oral every 6 hours PRN Mild Pain (1 - 3)  ondansetron Injectable 4 milliGRAM(s) IV Push every 8 hours PRN Nausea and/or Vomiting    Allergies  No Known Allergies      Intolerances      PHYSICAL EXAM:  Vital Signs Last 24 Hrs  T(C): 36.8 (27 Dec 2020 04:59), Max: 36.8 (26 Dec 2020 18:42)  T(F): 98.3 (27 Dec 2020 04:59), Max: 98.3 (27 Dec 2020 04:59)  HR: 115 (27 Dec 2020 04:59) (101 - 118)  BP: 115/83 (27 Dec 2020 04:59) (107/72 - 116/78)  BP(mean): --  RR: 20 (27 Dec 2020 04:59) (19 - 20)  SpO2: 98% (27 Dec 2020 04:59) (95% - 100%)      GENERAL: No acute distress, well-developed  HEAD:  Atraumatic, Normocephalic  EYES: EOMI, PERRLA, conjunctiva and sclera clear  EARS: symmetrical, no tenderness, no discharge  NECK: Supple, no lymphadenopathy, no JVD  CHEST/LUNG: CTAB; No wheezes, rales, or rhonchi  HEART: Regular rate and rhythm; No murmurs, rubs, or gallops  ABDOMEN: Soft, non-tender, non-distended; normal bowel sounds, no organomegaly  EXTREMITIES:  2+ peripheral pulses b/l, No clubbing, cyanosis, or edema  NEUROLOGY: A&O x 3, no focal deficits  SKIN: No rashes or lesions    PATIENT DATA:  CAPILLARY BLOOD GLUCOSE      POCT Blood Glucose.: 118 mg/dL (26 Dec 2020 08:44)    I&O's Summary    26 Dec 2020 07:01  -  27 Dec 2020 07:00  --------------------------------------------------------  IN: 512 mL / OUT: 0 mL / NET: 512 mL        LABS:                        10.3   6.88  )-----------( 233      ( 26 Dec 2020 11:49 )             28.7     12-27    130<L>  |  101  |  6<L>  ----------------------------<  117<H>  3.5   |  20<L>  |  0.54    Ca    7.1<L>      27 Dec 2020 00:21  Phos  1.8     12-27  Mg     1.7     12-27    TPro  3.7<L>  /  Alb  1.8<L>  /  TBili  12.3<H>  /  DBili  x   /  AST  1148<H>  /  ALT  414<H>  /  AlkPhos  157<H>  12-27    PT/INR - ( 27 Dec 2020 00:21 )   PT: 23.0 sec;   INR: 1.98 ratio                     RADIOLOGY & ADDITIONAL TESTS:  Pertinent & New Results:     COORDINATION OF CARE:   Consultants Status: [ON BOARD/ SIGNED OFF]  Consultants Spoke With:   Consults Pending:   Telemetry: YES/NO PROGRESS NOTE:     Patient is a 50y old  Female who presents with a chief complaint of abdominal pain (26 Dec 2020 08:44)      OVERNIGHT EVENTS: Complained of abdominal pain and was given toradol.   SUBJECTIVE: Patient reports that her abdominal pain is much improved. Reports that PO motrin does not help but IV toradol does help improve symptoms. Reports her bilateral LE edema and secondary pain are also improved. Denies CP, SOB, n/v/c/d. Concerned about social matters regarding social security and lack of insurance.  ADDITIONAL REVIEW OF SYSTEMS: see above       MEDICATIONS  (STANDING):  acetylcysteine IVPB 7 Gram(s) IV Intermittent once  dolutegravir 50 milliGRAM(s) Oral daily  emtricitabine 200 mG/tenofovir 300 mG (TRUVADA) 1 Tablet(s) Oral daily  enoxaparin Injectable 40 milliGRAM(s) SubCutaneous every 12 hours  entecavir 0.5 milliGRAM(s) Oral daily  ketorolac   Injectable 15 milliGRAM(s) IV Push once  pantoprazole    Tablet 40 milliGRAM(s) Oral before breakfast  phytonadione   Solution 10 milliGRAM(s) Oral daily  potassium phosphate / sodium phosphate Powder (PHOS-NaK) 1 Packet(s) Oral three times a day  simethicone 80 milliGRAM(s) Chew four times a day    MEDICATIONS  (PRN):  aluminum hydroxide/magnesium hydroxide/simethicone Suspension 30 milliLiter(s) Oral every 6 hours PRN Dyspepsia  ibuprofen  Tablet. 600 milliGRAM(s) Oral every 6 hours PRN Mild Pain (1 - 3)  ondansetron Injectable 4 milliGRAM(s) IV Push every 8 hours PRN Nausea and/or Vomiting    Allergies  No Known Allergies      Intolerances      PHYSICAL EXAM:  Vital Signs Last 24 Hrs  T(C): 36.8 (27 Dec 2020 04:59), Max: 36.8 (26 Dec 2020 18:42)  T(F): 98.3 (27 Dec 2020 04:59), Max: 98.3 (27 Dec 2020 04:59)  HR: 115 (27 Dec 2020 04:59) (101 - 118)  BP: 115/83 (27 Dec 2020 04:59) (107/72 - 116/78)  BP(mean): --  RR: 20 (27 Dec 2020 04:59) (19 - 20)  SpO2: 98% (27 Dec 2020 04:59) (95% - 100%)      GENERAL: No acute distress, well-developed  HEAD:  Atraumatic, Normocephalic  EYES: EOMI, PERRLA, conjunctiva and sclera clear  EARS: symmetrical, no tenderness, no discharge  NECK: Supple, no lymphadenopathy, no JVD  CHEST/LUNG: CTAB; No wheezes, rales, or rhonchi  HEART: Regular rate and rhythm; No murmurs, rubs, or gallops  ABDOMEN: Soft, non-tender, non-distended; normal bowel sounds, no organomegaly  EXTREMITIES:  2+ peripheral pulses b/l, No clubbing, cyanosis, or edema  NEUROLOGY: A&O x 3, no focal deficits  SKIN: No rashes or lesions    PATIENT DATA:  CAPILLARY BLOOD GLUCOSE      POCT Blood Glucose.: 118 mg/dL (26 Dec 2020 08:44)    I&O's Summary    26 Dec 2020 07:01  -  27 Dec 2020 07:00  --------------------------------------------------------  IN: 512 mL / OUT: 0 mL / NET: 512 mL        LABS:                        10.3   6.88  )-----------( 233      ( 26 Dec 2020 11:49 )             28.7     12-27    130<L>  |  101  |  6<L>  ----------------------------<  117<H>  3.5   |  20<L>  |  0.54    Ca    7.1<L>      27 Dec 2020 00:21  Phos  1.8     12-27  Mg     1.7     12-27    TPro  3.7<L>  /  Alb  1.8<L>  /  TBili  12.3<H>  /  DBili  x   /  AST  1148<H>  /  ALT  414<H>  /  AlkPhos  157<H>  12-27    PT/INR - ( 27 Dec 2020 00:21 )   PT: 23.0 sec;   INR: 1.98 ratio                     RADIOLOGY & ADDITIONAL TESTS:  Pertinent & New Results:     COORDINATION OF CARE:   Consultants Status: [ON BOARD/ SIGNED OFF]  Consultants Spoke With:   Consults Pending:   Telemetry: YES/NO PROGRESS NOTE:     Patient is a 50y old  Female who presents with a chief complaint of abdominal pain (26 Dec 2020 08:44)      OVERNIGHT EVENTS: Complained of abdominal pain and was given toradol.   SUBJECTIVE: Patient reports that her abdominal pain is much improved. Reports that PO motrin does not help but IV toradol does help improve symptoms. Reports her bilateral LE edema and secondary pain are also improved. Denies CP, SOB, n/v/c/d. Concerned about social matters regarding social security and lack of insurance.  ADDITIONAL REVIEW OF SYSTEMS: see above       MEDICATIONS  (STANDING):  acetylcysteine IVPB 7 Gram(s) IV Intermittent once  dolutegravir 50 milliGRAM(s) Oral daily  emtricitabine 200 mG/tenofovir 300 mG (TRUVADA) 1 Tablet(s) Oral daily  enoxaparin Injectable 40 milliGRAM(s) SubCutaneous every 12 hours  entecavir 0.5 milliGRAM(s) Oral daily  ketorolac   Injectable 15 milliGRAM(s) IV Push once  pantoprazole    Tablet 40 milliGRAM(s) Oral before breakfast  phytonadione   Solution 10 milliGRAM(s) Oral daily  potassium phosphate / sodium phosphate Powder (PHOS-NaK) 1 Packet(s) Oral three times a day  simethicone 80 milliGRAM(s) Chew four times a day    MEDICATIONS  (PRN):  aluminum hydroxide/magnesium hydroxide/simethicone Suspension 30 milliLiter(s) Oral every 6 hours PRN Dyspepsia  ibuprofen  Tablet. 600 milliGRAM(s) Oral every 6 hours PRN Mild Pain (1 - 3)  ondansetron Injectable 4 milliGRAM(s) IV Push every 8 hours PRN Nausea and/or Vomiting    Allergies  No Known Allergies      Intolerances      PHYSICAL EXAM:  Vital Signs Last 24 Hrs  T(C): 36.8 (27 Dec 2020 04:59), Max: 36.8 (26 Dec 2020 18:42)  T(F): 98.3 (27 Dec 2020 04:59), Max: 98.3 (27 Dec 2020 04:59)  HR: 115 (27 Dec 2020 04:59) (101 - 118)  BP: 115/83 (27 Dec 2020 04:59) (107/72 - 116/78)  BP(mean): --  RR: 20 (27 Dec 2020 04:59) (19 - 20)  SpO2: 98% (27 Dec 2020 04:59) (95% - 100%)      GENERAL: No acute distress, well-developed  HEAD:  Atraumatic, Normocephalic  EYES: EOMI, PERRLA, conjunctiva and sclera clear  EARS: symmetrical, no tenderness, no discharge  NECK: Supple, no lymphadenopathy, no JVD  CHEST/LUNG: CTAB; No wheezes, rales, or rhonchi  HEART: Regular rate and rhythm; No murmurs, rubs, or gallops  ABDOMEN: Soft, non-tender, non-distended; normal bowel sounds, no organomegaly  EXTREMITIES:  2+ peripheral pulses b/l, No clubbing, cyanosis. Moderate edema in bilateral LE. Edema in LUE improved  NEUROLOGY: A&O x 3, no focal deficits  SKIN: No rashes or lesions    PATIENT DATA:  CAPILLARY BLOOD GLUCOSE      POCT Blood Glucose.: 118 mg/dL (26 Dec 2020 08:44)    I&O's Summary    26 Dec 2020 07:01  -  27 Dec 2020 07:00  --------------------------------------------------------  IN: 512 mL / OUT: 0 mL / NET: 512 mL        LABS:                        10.3   6.88  )-----------( 233      ( 26 Dec 2020 11:49 )             28.7     12-27    130<L>  |  101  |  6<L>  ----------------------------<  117<H>  3.5   |  20<L>  |  0.54    Ca    7.1<L>      27 Dec 2020 00:21  Phos  1.8     12-27  Mg     1.7     12-27    TPro  3.7<L>  /  Alb  1.8<L>  /  TBili  12.3<H>  /  DBili  x   /  AST  1148<H>  /  ALT  414<H>  /  AlkPhos  157<H>  12-27    PT/INR - ( 27 Dec 2020 00:21 )   PT: 23.0 sec;   INR: 1.98 ratio                     RADIOLOGY & ADDITIONAL TESTS:  Pertinent & New Results:     COORDINATION OF CARE:   Consultants Status: [ON BOARD/ SIGNED OFF]  Consultants Spoke With:   Consults Pending:   Telemetry: YES/NO

## 2020-12-28 LAB
ALBUMIN SERPL ELPH-MCNC: 1.9 G/DL — LOW (ref 3.3–5)
ALP SERPL-CCNC: 104 U/L — SIGNIFICANT CHANGE UP (ref 40–120)
ALT FLD-CCNC: 298 U/L — HIGH (ref 10–45)
ANION GAP SERPL CALC-SCNC: 6 MMOL/L — SIGNIFICANT CHANGE UP (ref 5–17)
AST SERPL-CCNC: 935 U/L — HIGH (ref 10–40)
BASOPHILS # BLD AUTO: 0.02 K/UL — SIGNIFICANT CHANGE UP (ref 0–0.2)
BASOPHILS NFR BLD AUTO: 0.3 % — SIGNIFICANT CHANGE UP (ref 0–2)
BILIRUB SERPL-MCNC: 11.5 MG/DL — HIGH (ref 0.2–1.2)
BLD GP AB SCN SERPL QL: NEGATIVE — SIGNIFICANT CHANGE UP
BUN SERPL-MCNC: 7 MG/DL — SIGNIFICANT CHANGE UP (ref 7–23)
CALCIUM SERPL-MCNC: 7.2 MG/DL — LOW (ref 8.4–10.5)
CHLORIDE SERPL-SCNC: 104 MMOL/L — SIGNIFICANT CHANGE UP (ref 96–108)
CO2 SERPL-SCNC: 24 MMOL/L — SIGNIFICANT CHANGE UP (ref 22–31)
CREAT SERPL-MCNC: 0.51 MG/DL — SIGNIFICANT CHANGE UP (ref 0.5–1.3)
CRP SERPL-MCNC: 1.4 MG/DL — HIGH (ref 0–0.4)
D DIMER BLD IA.RAPID-MCNC: <150 NG/ML DDU — SIGNIFICANT CHANGE UP
EOSINOPHIL # BLD AUTO: 0.48 K/UL — SIGNIFICANT CHANGE UP (ref 0–0.5)
EOSINOPHIL NFR BLD AUTO: 6.7 % — HIGH (ref 0–6)
FERRITIN SERPL-MCNC: 746 NG/ML — HIGH (ref 15–150)
FIBRINOGEN PPP-MCNC: 113 MG/DL — LOW (ref 290–520)
GLUCOSE BLDC GLUCOMTR-MCNC: 80 MG/DL — SIGNIFICANT CHANGE UP (ref 70–99)
GLUCOSE SERPL-MCNC: 86 MG/DL — SIGNIFICANT CHANGE UP (ref 70–99)
HCT VFR BLD CALC: 23.9 % — LOW (ref 34.5–45)
HCT VFR BLD CALC: 25.1 % — LOW (ref 34.5–45)
HCT VFR BLD CALC: 27.2 % — LOW (ref 34.5–45)
HGB BLD-MCNC: 8.1 G/DL — LOW (ref 11.5–15.5)
HGB BLD-MCNC: 8.9 G/DL — LOW (ref 11.5–15.5)
HGB BLD-MCNC: 9.7 G/DL — LOW (ref 11.5–15.5)
IMM GRANULOCYTES NFR BLD AUTO: 1 % — SIGNIFICANT CHANGE UP (ref 0–1.5)
INR BLD: 1.78 RATIO — HIGH (ref 0.88–1.16)
LACTATE BLDV-MCNC: 1.1 MMOL/L — SIGNIFICANT CHANGE UP (ref 0.7–2)
LEPTOSPIRA AB TITR SER: NEGATIVE — SIGNIFICANT CHANGE UP
LYMPHOCYTES # BLD AUTO: 2.5 K/UL — SIGNIFICANT CHANGE UP (ref 1–3.3)
LYMPHOCYTES # BLD AUTO: 34.7 % — SIGNIFICANT CHANGE UP (ref 13–44)
MAGNESIUM SERPL-MCNC: 1.9 MG/DL — SIGNIFICANT CHANGE UP (ref 1.6–2.6)
MCHC RBC-ENTMCNC: 30.1 PG — SIGNIFICANT CHANGE UP (ref 27–34)
MCHC RBC-ENTMCNC: 30.7 PG — SIGNIFICANT CHANGE UP (ref 27–34)
MCHC RBC-ENTMCNC: 30.8 PG — SIGNIFICANT CHANGE UP (ref 27–34)
MCHC RBC-ENTMCNC: 33.9 GM/DL — SIGNIFICANT CHANGE UP (ref 32–36)
MCHC RBC-ENTMCNC: 35.5 GM/DL — SIGNIFICANT CHANGE UP (ref 32–36)
MCHC RBC-ENTMCNC: 35.7 GM/DL — SIGNIFICANT CHANGE UP (ref 32–36)
MCV RBC AUTO: 86.3 FL — SIGNIFICANT CHANGE UP (ref 80–100)
MCV RBC AUTO: 86.6 FL — SIGNIFICANT CHANGE UP (ref 80–100)
MCV RBC AUTO: 88.8 FL — SIGNIFICANT CHANGE UP (ref 80–100)
MONOCYTES # BLD AUTO: 0.72 K/UL — SIGNIFICANT CHANGE UP (ref 0–0.9)
MONOCYTES NFR BLD AUTO: 10 % — SIGNIFICANT CHANGE UP (ref 2–14)
NEUTROPHILS # BLD AUTO: 3.41 K/UL — SIGNIFICANT CHANGE UP (ref 1.8–7.4)
NEUTROPHILS NFR BLD AUTO: 47.3 % — SIGNIFICANT CHANGE UP (ref 43–77)
NRBC # BLD: 0 /100 WBCS — SIGNIFICANT CHANGE UP (ref 0–0)
OB PNL STL: NEGATIVE — SIGNIFICANT CHANGE UP
PHOSPHATE SERPL-MCNC: 1.9 MG/DL — LOW (ref 2.5–4.5)
PLATELET # BLD AUTO: 169 K/UL — SIGNIFICANT CHANGE UP (ref 150–400)
PLATELET # BLD AUTO: 184 K/UL — SIGNIFICANT CHANGE UP (ref 150–400)
PLATELET # BLD AUTO: 194 K/UL — SIGNIFICANT CHANGE UP (ref 150–400)
POTASSIUM SERPL-MCNC: 3.2 MMOL/L — LOW (ref 3.5–5.3)
POTASSIUM SERPL-SCNC: 3.2 MMOL/L — LOW (ref 3.5–5.3)
PROT SERPL-MCNC: 3.3 G/DL — LOW (ref 6–8.3)
PROTHROM AB SERPL-ACNC: 20.8 SEC — HIGH (ref 10.6–13.6)
RBC # BLD: 2.69 M/UL — LOW (ref 3.8–5.2)
RBC # BLD: 2.9 M/UL — LOW (ref 3.8–5.2)
RBC # BLD: 3.15 M/UL — LOW (ref 3.8–5.2)
RBC # FLD: 20.9 % — HIGH (ref 10.3–14.5)
RBC # FLD: 21.1 % — HIGH (ref 10.3–14.5)
RBC # FLD: 21.1 % — HIGH (ref 10.3–14.5)
RH IG SCN BLD-IMP: POSITIVE — SIGNIFICANT CHANGE UP
SODIUM SERPL-SCNC: 134 MMOL/L — LOW (ref 135–145)
WBC # BLD: 6.4 K/UL — SIGNIFICANT CHANGE UP (ref 3.8–10.5)
WBC # BLD: 7.2 K/UL — SIGNIFICANT CHANGE UP (ref 3.8–10.5)
WBC # BLD: 7.84 K/UL — SIGNIFICANT CHANGE UP (ref 3.8–10.5)
WBC # FLD AUTO: 6.4 K/UL — SIGNIFICANT CHANGE UP (ref 3.8–10.5)
WBC # FLD AUTO: 7.2 K/UL — SIGNIFICANT CHANGE UP (ref 3.8–10.5)
WBC # FLD AUTO: 7.84 K/UL — SIGNIFICANT CHANGE UP (ref 3.8–10.5)

## 2020-12-28 PROCEDURE — 99232 SBSQ HOSP IP/OBS MODERATE 35: CPT

## 2020-12-28 RX ORDER — MAGNESIUM SULFATE 500 MG/ML
1 VIAL (ML) INJECTION ONCE
Refills: 0 | Status: COMPLETED | OUTPATIENT
Start: 2020-12-28 | End: 2020-12-28

## 2020-12-28 RX ORDER — SODIUM,POTASSIUM PHOSPHATES 278-250MG
1 POWDER IN PACKET (EA) ORAL
Refills: 0 | Status: COMPLETED | OUTPATIENT
Start: 2020-12-28 | End: 2020-12-30

## 2020-12-28 RX ORDER — KETOROLAC TROMETHAMINE 30 MG/ML
15 SYRINGE (ML) INJECTION EVERY 8 HOURS
Refills: 0 | Status: DISCONTINUED | OUTPATIENT
Start: 2020-12-28 | End: 2020-12-30

## 2020-12-28 RX ORDER — ACETYLCYSTEINE 200 MG/ML
7 VIAL (ML) MISCELLANEOUS ONCE
Refills: 0 | Status: COMPLETED | OUTPATIENT
Start: 2020-12-28 | End: 2020-12-28

## 2020-12-28 RX ORDER — POTASSIUM CHLORIDE 20 MEQ
40 PACKET (EA) ORAL ONCE
Refills: 0 | Status: COMPLETED | OUTPATIENT
Start: 2020-12-28 | End: 2020-12-28

## 2020-12-28 RX ORDER — KETOROLAC TROMETHAMINE 30 MG/ML
15 SYRINGE (ML) INJECTION ONCE
Refills: 0 | Status: DISCONTINUED | OUTPATIENT
Start: 2020-12-28 | End: 2020-12-28

## 2020-12-28 RX ORDER — OXYCODONE HYDROCHLORIDE 5 MG/1
5 TABLET ORAL ONCE
Refills: 0 | Status: DISCONTINUED | OUTPATIENT
Start: 2020-12-28 | End: 2020-12-28

## 2020-12-28 RX ORDER — POTASSIUM PHOSPHATE, MONOBASIC POTASSIUM PHOSPHATE, DIBASIC 236; 224 MG/ML; MG/ML
30 INJECTION, SOLUTION INTRAVENOUS ONCE
Refills: 0 | Status: COMPLETED | OUTPATIENT
Start: 2020-12-28 | End: 2020-12-28

## 2020-12-28 RX ORDER — MAGNESIUM SULFATE 500 MG/ML
2 VIAL (ML) INJECTION ONCE
Refills: 0 | Status: COMPLETED | OUTPATIENT
Start: 2020-12-28 | End: 2020-12-28

## 2020-12-28 RX ADMIN — ENTECAVIR 0.5 MILLIGRAM(S): 0.5 TABLET ORAL at 12:11

## 2020-12-28 RX ADMIN — Medication 15 MILLIGRAM(S): at 09:56

## 2020-12-28 RX ADMIN — SIMETHICONE 80 MILLIGRAM(S): 80 TABLET, CHEWABLE ORAL at 00:36

## 2020-12-28 RX ADMIN — Medication 15 MILLIGRAM(S): at 10:20

## 2020-12-28 RX ADMIN — ENOXAPARIN SODIUM 40 MILLIGRAM(S): 100 INJECTION SUBCUTANEOUS at 18:00

## 2020-12-28 RX ADMIN — Medication 64.69 GRAM(S): at 09:56

## 2020-12-28 RX ADMIN — Medication 15 MILLIGRAM(S): at 00:35

## 2020-12-28 RX ADMIN — Medication 15 MILLIGRAM(S): at 01:30

## 2020-12-28 RX ADMIN — Medication 50 GRAM(S): at 18:00

## 2020-12-28 RX ADMIN — Medication 100 GRAM(S): at 08:50

## 2020-12-28 RX ADMIN — DOLUTEGRAVIR SODIUM 50 MILLIGRAM(S): 25 TABLET, FILM COATED ORAL at 12:11

## 2020-12-28 RX ADMIN — OXYCODONE HYDROCHLORIDE 5 MILLIGRAM(S): 5 TABLET ORAL at 16:20

## 2020-12-28 RX ADMIN — Medication 15 MILLIGRAM(S): at 17:30

## 2020-12-28 RX ADMIN — Medication 50 MILLILITER(S): at 22:46

## 2020-12-28 RX ADMIN — EMTRICITABINE AND TENOFOVIR DISOPROXIL FUMARATE 1 TABLET(S): 200; 300 TABLET, FILM COATED ORAL at 12:11

## 2020-12-28 RX ADMIN — Medication 50 MILLILITER(S): at 14:16

## 2020-12-28 RX ADMIN — Medication 1 PACKET(S): at 12:11

## 2020-12-28 RX ADMIN — PANTOPRAZOLE SODIUM 40 MILLIGRAM(S): 20 TABLET, DELAYED RELEASE ORAL at 06:08

## 2020-12-28 RX ADMIN — Medication 10 MILLIGRAM(S): at 12:11

## 2020-12-28 RX ADMIN — Medication 1 PACKET(S): at 18:00

## 2020-12-28 RX ADMIN — SIMETHICONE 80 MILLIGRAM(S): 80 TABLET, CHEWABLE ORAL at 18:00

## 2020-12-28 RX ADMIN — Medication 15 MILLIGRAM(S): at 17:06

## 2020-12-28 RX ADMIN — Medication 50 MILLILITER(S): at 06:07

## 2020-12-28 RX ADMIN — OXYCODONE HYDROCHLORIDE 5 MILLIGRAM(S): 5 TABLET ORAL at 15:34

## 2020-12-28 RX ADMIN — SIMETHICONE 80 MILLIGRAM(S): 80 TABLET, CHEWABLE ORAL at 06:08

## 2020-12-28 RX ADMIN — POTASSIUM PHOSPHATE, MONOBASIC POTASSIUM PHOSPHATE, DIBASIC 83.33 MILLIMOLE(S): 236; 224 INJECTION, SOLUTION INTRAVENOUS at 09:56

## 2020-12-28 RX ADMIN — Medication 40 MILLIEQUIVALENT(S): at 12:10

## 2020-12-28 RX ADMIN — ENOXAPARIN SODIUM 40 MILLIGRAM(S): 100 INJECTION SUBCUTANEOUS at 06:08

## 2020-12-28 RX ADMIN — SIMETHICONE 80 MILLIGRAM(S): 80 TABLET, CHEWABLE ORAL at 12:11

## 2020-12-28 NOTE — PROGRESS NOTE ADULT - ASSESSMENT
Assessment:  The patient is a 50 year old female with past medical history of HIV, CMV, HBV, histoplasmosis, renal vein thrombosis s/p coumadin for 6 months, and strongyloidiasis who presents with abdominal pain. She reports constant upper abdominal pain described as constant in nature. She reports three episodes of non-bloody vomiting today without diarrhea. She denies recent travel, sick contacts, or pets at home. She denies fever. CBC showed reactive thrombocytosis. CMP showed hyponatremia with mild hypocalcemia. She was found to be COVID-19 PCR positive. Infectious disease was consulted for HIV and COVID-19 management.      Plan:  # History of HBV infection  Has chronic active hepatitis B with a breakthrough viral load and acute decompensation in her liver disease with hepatitis and poor synthetic function based upon her labs  Hep viral load this admission over 100million copies/ml  Concern for potential for liver failure is decreasing as her LFTs are slowly improving  Symtuza discontinued  some HIV literature that Truvada may be superior to TAF for controlling hepatitis B- changed ARV regimen to Truvada and Tivicay and should continue new regimen at time of discharge   Hepatology  added entecavir pending HBV resistance testing      # COVID-19 disease   tolerating RA without supplemental oxygen and CXR NAD   unable to give patient remdesivir based upon her markedly elevated LFTS  stable on RA at this point  increase in LFTS/hepatitis component likely due to the COVID  -     # AIDS  - In light of significant transaminitis, discontinued the Symtuza and changed meds to Truvada/Tivicay  - CD4 116 consistent with AIDS  - RNA viral load in process - slightly detectable but cannot account for her acute decompensation in liver disease    # History of CMV viremia  - CMV PCR   -Cytomegalovirus By PCR:   183 Assay Range: 96 IU/mL to 1.88E+08 IU/mL  One IU is equal to 0.53 copies of CMV.  The limit of quantitation (LOQ) is 96 IU/mL. CMV DNA detected below  the LOQ will be reported as Detected:<96 IU/mL.    will hold off on Valganciclovir but repeat CMV viral load on 12/28        Chencho Mcadams MD  [phone]  After 5pm/weekends [phone]

## 2020-12-28 NOTE — PROGRESS NOTE ADULT - SUBJECTIVE AND OBJECTIVE BOX
Authored by Gee Gutiérrez MD ([phone]) [facility]    Patient is a 50y old  Female who presents with a chief complaint of Abdominal pain (28 Dec 2020 08:54)    SUBJECTIVE / OVERNIGHT EVENTS: NAEON. This am pt notes her abd pain improved s/p ketorolac. Otherwise denies HA, cp, SOB, abd pain, diarrhea, constipation, dysuria, hematuria.     MEDICATIONS  (STANDING):  albumin human 25% IVPB 100 milliLiter(s) IV Intermittent every 8 hours  dolutegravir 50 milliGRAM(s) Oral daily  emtricitabine 200 mG/tenofovir 300 mG (TRUVADA) 1 Tablet(s) Oral daily  enoxaparin Injectable 40 milliGRAM(s) SubCutaneous every 12 hours  entecavir 0.5 milliGRAM(s) Oral daily  magnesium sulfate  IVPB 2 Gram(s) IV Intermittent once  pantoprazole    Tablet 40 milliGRAM(s) Oral before breakfast  potassium phosphate / sodium phosphate Powder (PHOS-NaK) 1 Packet(s) Oral two times a day  simethicone 80 milliGRAM(s) Chew four times a day    MEDICATIONS  (PRN):  aluminum hydroxide/magnesium hydroxide/simethicone Suspension 30 milliLiter(s) Oral every 6 hours PRN Dyspepsia  ibuprofen  Tablet. 600 milliGRAM(s) Oral every 6 hours PRN Mild Pain (1 - 3)  ondansetron Injectable 4 milliGRAM(s) IV Push every 8 hours PRN Nausea and/or Vomiting    CAPILLARY BLOOD GLUCOSE  POCT Blood Glucose.: 80 mg/dL (28 Dec 2020 09:07)    I&O's Summary    27 Dec 2020 07:01  -  28 Dec 2020 07:00  --------------------------------------------------------  IN: 1830.6 mL / OUT: 0 mL / NET: 1830.6 mL    PHYSICAL EXAM:  Vital Signs Last 24 Hrs  T(C): 36.4 (28 Dec 2020 10:06), Max: 36.7 (27 Dec 2020 21:06)  T(F): 97.6 (28 Dec 2020 10:06), Max: 98 (27 Dec 2020 21:06)  HR: 97 (28 Dec 2020 10:06) (97 - 115)  BP: 108/74 (28 Dec 2020 10:06) (90/65 - 109/76)  BP(mean): --  RR: 18 (28 Dec 2020 10:06) (18 - 20)  SpO2: 98% (28 Dec 2020 10:06) (96% - 100%)    GENERAL: No acute distress, well-developed  HEAD:  Atraumatic, Normocephalic  EYES: EOMI, PERRLA, conjunctiva and sclera clear  NECK: Supple, no lymphadenopathy, no JVD  CHEST/LUNG: CTAB; No wheezes, rales, or rhonchi  HEART: Regular rate and rhythm; No murmurs, rubs, or gallops  ABDOMEN: +Mild TTP of RUQ. Soft. +distended; normal bowel sounds, no organomegaly  EXTREMITIES:  2+ peripheral pulses b/l, No clubbing, cyanosis, or edema  NEUROLOGY: A&O x 3, no focal deficits  SKIN: No rashes or lesions    LABS:                        8.9    7.84  )-----------( 184      ( 28 Dec 2020 09:22 )             25.1     12-28    134<L>  |  104  |  7   ----------------------------<  86  3.2<L>   |  24  |  0.51    Ca    7.2<L>      28 Dec 2020 06:25  Phos  1.9     12-28  Mg     1.9     12-28    TPro  3.3<L>  /  Alb  1.9<L>  /  TBili  11.5<H>  /  DBili  x   /  AST  935<H>  /  ALT  298<H>  /  AlkPhos  104  12-28    PT/INR - ( 28 Dec 2020 06:25 )   PT: 20.8 sec;   INR: 1.78 ratio

## 2020-12-28 NOTE — PROGRESS NOTE ADULT - SUBJECTIVE AND OBJECTIVE BOX
Chief complaint: Abdominal pain  Reason for consult: Abnormal liver enzymes    Interval Events:   - No new complaints this morning. Abdominal pain has improved.    MEDICATIONS  (STANDING):  acetylcysteine IVPB 7 Gram(s) IV Intermittent once  albumin human 25% IVPB 100 milliLiter(s) IV Intermittent every 8 hours  dolutegravir 50 milliGRAM(s) Oral daily  emtricitabine 200 mG/tenofovir 300 mG (TRUVADA) 1 Tablet(s) Oral daily  enoxaparin Injectable 40 milliGRAM(s) SubCutaneous every 12 hours  entecavir 0.5 milliGRAM(s) Oral daily  pantoprazole    Tablet 40 milliGRAM(s) Oral before breakfast  phytonadione   Solution 10 milliGRAM(s) Oral daily  potassium chloride   Solution 40 milliEquivalent(s) Oral once  potassium phosphate IVPB 30 milliMole(s) IV Intermittent once  simethicone 80 milliGRAM(s) Chew four times a day    MEDICATIONS  (PRN):  aluminum hydroxide/magnesium hydroxide/simethicone Suspension 30 milliLiter(s) Oral every 6 hours PRN Dyspepsia  ibuprofen  Tablet. 600 milliGRAM(s) Oral every 6 hours PRN Mild Pain (1 - 3)  ondansetron Injectable 4 milliGRAM(s) IV Push every 8 hours PRN Nausea and/or Vomiting    PHYSICAL EXAM:   Vital Signs:  Vital Signs Last 24 Hrs  T(C): 36.6 (28 Dec 2020 04:47), Max: 36.7 (27 Dec 2020 21:06)  T(F): 97.8 (28 Dec 2020 04:47), Max: 98 (27 Dec 2020 21:06)  HR: 101 (28 Dec 2020 04:47) (99 - 125)  BP: 109/76 (28 Dec 2020 04:47) (90/65 - 109/76)  BP(mean): --  RR: 18 (28 Dec 2020 04:47) (18 - 20)  SpO2: 98% (28 Dec 2020 04:47) (96% - 100%)    GENERAL:  No acute distress  HEENT:  Normocephalic/atraumatic, + scleral icterus  CHEST:  No resp distress  HEART:  Regular rate and rhythm  ABDOMEN:  Soft, distended, non tender  EXTREMITIES: No cyanosis, clubbing, or edema  SKIN:  No rash/erythema  NEURO:  Alert and oriented x 3    LABS:                        9.7    7.20  )-----------( 194      ( 28 Dec 2020 06:25 )             27.2     12-28    134<L>  |  104  |  7   ----------------------------<  86  3.2<L>   |  24  |  0.51    Ca    7.2<L>      28 Dec 2020 06:25  Phos  1.9     12-28  Mg     1.9     12-28    TPro  3.3<L>  /  Alb  1.9<L>  /  TBili  11.5<H>  /  DBili  x   /  AST  935<H>  /  ALT  298<H>  /  AlkPhos  104  12-28    LIVER FUNCTIONS - ( 28 Dec 2020 06:25 )  Alb: 1.9 g/dL / Pro: 3.3 g/dL / ALK PHOS: 104 U/L / ALT: 298 U/L / AST: 935 U/L / GGT: x           PT/INR - ( 28 Dec 2020 06:25 )   PT: 20.8 sec;   INR: 1.78 ratio      Imaging:    Reviewed

## 2020-12-28 NOTE — PROGRESS NOTE ADULT - ASSESSMENT
49 yo F w/ PMHx HIV/AIDS, HBV, previous histo, CMV, renal vein thrombus s/p coumadin (2010) admitted with abd pain x 4 days, found to have acute liver injury and covid +    # Acute liver injury - patient with predominantly hepatocellular liver injury, initially with AST >1800, Alt 600. Also w/ evidence of hepatic synthetic function, with elevated INR, decreased albumin. 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   Her outpatient viral load >400,000,000 corroborates this. Hepatitis delta coinfection has been ruled out as outpatient.   Patient was on biktarvy but switched to Truvada for TDF (as opposed to TAF), and adding double coverage with entecavir.   Also on NAC. Currently with minimal concern for acute liver failure as she is mentating well and INR improving  # HIV/AIDS - CD4 114, previous histo, CMV, has been off medications for 1 year until 11/2020  # COVID + - currently covid positive, O2 sat normal > 95% on room air    Recommendations:   - Continue Vitamin K 10 mg for 3 days   - Continue to trend CMP, INR, Mg, Phos, fibrinogen, vbg (lactate) daily   - c/w Truvada and entecavir for HBV  - c/w NAC infusion for acute liver failure (100 mg/kg over 16 hours, will likely need to be reordered daily)  - Continue albumin 100cc 25% q 8 hours  - c/w pantoprazole 40mg qd  - Pending clinical course will discuss role of ICU, currently does not require it, however if clinical status worsens may need to transfer to [facility] for COVID ICU bed  - Not transplant candidate at this time because of COVID and AIDS  - Rest of care per primary team    Alvarado Marin MD  Gastroenterology and Hepatology Fellow  Please contact via [employer] Teams    Please call Hepatology ([phone]) if there are any additional questions or concerns.    Please call answering service for on-call GI fellow ([phone]) after 5pm and before 8am, and on weekends.

## 2020-12-28 NOTE — PROGRESS NOTE ADULT - ASSESSMENT
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     #Acute liver injury   Patient with predominantly hepatocellular liver injury, with AST >1800, Alt 600. Also w/ evidence of hepatic synthetic function, with elevated INR, decreased albumin. Etiology likely from HBV, which was previously being treated w/ Symtuza (tenofovir 10mg qd) but patient had been off medication for almost 1 year (until 11/2020) due to insurance lapse. Her outpatient viral load >400,000,000 corroborates this. Hepatitis delta coinfection has been ruled out as outpatient. Patient will require immediate treatment of her HBV, now on biktarvy (tenofovir 25mg). Currently patient is not in acute liver failure, she is mentating well, but is having worsening INR which is concerning. LFTs improving   - c/w truvada and entecavir for HBV  - c/w NAC infusion for acute liver failure (100 mg/kg over 16 hours, will likely need to be reordered daily)  - albumin 100cc 25% q 8 hours  - IVF PRN  - vitamin K 10mg for 3 days (12/26 - 12/28)    #Hyponatremia  - urine lyres and osm   - c/w iv fluids prn     #AIDS  needs ARV medications to help with her immunosuppression but need to change ARV meds to ones metabolized by the kidney  continue the TAF for help controlling the Hep B infection  - In light of significant transaminitis, discontinued the Symtuza and changed meds to Truvada/Tivicay  - CD4 116 consistent with AIDS  - RNA viral load in process - slightly detectable but cannot account for her acute decompensation in liver disease  - If viral load elevated will add Bactrim for PJP prophylaxis    #COVID+  not eligible for Remdesivir secondary to her LFT abnormalities  could consider convalescent plasma if she decompensates  would send inflammatory markers  send QuantiFeron testing- given new onset ascites and abnormal appearing bowels  urine histo ag. in case recurrence  blood cultures x 2 sets    #CMV viremia   - hold off on Valganciclovir but repeat CMV viral load on 12/28    #Prophylactic measures  - DVT ppx: Lovenox bid  - GERD: pantoprazole 40mg qd  - Diet: Vegetarian     50 year old female with past medical history of HIV, CMV, HBV, histoplasmosis, renal vein thrombosis s/p coumadin for 6 months, and strongyloidiasis who presents with abdominal pain found to be COVID-19 PCR positive.     #Acute liver injury   Patient with predominantly hepatocellular liver injury, with AST >1800, Alt 600. Also w/ evidence of hepatic synthetic function, with elevated INR, decreased albumin. Etiology likely from HBV, which was previously being treated w/ Symtuza (tenofovir 10mg qd) but patient had been off medication for almost 1 year (until 11/2020) due to insurance lapse. Her outpatient viral load >400,000,000 corroborates this. Hepatitis delta coinfection has been ruled out as outpatient. Patient will require immediate treatment of her HBV, now on biktarvy (tenofovir 25mg). Currently patient is not in acute liver failure, she is mentating well, but is having worsening INR which is concerning. LFTs improving   - c/w truvada and entecavir for HBV  - c/w NAC infusion for acute liver failure (100 mg/kg over 16 hours, will likely need to be reordered daily)  - albumin 100cc 25% q 8 hours  - IVF PRN  - vitamin K 10mg for 3 days (12/26 - 12/28)  - ketorolac 15 mg q8h prn     #Anemia:   - likely dilutional 2/2 albumin. no s/s of bleed  - type and cross x2  - f/u FOBT  - repeat CBC at 6pm, monitor H/H q12h for now  - transfuse Hgb <7    #Hyponatremia- improving  - urine lyres and osm   - c/w iv fluids prn     #AIDS  needs ARV medications to help with her immunosuppression but need to change ARV meds to ones metabolized by the kidney  continue the TAF for help controlling the Hep B infection  - In light of significant transaminitis, discontinued the Symtuza and changed meds to Truvada/Tivicay  - CD4 116 consistent with AIDS  - RNA viral load in process - slightly detectable but cannot account for her acute decompensation in liver disease  - If viral load elevated will add Bactrim for PJP prophylaxis    #COVID+  not eligible for Remdesivir secondary to her LFT abnormalities  could consider convalescent plasma if she decompensates  D- dimer <150, fibrinogen 113 (downtrending), ferritin 746 (uptrending)  send QuantiFeron testing- given new onset ascites and abnormal appearing bowels  urine histo ag. in case recurrence  blood cultures 12/22- no growth    #CMV viremia   - hold off on Valganciclovir but repeat CMV viral load on 12/28    #Prophylactic measures  - DVT ppx: Lovenox bid  - GERD: pantoprazole 40mg qd  - Diet: Vegetarian

## 2020-12-28 NOTE — PROGRESS NOTE ADULT - SUBJECTIVE AND OBJECTIVE BOX
INFECTIOUS DISEASES FOLLOW UP-- Hollie Mcadams  [phone]    This is a follow up note for this  50yFemale with  High liver transaminase level with diminished synthetic liver function        ROS:  CONSTITUTIONAL:  No fever, c/o RUQ pain  CARDIOVASCULAR:  No chest pain or palpitations  RESPIRATORY:  No dyspnea  GASTROINTESTINAL:  No nausea, vomiting, diarrhea, notes RUQ pain abdominal pain  GENITOURINARY:  No dysuria  NEUROLOGIC:  No headache,     Allergies    No Known Allergies    Intolerances        ANTIBIOTICS/RELEVANT:  antimicrobials  dolutegravir 50 milliGRAM(s) Oral daily  emtricitabine 200 mG/tenofovir 300 mG (TRUVADA) 1 Tablet(s) Oral daily  entecavir 0.5 milliGRAM(s) Oral daily    immunologic:    OTHER:  albumin human 25% IVPB 100 milliLiter(s) IV Intermittent every 8 hours  aluminum hydroxide/magnesium hydroxide/simethicone Suspension 30 milliLiter(s) Oral every 6 hours PRN  enoxaparin Injectable 40 milliGRAM(s) SubCutaneous every 12 hours  ibuprofen  Tablet. 600 milliGRAM(s) Oral every 6 hours PRN  ketorolac   Injectable 15 milliGRAM(s) IV Push every 8 hours PRN  ondansetron Injectable 4 milliGRAM(s) IV Push every 8 hours PRN  pantoprazole    Tablet 40 milliGRAM(s) Oral before breakfast  potassium phosphate / sodium phosphate Powder (PHOS-NaK) 1 Packet(s) Oral two times a day  simethicone 80 milliGRAM(s) Chew four times a day      Objective:  Vital Signs Last 24 Hrs  T(C): 36.4 (28 Dec 2020 14:06), Max: 36.7 (27 Dec 2020 21:06)  T(F): 97.6 (28 Dec 2020 14:06), Max: 98 (27 Dec 2020 21:06)  HR: 105 (28 Dec 2020 14:06) (97 - 105)  BP: 111/79 (28 Dec 2020 14:06) (103/69 - 111/79)  BP(mean): --  RR: 18 (28 Dec 2020 14:06) (18 - 18)  SpO2: 98% (28 Dec 2020 14:06) (96% - 100%)    PHYSICAL EXAM:  Constitutional:no acute distress  Eyes:PIPO, EOMI, icterus remains  Ear/Nose/Throat: no oral lesions, 	  Respiratory: clear BL  Cardiovascular: S1S2  Gastrointestinal:soft, (+) BS, noted tenderness RUQ with palpable liver edge  Extremities:no e/e/c  No Lymphadenopathy  IV sites not inflammed.    LABS:                        8.1    6.40  )-----------( 169      ( 28 Dec 2020 18:40 )             23.9     12-28    134<L>  |  104  |  7   ----------------------------<  86  3.2<L>   |  24  |  0.51    Ca    7.2<L>      28 Dec 2020 06:25  Phos  1.9     12-28  Mg     1.9     12-28    TPro  3.3<L>  /  Alb  1.9<L>  /  TBili  11.5<H>  /  DBili  x   /  AST  935<H>  /  ALT  298<H>  /  AlkPhos  104  12-28    PT/INR - ( 28 Dec 2020 06:25 )   PT: 20.8 sec;   INR: 1.78 ratio               MICROBIOLOGY:            RECENT CULTURES:  12-22 @ 18:26  .Urine Clean Catch (Midstream)  --  --  --    <10,000 CFU/mL Normal Urogenital Celestina  --  12-22 @ 13:03  .Blood Blood  --  --  --    No Growth Final  --      RADIOLOGY & ADDITIONAL STUDIES:    < from: US Abdomen Limited (ED) (12.22.20 @ 09:45) >    IMPRESSION: Free fluid seen within the abdomen.      < end of copied text >

## 2020-12-28 NOTE — PROGRESS NOTE ADULT - ATTENDING COMMENTS
Hepatology Staff: Stefany Becker MD    I saw and examined the patient along with  Dr. Marin  12-28-20 @ 10:42.    Patient Medical Record, hosptial course was reviewed and summarized as below:    Vitals: Vital Signs Last 24 Hrs  T(C): 36.4 (28 Dec 2020 10:06), Max: 36.7 (27 Dec 2020 21:06)  T(F): 97.6 (28 Dec 2020 10:06), Max: 98 (27 Dec 2020 21:06)  HR: 97 (28 Dec 2020 10:06) (97 - 115)  BP: 108/74 (28 Dec 2020 10:06) (90/65 - 109/76)    RR: 18 (28 Dec 2020 10:06) (18 - 20)  SpO2: 98% (28 Dec 2020 10:06) (96% - 100%)    IV Fluids: albumin human 25% IVPB 100 milliLiter(s) IV Intermittent every 8 hours    Labs:Creatinine, Serum: 0.51 mg/dL (12-28-20 @ 06:25)  Bilirubin Total, Serum: 11.5 mg/dL (12-28-20 @ 06:25)  INR: 1.78 ratio (12-28-20 @ 06:25)  Creatinine, Serum: 0.40 mg/dL (12-27-20 @ 14:12)  Bilirubin Total, Serum: 10.6 mg/dL (12-27-20 @ 14:12)  INR: 1.98 ratio (12-27-20 @ 14:12)      I/O: I&O's Summary    27 Dec 2020 07:01  -  28 Dec 2020 07:00  --------------------------------------------------------  IN: 1830.6 mL / OUT: 0 mL / NET: 1830.6 mL      Nutritional Status:   Albumin, Serum: 1.9 g/dL (12-28-20 @ 06:25)  Albumin, Serum: 1.3 g/dL (12-27-20 @ 14:12)    Last 24 hour events: Overall improving LFTs.    Recommendations:  Elevated LFTs- suspect related to HBV reactivation (although COVID related hepatic dysfunction remains in the d/d). Noted plan from ID for Truvada with entecavir. Wait for HBV resistance test results, and decide on continuing entecavir at that point ( possibly as outpatient).    Plan discussed with Primary team as well as Dr. Mcadams.

## 2020-12-29 LAB
ALBUMIN SERPL ELPH-MCNC: 2.4 G/DL — LOW (ref 3.3–5)
ALP SERPL-CCNC: 84 U/L — SIGNIFICANT CHANGE UP (ref 40–120)
ALT FLD-CCNC: 290 U/L — HIGH (ref 10–45)
ANION GAP SERPL CALC-SCNC: 5 MMOL/L — SIGNIFICANT CHANGE UP (ref 5–17)
APTT 50/50 2HOUR INCUB: 31.9 SEC — SIGNIFICANT CHANGE UP (ref 27.5–36.5)
APTT BLD: 28.7 SEC — SIGNIFICANT CHANGE UP (ref 27.5–36.5)
APTT BLD: 73.5 SEC — HIGH (ref 27.5–35.5)
APTT BLD: 78.5 SEC — HIGH (ref 27.5–35.5)
AST SERPL-CCNC: 1015 U/L — HIGH (ref 10–40)
BASE EXCESS BLDV CALC-SCNC: 2.2 MMOL/L — HIGH (ref -2–2)
BASOPHILS # BLD AUTO: 0.03 K/UL — SIGNIFICANT CHANGE UP (ref 0–0.2)
BASOPHILS NFR BLD AUTO: 0.4 % — SIGNIFICANT CHANGE UP (ref 0–2)
BILIRUB DIRECT SERPL-MCNC: 9 MG/DL — HIGH (ref 0–0.2)
BILIRUB SERPL-MCNC: 11.7 MG/DL — HIGH (ref 0.2–1.2)
BUN SERPL-MCNC: 6 MG/DL — LOW (ref 7–23)
CA-I SERPL-SCNC: 1.1 MMOL/L — LOW (ref 1.12–1.3)
CALCIUM SERPL-MCNC: 7.4 MG/DL — LOW (ref 8.4–10.5)
CHLORIDE BLDV-SCNC: 107 MMOL/L — SIGNIFICANT CHANGE UP (ref 96–108)
CHLORIDE SERPL-SCNC: 106 MMOL/L — SIGNIFICANT CHANGE UP (ref 96–108)
CMV IGG FLD QL: >10 U/ML — HIGH
CMV IGG SERPL-IMP: POSITIVE
CMV IGM FLD-ACNC: <8 AU/ML — SIGNIFICANT CHANGE UP
CMV IGM SERPL QL: NEGATIVE — SIGNIFICANT CHANGE UP
CO2 BLDV-SCNC: 28 MMOL/L — SIGNIFICANT CHANGE UP (ref 22–30)
CO2 SERPL-SCNC: 24 MMOL/L — SIGNIFICANT CHANGE UP (ref 22–31)
CREAT SERPL-MCNC: 0.46 MG/DL — LOW (ref 0.5–1.3)
D DIMER BLD IA.RAPID-MCNC: <150 NG/ML DDU — SIGNIFICANT CHANGE UP
EOSINOPHIL # BLD AUTO: 0.44 K/UL — SIGNIFICANT CHANGE UP (ref 0–0.5)
EOSINOPHIL NFR BLD AUTO: 6.6 % — HIGH (ref 0–6)
FIBRINOGEN PPP-MCNC: 147 MG/DL — LOW (ref 290–520)
FIBRINOGEN PPP-MCNC: 80 MG/DL — CRITICAL LOW (ref 290–520)
GAS PNL BLDV: 132 MMOL/L — LOW (ref 135–145)
GAS PNL BLDV: SIGNIFICANT CHANGE UP
GAS PNL BLDV: SIGNIFICANT CHANGE UP
GLUCOSE BLDV-MCNC: 87 MG/DL — SIGNIFICANT CHANGE UP (ref 70–99)
GLUCOSE SERPL-MCNC: 87 MG/DL — SIGNIFICANT CHANGE UP (ref 70–99)
HAPTOGLOB SERPL-MCNC: <20 MG/DL — LOW (ref 34–200)
HCO3 BLDV-SCNC: 27 MMOL/L — SIGNIFICANT CHANGE UP (ref 21–29)
HCT VFR BLD CALC: 25.5 % — LOW (ref 34.5–45)
HCT VFR BLDA CALC: 28 % — LOW (ref 39–50)
HGB BLD CALC-MCNC: 9.1 G/DL — LOW (ref 11.5–15.5)
HGB BLD-MCNC: 9.1 G/DL — LOW (ref 11.5–15.5)
IMM GRANULOCYTES NFR BLD AUTO: 0.9 % — SIGNIFICANT CHANGE UP (ref 0–1.5)
INR BLD: 2.03 RATIO — HIGH (ref 0.88–1.16)
INR BLD: 2.04 RATIO — HIGH (ref 0.88–1.16)
LACTATE BLDV-MCNC: 1.3 MMOL/L — SIGNIFICANT CHANGE UP (ref 0.7–2)
LDH SERPL L TO P-CCNC: 378 U/L — HIGH (ref 50–242)
LYMPHOCYTES # BLD AUTO: 2.3 K/UL — SIGNIFICANT CHANGE UP (ref 1–3.3)
LYMPHOCYTES # BLD AUTO: 34.4 % — SIGNIFICANT CHANGE UP (ref 13–44)
MAGNESIUM SERPL-MCNC: 2.2 MG/DL — SIGNIFICANT CHANGE UP (ref 1.6–2.6)
MCHC RBC-ENTMCNC: 31.4 PG — SIGNIFICANT CHANGE UP (ref 27–34)
MCHC RBC-ENTMCNC: 35.7 GM/DL — SIGNIFICANT CHANGE UP (ref 32–36)
MCV RBC AUTO: 87.9 FL — SIGNIFICANT CHANGE UP (ref 80–100)
MONOCYTES # BLD AUTO: 0.72 K/UL — SIGNIFICANT CHANGE UP (ref 0–0.9)
MONOCYTES NFR BLD AUTO: 10.8 % — SIGNIFICANT CHANGE UP (ref 2–14)
NEUTROPHILS # BLD AUTO: 3.13 K/UL — SIGNIFICANT CHANGE UP (ref 1.8–7.4)
NEUTROPHILS NFR BLD AUTO: 46.9 % — SIGNIFICANT CHANGE UP (ref 43–77)
NRBC # BLD: 0 /100 WBCS — SIGNIFICANT CHANGE UP (ref 0–0)
OTHER CELLS CSF MANUAL: 10 ML/DL — LOW (ref 18–22)
PAT CTL 2H: 31.7 SEC — SIGNIFICANT CHANGE UP (ref 27.5–36.5)
PCO2 BLDV: 44 MMHG — SIGNIFICANT CHANGE UP (ref 35–50)
PH BLDV: 7.4 — SIGNIFICANT CHANGE UP (ref 7.35–7.45)
PHOSPHATE SERPL-MCNC: 2 MG/DL — LOW (ref 2.5–4.5)
PLATELET # BLD AUTO: 158 K/UL — SIGNIFICANT CHANGE UP (ref 150–400)
PO2 BLDV: 48 MMHG — HIGH (ref 25–45)
POTASSIUM BLDV-SCNC: 3.1 MMOL/L — LOW (ref 3.5–5.3)
POTASSIUM SERPL-MCNC: 3.4 MMOL/L — LOW (ref 3.5–5.3)
POTASSIUM SERPL-SCNC: 3.4 MMOL/L — LOW (ref 3.5–5.3)
PROT SERPL-MCNC: 3.3 G/DL — LOW (ref 6–8.3)
PROTHROM AB SERPL-ACNC: 23.5 SEC — HIGH (ref 10.6–13.6)
PROTHROM AB SERPL-ACNC: 23.7 SEC — HIGH (ref 10.6–13.6)
PT 100%: 23.7 SEC — HIGH (ref 10.6–13.6)
PT 50/50: 13.6 SEC — SIGNIFICANT CHANGE UP (ref 10.6–14.7)
RBC # BLD: 2.9 M/UL — LOW (ref 3.8–5.2)
RBC # BLD: 2.9 M/UL — LOW (ref 3.8–5.2)
RBC # FLD: 21.4 % — HIGH (ref 10.3–14.5)
REPTILASE TIME: 24.3 SEC — HIGH (ref 15–20.5)
RETICS #: 105 K/UL — SIGNIFICANT CHANGE UP (ref 25–125)
RETICS/RBC NFR: 3.6 % — HIGH (ref 0.5–2.5)
SAO2 % BLDV: 77 % — SIGNIFICANT CHANGE UP (ref 67–88)
SODIUM SERPL-SCNC: 135 MMOL/L — SIGNIFICANT CHANGE UP (ref 135–145)
THROMBIN TIME: 31 SEC — HIGH (ref 16–25)
WBC # BLD: 6.68 K/UL — SIGNIFICANT CHANGE UP (ref 3.8–10.5)
WBC # FLD AUTO: 6.68 K/UL — SIGNIFICANT CHANGE UP (ref 3.8–10.5)

## 2020-12-29 PROCEDURE — 99232 SBSQ HOSP IP/OBS MODERATE 35: CPT

## 2020-12-29 PROCEDURE — 99222 1ST HOSP IP/OBS MODERATE 55: CPT | Mod: GC

## 2020-12-29 RX ORDER — ACETYLCYSTEINE 200 MG/ML
7 VIAL (ML) MISCELLANEOUS ONCE
Refills: 0 | Status: COMPLETED | OUTPATIENT
Start: 2020-12-29 | End: 2020-12-29

## 2020-12-29 RX ORDER — ATOVAQUONE 750 MG/5ML
1500 SUSPENSION ORAL DAILY
Refills: 0 | Status: DISCONTINUED | OUTPATIENT
Start: 2020-12-29 | End: 2021-01-12

## 2020-12-29 RX ORDER — POTASSIUM CHLORIDE 20 MEQ
40 PACKET (EA) ORAL ONCE
Refills: 0 | Status: COMPLETED | OUTPATIENT
Start: 2020-12-29 | End: 2020-12-29

## 2020-12-29 RX ADMIN — SIMETHICONE 80 MILLIGRAM(S): 80 TABLET, CHEWABLE ORAL at 22:23

## 2020-12-29 RX ADMIN — Medication 1 PACKET(S): at 06:11

## 2020-12-29 RX ADMIN — SIMETHICONE 80 MILLIGRAM(S): 80 TABLET, CHEWABLE ORAL at 06:10

## 2020-12-29 RX ADMIN — EMTRICITABINE AND TENOFOVIR DISOPROXIL FUMARATE 1 TABLET(S): 200; 300 TABLET, FILM COATED ORAL at 12:31

## 2020-12-29 RX ADMIN — Medication 50 MILLILITER(S): at 06:11

## 2020-12-29 RX ADMIN — ENTECAVIR 0.5 MILLIGRAM(S): 0.5 TABLET ORAL at 13:08

## 2020-12-29 RX ADMIN — ATOVAQUONE 1500 MILLIGRAM(S): 750 SUSPENSION ORAL at 13:09

## 2020-12-29 RX ADMIN — ENOXAPARIN SODIUM 40 MILLIGRAM(S): 100 INJECTION SUBCUTANEOUS at 17:01

## 2020-12-29 RX ADMIN — SIMETHICONE 80 MILLIGRAM(S): 80 TABLET, CHEWABLE ORAL at 17:01

## 2020-12-29 RX ADMIN — SIMETHICONE 80 MILLIGRAM(S): 80 TABLET, CHEWABLE ORAL at 12:31

## 2020-12-29 RX ADMIN — Medication 64.69 GRAM(S): at 11:44

## 2020-12-29 RX ADMIN — Medication 15 MILLIGRAM(S): at 08:45

## 2020-12-29 RX ADMIN — Medication 15 MILLIGRAM(S): at 00:17

## 2020-12-29 RX ADMIN — ENOXAPARIN SODIUM 40 MILLIGRAM(S): 100 INJECTION SUBCUTANEOUS at 06:11

## 2020-12-29 RX ADMIN — Medication 15 MILLIGRAM(S): at 17:12

## 2020-12-29 RX ADMIN — Medication 40 MILLIEQUIVALENT(S): at 17:01

## 2020-12-29 RX ADMIN — DOLUTEGRAVIR SODIUM 50 MILLIGRAM(S): 25 TABLET, FILM COATED ORAL at 12:32

## 2020-12-29 RX ADMIN — Medication 1 PACKET(S): at 17:01

## 2020-12-29 RX ADMIN — PANTOPRAZOLE SODIUM 40 MILLIGRAM(S): 20 TABLET, DELAYED RELEASE ORAL at 06:11

## 2020-12-29 RX ADMIN — SIMETHICONE 80 MILLIGRAM(S): 80 TABLET, CHEWABLE ORAL at 00:18

## 2020-12-29 NOTE — PROGRESS NOTE ADULT - SUBJECTIVE AND OBJECTIVE BOX
Authored by Gee Gutiérrez MD ([phone]) [facility]    Patient is a 50y old  Female who presents with a chief complaint of abdominal pain (29 Dec 2020 10:10)    SUBJECTIVE / OVERNIGHT EVENTS: NAEON. This am pt noted her abd pain is still present but notes she is having regular BM, denies melena, BRBPR. Denies HA, cp, SOB, dysuria, hematuria.    MEDICATIONS  (STANDING):  acetylcysteine IVPB 7 Gram(s) IV Intermittent once  dolutegravir 50 milliGRAM(s) Oral daily  emtricitabine 200 mG/tenofovir 300 mG (TRUVADA) 1 Tablet(s) Oral daily  enoxaparin Injectable 40 milliGRAM(s) SubCutaneous every 12 hours  entecavir 0.5 milliGRAM(s) Oral daily  pantoprazole    Tablet 40 milliGRAM(s) Oral before breakfast  potassium chloride    Tablet ER 40 milliEquivalent(s) Oral once  potassium phosphate / sodium phosphate Powder (PHOS-NaK) 1 Packet(s) Oral two times a day  simethicone 80 milliGRAM(s) Chew four times a day    MEDICATIONS  (PRN):  aluminum hydroxide/magnesium hydroxide/simethicone Suspension 30 milliLiter(s) Oral every 6 hours PRN Dyspepsia  ibuprofen  Tablet. 600 milliGRAM(s) Oral every 6 hours PRN Mild Pain (1 - 3)  ketorolac   Injectable 15 milliGRAM(s) IV Push every 8 hours PRN Severe Pain (7 - 10)  ondansetron Injectable 4 milliGRAM(s) IV Push every 8 hours PRN Nausea and/or Vomiting    I&O's Summary    28 Dec 2020 07:01  -  29 Dec 2020 07:00  --------------------------------------------------------  IN: 2870.4 mL / OUT: 0 mL / NET: 2870.4 mL    PHYSICAL EXAM:  Vital Signs Last 24 Hrs  T(C): 36.7 (29 Dec 2020 06:27), Max: 36.8 (29 Dec 2020 00:26)  T(F): 98.1 (29 Dec 2020 06:27), Max: 98.3 (29 Dec 2020 00:26)  HR: 109 (29 Dec 2020 06:27) (105 - 120)  BP: 100/68 (29 Dec 2020 06:27) (100/68 - 115/78)  BP(mean): --  RR: 18 (29 Dec 2020 06:27) (18 - 18)  SpO2: 97% (29 Dec 2020 06:27) (96% - 99%)    GENERAL: No acute distress, well-developed  HEAD:  Atraumatic, Normocephalic  EYES: EOMI, PERRLA, conjunctiva and sclera clear  NECK: Supple, no lymphadenopathy, no JVD  CHEST/LUNG: CTAB; No wheezes, rales, or rhonchi  HEART: Regular rate and rhythm; No murmurs, rubs, or gallops  ABDOMEN: Soft, non-tender, non-distended; normal bowel sounds, no organomegaly  EXTREMITIES:  2+ peripheral pulses b/l, No clubbing, cyanosis, or edema  NEUROLOGY: A&O x 3, no focal deficits  SKIN: No rashes or lesions    LABS:                        9.1    6.68  )-----------( 158      ( 29 Dec 2020 06:58 )             25.5     12-29    135  |  106  |  6<L>  ----------------------------<  87  3.4<L>   |  24  |  0.46<L>    Ca    7.4<L>      29 Dec 2020 06:52  Phos  2.0     12-29  Mg     2.2     12-29    TPro  3.3<L>  /  Alb  2.4<L>  /  TBili  11.7<H>  /  DBili  x   /  AST  1015<H>  /  ALT  290<H>  /  AlkPhos  84  12-29    PT/INR - ( 29 Dec 2020 06:52 )   PT: 23.5 sec;   INR: 2.03 ratio      PTT - ( 29 Dec 2020 06:52 )  PTT:78.5 sec

## 2020-12-29 NOTE — CONSULT NOTE ADULT - ASSESSMENT
49 yo F PMHx HIV, CMV, HBV, histoplasmosis, renal vein thrombosis s/p Coumadin x 6 months, admitted with COVID and acute liver injury.  Heme consulted for anemia and coagulopathy    #Coagulopathy  - please send mixing study, factor V, VIII and IX assays.  If VIII is normal, this is likely 2/2 liver disease  - smear reviewed, markedly increased target cells, no schistocytes  - if fibrinogen < 150, can give cryo    #Normocytic Anemia: likely dilutional and worsening with active infection  - send iron studies  - b12, folate normal  - retic 3%, inappropriately low  - transfuse if hg < 7.0    Lauren Lee DO  Hematology/Oncology Fellow, PGY6  Pager: [phone]

## 2020-12-29 NOTE — PROGRESS NOTE ADULT - ATTENDING COMMENTS
Hepatology Staff: Stefany Becker MD    I saw and examined the patient along with  Dr. Marin 12-29-20 @ 11:19.    Patient Medical Record, hosptial course was reviewed and summarized as below:    Vitals: Vital Signs Last 24 Hrs  T(C): 36.7 (29 Dec 2020 06:27), Max: 36.8 (29 Dec 2020 00:26)  T(F): 98.1 (29 Dec 2020 06:27), Max: 98.3 (29 Dec 2020 00:26)  HR: 109 (29 Dec 2020 06:27) (105 - 120)  BP: 100/68 (29 Dec 2020 06:27) (100/68 - 115/78)  RR: 18 (29 Dec 2020 06:27) (18 - 18)  SpO2: 97% (29 Dec 2020 06:27) (96% - 99%)    Labs:Bilirubin Total, Serum: 11.7 mg/dL (12-29-20 @ 06:52)  Creatinine, Serum: 0.46 mg/dL (12-29-20 @ 06:52)  INR: 2.03 ratio (12-29-20 @ 06:52)    I/O: I&O's Summary    28 Dec 2020 07:01  -  29 Dec 2020 07:00  --------------------------------------------------------  IN: 2870.4 mL / OUT: 0 mL / NET: 2870.4 mL      Nutritional Status:   Albumin, Serum: 2.4 g/dL (12-29-20 @ 06:52)    Last 24 hour events: Patient with overall stable LFTs, improving.    Recommendations: Continue with Truvada with Entecavir. Await HBV resistance.      Plan discussed with Primary team.

## 2020-12-29 NOTE — PROGRESS NOTE ADULT - ASSESSMENT
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     #Acute liver injury   Patient with predominantly hepatocellular liver injury, with AST >1800, Alt 600. Also w/ evidence of hepatic synthetic function, with elevated INR, decreased albumin. Etiology likely from HBV, which was previously being treated w/ Symtuza (tenofovir 10mg qd) but patient had been off medication for almost 1 year (until 11/2020) due to insurance lapse. Her outpatient viral load >400,000,000 corroborates this. Hepatitis delta coinfection has been ruled out as outpatient. Patient will require immediate treatment of her HBV, now on biktarvy (tenofovir 25mg). Currently patient is not in acute liver failure, she is mentating well, but is having worsening INR which is concerning. LFTs improving   - c/w truvada and entecavir for HBV  - c/w NAC infusion for acute liver failure (100 mg/kg over 16 hours, will likely need to be reordered daily)  - albumin 100cc 25% q 8 hours  - IVF PRN  - vitamin K 10mg for 3 days (12/26 - 12/28)  - ketorolac 15 mg q8h prn     #Anemia:   - likely dilutional 2/2 albumin. no s/s of bleed  - type and cross x2  - f/u FOBT  - repeat CBC at 6pm, monitor H/H q12h for now  - transfuse Hgb <7    #Hyponatremia- improving  - urine lyres and osm   - c/w iv fluids prn     #AIDS  needs ARV medications to help with her immunosuppression but need to change ARV meds to ones metabolized by the kidney  continue the TAF for help controlling the Hep B infection  - In light of significant transaminitis, discontinued the Symtuza and changed meds to Truvada/Tivicay  - CD4 116 consistent with AIDS  - RNA viral load in process - slightly detectable but cannot account for her acute decompensation in liver disease  - If viral load elevated will add Bactrim for PJP prophylaxis    #COVID+  not eligible for Remdesivir secondary to her LFT abnormalities  could consider convalescent plasma if she decompensates  D- dimer <150, fibrinogen 113 (downtrending), ferritin 746 (uptrending)  send QuantiFeron testing- given new onset ascites and abnormal appearing bowels  urine histo ag. in case recurrence  blood cultures 12/22- no growth    #CMV viremia   - hold off on Valganciclovir   - f/u repeat CMV viral load    #Prophylactic measures  - DVT ppx: Lovenox bid  - GERD: pantoprazole 40mg qd  - Diet: Vegetarian 50 year old female with past medical history of HIV, CMV, HBV, histoplasmosis, renal vein thrombosis s/p coumadin for 6 months, and strongyloidiasis who presents with abdominal pain found to be COVID-19 PCR positive.     #Acute liver injury   Patient with predominantly hepatocellular liver injury, with AST >1800, Alt 600. Also w/ evidence of hepatic synthetic function, with elevated INR, decreased albumin. Etiology likely from HBV, which was previously being treated w/ Symtuza (tenofovir 10mg qd) but patient had been off medication for almost 1 year (until 11/2020) due to insurance lapse. Her outpatient viral load >400,000,000 corroborates this. Hepatitis delta coinfection has been ruled out as outpatient. Patient will require immediate treatment of her HBV, now on biktarvy (tenofovir 25mg). Currently patient is not in acute liver failure, she is mentating well, but is having worsening INR which is concerning. LFTs improving   - c/w truvada and entecavir for HBV  - c/w NAC infusion for acute liver failure (100 mg/kg over 16 hours, will likely need to be reordered daily)  - albumin 100cc 25% q 8 hours  - IVF PRN  - vitamin K 10mg for 3 days (12/26 - 12/28)  - ketorolac 15 mg q8h prn   - appreciate hepatology recs for further pain management  - appreciate hepatology recs for possible para or new moderate ascites noted on CTAP 12/22/2020, new from 11/4/2020    #Anemia:   - likely dilutional 2/2 albumin. no s/s of bleed  - type and cross x2  - f/u FOBT  - repeat CBC at 6pm, monitor H/H q12h for now  - transfuse Hgb <7    #Hyponatremia- improving  - urine lyres and osm   - c/w iv fluids prn     #AIDS  needs ARV medications to help with her immunosuppression but need to change ARV meds to ones metabolized by the kidney  continue the TAF for help controlling the Hep B infection  - In light of significant transaminitis, discontinued the Symtuza and changed meds to Truvada/Tivicay  - CD4 116 consistent with AIDS  - RNA viral load in process - slightly detectable but cannot account for her acute decompensation in liver disease  - If viral load elevated will add Bactrim for PJP prophylaxis  - appreciate ID recs for PCP prophylaxis given low CD4/8 ratio    #COVID+  not eligible for Remdesivir secondary to her LFT abnormalities  could consider convalescent plasma if she decompensates  D- dimer <150, fibrinogen 113 (downtrending), ferritin 746 (uptrending)  send QuantiFeron testing- given new onset ascites and abnormal appearing bowels  urine histo ag. in case recurrence  blood cultures 12/22- no growth    #CMV viremia   - hold off on Valganciclovir   - f/u repeat CMV viral load    #Prophylactic measures  - DVT ppx: Lovenox bid  - GERD: pantoprazole 40mg qd  - Diet: Vegetarian 50 year old female with past medical history of HIV, CMV, HBV, histoplasmosis, renal vein thrombosis s/p coumadin for 6 months, and strongyloidiasis who presents with abdominal pain found to be COVID-19 PCR positive.     #Acute liver injury   Patient with predominantly hepatocellular liver injury, with AST >1800, Alt 600. Also w/ evidence of hepatic synthetic function, with elevated INR, decreased albumin. Etiology likely from HBV, which was previously being treated w/ Symtuza (tenofovir 10mg qd) but patient had been off medication for almost 1 year (until 11/2020) due to insurance lapse. Her outpatient viral load >400,000,000 corroborates this. Hepatitis delta coinfection has been ruled out as outpatient. Patient will require immediate treatment of her HBV, now on biktarvy (tenofovir 25mg). Currently patient is not in acute liver failure, she is mentating well, but is having worsening INR which is concerning. LFTs improving   - c/w truvada and entecavir for HBV  - c/w NAC infusion for acute liver failure (100 mg/kg over 16 hours, will likely need to be reordered daily)  - albumin 100cc 25% q 8 hours  - IVF PRN  - vitamin K 10mg for 3 days (12/26 - 12/28)  - ketorolac 15 mg q8h prn   - appreciate hepatology recs for further pain management  - appreciate hepatology recs for possible para or new moderate ascites noted on CTAP 12/22/2020, new from 11/4/2020    #Anemia:   - likely dilutional 2/2 albumin. no s/s of bleed.   - consult heme for labs c/f CDIC vs liver synthetic dysfunction- appreciate recs  - type and cross x2  - FOBT negative  - repeat CBC at 6pm, monitor H/H q12h for now  - transfuse Hgb <7    #AIDS  needs ARV medications to help with her immunosuppression but need to change ARV meds to ones metabolized by the kidney  continue the TAF for help controlling the Hep B infection  - In light of significant transaminitis, discontinued the Symtuza and changed meds to Truvada/Tivicay  - CD4 116 consistent with AIDS  - RNA viral load in process - slightly detectable but cannot account for her acute decompensation in liver disease  - If viral load elevated will add Bactrim for PJP prophylaxis  - appreciate ID recs for PCP prophylaxis given low CD4/8 ratio- no Bactrim given hepatic dysfunction    #COVID+  not eligible for Remdesivir secondary to her LFT abnormalities  could consider convalescent plasma if she decompensates  D- dimer <150, fibrinogen 113 (downtrending), ferritin 746 (uptrending)  send QuantiFeron testing- given new onset ascites and abnormal appearing bowels  urine histo ag. in case recurrence  blood cultures 12/22- no growth    #CMV viremia   - hold off on Valganciclovir   - f/u repeat CMV viral load    #Prophylactic measures  - DVT ppx: Lovenox bid  - GERD: pantoprazole 40mg qd  - Diet: Vegetarian

## 2020-12-29 NOTE — PROGRESS NOTE ADULT - SUBJECTIVE AND OBJECTIVE BOX
Chief complaint: Abdominal pain  Reason for consult: Abnormal liver enzymes    Interval Events:   - No new complaints this morning    MEDICATIONS  (STANDING):  acetylcysteine IVPB 7 Gram(s) IV Intermittent once  dolutegravir 50 milliGRAM(s) Oral daily  emtricitabine 200 mG/tenofovir 300 mG (TRUVADA) 1 Tablet(s) Oral daily  enoxaparin Injectable 40 milliGRAM(s) SubCutaneous every 12 hours  entecavir 0.5 milliGRAM(s) Oral daily  pantoprazole    Tablet 40 milliGRAM(s) Oral before breakfast  potassium chloride    Tablet ER 40 milliEquivalent(s) Oral once  potassium phosphate / sodium phosphate Powder (PHOS-NaK) 1 Packet(s) Oral two times a day  simethicone 80 milliGRAM(s) Chew four times a day    MEDICATIONS  (PRN):  aluminum hydroxide/magnesium hydroxide/simethicone Suspension 30 milliLiter(s) Oral every 6 hours PRN Dyspepsia  ibuprofen  Tablet. 600 milliGRAM(s) Oral every 6 hours PRN Mild Pain (1 - 3)  ketorolac   Injectable 15 milliGRAM(s) IV Push every 8 hours PRN Severe Pain (7 - 10)  ondansetron Injectable 4 milliGRAM(s) IV Push every 8 hours PRN Nausea and/or Vomiting    PHYSICAL EXAM:   Vital Signs:  Vital Signs Last 24 Hrs  T(C): 36.7 (29 Dec 2020 06:27), Max: 36.8 (29 Dec 2020 00:26)  T(F): 98.1 (29 Dec 2020 06:27), Max: 98.3 (29 Dec 2020 00:26)  HR: 109 (29 Dec 2020 06:27) (105 - 120)  BP: 100/68 (29 Dec 2020 06:27) (100/68 - 115/78)  BP(mean): --  RR: 18 (29 Dec 2020 06:27) (18 - 18)  SpO2: 97% (29 Dec 2020 06:27) (96% - 99%)    GENERAL:  No acute distress  HEENT:  Normocephalic/atraumatic, + scleral icterus  CHEST:  No resp distress  HEART:  Regular rate and rhythm  ABDOMEN:  Soft, distended, non tender  EXTREMITIES: No cyanosis, clubbing, or edema  SKIN:  No rash/erythema  NEURO:  Alert and oriented x 3    LABS:                        9.1    6.68  )-----------( 158      ( 29 Dec 2020 06:58 )             25.5     12-29    135  |  106  |  6<L>  ----------------------------<  87  3.4<L>   |  24  |  0.46<L>    Ca    7.4<L>      29 Dec 2020 06:52  Phos  2.0     12-29  Mg     2.2     12-29    TPro  3.3<L>  /  Alb  2.4<L>  /  TBili  11.7<H>  /  DBili  x   /  AST  1015<H>  /  ALT  290<H>  /  AlkPhos  84  12-29    LIVER FUNCTIONS - ( 29 Dec 2020 06:52 )  Alb: 2.4 g/dL / Pro: 3.3 g/dL / ALK PHOS: 84 U/L / ALT: 290 U/L / AST: 1015 U/L / GGT: x           PT/INR - ( 29 Dec 2020 06:52 )   PT: 23.5 sec;   INR: 2.03 ratio      PTT - ( 29 Dec 2020 06:52 )  PTT:78.5 sec    Imaging:    Reviewed

## 2020-12-29 NOTE — PROGRESS NOTE ADULT - ASSESSMENT
Assessment:  The patient is a 50 year old female with past medical history of HIV, CMV, HBV, histoplasmosis, renal vein thrombosis s/p coumadin for 6 months, and strongyloidiasis who presents with abdominal pain. She reports constant upper abdominal pain described as constant in nature. She reports three episodes of non-bloody vomiting today without diarrhea. She denies recent travel, sick contacts, or pets at home. She denies fever. CBC showed reactive thrombocytosis. CMP showed hyponatremia with mild hypocalcemia. She was found to be COVID-19 PCR positive. Infectious disease was consulted for HIV and COVID-19 management.      Plan:  # History of HBV infection with active viral flare  Has chronic active hepatitis B with a breakthrough viral load and acute decompensation in her liver disease with hepatitis and poor synthetic function based upon her labs  Hep viral load this admission over 100million copies/ml  Concern for potential for liver failure is decreasing as her LFTs are slowly improving  should r/o contributing viral hepatitis pathogens and will send HCV viral load, hepatitis delta ab, hep E abs.  some HIV literature that Truvada may be superior to TAF for controlling hepatitis B- changed ARV regimen to Truvada and Tivicay and should continue new regimen at time of discharge   Hepatology  added entecavir - HBV resistance testing is pending  Please repeat HepB viral load to see if meds are working, also send HCV viral load, hep delta agent ab, hep E ab to see if secondary component    Given persistent RUQ pain/tenderness would also repeat imaging of abdomen with ultrasound or CT scan to evaluate for biliary disease component  check amylase and lipase  sent lactate level (wnl)      # COVID-19 disease   tolerating RA without supplemental oxygen and CXR NAD   unable to give patient remdesivir based upon her markedly elevated LFTS  stable on RA at this point  increase in LFTS/hepatitis  a small component likely due to the COVID  -     # AIDS  - In light of significant transaminitis, discontinued the Symtuza and changed meds to Truvada/Tivicay  - CD4 116 consistent with AIDS  - RNA viral load in process - slightly detectable but cannot account for her acute decompensation in liver disease  - started on atovaquone for PJP prophylaxis    # History of CMV viremia  - CMV PCR   -Cytomegalovirus By PCR:   183 Assay Range: 96 IU/mL to 1.88E+08 IU/mL  One IU is equal to 0.53 copies of CMV.  The limit of quantitation (LOQ) is 96 IU/mL. CMV DNA detected below  the LOQ will be reported as Detected:<96 IU/mL.    will hold off on Valganciclovir but repeat CMV viral load on 12/28 is pending        Chencho Mcadams MD  [phone]  After 5pm/weekends [phone]

## 2020-12-29 NOTE — CONSULT NOTE ADULT - SUBJECTIVE AND OBJECTIVE BOX
HPI:  49 yo F PMHx HIV, CMV, HBV, histoplasmosis, renal vein thrombosis s/p Coumadin x 6 months, admitted with COVID and acute liver injury.  Heme consulted for anemia.    Pt presented with 3 episodes of vomiting and jaundice, found have acute liver injury and COVID.  CT A/P showing small b/l pleural effusions, moderate volume ascites, submucosal fat despoition suggestive of chronic inflammation, nonspecific mesenteric and pelvic lymphadenopathy which is unchanged from past. Hepatology on board, AST initially > 1800, .  Etiology was thought to be likely from HBV reactivation but she has been off of tenofovir for 1 year.  Last CD4 114.     Admission hg 15.8, now has downtrended to 9.1.  Patient has not received any blood transfusions.  Normocytic. Coags showing elevated INR, now 2.04  PAST MEDICAL & SURGICAL HISTORY:  Renal Vein Thrombosis  , s/p Coumadin for 6 months    CMV Infection    Hepatitis B    Strongyloidosis    Histoplasmosis    HIV (Human Immunodeficiency Virus Infection)    Gallstones    S/P  Section    S/P Bilateral Tubal Ligation    S/P PEG (Percutaneous Endoscopic Gastrostomy) Placement and Removal    History of Cholecystectomy        MEDICATIONS  (STANDING):  atovaquone Suspension 1500 milliGRAM(s) Oral daily  dolutegravir 50 milliGRAM(s) Oral daily  emtricitabine 200 mG/tenofovir 300 mG (TRUVADA) 1 Tablet(s) Oral daily  enoxaparin Injectable 40 milliGRAM(s) SubCutaneous every 12 hours  entecavir 0.5 milliGRAM(s) Oral daily  pantoprazole    Tablet 40 milliGRAM(s) Oral before breakfast  potassium chloride    Tablet ER 40 milliEquivalent(s) Oral once  potassium phosphate / sodium phosphate Powder (PHOS-NaK) 1 Packet(s) Oral two times a day  simethicone 80 milliGRAM(s) Chew four times a day    MEDICATIONS  (PRN):  aluminum hydroxide/magnesium hydroxide/simethicone Suspension 30 milliLiter(s) Oral every 6 hours PRN Dyspepsia  ibuprofen  Tablet. 600 milliGRAM(s) Oral every 6 hours PRN Mild Pain (1 - 3)  ketorolac   Injectable 15 milliGRAM(s) IV Push every 8 hours PRN Severe Pain (7 - 10)  ondansetron Injectable 4 milliGRAM(s) IV Push every 8 hours PRN Nausea and/or Vomiting      Allergies    No Known Allergies    Intolerances        SOCIAL HISTORY:    FAMILY HISTORY:      Vital Signs Last 24 Hrs  T(C): 36.7 (29 Dec 2020 14:09), Max: 36.8 (29 Dec 2020 00:26)  T(F): 98.1 (29 Dec 2020 14:09), Max: 98.3 (29 Dec 2020 00:26)  HR: 102 (29 Dec 2020 14:09) (102 - 120)  BP: 105/73 (29 Dec 2020 14:09) (100/68 - 115/78)  BP(mean): --  RR: 18 (29 Dec 2020 14:09) (18 - 18)  SpO2: 100% (29 Dec 2020 14:09) (96% - 100%)    PHYSICAL EXAM:    deferred    LABS:                        9.1    6.68  )-----------( 158      ( 29 Dec 2020 06:58 )             25.5         135  |  106  |  6<L>  ----------------------------<  87  3.4<L>   |  24  |  0.46<L>    Ca    7.4<L>      29 Dec 2020 06:52  Phos  2.0       Mg     2.2         TPro  x   /  Alb  x   /  TBili  x   /  DBili  9.0<H>  /  AST  x   /  ALT  x   /  AlkPhos  x       PT/INR - ( 29 Dec 2020 12:49 )   PT: 23.7 sec;   INR: 2.04 ratio         PTT - ( 29 Dec 2020 06:52 )  PTT:78.5 sec    Reticulocyte Percent: 3.6 % ( @ 06:58)        RADIOLOGY & ADDITIONAL STUDIES:

## 2020-12-29 NOTE — PROGRESS NOTE ADULT - SUBJECTIVE AND OBJECTIVE BOX
INFECTIOUS DISEASES FOLLOW UP-- Hollie Mcadams  [phone]    This is a follow up note for this  50yFemale with  High liver transaminase level HEP B flare    remains with RUQ pain        ROS:  CONSTITUTIONAL:  No fever, fair appetite  CARDIOVASCULAR:  No chest pain or palpitations  RESPIRATORY:  No dyspnea  GASTROINTESTINAL:  No nausea, vomiting, persistent RUQ abdominal pain  GENITOURINARY:  No dysuria  NEUROLOGIC:  No headache,     Allergies    No Known Allergies    Intolerances        ANTIBIOTICS/RELEVANT:  antimicrobials  atovaquone Suspension 1500 milliGRAM(s) Oral daily  dolutegravir 50 milliGRAM(s) Oral daily  emtricitabine 200 mG/tenofovir 300 mG (TRUVADA) 1 Tablet(s) Oral daily  entecavir 0.5 milliGRAM(s) Oral daily    immunologic:    OTHER:  aluminum hydroxide/magnesium hydroxide/simethicone Suspension 30 milliLiter(s) Oral every 6 hours PRN  enoxaparin Injectable 40 milliGRAM(s) SubCutaneous every 12 hours  ibuprofen  Tablet. 600 milliGRAM(s) Oral every 6 hours PRN  ketorolac   Injectable 15 milliGRAM(s) IV Push every 8 hours PRN  ondansetron Injectable 4 milliGRAM(s) IV Push every 8 hours PRN  pantoprazole    Tablet 40 milliGRAM(s) Oral before breakfast  potassium phosphate / sodium phosphate Powder (PHOS-NaK) 1 Packet(s) Oral two times a day  simethicone 80 milliGRAM(s) Chew four times a day      Objective:  Vital Signs Last 24 Hrs  T(C): 36.7 (29 Dec 2020 14:09), Max: 36.8 (29 Dec 2020 00:26)  T(F): 98.1 (29 Dec 2020 14:09), Max: 98.3 (29 Dec 2020 00:26)  HR: 102 (29 Dec 2020 14:09) (102 - 120)  BP: 105/73 (29 Dec 2020 14:09) (100/68 - 115/78)  BP(mean): --  RR: 18 (29 Dec 2020 14:09) (18 - 18)  SpO2: 100% (29 Dec 2020 14:09) (96% - 100%)    PHYSICAL EXAM:  Constitutional:no acute distress  Eyes:PIPO, EOMI, marked icterus  Ear/Nose/Throat: no oral lesions, 	  Respiratory: clear BL  Cardiovascular: S1S2  Gastrointestinal:soft, (+) BS, tender palpable liver edge  Extremities:no e/e/c  No Lymphadenopathy  IV sites not inflammed.    LABS:                        9.1    6.68  )-----------( 158      ( 29 Dec 2020 06:58 )             25.5     12-29    135  |  106  |  6<L>  ----------------------------<  87  3.4<L>   |  24  |  0.46<L>    Ca    7.4<L>      29 Dec 2020 06:52  Phos  2.0     12-29  Mg     2.2     12-29    TPro  x   /  Alb  x   /  TBili  x   /  DBili  9.0<H>  /  AST  x   /  ALT  x   /  AlkPhos  x   12-29    PT/INR - ( 29 Dec 2020 12:49 )   PT: 23.7 sec;   INR: 2.04 ratio         PTT - ( 29 Dec 2020 06:52 )  PTT:78.5 sec      MICROBIOLOGY:  COVID-19 IgG Antibody Interpretation: Negative: This test has not been reviewed by the FDA by the standard review  process. It has been authorized for emergency use by the FDA. [facility] has validated this test to be accurate.  Negative results do not rule out SARS-CoV-2 infection, particularly in  those who have been in recent contact with the virus. Follow-up testing  with a molecular diagnostic test should be considered to rule out  infection in these individuals.  Results from antibody testing should not be used as thesole basis to  diagnose or exclude SARS-CoV-2 infection, or to inform infection status.  Positive results may rarely be due to past or present infection with  non-SARS-CoV-2 coronavirus strains, such as coronavirus HKU1, NL63, OC43,  or 229E. [facility], through extensive validation  testing, has confirmed that this risk is minimal with this test. (12.24.20 @ 09:24)              RECENT CULTURES:  Hepatitis B PCR Quantitative: [id number] IU/mL (12.23.20 @ 02:56)        RADIOLOGY & ADDITIONAL STUDIES:    < from: US Abdomen Limited (ED) (12.22.20 @ 09:45) >  IMPRESSION: Free fluid seen within the abdomen.    < end of copied text >

## 2020-12-29 NOTE — PROGRESS NOTE ADULT - ASSESSMENT
51 yo F w/ PMHx HIV/AIDS, HBV, previous histo, CMV, renal vein thrombus s/p coumadin (2010) admitted with abd pain x 4 days, found to have acute liver injury and covid +    # Acute liver injury - patient with predominantly hepatocellular liver injury, initially with AST >1800, Alt 600. Also w/ evidence of hepatic synthetic function, with elevated INR, decreased albumin. 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 Her outpatient viral load >400,000,000 corroborates this. Hepatitis delta coinfection has beenruled out as outpatient.   Patient was on biktarvy but switched to Truvada for TDF (as opposed to TAF), and adding double coverage with entecavir. Also on NAC. Currently with minimal concern for acute liver failure as she is mentating well.  # HIV/AIDS - CD4 114, previous histo, CMV, has been off medications for 1 year until 11/2020  # COVID + - currently covid positive, O2 sat normal > 95% on room air    Recommendations:   - Continue to trend CMP, INR, Mg, Phos, fibrinogen, vbg (lactate) daily   - c/w Truvada and entecavir for HBV  - c/w NAC infusion for acute liver failure (100 mg/kg over 16 hours, will likely need to be reordered daily)  - Continue albumin 100cc 25% q 8 hours  - c/w pantoprazole 40mg qd  - Pending clinical course will discuss role of ICU, currently does not require it, however if clinical status worsens may need to [facility] bed  - Not transplant candidate at this time because of COVID and AIDS  - Rest of care per primary team    Alvarado Marin MD  Gastroenterology and Hepatology Fellow  Please contact via [employer] Teams    Please call Hepatology ([phone]) if there are any additional questions or concerns.    Please call answering service for on-call GI fellow ([phone]) after 5pm and before 8am, and on weekends.

## 2020-12-30 LAB
ALBUMIN SERPL ELPH-MCNC: 1.6 G/DL — LOW (ref 3.3–5)
ALBUMIN SERPL ELPH-MCNC: 2.3 G/DL — LOW (ref 3.3–5)
ALP SERPL-CCNC: 71 U/L — SIGNIFICANT CHANGE UP (ref 40–120)
ALP SERPL-CCNC: 88 U/L — SIGNIFICANT CHANGE UP (ref 40–120)
ALT FLD-CCNC: 290 U/L — HIGH (ref 10–45)
ALT FLD-CCNC: 353 U/L — HIGH (ref 10–45)
AMMONIA BLD-MCNC: 62 UMOL/L — HIGH (ref 11–55)
ANION GAP SERPL CALC-SCNC: 16 MMOL/L — SIGNIFICANT CHANGE UP (ref 5–17)
ANION GAP SERPL CALC-SCNC: 7 MMOL/L — SIGNIFICANT CHANGE UP (ref 5–17)
APTT BLD: 61.8 SEC — HIGH (ref 27.5–35.5)
AST SERPL-CCNC: 1190 U/L — HIGH (ref 10–40)
AST SERPL-CCNC: 906 U/L — HIGH (ref 10–40)
BILIRUB SERPL-MCNC: 10.7 MG/DL — HIGH (ref 0.2–1.2)
BILIRUB SERPL-MCNC: 13.3 MG/DL — HIGH (ref 0.2–1.2)
BUN SERPL-MCNC: 8 MG/DL — SIGNIFICANT CHANGE UP (ref 7–23)
BUN SERPL-MCNC: 9 MG/DL — SIGNIFICANT CHANGE UP (ref 7–23)
CALCIUM SERPL-MCNC: 5.8 MG/DL — CRITICAL LOW (ref 8.4–10.5)
CALCIUM SERPL-MCNC: 7.3 MG/DL — LOW (ref 8.4–10.5)
CHLORIDE SERPL-SCNC: 104 MMOL/L — SIGNIFICANT CHANGE UP (ref 96–108)
CHLORIDE SERPL-SCNC: 91 MMOL/L — LOW (ref 96–108)
CMV DNA CSF QL NAA+PROBE: SIGNIFICANT CHANGE UP
CO2 SERPL-SCNC: 16 MMOL/L — LOW (ref 22–31)
CO2 SERPL-SCNC: 24 MMOL/L — SIGNIFICANT CHANGE UP (ref 22–31)
CREAT SERPL-MCNC: 0.35 MG/DL — LOW (ref 0.5–1.3)
CREAT SERPL-MCNC: 0.43 MG/DL — LOW (ref 0.5–1.3)
CRP SERPL-MCNC: 2.01 MG/DL — HIGH (ref 0–0.4)
FACT IX PPP CHRO-ACNC: 20 % — LOW (ref 80–165)
FACT V ACT/NOR PPP: 46 % — LOW (ref 50–150)
FACT VIII ACT/NOR PPP: 426 % — HIGH (ref 60–125)
FERRITIN SERPL-MCNC: 957 NG/ML — HIGH (ref 15–150)
FIBRINOGEN PPP-MCNC: 224 MG/DL — LOW (ref 290–520)
GAMMA INTERFERON BACKGROUND BLD IA-ACNC: 0.15 IU/ML — SIGNIFICANT CHANGE UP
GAS PNL BLDV: SIGNIFICANT CHANGE UP
GLUCOSE BLDC GLUCOMTR-MCNC: 118 MG/DL — HIGH (ref 70–99)
GLUCOSE SERPL-MCNC: 603 MG/DL — CRITICAL HIGH (ref 70–99)
GLUCOSE SERPL-MCNC: 93 MG/DL — SIGNIFICANT CHANGE UP (ref 70–99)
HBV DNA # SERPL NAA+PROBE: SIGNIFICANT CHANGE UP IU/ML
HBV DNA SERPL NAA+PROBE-LOG#: 7.56 LOG10IU/ML — SIGNIFICANT CHANGE UP
HCG SERPL-ACNC: <2 MIU/ML — SIGNIFICANT CHANGE UP
HCT VFR BLD CALC: 27.9 % — LOW (ref 34.5–45)
HCT VFR BLD CALC: 29.3 % — LOW (ref 34.5–45)
HGB BLD-MCNC: 10.4 G/DL — LOW (ref 11.5–15.5)
HGB BLD-MCNC: 9.8 G/DL — LOW (ref 11.5–15.5)
INR BLD: 1.93 RATIO — HIGH (ref 0.88–1.16)
IRON SATN MFR SERPL: 56 UG/DL — SIGNIFICANT CHANGE UP (ref 30–160)
IRON SATN MFR SERPL: SIGNIFICANT CHANGE UP % (ref 14–50)
M TB IFN-G BLD-IMP: NEGATIVE — SIGNIFICANT CHANGE UP
M TB IFN-G CD4+ BCKGRND COR BLD-ACNC: 0.01 IU/ML — SIGNIFICANT CHANGE UP
M TB IFN-G CD4+CD8+ BCKGRND COR BLD-ACNC: 0.01 IU/ML — SIGNIFICANT CHANGE UP
MAGNESIUM SERPL-MCNC: 1.6 MG/DL — SIGNIFICANT CHANGE UP (ref 1.6–2.6)
MAGNESIUM SERPL-MCNC: 1.8 MG/DL — SIGNIFICANT CHANGE UP (ref 1.6–2.6)
MCHC RBC-ENTMCNC: 31 PG — SIGNIFICANT CHANGE UP (ref 27–34)
MCHC RBC-ENTMCNC: 31.1 PG — SIGNIFICANT CHANGE UP (ref 27–34)
MCHC RBC-ENTMCNC: 35.1 GM/DL — SIGNIFICANT CHANGE UP (ref 32–36)
MCHC RBC-ENTMCNC: 35.5 GM/DL — SIGNIFICANT CHANGE UP (ref 32–36)
MCV RBC AUTO: 87.5 FL — SIGNIFICANT CHANGE UP (ref 80–100)
MCV RBC AUTO: 88.6 FL — SIGNIFICANT CHANGE UP (ref 80–100)
NRBC # BLD: 0 /100 WBCS — SIGNIFICANT CHANGE UP (ref 0–0)
NRBC # BLD: 0 /100 WBCS — SIGNIFICANT CHANGE UP (ref 0–0)
PHOSPHATE SERPL-MCNC: 1.4 MG/DL — LOW (ref 2.5–4.5)
PHOSPHATE SERPL-MCNC: 2.5 MG/DL — SIGNIFICANT CHANGE UP (ref 2.5–4.5)
PLATELET # BLD AUTO: 144 K/UL — LOW (ref 150–400)
PLATELET # BLD AUTO: 156 K/UL — SIGNIFICANT CHANGE UP (ref 150–400)
POTASSIUM SERPL-MCNC: 3 MMOL/L — LOW (ref 3.5–5.3)
POTASSIUM SERPL-MCNC: 3.4 MMOL/L — LOW (ref 3.5–5.3)
POTASSIUM SERPL-SCNC: 3 MMOL/L — LOW (ref 3.5–5.3)
POTASSIUM SERPL-SCNC: 3.4 MMOL/L — LOW (ref 3.5–5.3)
PROCALCITONIN SERPL-MCNC: 0.53 NG/ML — HIGH (ref 0.02–0.1)
PROT SERPL-MCNC: 3 G/DL — LOW (ref 6–8.3)
PROT SERPL-MCNC: 3.9 G/DL — LOW (ref 6–8.3)
PROTHROM AB SERPL-ACNC: 22.5 SEC — HIGH (ref 10.6–13.6)
QUANT TB PLUS MITOGEN MINUS NIL: >10 IU/ML — SIGNIFICANT CHANGE UP
RBC # BLD: 3.15 M/UL — LOW (ref 3.8–5.2)
RBC # BLD: 3.35 M/UL — LOW (ref 3.8–5.2)
RBC # FLD: 21.9 % — HIGH (ref 10.3–14.5)
RBC # FLD: 22.4 % — HIGH (ref 10.3–14.5)
SODIUM SERPL-SCNC: 123 MMOL/L — LOW (ref 135–145)
SODIUM SERPL-SCNC: 135 MMOL/L — SIGNIFICANT CHANGE UP (ref 135–145)
TIBC SERPL-MCNC: SIGNIFICANT CHANGE UP UG/DL (ref 220–430)
UIBC SERPL-MCNC: <20 UG/DL — LOW (ref 110–370)
WBC # BLD: 10.75 K/UL — HIGH (ref 3.8–10.5)
WBC # BLD: 4.22 K/UL — SIGNIFICANT CHANGE UP (ref 3.8–10.5)
WBC # FLD AUTO: 10.75 K/UL — HIGH (ref 3.8–10.5)
WBC # FLD AUTO: 4.22 K/UL — SIGNIFICANT CHANGE UP (ref 3.8–10.5)

## 2020-12-30 PROCEDURE — 99232 SBSQ HOSP IP/OBS MODERATE 35: CPT

## 2020-12-30 PROCEDURE — 99446 NTRPROF PH1/NTRNET/EHR 5-10: CPT

## 2020-12-30 PROCEDURE — 76705 ECHO EXAM OF ABDOMEN: CPT | Mod: 26

## 2020-12-30 RX ORDER — MAGNESIUM SULFATE 500 MG/ML
2 VIAL (ML) INJECTION ONCE
Refills: 0 | Status: COMPLETED | OUTPATIENT
Start: 2020-12-30 | End: 2020-12-30

## 2020-12-30 RX ORDER — LACTULOSE 10 G/15ML
20 SOLUTION ORAL EVERY 4 HOURS
Refills: 0 | Status: COMPLETED | OUTPATIENT
Start: 2020-12-30 | End: 2020-12-31

## 2020-12-30 RX ORDER — VANCOMYCIN HCL 1 G
1000 VIAL (EA) INTRAVENOUS EVERY 12 HOURS
Refills: 0 | Status: DISCONTINUED | OUTPATIENT
Start: 2020-12-31 | End: 2021-01-01

## 2020-12-30 RX ORDER — SODIUM CHLORIDE 9 MG/ML
1000 INJECTION, SOLUTION INTRAVENOUS ONCE
Refills: 0 | Status: DISCONTINUED | OUTPATIENT
Start: 2020-12-30 | End: 2020-12-30

## 2020-12-30 RX ORDER — VANCOMYCIN HCL 1 G
VIAL (EA) INTRAVENOUS
Refills: 0 | Status: DISCONTINUED | OUTPATIENT
Start: 2020-12-30 | End: 2020-12-30

## 2020-12-30 RX ORDER — METOPROLOL TARTRATE 50 MG
10 TABLET ORAL ONCE
Refills: 0 | Status: COMPLETED | OUTPATIENT
Start: 2020-12-30 | End: 2020-12-30

## 2020-12-30 RX ORDER — POTASSIUM CHLORIDE 20 MEQ
40 PACKET (EA) ORAL ONCE
Refills: 0 | Status: COMPLETED | OUTPATIENT
Start: 2020-12-30 | End: 2020-12-30

## 2020-12-30 RX ORDER — PIPERACILLIN AND TAZOBACTAM 4; .5 G/20ML; G/20ML
3.38 INJECTION, POWDER, LYOPHILIZED, FOR SOLUTION INTRAVENOUS EVERY 8 HOURS
Refills: 0 | Status: DISCONTINUED | OUTPATIENT
Start: 2020-12-31 | End: 2021-01-03

## 2020-12-30 RX ORDER — POTASSIUM CHLORIDE 20 MEQ
40 PACKET (EA) ORAL DAILY
Refills: 0 | Status: DISCONTINUED | OUTPATIENT
Start: 2020-12-30 | End: 2020-12-30

## 2020-12-30 RX ORDER — SODIUM CHLORIDE 9 MG/ML
1000 INJECTION INTRAMUSCULAR; INTRAVENOUS; SUBCUTANEOUS ONCE
Refills: 0 | Status: COMPLETED | OUTPATIENT
Start: 2020-12-30 | End: 2020-12-30

## 2020-12-30 RX ORDER — SENNA PLUS 8.6 MG/1
1 TABLET ORAL ONCE
Refills: 0 | Status: COMPLETED | OUTPATIENT
Start: 2020-12-30 | End: 2020-12-30

## 2020-12-30 RX ORDER — ACETAMINOPHEN 500 MG
1000 TABLET ORAL ONCE
Refills: 0 | Status: COMPLETED | OUTPATIENT
Start: 2020-12-30 | End: 2020-12-30

## 2020-12-30 RX ORDER — PIPERACILLIN AND TAZOBACTAM 4; .5 G/20ML; G/20ML
3.38 INJECTION, POWDER, LYOPHILIZED, FOR SOLUTION INTRAVENOUS ONCE
Refills: 0 | Status: COMPLETED | OUTPATIENT
Start: 2020-12-30 | End: 2020-12-30

## 2020-12-30 RX ORDER — VANCOMYCIN HCL 1 G
VIAL (EA) INTRAVENOUS
Refills: 0 | Status: DISCONTINUED | OUTPATIENT
Start: 2020-12-30 | End: 2021-01-01

## 2020-12-30 RX ORDER — OXYCODONE HYDROCHLORIDE 5 MG/1
10 TABLET ORAL EVERY 6 HOURS
Refills: 0 | Status: DISCONTINUED | OUTPATIENT
Start: 2020-12-30 | End: 2021-01-01

## 2020-12-30 RX ORDER — POLYETHYLENE GLYCOL 3350 17 G/17G
17 POWDER, FOR SOLUTION ORAL ONCE
Refills: 0 | Status: COMPLETED | OUTPATIENT
Start: 2020-12-30 | End: 2020-12-30

## 2020-12-30 RX ORDER — ACETYLCYSTEINE 200 MG/ML
7 VIAL (ML) MISCELLANEOUS ONCE
Refills: 0 | Status: COMPLETED | OUTPATIENT
Start: 2020-12-30 | End: 2020-12-30

## 2020-12-30 RX ORDER — POTASSIUM PHOSPHATE, MONOBASIC POTASSIUM PHOSPHATE, DIBASIC 236; 224 MG/ML; MG/ML
30 INJECTION, SOLUTION INTRAVENOUS ONCE
Refills: 0 | Status: COMPLETED | OUTPATIENT
Start: 2020-12-30 | End: 2020-12-30

## 2020-12-30 RX ORDER — VANCOMYCIN HCL 1 G
1000 VIAL (EA) INTRAVENOUS ONCE
Refills: 0 | Status: COMPLETED | OUTPATIENT
Start: 2020-12-30 | End: 2020-12-30

## 2020-12-30 RX ADMIN — SENNA PLUS 1 TABLET(S): 8.6 TABLET ORAL at 08:45

## 2020-12-30 RX ADMIN — ENOXAPARIN SODIUM 40 MILLIGRAM(S): 100 INJECTION SUBCUTANEOUS at 06:23

## 2020-12-30 RX ADMIN — Medication 1000 MILLIGRAM(S): at 21:30

## 2020-12-30 RX ADMIN — Medication 40 MILLIEQUIVALENT(S): at 08:45

## 2020-12-30 RX ADMIN — ONDANSETRON 4 MILLIGRAM(S): 8 TABLET, FILM COATED ORAL at 00:57

## 2020-12-30 RX ADMIN — PANTOPRAZOLE SODIUM 40 MILLIGRAM(S): 20 TABLET, DELAYED RELEASE ORAL at 06:23

## 2020-12-30 RX ADMIN — SIMETHICONE 80 MILLIGRAM(S): 80 TABLET, CHEWABLE ORAL at 11:52

## 2020-12-30 RX ADMIN — LACTULOSE 20 GRAM(S): 10 SOLUTION ORAL at 17:19

## 2020-12-30 RX ADMIN — EMTRICITABINE AND TENOFOVIR DISOPROXIL FUMARATE 1 TABLET(S): 200; 300 TABLET, FILM COATED ORAL at 11:52

## 2020-12-30 RX ADMIN — SIMETHICONE 80 MILLIGRAM(S): 80 TABLET, CHEWABLE ORAL at 06:23

## 2020-12-30 RX ADMIN — Medication 10 MILLIGRAM(S): at 20:45

## 2020-12-30 RX ADMIN — Medication 50 GRAM(S): at 08:45

## 2020-12-30 RX ADMIN — SIMETHICONE 80 MILLIGRAM(S): 80 TABLET, CHEWABLE ORAL at 17:19

## 2020-12-30 RX ADMIN — ENTECAVIR 0.5 MILLIGRAM(S): 0.5 TABLET ORAL at 11:52

## 2020-12-30 RX ADMIN — Medication 15 MILLIGRAM(S): at 06:33

## 2020-12-30 RX ADMIN — PIPERACILLIN AND TAZOBACTAM 200 GRAM(S): 4; .5 INJECTION, POWDER, LYOPHILIZED, FOR SOLUTION INTRAVENOUS at 22:23

## 2020-12-30 RX ADMIN — DOLUTEGRAVIR SODIUM 50 MILLIGRAM(S): 25 TABLET, FILM COATED ORAL at 11:52

## 2020-12-30 RX ADMIN — Medication 1 PACKET(S): at 06:23

## 2020-12-30 RX ADMIN — SIMETHICONE 80 MILLIGRAM(S): 80 TABLET, CHEWABLE ORAL at 23:48

## 2020-12-30 RX ADMIN — SODIUM CHLORIDE 1000 MILLILITER(S): 9 INJECTION INTRAMUSCULAR; INTRAVENOUS; SUBCUTANEOUS at 22:23

## 2020-12-30 RX ADMIN — Medication 15 MILLIGRAM(S): at 15:12

## 2020-12-30 RX ADMIN — Medication 64.69 GRAM(S): at 11:52

## 2020-12-30 RX ADMIN — ENOXAPARIN SODIUM 40 MILLIGRAM(S): 100 INJECTION SUBCUTANEOUS at 17:20

## 2020-12-30 RX ADMIN — Medication 250 MILLIGRAM(S): at 22:23

## 2020-12-30 RX ADMIN — ATOVAQUONE 1500 MILLIGRAM(S): 750 SUSPENSION ORAL at 11:52

## 2020-12-30 RX ADMIN — OXYCODONE HYDROCHLORIDE 10 MILLIGRAM(S): 5 TABLET ORAL at 23:20

## 2020-12-30 RX ADMIN — POLYETHYLENE GLYCOL 3350 17 GRAM(S): 17 POWDER, FOR SOLUTION ORAL at 08:45

## 2020-12-30 RX ADMIN — Medication 400 MILLIGRAM(S): at 21:22

## 2020-12-30 RX ADMIN — POTASSIUM PHOSPHATE, MONOBASIC POTASSIUM PHOSPHATE, DIBASIC 83.33 MILLIMOLE(S): 236; 224 INJECTION, SOLUTION INTRAVENOUS at 11:52

## 2020-12-30 RX ADMIN — ONDANSETRON 4 MILLIGRAM(S): 8 TABLET, FILM COATED ORAL at 20:45

## 2020-12-30 NOTE — PROGRESS NOTE ADULT - ASSESSMENT
50F with HIV, chronic HBV, histoplasmosis, renal vein thrombosis s/p coumadin for 6 months, and strongyloidiasis who presents with abdominal pain.   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  some HIV literature that Truvada may be superior to TAF for controlling hepatitis B- changed ARV regimen to Truvada and Tivicay and should continue new regimen at time of discharge   Hepatology  added entecavir - HBV resistance testing is pending  Please repeat HepB viral load to see if meds are working, also send HCV viral load, hep delta agent ab, hep E ab to see if secondary component    Given persistent RUQ pain/tenderness would also repeat imaging of abdomen with ultrasound or CT scan to evaluate for biliary disease component  check amylase and lipase      # AIDS  - In light of significant transaminitis, discontinued the Symtuza and changed meds to Truvada/Tivicay  - CD4 116 consistent with AIDS  - RNA viral load in process - slightly detectable but cannot account for her acute decompensation in liver disease  - started on atovaquone for PJP prophylaxis    # History of CMV viremia  - CMV PCR   -Cytomegalovirus By PCR:   183 Assay Range: 96 IU/mL to 1.88E+08 IU/mL  One IU is equal to 0.53 copies of CMV.  The limit of quantitation (LOQ) is 96 IU/mL. CMV DNA detected below  the LOQ will be reported as Detected:<96 IU/mL.    will hold off on Valganciclovir but repeat CMV viral load on 12/28 is pending   50F with HIV and chronic HBV, was lost to follow up for about a year, resumed care in 11/2020 but not before developing HBV reactivation, now admitted 12/22/20 with acute liver injury.   Also COVID positive but on room air, probably not contributing much to her liver process   CMV viremia low level not significant   HDV serology negative, PCR not available per core lab   f/u repeat HepB viral load   HCV viral load, hep delta agent ab, hep E ab to see if secondary component  On entecavir as well as TDF containing ART for HIV   on atovaquone for PJP prophylaxis      Ag Merrill MD   Infectious Disease   Pager [phone]   After 5PM and on weekends please page fellow on call or call [phone] 50F with HIV and chronic HBV, admitted 12/22/20 with HBV reactivation and acute liver injury after stopping ART for a year.   I don't think COVID or the low level CMV viremia are contributing.   HDV serology negative and epidemiology makes this unlikely. PCR not available per core lab.   Her HBV viral load has already improved from 483 million copies 12/2 to 147 million 12/23 since resuming Symtuza in Nov.   However, she still feels unwell and bilirubin continues to rise.     Suggest  -f/u repeat HBV viral load   -continue Tivicay and Truvada for HIV, the latter for HBV as well   -also on entecavir per hepatology   -f/u HBV resistance testing   -continue Atovaquone for PJP prophylaxis until CD4 >200 for three months   -supportive care and isolation precautions for COVID, currently on room air, remdesivir is generally discouraged for elevated liver enzymes     Spoke with hepatology     Ag Merrill MD   Infectious Disease   Pager [phone]   After 5PM and on weekends please page fellow on call or call [phone]

## 2020-12-30 NOTE — CHART NOTE - NSCHARTNOTEFT_GEN_A_CORE
Heme Update    Sent workup for abnormal coags, mixing study fully corrected and Factors 5 + 9 are decreased while 8 is normal, indicating coagulopathy 2/2 liver disease.  If patient is bleeding, would transfuse FFP or cryo as needed.  Heme will sign off, please call with questions.    Lauren Lee DO  Hematology/Oncology Fellow, PGY6  Pager: [phone]

## 2020-12-30 NOTE — CHART NOTE - NSCHARTNOTEFT_GEN_A_CORE
Called by nursing staff for tachycardia to 150s. On Zoll sinus. BP 70s/50s. Patient mentating well however vomiting. Given Zofran x 1. Started 1L NS x 1. Rectal temp 102.5F. Ordered CBC/CMP/VBG w/ lactate. Ordered Blood cultures and UA. Ordered Zosyn and Vancomycin. Ordered Tylenol. Repeat BP was 90s-100s. Patient reported feeling better.       Plan:  Sepsis 2/2 to COVID +/- bacterial process such as SBP with ascites OR ischemic bowel  - Follow up on labs  - If worsening abdominal pain/high lactate need to r/o ischemic bowel and likely need imaging if stable  - Follow up cultures and UA   - If MAP < 65 can consider IVF bolus +/- Midodrine  - Serial abdominal exams   - Continue Antibiotic Regimen   - Low threshold for ICU consult

## 2020-12-30 NOTE — PROGRESS NOTE ADULT - ATTENDING COMMENTS
agree with above  - worsening liver function  - hepatology following    constipation  - lactulose x 2

## 2020-12-30 NOTE — PROGRESS NOTE ADULT - SUBJECTIVE AND OBJECTIVE BOX
Chief complaint: Abdominal pain  Reason for consult: Abnormal liver enzymes    Interval Events:   - No acute events overnight    MEDICATIONS  (STANDING):  atovaquone Suspension 1500 milliGRAM(s) Oral daily  dolutegravir 50 milliGRAM(s) Oral daily  emtricitabine 200 mG/tenofovir 300 mG (TRUVADA) 1 Tablet(s) Oral daily  enoxaparin Injectable 40 milliGRAM(s) SubCutaneous every 12 hours  entecavir 0.5 milliGRAM(s) Oral daily  pantoprazole    Tablet 40 milliGRAM(s) Oral before breakfast  potassium phosphate IVPB 30 milliMole(s) IV Intermittent once  simethicone 80 milliGRAM(s) Chew four times a day    MEDICATIONS  (PRN):  aluminum hydroxide/magnesium hydroxide/simethicone Suspension 30 milliLiter(s) Oral every 6 hours PRN Dyspepsia  ibuprofen  Tablet. 600 milliGRAM(s) Oral every 6 hours PRN Mild Pain (1 - 3)  ketorolac   Injectable 15 milliGRAM(s) IV Push every 8 hours PRN Severe Pain (7 - 10)  ondansetron Injectable 4 milliGRAM(s) IV Push every 8 hours PRN Nausea and/or Vomiting    PHYSICAL EXAM:   Vital Signs:  Vital Signs Last 24 Hrs  T(C): 37.4 (30 Dec 2020 05:59), Max: 37.6 (30 Dec 2020 01:19)  T(F): 99.3 (30 Dec 2020 05:59), Max: 99.6 (30 Dec 2020 01:19)  HR: 126 (30 Dec 2020 05:59) (102 - 126)  BP: 104/71 (30 Dec 2020 05:59) (98/65 - 108/71)  BP(mean): --  RR: 18 (30 Dec 2020 05:59) (18 - 18)  SpO2: 97% (30 Dec 2020 05:59) (97% - 100%)    GENERAL:  No acute distress  HEENT:  Normocephalic/atraumatic, + scleral icterus  CHEST:  No resp distress  ABDOMEN:  Soft, distended, non tender  EXTREMITIES: No cyanosis, clubbing, or edema  SKIN:  No rash/erythema  NEURO:  Alert and oriented x 3    LABS:                        10.4   10.75 )-----------( 156      ( 30 Dec 2020 07:13 )             29.3     12-30    135  |  104  |  8   ----------------------------<  93  3.4<L>   |  24  |  0.43<L>    Ca    7.3<L>      30 Dec 2020 07:13  Phos  1.4     12-30  Mg     1.8     12-30    TPro  3.9<L>  /  Alb  2.3<L>  /  TBili  13.3<H>  /  DBili  x   /  AST  1190<H>  /  ALT  353<H>  /  AlkPhos  88  12-30    LIVER FUNCTIONS - ( 30 Dec 2020 07:13 )  Alb: 2.3 g/dL / Pro: 3.9 g/dL / ALK PHOS: 88 U/L / ALT: 353 U/L / AST: 1190 U/L / GGT: x           PT/INR - ( 29 Dec 2020 12:49 )   PT: 23.7 sec;   INR: 2.04 ratio         PTT - ( 30 Dec 2020 07:13 )  PTT:61.8 sec    Imaging:    Reviewed

## 2020-12-30 NOTE — PROGRESS NOTE ADULT - SUBJECTIVE AND OBJECTIVE BOX
Authored by Gee Gutiérrez MD ([phone]) [facility]    Patient is a 50y old  Female who presents with a chief complaint of Abdominal pain (30 Dec 2020 09:01)    SUBJECTIVE / OVERNIGHT EVENTS: NAEON. This am pt notes her abd pain is still present, the ketorolac is helping. Otherwise denies HA, cp, SOB, dysuria, hematuria.    MEDICATIONS  (STANDING):  acetylcysteine IVPB 7 Gram(s) IV Intermittent once  atovaquone Suspension 1500 milliGRAM(s) Oral daily  dolutegravir 50 milliGRAM(s) Oral daily  emtricitabine 200 mG/tenofovir 300 mG (TRUVADA) 1 Tablet(s) Oral daily  enoxaparin Injectable 40 milliGRAM(s) SubCutaneous every 12 hours  entecavir 0.5 milliGRAM(s) Oral daily  pantoprazole    Tablet 40 milliGRAM(s) Oral before breakfast  potassium phosphate IVPB 30 milliMole(s) IV Intermittent once  simethicone 80 milliGRAM(s) Chew four times a day    MEDICATIONS  (PRN):  aluminum hydroxide/magnesium hydroxide/simethicone Suspension 30 milliLiter(s) Oral every 6 hours PRN Dyspepsia  ibuprofen  Tablet. 600 milliGRAM(s) Oral every 6 hours PRN Mild Pain (1 - 3)  ketorolac   Injectable 15 milliGRAM(s) IV Push every 8 hours PRN Severe Pain (7 - 10)  ondansetron Injectable 4 milliGRAM(s) IV Push every 8 hours PRN Nausea and/or Vomiting    I&O's Summary    29 Dec 2020 07:01  -  30 Dec 2020 07:00  --------------------------------------------------------  IN: 240 mL / OUT: 0 mL / NET: 240 mL    PHYSICAL EXAM:  Vital Signs Last 24 Hrs  T(C): 37.4 (30 Dec 2020 05:59), Max: 37.6 (30 Dec 2020 01:19)  T(F): 99.3 (30 Dec 2020 05:59), Max: 99.6 (30 Dec 2020 01:19)  HR: 126 (30 Dec 2020 05:59) (102 - 126)  BP: 104/71 (30 Dec 2020 05:59) (98/65 - 108/71)  BP(mean): --  RR: 18 (30 Dec 2020 05:59) (18 - 18)  SpO2: 97% (30 Dec 2020 05:59) (97% - 100%)    GENERAL: No acute distress, well-developed  HEAD:  Atraumatic, Normocephalic  EYES: EOMI, PERRLA, conjunctiva and sclera clear  NECK: Supple, no lymphadenopathy, no JVD  CHEST/LUNG: CTAB; No wheezes, rales, or rhonchi  HEART: Regular rate and rhythm; No murmurs, rubs, or gallops  ABDOMEN: +Distended, TTP on b/l upper abdomen. Soft; normal bowel sounds, no organomegaly  EXTREMITIES: 2+ peripheral pulses b/l, No clubbing, cyanosis, or edema  NEUROLOGY: A&O x 3, no focal deficits  SKIN: No rashes or lesions    LABS:                        10.4   10.75 )-----------( 156      ( 30 Dec 2020 07:13 )             29.3     12-30    135  |  104  |  8   ----------------------------<  93  3.4<L>   |  24  |  0.43<L>    Ca    7.3<L>      30 Dec 2020 07:13  Phos  1.4     12-30  Mg     1.8     12-30    TPro  3.9<L>  /  Alb  2.3<L>  /  TBili  13.3<H>  /  DBili  x   /  AST  1190<H>  /  ALT  353<H>  /  AlkPhos  88  12-30    PT/INR - ( 30 Dec 2020 07:13 )   PT: 22.5 sec;   INR: 1.93 ratio      PTT - ( 30 Dec 2020 07:13 )  PTT:61.8 sec

## 2020-12-30 NOTE — CONSULT NOTE ADULT - SUBJECTIVE AND OBJECTIVE BOX
Vascular & Interventional Radiology Brief Consult Note    Evaluate for Procedure: Paracentesis     HPI: 50 year old female with past medical history of HIV, CMV, HBV, histoplasmosis, renal vein thrombosis s/p coumadin for 6 months, and strongyloidiasis who presents with abdominal pain found to be COVID-19 PCR positive. With acute liver injury and ascites on ultrasound.     Allergies:   Medications (Abx/Cardiac/Anticoagulation/Blood Products)  atovaquone Suspension: 1500 milliGRAM(s) Oral (12-30 @ 11:52)  dolutegravir: 50 milliGRAM(s) Oral (12-30 @ 11:52)  emtricitabine 200 mG/tenofovir 300 mG (TRUVADA): 1 Tablet(s) Oral (12-30 @ 11:52)  enoxaparin Injectable: 40 milliGRAM(s) SubCutaneous (12-30 @ 17:20)  entecavir: 0.5 milliGRAM(s) Oral (12-30 @ 11:52)    Data:    T(C): 36.7  HR: 122  BP: 91/65  RR: 18  SpO2: 97%    -WBC 10.75 / HgB 10.4 / Hct 29.3 / Plt 156  -Na 135 / Cl 104 / BUN 8 / Glucose 93  -K 3.4 / CO2 24 / Cr 0.43  - / Alk Phos 88 / T.Bili 13.3  -INR 1.93 / PTT 61.8    Imaging:   Abdominal US 12/30/20   IMPRESSION:    Moderate ascites in the right upper, left upper, and left lower quadrants. Left upper quadrant is not well visualized.     Assessment/Plan:   50 year old female with past medical history of HIV, CMV, HBV, histoplasmosis, renal vein thrombosis s/p coumadin for 6 months, and strongyloidiasis who presents with abdominal pain found to be COVID-19 PCR positive. With acute liver injury and ascites on ultrasound. IR consulted for paracentesis.    - Plan for bedside diagnostic paracentesis 12/31.   - NPO at midnight.   - AM CBC, BMP, Coags   - Hold AM DVT PPx   - Please place IR procedure order under Dr. Dupont.   - Discussed with Dr. Donohue.

## 2020-12-30 NOTE — PROGRESS NOTE ADULT - ASSESSMENT
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     #Acute liver injury   Patient with predominantly hepatocellular liver injury, with AST >1800, Alt 600. Also w/ evidence of hepatic synthetic function, with elevated INR, decreased albumin. Etiology likely from HBV, which was previously being treated w/ Symtuza (tenofovir 10mg qd) but patient had been off medication for almost 1 year (until 11/2020) due to insurance lapse. Her outpatient viral load >400,000,000 corroborates this. Hepatitis delta coinfection has been ruled out as outpatient. Patient will require immediate treatment of her HBV, now on biktarvy (tenofovir 25mg). Currently patient is not in acute liver failure, she is mentating well, but is having worsening INR which is concerning. LFTs improving   - c/w truvada and entecavir for HBV  - c/w NAC infusion for acute liver failure (100 mg/kg over 16 hours, will likely need to be reordered daily)  - albumin 100cc 25% q 8 hours  - IVF PRN  - vitamin K 10mg for 3 days (12/26 - 12/28)  - ketorolac 15 mg q8h prn   - appreciate hepatology recs for further pain management  - appreciate hepatology recs for possible para or new moderate ascites noted on CTAP 12/22/2020, new from 11/4/2020    #Anemia:   - likely dilutional 2/2 albumin. no s/s of bleed.   - consult heme for labs c/f CDIC vs liver synthetic dysfunction- appreciate recs  - type and cross x2  - FOBT negative  - repeat CBC at 6pm, monitor H/H q12h for now  - transfuse Hgb <7    #AIDS  needs ARV medications to help with her immunosuppression but need to change ARV meds to ones metabolized by the kidney  continue the TAF for help controlling the Hep B infection  - In light of significant transaminitis, discontinued the Symtuza and changed meds to Truvada/Tivicay  - CD4 116 consistent with AIDS  - RNA viral load in process - slightly detectable but cannot account for her acute decompensation in liver disease  - If viral load elevated will add Bactrim for PJP prophylaxis  - appreciate ID recs for PCP prophylaxis given low CD4/8 ratio- no Bactrim given hepatic dysfunction    #COVID+  not eligible for Remdesivir secondary to her LFT abnormalities  could consider convalescent plasma if she decompensates  D- dimer <150, fibrinogen 113 (downtrending), ferritin 746 (uptrending)  send QuantiFeron testing- given new onset ascites and abnormal appearing bowels  urine histo ag. in case recurrence  blood cultures 12/22- no growth    #CMV viremia   - hold off on Valganciclovir   - f/u repeat CMV viral load    #Prophylactic measures  - DVT ppx: Lovenox bid  - GERD: pantoprazole 40mg qd  - Diet: Vegetarian 50 year old female with past medical history of HIV, CMV, HBV, histoplasmosis, renal vein thrombosis s/p coumadin for 6 months, and strongyloidiasis who presents with abdominal pain found to be COVID-19 PCR positive.     #Acute liver injury   Patient with predominantly hepatocellular liver injury, with AST >1800, Alt 600. Also w/ evidence of hepatic synthetic function, with elevated INR, decreased albumin. Etiology likely from HBV, which was previously being treated w/ Symtuza (tenofovir 10mg qd) but patient had been off medication for almost 1 year (until 11/2020) due to insurance lapse. Her outpatient viral load >400,000,000 corroborates this. Hepatitis delta coinfection has been ruled out as outpatient. Patient will require immediate treatment of her HBV, now on biktarvy (tenofovir 25mg). Currently patient is not in acute liver failure, she is mentating well, but is having worsening INR which is concerning. LFTs improving   - c/w truvada and entecavir for HBV  - c/w NAC infusion for acute liver failure (100 mg/kg over 16 hours, will likely need to be reordered daily)  - albumin 100cc 25% q 8 hours  - IVF PRN  - vitamin K 10mg for 3 days (12/26 - 12/28)  - ketorolac 15 mg q8h prn   - appreciate hepatology recs for further pain management  - f/u limited abd US to ID pocket for possible para or new moderate ascites noted on CTAP 12/22/2020, new from 11/4/2020    #Anemia:   - likely dilutional 2/2 albumin. no s/s of bleed.   - f/u heme recs- factor assays, mixing studies.   - type and cross x2  - FOBT negative  - repeat CBC at 6pm, monitor H/H q12h for now  - transfuse Hgb <7    #AIDS  needs ARV medications to help with her immunosuppression but need to change ARV meds to ones metabolized by the kidney  continue the TAF for help controlling the Hep B infection  - In light of significant transaminitis, discontinued the Symtuza and changed meds to Truvada/Tivicay  - CD4 116 consistent with AIDS  - RNA viral load in process - slightly detectable but cannot account for her acute decompensation in liver disease  - If viral load elevated will add Bactrim for PJP prophylaxis  - c/w atovaquone for PCP ppx per ID- no Bactrim given hepatic dysfunction    #COVID+  - not eligible for Remdesivir secondary to her LFT abnormalities  - could consider convalescent plasma if she decompensates  - D- dimer <150, fibrinogen 224 (uptrending), ferritin 957 (uptrending)  - send QuantiFeron testing- given new onset ascites and abnormal appearing bowels  - urine histo ag. in case recurrence  - blood cultures 12/22- no growth    #CMV viremia   - hold off on Valganciclovir   - f/u repeat CMV viral load    #Prophylactic measures  - DVT ppx: Lovenox bid  - GERD: pantoprazole 40mg qd  - Diet: Vegetarian

## 2020-12-30 NOTE — PROGRESS NOTE ADULT - SUBJECTIVE AND OBJECTIVE BOX
Follow Up:      Interval History/ROS:     Allergies  No Known Allergies        ANTIMICROBIALS:  atovaquone Suspension 1500 daily  dolutegravir 50 daily  emtricitabine 200 mG/tenofovir 300 mG (TRUVADA) 1 daily  entecavir 0.5 daily      OTHER MEDS:  aluminum hydroxide/magnesium hydroxide/simethicone Suspension 30 milliLiter(s) Oral every 6 hours PRN  enoxaparin Injectable 40 milliGRAM(s) SubCutaneous every 12 hours  ibuprofen  Tablet. 600 milliGRAM(s) Oral every 6 hours PRN  ketorolac   Injectable 15 milliGRAM(s) IV Push every 8 hours PRN  ondansetron Injectable 4 milliGRAM(s) IV Push every 8 hours PRN  pantoprazole    Tablet 40 milliGRAM(s) Oral before breakfast  simethicone 80 milliGRAM(s) Chew four times a day      Vital Signs Last 24 Hrs  T(C): 37.4 (30 Dec 2020 05:59), Max: 37.6 (30 Dec 2020 01:19)  T(F): 99.3 (30 Dec 2020 05:59), Max: 99.6 (30 Dec 2020 01:19)  HR: 126 (30 Dec 2020 05:59) (102 - 126)  BP: 104/71 (30 Dec 2020 05:59) (98/65 - 108/71)  BP(mean): --  RR: 18 (30 Dec 2020 05:59) (18 - 18)  SpO2: 97% (30 Dec 2020 05:59) (97% - 100%)    Physical Exam:  General: awake, alert, non toxic  Head: atraumatic, normocephalic  Eye: normal sclera and conjunctiva  ENT: no oropharyngeal lesions, no cervical lymphadenopathy   Cardio: regular rate and rhythm   Respiratory: nonlabored on room air, clear bilaterally, no wheezing  abd: soft, bowel sounds present, no tenderness  : no CVAT, no suprapubic tenderness, no [place]  Musculoskeletal: no focal joint swelling, no edema  vascular: no phlebitis   Skin: no rash  Neurologic: no focal deficit  psych: normal affect                          10.4   10.75 )-----------( 156      ( 30 Dec 2020 07:13 )             29.3       12-30    135  |  104  |  8   ----------------------------<  93  3.4<L>   |  24  |  0.43<L>    Ca    7.3<L>      30 Dec 2020 07:13  Phos  1.4       Mg     1.8         TPro  3.9<L>  /  Alb  2.3<L>  /  TBili  13.3<H>  /  DBili  x   /  AST  1190<H>  /  ALT  353<H>  /  AlkPhos  88            MICROBIOLOGY:  HIV-1 RNA Quantitative, Viral Load Lo.76 (20 @ 14:37)  HIV-1 RNA Quantitative, Viral Load: 57 (20 @ 14:37)     CD4 %: 10 % (20 @ 11:01) ABS CD4: 116 /uL (20 @ 11:01) Full T Cell Subset (20 @ 11:01)     RADIOLOGY:  Images below reviewed personally  Xray Chest 1 View- PORTABLE-Urgent (Xray Chest 1 View- PORTABLE-Urgent .) (20 @ 18:36)   The heart is normal in size. The lungs appear to be clear. A PICC line was placed on the right and the tip is in the superior vena cava. No pneumothorax.   Follow Up:  HIV, HBV     Interval History/ROS: Afebrile. Her stomach pain and nausea haven't improved. She's otherwise fine, no respiratory or urinary symptoms. I explained to her that when she stopped her meds it not only allowed the HIV to re-emerge but also the HBV.     Allergies  No Known Allergies        ANTIMICROBIALS:  atovaquone Suspension 1500 daily  dolutegravir 50 daily  emtricitabine 200 mG/tenofovir 300 mG (TRUVADA) 1 daily  entecavir 0.5 daily      OTHER MEDS:  aluminum hydroxide/magnesium hydroxide/simethicone Suspension 30 milliLiter(s) Oral every 6 hours PRN  enoxaparin Injectable 40 milliGRAM(s) SubCutaneous every 12 hours  ibuprofen  Tablet. 600 milliGRAM(s) Oral every 6 hours PRN  ketorolac   Injectable 15 milliGRAM(s) IV Push every 8 hours PRN  ondansetron Injectable 4 milliGRAM(s) IV Push every 8 hours PRN  pantoprazole    Tablet 40 milliGRAM(s) Oral before breakfast  simethicone 80 milliGRAM(s) Chew four times a day      Vital Signs Last 24 Hrs  T(C): 37.4 (30 Dec 2020 05:59), Max: 37.6 (30 Dec 2020 01:19)  T(F): 99.3 (30 Dec 2020 05:59), Max: 99.6 (30 Dec 2020 01:19)  HR: 126 (30 Dec 2020 05:59) (102 - 126)  BP: 104/71 (30 Dec 2020 05:59) (98/65 - 108/71)  BP(mean): --  RR: 18 (30 Dec 2020 05:59) (18 - 18)  SpO2: 97% (30 Dec 2020 05:59) (97% - 100%)    Physical Exam:  General: awake, alert, non toxic  Head: atraumatic, normocephalic  Eye: icteric sclera   Cardio: regular rate   Respiratory: nonlabored on room air  abd: soft, bowel sounds present, some tenderness to upper abdomen but no guarding   Skin: jaundiced   Neurologic: no focal deficit  psych: normal affect                          10.4   10.75 )-----------( 156      ( 30 Dec 2020 07:13 )             29.3       12-30    135  |  104  |  8   ----------------------------<  93  3.4<L>   |  24  |  0.43<L>    Ca    7.3<L>      30 Dec 2020 07:13  Phos  1.4       Mg     1.8         TPro  3.9<L>  /  Alb  2.3<L>  /  TBili  13.3<H>  /  DBili  x   /  AST  1190<H>  /  ALT  353<H>  /  AlkPhos  88            MICROBIOLOGY:  HIV-1 RNA Quantitative, Viral Load Lo.76 (20 @ 14:37)  HIV-1 RNA Quantitative, Viral Load: 57 (20 @ 14:37)     CD4 %: 10 % (20 @ 11:01) ABS CD4: 116 /uL (20 @ 11:01) Full T Cell Subset (20 @ 11:01)     Hepatitis B PCR Quantitative: 147,167,465 IU/mL (20 @ 02:56)     RADIOLOGY:  Images below reviewed personally  Xray Chest 1 View- PORTABLE-Urgent (Xray Chest 1 View- PORTABLE-Urgent .) (20 @ 18:36)   The heart is normal in size. The lungs appear to be clear. A PICC line was placed on the right and the tip is in the superior vena cava. No pneumothorax.

## 2020-12-30 NOTE — CHART NOTE - NSCHARTNOTEFT_GEN_A_CORE
Nutrition Follow-up  Patient seen for: nutrition follow-up on COVID unit    Source: EMR, patient     Hospital course as per chart: Pt is a 51 yo female with PMH of HIV, CMV, HBV, histoplasmosis, renal vein thrombosis s/p coumadin for 6 months, and strongyloidiasis, who presented with abdominal pain, found to be COVID-19 PCR positive, admitted 12/22. Pt with acute liver injury, Hepatology following. Chart reviewed, events noted.    Diet, Regular:   Supplement Feeding Modality:  Oral  Ensure Enlive Cans or Servings Per Day:  2       Frequency:  Daily (12-26-20 @ 08:24) [Active]    Unable to conduct a face-to-face interview at this time due to limited contact restrictions related to pt's medical condition and isolation precautions. Information obtained from comprehensive chart review and phone call with pt at this time. Pt reports not feeling well, interview very limited at this time.     Patient reports having breakfast this morning, unable to recall quantity consumed. Flowsheets suggest poor PO intake (50% or less of documented meals) since admission. No acute GI distress reported. Last BM 12/26 per flowsheets. Encouraged PO intake as tolerated. Pt declined further interview, made aware RD remains available.     PO intake:  < 50%      Source for PO intake: chart    Most recent weight/% Weight Change: no new weights to assess  Will continue to monitor and trend weights.     Pertinent Medications: MEDICATIONS  (STANDING):  acetylcysteine IVPB 7 Gram(s) IV Intermittent once  atovaquone Suspension 1500 milliGRAM(s) Oral daily  dolutegravir 50 milliGRAM(s) Oral daily  emtricitabine 200 mG/tenofovir 300 mG (TRUVADA) 1 Tablet(s) Oral daily  enoxaparin Injectable 40 milliGRAM(s) SubCutaneous every 12 hours  entecavir 0.5 milliGRAM(s) Oral daily  pantoprazole    Tablet 40 milliGRAM(s) Oral before breakfast  potassium phosphate IVPB 30 milliMole(s) IV Intermittent once  simethicone 80 milliGRAM(s) Chew four times a day    MEDICATIONS  (PRN):  aluminum hydroxide/magnesium hydroxide/simethicone Suspension 30 milliLiter(s) Oral every 6 hours PRN Dyspepsia  ibuprofen  Tablet. 600 milliGRAM(s) Oral every 6 hours PRN Mild Pain (1 - 3)  ketorolac   Injectable 15 milliGRAM(s) IV Push every 8 hours PRN Severe Pain (7 - 10)  ondansetron Injectable 4 milliGRAM(s) IV Push every 8 hours PRN Nausea and/or Vomiting    Pertinent Labs:  12-30 Na135 mmol/L Glu 93 mg/dL K+ 3.4 mmol/L<L> Cr  0.43 mg/dL<L> BUN 8 mg/dL 12-30 Phos 1.4 mg/dL<L> 12-30 Alb 2.3 g/dL<L>    Skin: no pressure injuries per flowsheets   Edema: no edema per flowsheets     Estimated Needs: no change since previous assessment  Based on upper .5 pounds  Estimated Energy Needs (25-30 kcal/kg): 1357-8965 kcal/day  Estimated Protein Needs (1-1.2 g/kg): 62-75 g/day  Estimated Fluid Needs (25-30 ml/kg): 2912-1983 ml/day    Previous Nutrition Diagnosis: Inadequate Oral Intake - nutrition diagnosis ongoing, being addressed with PO diet, Ensure Enlive supplements, RD provision of Mighty Shakes and high protein apple juice     New Nutrition Diagnosis: not applicable    Recommendations:  1) Continue Regular diet. Monitor/adjust as needed.   2) Continue Ensure Enlive daily (350 kcal, 20 g protein in each), RD to continue to provide Mighty Shakes and high-protein apple juice to optimize intake   3) Encouraged PO intake as tolerated. Patient made aware RD remains available. RD to continue to obtain/honor food preferences as feasible.     Monitoring and Evaluation: Monitor PO intake, weight, labs, skin, GI status, diet     RD remains available upon request and will continue to follow-up per protocol.   Asia Rosales MS RD CDN pager #[phone]

## 2020-12-30 NOTE — PROGRESS NOTE ADULT - ASSESSMENT
51 yo F w/ PMHx HIV/AIDS, HBV, previous histo, CMV, renal vein thrombus s/p coumadin (2010) admitted with abd pain x 4 days, found to have acute liver injury and covid +    # Acute liver injury - patient with predominantly hepatocellular liver injury, initially with AST >1800, Alt 600. Also w/ evidence of hepatic synthetic function, with elevated INR, decreased albumin. 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 Her outpatient viral load >400,000,000 corroborates this. Hepatitis delta coinfection has beenruled out as outpatient.   Patient was on biktarvy but switched to Truvada for TDF (as opposed to TAF), and adding double coverage with entecavir. Also on NAC. Currently with minimal concern for acute liver failure as she is mentating well.  # Abdominal pain: Likely due to hepatitis, however, differential includes SBP and GERD. U/S abdomen pending.  # HIV/AIDS - CD4 114, previous histo, CMV, has been off medications for 1 year until 11/2020  # COVID + - currently covid positive, O2 sat normal > 95% on room air    Recommendations:   - F/U U/S abdomen, is ascites present would obtain diagnostic paracentesis  - Continue to trend CMP, INR, Mg, Phos, fibrinogen, vbg (lactate) daily   - c/w Truvada and entecavir for HBV  - F/U HBV resistance profile  - c/w NAC infusion for acute liver failure (100 mg/kg over 16 hours, will likely need to be reordered daily)  - Continue albumin 100cc 25% q 8 hours  - c/w pantoprazole 40mg qd  - Pending clinical course will discuss role of ICU, currently does not require it, however if clinical status worsens may need to transfer to [facility] for COVID ICU bed  - Not transplant candidate at this time because of COVID and AIDS  - Rest of care per primary team    Alvarado Marin MD  Gastroenterology and Hepatology Fellow  Please contact via [employer]ED[employer] Teams    Please call Hepatology ([phone]) if there are any additional questions or concerns.    Please call answering service for on-call GI fellow ([phone]) after 5pm and before 8am, and on weekends.

## 2020-12-31 ENCOUNTER — RESULT REVIEW (OUTPATIENT)
Age: 50
End: 2020-12-31

## 2020-12-31 LAB
A PHAGOCYTOPH IGG TITR SER IF: SIGNIFICANT CHANGE UP TITER
ALBUMIN FLD-MCNC: 0.5 G/DL — SIGNIFICANT CHANGE UP
ALBUMIN SERPL ELPH-MCNC: 1.8 G/DL — LOW (ref 3.3–5)
ALP SERPL-CCNC: 84 U/L — SIGNIFICANT CHANGE UP (ref 40–120)
ALT FLD-CCNC: 355 U/L — HIGH (ref 10–45)
ANION GAP SERPL CALC-SCNC: 7 MMOL/L — SIGNIFICANT CHANGE UP (ref 5–17)
ANION GAP SERPL CALC-SCNC: 9 MMOL/L — SIGNIFICANT CHANGE UP (ref 5–17)
APTT BLD: 85.5 SEC — HIGH (ref 27.5–35.5)
AST SERPL-CCNC: 1119 U/L — HIGH (ref 10–40)
B BURGDOR AB SER QL IA: NEGATIVE — SIGNIFICANT CHANGE UP
B MICROTI IGG TITR SER: SIGNIFICANT CHANGE UP TITER
B PERT IGG+IGM PNL SER: ABNORMAL
BILIRUB SERPL-MCNC: 14.2 MG/DL — HIGH (ref 0.2–1.2)
BUN SERPL-MCNC: 11 MG/DL — SIGNIFICANT CHANGE UP (ref 7–23)
BUN SERPL-MCNC: 13 MG/DL — SIGNIFICANT CHANGE UP (ref 7–23)
CALCIUM SERPL-MCNC: 6.3 MG/DL — CRITICAL LOW (ref 8.4–10.5)
CALCIUM SERPL-MCNC: 7.3 MG/DL — LOW (ref 8.4–10.5)
CHLORIDE SERPL-SCNC: 102 MMOL/L — SIGNIFICANT CHANGE UP (ref 96–108)
CHLORIDE SERPL-SCNC: 95 MMOL/L — LOW (ref 96–108)
CO2 SERPL-SCNC: 20 MMOL/L — LOW (ref 22–31)
CO2 SERPL-SCNC: 24 MMOL/L — SIGNIFICANT CHANGE UP (ref 22–31)
COLOR FLD: SIGNIFICANT CHANGE UP
CREAT SERPL-MCNC: 0.49 MG/DL — LOW (ref 0.5–1.3)
CREAT SERPL-MCNC: 0.97 MG/DL — SIGNIFICANT CHANGE UP (ref 0.5–1.3)
E CHAFFEENSIS IGG TITR SER IF: SIGNIFICANT CHANGE UP TITER
FERRITIN SERPL-MCNC: 943 NG/ML — HIGH (ref 15–150)
FIBRINOGEN PPP-MCNC: 172 MG/DL — LOW (ref 290–520)
FLUID INTAKE SUBSTANCE CLASS: SIGNIFICANT CHANGE UP
FLUID SEGMENTED GRANULOCYTES: 80 % — SIGNIFICANT CHANGE UP
GAS PNL BLDV: SIGNIFICANT CHANGE UP
GLUCOSE FLD-MCNC: 84 MG/DL — SIGNIFICANT CHANGE UP
GLUCOSE SERPL-MCNC: 123 MG/DL — HIGH (ref 70–99)
GLUCOSE SERPL-MCNC: 441 MG/DL — HIGH (ref 70–99)
HCT VFR BLD CALC: 30.3 % — LOW (ref 34.5–45)
HGB BLD-MCNC: 10.5 G/DL — LOW (ref 11.5–15.5)
INR BLD: 2.4 RATIO — HIGH (ref 0.88–1.16)
LACTATE BLDV-MCNC: 3.9 MMOL/L — HIGH (ref 0.7–2)
LDH SERPL L TO P-CCNC: 127 U/L — SIGNIFICANT CHANGE UP
LYMPHOCYTES # FLD: 8 % — SIGNIFICANT CHANGE UP
MAGNESIUM SERPL-MCNC: 1.7 MG/DL — SIGNIFICANT CHANGE UP (ref 1.6–2.6)
MAGNESIUM SERPL-MCNC: 1.9 MG/DL — SIGNIFICANT CHANGE UP (ref 1.6–2.6)
MCHC RBC-ENTMCNC: 30.9 PG — SIGNIFICANT CHANGE UP (ref 27–34)
MCHC RBC-ENTMCNC: 34.7 GM/DL — SIGNIFICANT CHANGE UP (ref 32–36)
MCV RBC AUTO: 89.1 FL — SIGNIFICANT CHANGE UP (ref 80–100)
MONOS+MACROS # FLD: 12 % — SIGNIFICANT CHANGE UP
NRBC # BLD: 0 /100 WBCS — SIGNIFICANT CHANGE UP (ref 0–0)
PHOSPHATE SERPL-MCNC: 2.1 MG/DL — LOW (ref 2.5–4.5)
PHOSPHATE SERPL-MCNC: 3.5 MG/DL — SIGNIFICANT CHANGE UP (ref 2.5–4.5)
PLATELET # BLD AUTO: 137 K/UL — LOW (ref 150–400)
POTASSIUM SERPL-MCNC: 3 MMOL/L — LOW (ref 3.5–5.3)
POTASSIUM SERPL-MCNC: 4 MMOL/L — SIGNIFICANT CHANGE UP (ref 3.5–5.3)
POTASSIUM SERPL-SCNC: 3 MMOL/L — LOW (ref 3.5–5.3)
POTASSIUM SERPL-SCNC: 4 MMOL/L — SIGNIFICANT CHANGE UP (ref 3.5–5.3)
PROT FLD-MCNC: 0.8 G/DL — SIGNIFICANT CHANGE UP
PROT SERPL-MCNC: 3.4 G/DL — LOW (ref 6–8.3)
PROTHROM AB SERPL-ACNC: 27.6 SEC — HIGH (ref 10.6–13.6)
RBC # BLD: 3.4 M/UL — LOW (ref 3.8–5.2)
RBC # FLD: 22.8 % — HIGH (ref 10.3–14.5)
RCV VOL RI: 4150 /UL — HIGH (ref 0–0)
SODIUM SERPL-SCNC: 124 MMOL/L — LOW (ref 135–145)
SODIUM SERPL-SCNC: 133 MMOL/L — LOW (ref 135–145)
TOTAL NUCLEATED CELL COUNT, BODY FLUID: 4800 /UL — SIGNIFICANT CHANGE UP
TUBE TYPE: SIGNIFICANT CHANGE UP
WBC # BLD: 10.06 K/UL — SIGNIFICANT CHANGE UP (ref 3.8–10.5)
WBC # FLD AUTO: 10.06 K/UL — SIGNIFICANT CHANGE UP (ref 3.8–10.5)

## 2020-12-31 PROCEDURE — 49083 ABD PARACENTESIS W/IMAGING: CPT

## 2020-12-31 PROCEDURE — 99233 SBSQ HOSP IP/OBS HIGH 50: CPT

## 2020-12-31 PROCEDURE — 99232 SBSQ HOSP IP/OBS MODERATE 35: CPT

## 2020-12-31 PROCEDURE — 99233 SBSQ HOSP IP/OBS HIGH 50: CPT | Mod: GC

## 2020-12-31 PROCEDURE — 88305 TISSUE EXAM BY PATHOLOGIST: CPT | Mod: 26

## 2020-12-31 PROCEDURE — 88112 CYTOPATH CELL ENHANCE TECH: CPT | Mod: 26

## 2020-12-31 RX ORDER — ALBUMIN HUMAN 25 %
100 VIAL (ML) INTRAVENOUS EVERY 8 HOURS
Refills: 0 | Status: DISCONTINUED | OUTPATIENT
Start: 2020-12-31 | End: 2021-01-01

## 2020-12-31 RX ORDER — SODIUM CHLORIDE 9 MG/ML
1000 INJECTION INTRAMUSCULAR; INTRAVENOUS; SUBCUTANEOUS ONCE
Refills: 0 | Status: COMPLETED | OUTPATIENT
Start: 2020-12-31 | End: 2020-12-31

## 2020-12-31 RX ORDER — HYDROMORPHONE HYDROCHLORIDE 2 MG/ML
0.5 INJECTION INTRAMUSCULAR; INTRAVENOUS; SUBCUTANEOUS ONCE
Refills: 0 | Status: DISCONTINUED | OUTPATIENT
Start: 2020-12-31 | End: 2020-12-31

## 2020-12-31 RX ORDER — ACETYLCYSTEINE 200 MG/ML
7 VIAL (ML) MISCELLANEOUS ONCE
Refills: 0 | Status: COMPLETED | OUTPATIENT
Start: 2020-12-31 | End: 2020-12-31

## 2020-12-31 RX ADMIN — Medication 600 MILLIGRAM(S): at 18:10

## 2020-12-31 RX ADMIN — SIMETHICONE 80 MILLIGRAM(S): 80 TABLET, CHEWABLE ORAL at 17:37

## 2020-12-31 RX ADMIN — Medication 250 MILLIGRAM(S): at 06:11

## 2020-12-31 RX ADMIN — Medication 250 MILLIGRAM(S): at 17:38

## 2020-12-31 RX ADMIN — ATOVAQUONE 1500 MILLIGRAM(S): 750 SUSPENSION ORAL at 16:29

## 2020-12-31 RX ADMIN — DOLUTEGRAVIR SODIUM 50 MILLIGRAM(S): 25 TABLET, FILM COATED ORAL at 12:07

## 2020-12-31 RX ADMIN — Medication 600 MILLIGRAM(S): at 17:47

## 2020-12-31 RX ADMIN — ENTECAVIR 0.5 MILLIGRAM(S): 0.5 TABLET ORAL at 12:07

## 2020-12-31 RX ADMIN — SIMETHICONE 80 MILLIGRAM(S): 80 TABLET, CHEWABLE ORAL at 06:10

## 2020-12-31 RX ADMIN — Medication 600 MILLIGRAM(S): at 10:42

## 2020-12-31 RX ADMIN — SODIUM CHLORIDE 1000 MILLILITER(S): 9 INJECTION INTRAMUSCULAR; INTRAVENOUS; SUBCUTANEOUS at 12:07

## 2020-12-31 RX ADMIN — PIPERACILLIN AND TAZOBACTAM 25 GRAM(S): 4; .5 INJECTION, POWDER, LYOPHILIZED, FOR SOLUTION INTRAVENOUS at 17:42

## 2020-12-31 RX ADMIN — Medication 50 MILLILITER(S): at 10:45

## 2020-12-31 RX ADMIN — PIPERACILLIN AND TAZOBACTAM 25 GRAM(S): 4; .5 INJECTION, POWDER, LYOPHILIZED, FOR SOLUTION INTRAVENOUS at 02:34

## 2020-12-31 RX ADMIN — EMTRICITABINE AND TENOFOVIR DISOPROXIL FUMARATE 1 TABLET(S): 200; 300 TABLET, FILM COATED ORAL at 12:07

## 2020-12-31 RX ADMIN — Medication 600 MILLIGRAM(S): at 11:10

## 2020-12-31 RX ADMIN — PIPERACILLIN AND TAZOBACTAM 25 GRAM(S): 4; .5 INJECTION, POWDER, LYOPHILIZED, FOR SOLUTION INTRAVENOUS at 10:42

## 2020-12-31 RX ADMIN — HYDROMORPHONE HYDROCHLORIDE 0.5 MILLIGRAM(S): 2 INJECTION INTRAMUSCULAR; INTRAVENOUS; SUBCUTANEOUS at 23:50

## 2020-12-31 RX ADMIN — Medication 50 MILLILITER(S): at 16:30

## 2020-12-31 RX ADMIN — ENOXAPARIN SODIUM 40 MILLIGRAM(S): 100 INJECTION SUBCUTANEOUS at 17:37

## 2020-12-31 RX ADMIN — SIMETHICONE 80 MILLIGRAM(S): 80 TABLET, CHEWABLE ORAL at 23:23

## 2020-12-31 RX ADMIN — Medication 64.69 GRAM(S): at 12:10

## 2020-12-31 RX ADMIN — HYDROMORPHONE HYDROCHLORIDE 0.5 MILLIGRAM(S): 2 INJECTION INTRAMUSCULAR; INTRAVENOUS; SUBCUTANEOUS at 22:59

## 2020-12-31 RX ADMIN — Medication 50 MILLILITER(S): at 22:59

## 2020-12-31 RX ADMIN — SIMETHICONE 80 MILLIGRAM(S): 80 TABLET, CHEWABLE ORAL at 12:07

## 2020-12-31 RX ADMIN — OXYCODONE HYDROCHLORIDE 10 MILLIGRAM(S): 5 TABLET ORAL at 00:52

## 2020-12-31 RX ADMIN — PANTOPRAZOLE SODIUM 40 MILLIGRAM(S): 20 TABLET, DELAYED RELEASE ORAL at 06:10

## 2020-12-31 NOTE — PROGRESS NOTE ADULT - SUBJECTIVE AND OBJECTIVE BOX
Vascular & Interventional Radiology Pre-Procedure Note    Procedure Name: Paracentesis.    HPI: 50y Female with ascites and COVID. Concern for SBP. Diagnostic paracentesis is requested     Allergies:   Medications (Abx/Cardiac/Anticoagulation/Blood Products)  atovaquone Suspension: 1500 milliGRAM(s) Oral (12-30 @ 11:52)  dolutegravir: 50 milliGRAM(s) Oral (12-31 @ 12:07)  emtricitabine 200 mG/tenofovir 300 mG (TRUVADA): 1 Tablet(s) Oral (12-31 @ 12:07)  enoxaparin Injectable: 40 milliGRAM(s) SubCutaneous (12-30 @ 17:20)  entecavir: 0.5 milliGRAM(s) Oral (12-31 @ 12:07)  metoprolol tartrate Injectable: 10 milliGRAM(s) IV Push (12-30 @ 20:45)  piperacillin/tazobactam IVPB.: 200 mL/Hr IV Intermittent (12-30 @ 22:23)  piperacillin/tazobactam IVPB..: 25 mL/Hr IV Intermittent (12-31 @ 10:42)  vancomycin  IVPB: 250 mL/Hr IV Intermittent (12-30 @ 22:23)  vancomycin  IVPB: 250 mL/Hr IV Intermittent (12-31 @ 06:11)    Data:    T(C): 36.4  HR: 124  BP: 92/62  RR: 18  SpO2: 97%    -WBC 10.06 / HgB 10.5 / Hct 30.3 / Plt 137  -Na 133 / Cl 102 / BUN 13 / Glucose 123  -K 4.0 / CO2 24 / Cr 0.97  - / Alk Phos 84 / T.Bili 14.2  -INR2.40    Plan:   -50y Female presents for paracentesis. Procedure and risks discussed with patient through  and she is agreeable to proceed.   -Risks/Benefits/alternatives explained with the patient and/or healthcare proxy and witnessed informed consent obtained.

## 2020-12-31 NOTE — PROGRESS NOTE ADULT - ASSESSMENT
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   I think this is all HBV.   COVID PCR positive which can cause LFT abnormalities but in the absence any respiratory illness I don't think it's contributing much.   CMV viremia low level is unlikely significant.   HDV serology negative and wrong epidemiology. PCR not available per core lab.   HCV is usually asymptomatic and her serology has been negative for years.   AIDS cholangiopathy occurs when CD4 <100 and causes biliary strictures which we don't see.   HBV viral load is improving from 483 million 12/2 to 36 million 12/29 but her pain persists and bilirubin is still rising.   Fulminant hepatitis is uncommon with HBV but if things don't improve I think her prognosis is guarded. HIV and delayed treatment may be factors. Aside from antiviral therapy, management is largely supportive.   Overnight became febrile to 102F with worsening tachycardia (not recorded).   Went for paracentesis this afternoon.     Suggest  -please document her overnight fever in the flowsheet, this is a significant event   -f/u blood and ascites cultures   -f/u ascites cell count   -empiric Vanc and Zosyn are reasonable pending cultures   -continue Tivicay and Truvada for HIV, the latter for HBV as well   -I'm unclear if we should continue entecavir- there is data on combination therapy but not usually two nucleoside analogues and they can both cause lactic acidosis and hepatic steatosis, will consider stopping as her viral load already improved with just TAF   -f/u HBV resistance testing   -[place] has transplanted HIV patients before   -Atovaquone for PJP prophylaxis until CD4 >200 for three months   -supportive care and isolation precautions for COVID, currently on room air, remdesivir is generally discouraged with liver enzymes this high     Spoke with hepatology and primary team     Ag Merrill MD   Infectious Disease   Pager [phone]   After 5PM and on weekends please page fellow on call or call [phone]

## 2020-12-31 NOTE — PROGRESS NOTE ADULT - SUBJECTIVE AND OBJECTIVE BOX
Authored by Gee Gutiérrez MD ([phone]) [facility]    Patient is a 50y old  Female who presents with a chief complaint of Abdominal pain (31 Dec 2020 08:54)    SUBJECTIVE / OVERNIGHT EVENTS: NAEON. This am pt notes her abdominal pain is significantly better.     ADDITIONAL REVIEW OF SYSTEMS:    MEDICATIONS  (STANDING):  acetylcysteine IVPB 7 Gram(s) IV Intermittent once  albumin human 25% IVPB 100 milliLiter(s) IV Intermittent every 8 hours  atovaquone Suspension 1500 milliGRAM(s) Oral daily  dolutegravir 50 milliGRAM(s) Oral daily  emtricitabine 200 mG/tenofovir 300 mG (TRUVADA) 1 Tablet(s) Oral daily  enoxaparin Injectable 40 milliGRAM(s) SubCutaneous every 12 hours  entecavir 0.5 milliGRAM(s) Oral daily  pantoprazole    Tablet 40 milliGRAM(s) Oral before breakfast  piperacillin/tazobactam IVPB.. 3.375 Gram(s) IV Intermittent every 8 hours  simethicone 80 milliGRAM(s) Chew four times a day  vancomycin  IVPB      vancomycin  IVPB 1000 milliGRAM(s) IV Intermittent every 12 hours    MEDICATIONS  (PRN):  aluminum hydroxide/magnesium hydroxide/simethicone Suspension 30 milliLiter(s) Oral every 6 hours PRN Dyspepsia  ibuprofen  Tablet. 600 milliGRAM(s) Oral every 6 hours PRN Mild Pain (1 - 3)  ondansetron Injectable 4 milliGRAM(s) IV Push every 8 hours PRN Nausea and/or Vomiting  oxyCODONE    IR 10 milliGRAM(s) Oral every 6 hours PRN Severe Pain (7 - 10)    POCT Blood Glucose.: 118 mg/dL (30 Dec 2020 23:09)    I&O's Summary    30 Dec 2020 07:01  -  31 Dec 2020 07:00  --------------------------------------------------------  IN: 2700 mL / OUT: 0 mL / NET: 2700 mL    PHYSICAL EXAM:  Vital Signs Last 24 Hrs  T(C): 37.1 (31 Dec 2020 06:45), Max: 37.7 (30 Dec 2020 20:05)  T(F): 98.8 (31 Dec 2020 06:45), Max: 99.9 (30 Dec 2020 20:05)  HR: 119 (31 Dec 2020 06:45) (119 - 153)  BP: 92/62 (31 Dec 2020 06:45) (91/65 - 131/86)  BP(mean): --  RR: 17 (31 Dec 2020 06:45) (16 - 18)  SpO2: 95% (31 Dec 2020 06:45) (95% - 97%)    GENERAL: No acute distress, well-developed  HEAD:  Atraumatic, Normocephalic  EYES: EOMI, PERRLA, conjunctiva and sclera clear  NECK: Supple, no lymphadenopathy, no JVD  CHEST/LUNG: CTAB; No wheezes, rales, or rhonchi  HEART: Regular rate and rhythm; No murmurs, rubs, or gallops  ABDOMEN: Soft, non-tender, non-distended; normal bowel sounds, no organomegaly  EXTREMITIES:  2+ peripheral pulses b/l, No clubbing, cyanosis, or edema  NEUROLOGY: A&O x 3, no focal deficits  SKIN: No rashes or lesions    LABS:                        10.5   10.06 )-----------( 137      ( 31 Dec 2020 07:11 )             30.3     12-31    133<L>  |  102  |  13  ----------------------------<  123<H>  4.0   |  24  |  0.97    Ca    7.3<L>      31 Dec 2020 07:10  Phos  3.5     12-31  Mg     1.9     12-31    TPro  3.4<L>  /  Alb  1.8<L>  /  TBili  14.2<H>  /  DBili  x   /  AST  1119<H>  /  ALT  355<H>  /  AlkPhos  84  12-31    PT/INR - ( 31 Dec 2020 07:11 )   PT: 27.6 sec;   INR: 2.40 ratio      PTT - ( 31 Dec 2020 07:11 )  PTT:85.5 sec

## 2020-12-31 NOTE — CONSULT NOTE ADULT - SUBJECTIVE AND OBJECTIVE BOX
Jonas Garcia, MICU Resident  PGY-3, [phone], [id number]    CHIEF COMPLAINT: Abd pain    HPI  	  50 year old female with past medical history of HIV, CMV, HBV, histoplasmosis, renal vein thrombosis s/p coumadin for 6 months, and strongyloidiasis who presents with abdominal pain found to be COVID-19 PCR positive. Also with acute liver injury.  MICU consulted for hypotension        PAST MEDICAL & SURGICAL HISTORY:  Renal Vein Thrombosis  , s/p Coumadin for 6 months    CMV Infection    Hepatitis B    Strongyloidosis    Histoplasmosis    HIV (Human Immunodeficiency Virus Infection)    Gallstones    S/P  Section    S/P Bilateral Tubal Ligation    S/P PEG (Percutaneous Endoscopic Gastrostomy) Placement and Removal    History of Cholecystectomy        FAMILY HISTORY:      SOCIAL HISTORY:  Denies smoking or etoh  Allergies    No Known Allergies    Intolerances        HOME MEDICATIONS:    REVIEW OF SYSTEMS:      CONSTITUTIONAL: No weakness, fevers or chills  EYES/ENT: No visual changes;  No vertigo or throat pain   NECK: No pain or stiffness  RESPIRATORY: +  cough, denies SOB  CARDIOVASCULAR: Pain in chest with coughing and deep inspiration   GASTROINTESTINAL: + abdominal or epigastric pain. + BRBPR (small amount). No nausea, vomiting, or hematemesis  GENITOURINARY: No dysuria, frequency or hematuria  NEUROLOGICAL: No numbness or weakness  SKIN: No itching, burning, rashes, or lesions   All other review of systems is negative unless indicated above.    OBJECTIVE:  ICU Vital Signs Last 24 Hrs  T(C): 36.8 (31 Dec 2020 16:32), Max: 37.1 (31 Dec 2020 06:45)  T(F): 98.3 (31 Dec 2020 16:32), Max: 98.8 (31 Dec 2020 06:45)  HR: 103 (31 Dec 2020 16:32) (100 - 150)  BP: 99/64 (31 Dec 2020 16:32) (79/66 - 105/74)  BP(mean): --  ABP: --  ABP(mean): --  RR: 18 (31 Dec 2020 16:32) (16 - 18)  SpO2: 97% (31 Dec 2020 16:32) (95% - 98%)         @ 07:01  -   @ 07:00  --------------------------------------------------------  IN: 2700 mL / OUT: 0 mL / NET: 2700 mL      CAPILLARY BLOOD GLUCOSE      POCT Blood Glucose.: 118 mg/dL (30 Dec 2020 23:09)      .  VITAL SIGNS:  T(C): 36.8 (20 @ 16:32), Max: 37.1 (20 @ 06:45)  T(F): 98.3 (20 @ 16:32), Max: 98.8 (20 @ 06:45)  HR: 103 (20 @ 16:32) (100 - 150)  BP: 99/64 (20 @ 16:32) (79/66 - 105/74)  BP(mean): --  RR: 18 (20 @ 16:32) (16 - 18)  SpO2: 97% (20 @ 16:32) (95% - 98%)  Wt(kg): --    PHYSICAL EXAM:    Constitutional: WDWN resting comfortably in bed; NAD  Head: NC/AT  Eyes: PERRL, EOMI, anicteric sclera  ENT: no nasal discharge, MMM  Neck: supple; no JVD appreciated  Respiratory: CTA B/L; no W/R/R, no increased work of breathing  Cardiac: +S1/S2; RRR; no M/R/G  Gastrointestinal: Tender in all 4 quadrants to moderate palpation.   Extremities: 3+ LE edema bilat  Musculoskeletal: NROM x4; no joint swelling, tenderness or erythema  Vascular: 2+ radial, DP pulses B/L  Dermatologic: skin warm, dry and intact  Neurologic: Alert and oriented, no focal deficits appreciated  Psychiatric: affect and characteristics of appearance, verbalizations, behaviors are appropriate  HOSPITAL MEDICATIONS:  MEDICATIONS  (STANDING):  albumin human 25% IVPB 100 milliLiter(s) IV Intermittent every 8 hours  atovaquone Suspension 1500 milliGRAM(s) Oral daily  dolutegravir 50 milliGRAM(s) Oral daily  emtricitabine 200 mG/tenofovir 300 mG (TRUVADA) 1 Tablet(s) Oral daily  enoxaparin Injectable 40 milliGRAM(s) SubCutaneous every 12 hours  entecavir 0.5 milliGRAM(s) Oral daily  pantoprazole    Tablet 40 milliGRAM(s) Oral before breakfast  piperacillin/tazobactam IVPB.. 3.375 Gram(s) IV Intermittent every 8 hours  simethicone 80 milliGRAM(s) Chew four times a day  vancomycin  IVPB      vancomycin  IVPB 1000 milliGRAM(s) IV Intermittent every 12 hours    MEDICATIONS  (PRN):  aluminum hydroxide/magnesium hydroxide/simethicone Suspension 30 milliLiter(s) Oral every 6 hours PRN Dyspepsia  ibuprofen  Tablet. 600 milliGRAM(s) Oral every 6 hours PRN Mild Pain (1 - 3)  ondansetron Injectable 4 milliGRAM(s) IV Push every 8 hours PRN Nausea and/or Vomiting  oxyCODONE    IR 10 milliGRAM(s) Oral every 6 hours PRN Severe Pain (7 - 10)      LABS:                        10.5   10.06 )-----------( 137      ( 31 Dec 2020 07:11 )             30.3         133<L>  |  102  |  13  ----------------------------<  123<H>  4.0   |  24  |  0.97    Ca    7.3<L>      31 Dec 2020 07:10  Phos  3.5       Mg     1.9         TPro  3.4<L>  /  Alb  1.8<L>  /  TBili  14.2<H>  /  DBili  x   /  AST  1119<H>  /  ALT  355<H>  /  AlkPhos  84      PT/INR - ( 31 Dec 2020 07:11 )   PT: 27.6 sec;   INR: 2.40 ratio         PTT - ( 31 Dec 2020 07:11 )  PTT:85.5 sec      Venous Blood Gas:   @ 11:36  --/--/--/--/--  VBG Lactate: 3.9  Venous Blood Gas:   @ 11:33  7.36/41/28/22/41  VBG Lactate: 3.7  Venous Blood Gas:   @ 21:29  7.38/40/<20/23/21  VBG Lactate: 5.2

## 2020-12-31 NOTE — PROGRESS NOTE ADULT - ASSESSMENT
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     #Acute liver injury   Patient with predominantly hepatocellular liver injury, with AST >1800, Alt 600. Also w/ evidence of hepatic synthetic function, with elevated INR, decreased albumin. Etiology likely from HBV, which was previously being treated w/ Symtuza (tenofovir 10mg qd) but patient had been off medication for almost 1 year (until 11/2020) due to insurance lapse. Her outpatient viral load >400,000,000 corroborates this. Hepatitis delta coinfection has been ruled out as outpatient. Patient will require immediate treatment of her HBV, now on biktarvy (tenofovir 25mg). Currently patient is not in acute liver failure, she is mentating well, but is having worsening INR which is concerning. LFTs improving   - c/w truvada and entecavir for HBV  - c/w NAC infusion for acute liver failure (100 mg/kg over 16 hours, will likely need to be reordered daily)  - albumin 100cc 25% q 8 hours  - IVF PRN  - vitamin K 10mg for 3 days (12/26 - 12/28)  - ketorolac 15 mg q8h prn   - appreciate hepatology recs for further pain management  - f/u limited abd US to ID pocket for possible para or new moderate ascites noted on CTAP 12/22/2020, new from 11/4/2020    #Anemia:   - likely dilutional 2/2 albumin. no s/s of bleed.   - f/u heme recs- factor assays, mixing studies.   - type and cross x2  - FOBT negative  - repeat CBC at 6pm, monitor H/H q12h for now  - transfuse Hgb <7    #AIDS  needs ARV medications to help with her immunosuppression but need to change ARV meds to ones metabolized by the kidney  continue the TAF for help controlling the Hep B infection  - In light of significant transaminitis, discontinued the Symtuza and changed meds to Truvada/Tivicay  - CD4 116 consistent with AIDS  - RNA viral load in process - slightly detectable but cannot account for her acute decompensation in liver disease  - If viral load elevated will add Bactrim for PJP prophylaxis  - c/w atovaquone for PCP ppx per ID- no Bactrim given hepatic dysfunction    #COVID+  - not eligible for Remdesivir secondary to her LFT abnormalities  - could consider convalescent plasma if she decompensates  - D- dimer <150, fibrinogen 224 (uptrending), ferritin 957 (uptrending)  - send QuantiFeron testing- given new onset ascites and abnormal appearing bowels  - urine histo ag. in case recurrence  - blood cultures 12/22- no growth    #CMV viremia   - hold off on Valganciclovir   - f/u repeat CMV viral load    #Prophylactic measures  - DVT ppx: Lovenox bid  - GERD: pantoprazole 40mg qd  - Diet: Vegetarian 50 year old female with past medical history of HIV, CMV, HBV, histoplasmosis, renal vein thrombosis s/p coumadin for 6 months, and strongyloidiasis who presents with abdominal pain found to be COVID-19 PCR positive.     #Acute liver injury   Patient with predominantly hepatocellular liver injury, with AST >1800, Alt 600. Also w/ evidence of hepatic synthetic function, with elevated INR, decreased albumin. Etiology likely from HBV, which was previously being treated w/ Symtuza (tenofovir 10mg qd) but patient had been off medication for almost 1 year (until 11/2020) due to insurance lapse. Her outpatient viral load >400,000,000 corroborates this. Hepatitis delta coinfection has been ruled out as outpatient. Patient will require immediate treatment of her HBV, now on biktarvy (tenofovir 25mg). Currently patient is not in acute liver failure, she is mentating well, but is having worsening INR which is concerning. LFTs improving   - c/w truvada and entecavir for HBV  - c/w NAC infusion for acute liver failure (100 mg/kg over 16 hours, will likely need to be reordered daily)  - c/w albumin 100cc 25% q 8 hours  - IVF PRN  - vitamin K 10mg for 3 days (12/26 - 12/28)  - ketorolac 15 mg q8h prn   - appreciate hepatology recs for further pain management  - ID to do para 12/31    #Sepsis:  Pt noted to be tachy, HOTN, febrile 12/30.   - HD stable this am  - f/u blood cx  - c/w vanc (12/30- )  - c/w zosyn (12/30- )     #Anemia:   - likely dilutional 2/2 albumin. no s/s of bleed.   - f/u heme recs- factor assays, mixing studies.   - type and cross x2  - FOBT negative  - repeat CBC at 6pm, monitor H/H q12h for now  - transfuse Hgb <7    #AIDS  needs ARV medications to help with her immunosuppression but need to change ARV meds to ones metabolized by the kidney  continue the TAF for help controlling the Hep B infection  - In light of significant transaminitis, discontinued the Symtuza and changed meds to Truvada/Tivicay  - CD4 116 consistent with AIDS  - RNA viral load in process - slightly detectable but cannot account for her acute decompensation in liver disease  - If viral load elevated will add Bactrim for PJP prophylaxis  - c/w atovaquone for PCP ppx per ID- no Bactrim given hepatic dysfunction    #COVID+  - not eligible for Remdesivir secondary to her LFT abnormalities  - could consider convalescent plasma if she decompensates  - D- dimer <150, fibrinogen 224 (uptrending), ferritin 957 (uptrending)  - send QuantiFeron testing- given new onset ascites and abnormal appearing bowels  - urine histo ag. in case recurrence  - blood cultures 12/22- no growth    #CMV viremia   - hold off on Valganciclovir   - f/u repeat CMV viral load    #Prophylactic measures  - DVT ppx: Lovenox bid  - GERD: pantoprazole 40mg qd  - Diet: Vegetarian

## 2020-12-31 NOTE — CONSULT NOTE ADULT - ASSESSMENT
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 Also with acute liver injury.   MICU consulted for Hypotension.    # Hypotension likely in the setting of liver injury Vs infection   -A couple of low readings today, but latest BP 90/69. Baseline BP this admission 's/60-70's.   -Can increase albumin to q6h  -can start midodrine  -c/w abx  -send urine studies and  blood and paracentesis studies  -Monitor renal function (ANTOINETTE)  -monitor for further bleeding per rectum  -monitor CBC  -f/u hepatology and ID recs    No indication for the MICU at this time.    Case Discussed with Dr. Mixon.

## 2020-12-31 NOTE — CHART NOTE - NSCHARTNOTEFT_GEN_A_CORE
Reviewed labs from 21:34 and 00:10. Blood was drawn from PICC line which would explain the electrolyte abnormalities. FS normal. Patient was seen and in no acute distress. Vitals stable. Explained results to nurse, who will draw repeat labs from peripheral site and not from the PICC.

## 2020-12-31 NOTE — PROGRESS NOTE ADULT - SUBJECTIVE AND OBJECTIVE BOX
Interventional Radiology Brief Post Procedure Note    Procedure: Paracentesis.    Operators: Jaswinder Dupont MD    Anesthesia (type): Local lidocaine    Contrast: None.     EBL: Minimal.     Findings/Follow up Plan of Care: Successful diagnostic paracentesis yielding 120 mL serous fluid.     Specimens Removed: 120 mL serous fluid.     Implants: None.     Complications: No immediate complications.     Condition/Disposition: Stay in room.     Please call Interventional Radiology x [phone] with any questions, concerns, or issues.

## 2020-12-31 NOTE — PROGRESS NOTE ADULT - SUBJECTIVE AND OBJECTIVE BOX
Chief complaint: Abdominal pain  Reason for consult: Abnormal liver enzymes    Interval Events:   - Abdominal U/S with moderate ascites    MEDICATIONS  (STANDING):  atovaquone Suspension 1500 milliGRAM(s) Oral daily  dolutegravir 50 milliGRAM(s) Oral daily  emtricitabine 200 mG/tenofovir 300 mG (TRUVADA) 1 Tablet(s) Oral daily  enoxaparin Injectable 40 milliGRAM(s) SubCutaneous every 12 hours  entecavir 0.5 milliGRAM(s) Oral daily  pantoprazole    Tablet 40 milliGRAM(s) Oral before breakfast  piperacillin/tazobactam IVPB.. 3.375 Gram(s) IV Intermittent every 8 hours  simethicone 80 milliGRAM(s) Chew four times a day  vancomycin  IVPB      vancomycin  IVPB 1000 milliGRAM(s) IV Intermittent every 12 hours    MEDICATIONS  (PRN):  aluminum hydroxide/magnesium hydroxide/simethicone Suspension 30 milliLiter(s) Oral every 6 hours PRN Dyspepsia  ibuprofen  Tablet. 600 milliGRAM(s) Oral every 6 hours PRN Mild Pain (1 - 3)  ondansetron Injectable 4 milliGRAM(s) IV Push every 8 hours PRN Nausea and/or Vomiting  oxyCODONE    IR 10 milliGRAM(s) Oral every 6 hours PRN Severe Pain (7 - 10)    PHYSICAL EXAM:   Vital Signs:  Vital Signs Last 24 Hrs  T(C): 37.1 (31 Dec 2020 06:45), Max: 37.7 (30 Dec 2020 20:05)  T(F): 98.8 (31 Dec 2020 06:45), Max: 99.9 (30 Dec 2020 20:05)  HR: 119 (31 Dec 2020 06:45) (119 - 153)  BP: 92/62 (31 Dec 2020 06:45) (91/65 - 131/86)  BP(mean): --  RR: 17 (31 Dec 2020 06:45) (16 - 18)  SpO2: 95% (31 Dec 2020 06:45) (95% - 97%)    GENERAL:  No acute distress  HEENT:  Normocephalic/atraumatic, + scleral icterus  CHEST:  No resp distress  ABDOMEN:  Soft, distended, non tender  EXTREMITIES: No cyanosis, clubbing, or edema  SKIN:  No rash/erythema  NEURO:  Alert and oriented x 3    LABS:                        10.5   10.06 )-----------( 137      ( 31 Dec 2020 07:11 )             30.3     12-31    133<L>  |  102  |  13  ----------------------------<  123<H>  4.0   |  24  |  0.97    Ca    7.3<L>      31 Dec 2020 07:10  Phos  3.5     12-31  Mg     1.9     12-31    TPro  3.4<L>  /  Alb  1.8<L>  /  TBili  14.2<H>  /  DBili  x   /  AST  1119<H>  /  ALT  355<H>  /  AlkPhos  84  12-31    LIVER FUNCTIONS - ( 31 Dec 2020 07:10 )  Alb: 1.8 g/dL / Pro: 3.4 g/dL / ALK PHOS: 84 U/L / ALT: 355 U/L / AST: 1119 U/L / GGT: x           PT/INR - ( 31 Dec 2020 07:11 )   PT: 27.6 sec;   INR: 2.40 ratio         PTT - ( 31 Dec 2020 07:11 )  PTT:85.5 sec    Amylase Serum--      Lipase serum--       [id number]    Imaging:    < from: US Abdomen Limited (12.30.20 @ 15:08) >    EXAM:  US ABDOMEN LIMITED                            PROCEDURE DATE:  12/30/2020            INTERPRETATION:  CLINICAL INFORMATION: HIV with AIDS, hepatitis B, ascites    COMPARISON: Abdominal ultrasound 12/22/2020, CT abdomen and pelvis 12/22/2020    TECHNIQUE: Sonography of the abdomen.    FINDINGS:    Right upper quadrant: Moderate ascites.  Right lower quadrant: Moderate ascites.  Left upper quadrant: Not well visualized  Left lower quadrant: Moderate ascites.    IMPRESSION:    Moderate ascites in the right upper, left upper, and left lower quadrants. Left upper quadrant is not well visualized.    TOR HOGAN MD, Resident Radiology  This document has been electronically signed.  VENU GLASS MD; Attending Radiologist  This document has been electronically signed. Dec 30 2020  3:48PM    < end of copied text >

## 2020-12-31 NOTE — PROVIDER CONTACT NOTE (CRITICAL VALUE NOTIFICATION) - ACTION/TREATMENT ORDERED:
ENDORSED TO FLOYD Cornejo that MED INTERN IS EVALUATING and she must contact him after coming out from another patient room.

## 2020-12-31 NOTE — PROGRESS NOTE ADULT - SUBJECTIVE AND OBJECTIVE BOX
Follow Up: HBV, HIV     Interval History/ROS: Still really uncomfortable, upper abdominal pain diffuse. Febrile last night to 102F per primary team although not recorded. No cough or dyspnea.     Allergies  No Known Allergies    ANTIMICROBIALS:  atovaquone Suspension 1500 daily  dolutegravir 50 daily  emtricitabine 200 mG/tenofovir 300 mG (TRUVADA) 1 daily  entecavir 0.5 daily  piperacillin/tazobactam IVPB.. 3.375 every 8 hours  vancomycin  IVPB    vancomycin  IVPB 1000 every 12 hours      OTHER MEDS:  albumin human 25% IVPB 100 milliLiter(s) IV Intermittent every 8 hours  aluminum hydroxide/magnesium hydroxide/simethicone Suspension 30 milliLiter(s) Oral every 6 hours PRN  enoxaparin Injectable 40 milliGRAM(s) SubCutaneous every 12 hours  ibuprofen  Tablet. 600 milliGRAM(s) Oral every 6 hours PRN  ondansetron Injectable 4 milliGRAM(s) IV Push every 8 hours PRN  oxyCODONE    IR 10 milliGRAM(s) Oral every 6 hours PRN  pantoprazole    Tablet 40 milliGRAM(s) Oral before breakfast  simethicone 80 milliGRAM(s) Chew four times a day      Vital Signs Last 24 Hrs  T(C): 36.8 (31 Dec 2020 16:32), Max: 37.7 (30 Dec 2020 20:05)  T(F): 98.3 (31 Dec 2020 16:32), Max: 99.9 (30 Dec 2020 20:05)  HR: 103 (31 Dec 2020 16:32) (100 - 153)  BP: 99/64 (31 Dec 2020 16:32) (79/66 - 131/86)  BP(mean): --  RR: 18 (31 Dec 2020 16:32) (16 - 18)  SpO2: 97% (31 Dec 2020 16:32) (95% - 98%)    Physical Exam:  General: awake, alert, non toxic   Eye: yellow   Cardio: tachycardic   Respiratory: nonlabored on room air, clear bilaterally, no wheezing  abd: soft, bowel sounds present. mild tenderness upper abdomen   Skin: yellow   Neurologic: no focal deficit  psych: normal affect                          10.5   10.06 )-----------( 137      ( 31 Dec 2020 07:11 )             30.3       12-31    133<L>  |  102  |  13  ----------------------------<  123<H>  4.0   |  24  |  0.97    Ca    7.3<L>      31 Dec 2020 07:10  Phos  3.5       Mg     1.9         TPro  3.4<L>  /  Alb  1.8<L>  /  TBili  14.2<H>  /  DBili  x   /  AST  1119<H>  /  ALT  355<H>  /  AlkPhos  84            MICROBIOLOGY:  HIV-1 RNA Quantitative, Viral Load Lo.76 (20 @ 14:37)  HIV-1 RNA Quantitative, Viral Load: 57 (20 @ 14:37)     CD4 %: 10 % (20 @ 11:01) ABS CD4: 116 /uL (20 @ 11:01) Full T Cell Subset (20 @ 11:01)     Hepatitis B PCR Quantitative: 483,389,507 IU/mL    [id number] 04:21PM   Hepatitis B PCR Quantitative: 147,167,465 IU/mL (20 @ 02:56)   Hepatitis B PCR Quantitative: 36,713,676 IU/mL (20 @ 09:17)     RADIOLOGY:  Images below reviewed personally  Xray Chest 1 View- PORTABLE-Urgent (Xray Chest 1 View- PORTABLE-Urgent .) (20 @ 18:36)   The heart is normal in size. The lungs appear to be clear. A PICC line was placed on the right and the tip is in the superior vena cava. No pneumothorax.    US Abdomen Limited (20 @ 15:08)   Moderate ascites in the right upper, left upper, and left lower quadrants. Left upper quadrant is not well visualized.

## 2020-12-31 NOTE — PROGRESS NOTE ADULT - ASSESSMENT
51 yo F w/ PMHx HIV/AIDS, HBV, previous histo, CMV, renal vein thrombus s/p coumadin (2010) admitted with abd pain x 4 days, found to have acute liver injury and covid +    # Acute liver injury - patient with predominantly hepatocellular liver injury, initially with AST >1800, Alt 600. Also w/ evidence of hepatic synthetic function, with elevated INR, decreased albumin. 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 Her outpatient viral load >400,000,000 corroborates this. Hepatitis delta coinfection has beenruled out as outpatient.   Patient was on biktarvy but switched to Truvada for TDF (as opposed to TAF), and adding double coverage with entecavir. Also on NAC. Currently with minimal concern for acute liver failure as she is mentating well.  # Abdominal pain: Likely due to hepatitis, however, differential includes SBP and GERD. U/S abdomen pending.  # HIV/AIDS - CD4 114, previous histo, CMV, has been off medications for 1 year until 11/2020  # COVID + - currently covid positive, O2 sat normal > 95% on room air    Recommendations:   - Diagnostic and therapeutic paracentesis (Check cell count, albumin, protein, LDH, glucose, gram stain and culture, and cytology)  - Continue to trend CMP, INR, Mg, Phos, fibrinogen, vbg (lactate) daily   - c/w Truvada and entecavir for HBV  - F/U HBV resistance profile  - c/w NAC infusion for acute liver failure (100 mg/kg over 16 hours, will likely need to be reordered daily)  - Continue albumin 100cc 25% q 8 hours  - c/w pantoprazole 40mg qd  - Pending clinical course will discuss role of ICU, currently does not require it, however if clinical status worsens may need to transfer to [facility] for COVID ICU bed  - Not transplant candidate at this time because of COVID and AIDS  - Rest of care per primary team    Alvarado Marin MD  Gastroenterology and Hepatology Fellow  Please contact via [employer] Teams    Please call Hepatology ([phone]) if there are any additional questions or concerns.    Please call answering service for on-call GI fellow ([phone]) after 5pm and before 8am, and on weekends.

## 2020-12-31 NOTE — PROGRESS NOTE ADULT - ATTENDING COMMENTS
Hepatology Staff: Stefany Becker MD    I saw and examined the patient along with  Dr. Marin 12-31-20 @ 16:42.    Patient Medical Record, hosptial course was reviewed and summarized as below:    Vitals: Vital Signs Last 24 Hrs  T(C): 36.8 (31 Dec 2020 16:32), Max: 37.7 (30 Dec 2020 20:05)  T(F): 98.3 (31 Dec 2020 16:32), Max: 99.9 (30 Dec 2020 20:05)  HR: 103 (31 Dec 2020 16:32) (100 - 153)  BP: 99/64 (31 Dec 2020 16:32) (79/66 - 131/86)    RR: 18 (31 Dec 2020 16:32) (16 - 18)  SpO2: 97% (31 Dec 2020 16:32) (95% - 98%)  Medications:  IV Fluids: albumin human 25% IVPB 100 milliLiter(s) IV Intermittent every 8 hours    Antibiotics:piperacillin/tazobactam IVPB.. 3.375 Gram(s) IV Intermittent every 8 hours  vancomycin  IVPB      vancomycin  IVPB 1000 milliGRAM(s) IV Intermittent every 12 hours    Diuretics:  Labs:INR: 2.40 ratio (12-31-20 @ 07:11)  Creatinine, Serum: 0.97 mg/dL (12-31-20 @ 07:10)  Bilirubin Total, Serum: 14.2 mg/dL (12-31-20 @ 07:10)  Creatinine, Serum: 0.49 mg/dL (12-31-20 @ 00:10)  Creatinine, Serum: 0.35 mg/dL (12-30-20 @ 21:34)  Bilirubin Total, Serum: 10.7 mg/dL (12-30-20 @ 21:34)      I/O: I&O's Summary    30 Dec 2020 07:01  -  31 Dec 2020 07:00  --------------------------------------------------------  IN: 2700 mL / OUT: 0 mL / NET: 2700 mL      Nutritional Status:   Albumin, Serum: 1.8 g/dL (12-31-20 @ 07:10)  Albumin, Serum: 1.6 g/dL (12-30-20 @ 21:34)    Last 24 hour events:  Noted acute worsening of LFTs, particularly total bili. Moderate ascites on US of the abdomen. Also patient was hypotensive this afternoon..     Recommendations: Recommend infection w/u (secondary bacterial infections)- repeat blood cx, urine Cx, ascitic fluid analysis to r/o SBP. Lactate is 1.7.  In my opinion, due to potential toxicity with two PIs, will recommend to withheld Entecavir at this time. Keep Truvada, however. Agree with broad spectrum IV antibiotics.    Plan discussed with Primary team. Hepatology Staff: Stefany Becker MD    I saw and examined the patient along with  Dr. Marin 12-31-20 @ 16:42.    Patient Medical Record, hosptial course was reviewed and summarized as below:    Vitals: Vital Signs Last 24 Hrs  T(C): 36.8 (31 Dec 2020 16:32), Max: 37.7 (30 Dec 2020 20:05)  T(F): 98.3 (31 Dec 2020 16:32), Max: 99.9 (30 Dec 2020 20:05)  HR: 103 (31 Dec 2020 16:32) (100 - 153)  BP: 99/64 (31 Dec 2020 16:32) (79/66 - 131/86)    RR: 18 (31 Dec 2020 16:32) (16 - 18)  SpO2: 97% (31 Dec 2020 16:32) (95% - 98%)  Medications:  IV Fluids: albumin human 25% IVPB 100 milliLiter(s) IV Intermittent every 8 hours    Antibiotics:piperacillin/tazobactam IVPB.. 3.375 Gram(s) IV Intermittent every 8 hours  vancomycin  IVPB      vancomycin  IVPB 1000 milliGRAM(s) IV Intermittent every 12 hours    Diuretics:  Labs:INR: 2.40 ratio (12-31-20 @ 07:11)  Creatinine, Serum: 0.97 mg/dL (12-31-20 @ 07:10)  Bilirubin Total, Serum: 14.2 mg/dL (12-31-20 @ 07:10)  Creatinine, Serum: 0.49 mg/dL (12-31-20 @ 00:10)  Creatinine, Serum: 0.35 mg/dL (12-30-20 @ 21:34)  Bilirubin Total, Serum: 10.7 mg/dL (12-30-20 @ 21:34)      I/O: I&O's Summary    30 Dec 2020 07:01  -  31 Dec 2020 07:00  --------------------------------------------------------  IN: 2700 mL / OUT: 0 mL / NET: 2700 mL      Nutritional Status:   Albumin, Serum: 1.8 g/dL (12-31-20 @ 07:10)  Albumin, Serum: 1.6 g/dL (12-30-20 @ 21:34)    Last 24 hour events:  Noted acute worsening of LFTs, particularly total bili. Moderate ascites on US of the abdomen. Also patient was hypotensive this afternoon..     Recommendations: Recommend infection w/u (secondary bacterial infections)- repeat blood cx, urine Cx, ascitic fluid analysis to r/o SBP. Lactate is 1.7.  In my opinion, due to potential toxicity with two PIs, will recommend to withheld Entecavir at this time. Keep Truvada, however. Agree with broad spectrum IV antibiotics.    Please fractionate total bili ( seems a predominant hemolytic component)    Plan discussed with Primary team.

## 2021-01-01 LAB
ALBUMIN SERPL ELPH-MCNC: 2.5 G/DL — LOW (ref 3.3–5)
ALBUMIN SERPL ELPH-MCNC: 3 G/DL — LOW (ref 3.3–5)
ALP SERPL-CCNC: 46 U/L — SIGNIFICANT CHANGE UP (ref 40–120)
ALP SERPL-CCNC: 47 U/L — SIGNIFICANT CHANGE UP (ref 40–120)
ALT FLD-CCNC: 166 U/L — HIGH (ref 10–45)
ALT FLD-CCNC: 189 U/L — HIGH (ref 10–45)
ANION GAP SERPL CALC-SCNC: 10 MMOL/L — SIGNIFICANT CHANGE UP (ref 5–17)
ANION GAP SERPL CALC-SCNC: 9 MMOL/L — SIGNIFICANT CHANGE UP (ref 5–17)
APPEARANCE UR: ABNORMAL
APTT BLD: 134.2 SEC — CRITICAL HIGH (ref 27.5–35.5)
APTT BLD: 162.9 SEC — CRITICAL HIGH (ref 27.5–35.5)
AST SERPL-CCNC: 519 U/L — HIGH (ref 10–40)
AST SERPL-CCNC: 570 U/L — HIGH (ref 10–40)
BILIRUB DIRECT SERPL-MCNC: >10 MG/DL — HIGH (ref 0–0.2)
BILIRUB SERPL-MCNC: 13.4 MG/DL — HIGH (ref 0.2–1.2)
BILIRUB SERPL-MCNC: 13.7 MG/DL — HIGH (ref 0.2–1.2)
BILIRUB UR-MCNC: ABNORMAL
BLD GP AB SCN SERPL QL: NEGATIVE — SIGNIFICANT CHANGE UP
BUN SERPL-MCNC: 13 MG/DL — SIGNIFICANT CHANGE UP (ref 7–23)
BUN SERPL-MCNC: 14 MG/DL — SIGNIFICANT CHANGE UP (ref 7–23)
CALCIUM SERPL-MCNC: 7.8 MG/DL — LOW (ref 8.4–10.5)
CALCIUM SERPL-MCNC: 8.1 MG/DL — LOW (ref 8.4–10.5)
CHLORIDE SERPL-SCNC: 101 MMOL/L — SIGNIFICANT CHANGE UP (ref 96–108)
CHLORIDE SERPL-SCNC: 102 MMOL/L — SIGNIFICANT CHANGE UP (ref 96–108)
CO2 SERPL-SCNC: 22 MMOL/L — SIGNIFICANT CHANGE UP (ref 22–31)
CO2 SERPL-SCNC: 23 MMOL/L — SIGNIFICANT CHANGE UP (ref 22–31)
COLOR SPEC: ABNORMAL
CREAT SERPL-MCNC: 0.81 MG/DL — SIGNIFICANT CHANGE UP (ref 0.5–1.3)
CREAT SERPL-MCNC: 0.91 MG/DL — SIGNIFICANT CHANGE UP (ref 0.5–1.3)
CRP SERPL-MCNC: 5.98 MG/DL — HIGH (ref 0–0.4)
D DIMER BLD IA.RAPID-MCNC: 402 NG/ML DDU — HIGH
D DIMER BLD IA.RAPID-MCNC: 413 NG/ML DDU — HIGH
D DIMER BLD IA.RAPID-MCNC: 530 NG/ML DDU — HIGH
DIFF PNL FLD: ABNORMAL
FERRITIN SERPL-MCNC: 605 NG/ML — HIGH (ref 15–150)
FIBRINOGEN PPP-MCNC: 110 MG/DL — LOW (ref 290–520)
FIBRINOGEN PPP-MCNC: 127 MG/DL — LOW (ref 290–520)
FIBRINOGEN PPP-MCNC: 199 MG/DL — LOW (ref 290–520)
GLUCOSE SERPL-MCNC: 91 MG/DL — SIGNIFICANT CHANGE UP (ref 70–99)
GLUCOSE SERPL-MCNC: 94 MG/DL — SIGNIFICANT CHANGE UP (ref 70–99)
GLUCOSE UR QL: NEGATIVE — SIGNIFICANT CHANGE UP
GRAM STN FLD: SIGNIFICANT CHANGE UP
GRAM STN FLD: SIGNIFICANT CHANGE UP
HAPTOGLOB SERPL-MCNC: <20 MG/DL — LOW (ref 34–200)
HCT VFR BLD CALC: 19.9 % — CRITICAL LOW (ref 34.5–45)
HCT VFR BLD CALC: 21.3 % — LOW (ref 34.5–45)
HCT VFR BLD CALC: 25.7 % — LOW (ref 34.5–45)
HGB BLD-MCNC: 6.8 G/DL — CRITICAL LOW (ref 11.5–15.5)
HGB BLD-MCNC: 7.5 G/DL — LOW (ref 11.5–15.5)
HGB BLD-MCNC: 8.8 G/DL — LOW (ref 11.5–15.5)
INR BLD: 2.92 RATIO — HIGH (ref 0.88–1.16)
INR BLD: 3.65 RATIO — HIGH (ref 0.88–1.16)
INR BLD: 4.15 RATIO — HIGH (ref 0.88–1.16)
IRON SATN MFR SERPL: 31 UG/DL — SIGNIFICANT CHANGE UP (ref 30–160)
KETONES UR-MCNC: ABNORMAL
LACTATE SERPL-SCNC: 2 MMOL/L — SIGNIFICANT CHANGE UP (ref 0.7–2)
LDH SERPL L TO P-CCNC: 179 U/L — SIGNIFICANT CHANGE UP (ref 50–242)
LEUKOCYTE ESTERASE UR-ACNC: NEGATIVE — SIGNIFICANT CHANGE UP
MAGNESIUM SERPL-MCNC: 2 MG/DL — SIGNIFICANT CHANGE UP (ref 1.6–2.6)
MAGNESIUM SERPL-MCNC: 2 MG/DL — SIGNIFICANT CHANGE UP (ref 1.6–2.6)
MCHC RBC-ENTMCNC: 30.7 PG — SIGNIFICANT CHANGE UP (ref 27–34)
MCHC RBC-ENTMCNC: 30.8 PG — SIGNIFICANT CHANGE UP (ref 27–34)
MCHC RBC-ENTMCNC: 31.5 PG — SIGNIFICANT CHANGE UP (ref 27–34)
MCHC RBC-ENTMCNC: 34.2 GM/DL — SIGNIFICANT CHANGE UP (ref 32–36)
MCHC RBC-ENTMCNC: 34.2 GM/DL — SIGNIFICANT CHANGE UP (ref 32–36)
MCHC RBC-ENTMCNC: 35.2 GM/DL — SIGNIFICANT CHANGE UP (ref 32–36)
MCV RBC AUTO: 89.5 FL — SIGNIFICANT CHANGE UP (ref 80–100)
MCV RBC AUTO: 89.5 FL — SIGNIFICANT CHANGE UP (ref 80–100)
MCV RBC AUTO: 90 FL — SIGNIFICANT CHANGE UP (ref 80–100)
NITRITE UR-MCNC: NEGATIVE — SIGNIFICANT CHANGE UP
NRBC # BLD: 0 /100 WBCS — SIGNIFICANT CHANGE UP (ref 0–0)
OB PNL STL: POSITIVE
PH UR: 5.5 — SIGNIFICANT CHANGE UP (ref 5–8)
PHOSPHATE SERPL-MCNC: 2.3 MG/DL — LOW (ref 2.5–4.5)
PHOSPHATE SERPL-MCNC: 2.9 MG/DL — SIGNIFICANT CHANGE UP (ref 2.5–4.5)
PLATELET # BLD AUTO: 83 K/UL — LOW (ref 150–400)
PLATELET # BLD AUTO: 93 K/UL — LOW (ref 150–400)
PLATELET # BLD AUTO: 93 K/UL — LOW (ref 150–400)
POTASSIUM SERPL-MCNC: 2.8 MMOL/L — CRITICAL LOW (ref 3.5–5.3)
POTASSIUM SERPL-MCNC: 3 MMOL/L — LOW (ref 3.5–5.3)
POTASSIUM SERPL-SCNC: 2.8 MMOL/L — CRITICAL LOW (ref 3.5–5.3)
POTASSIUM SERPL-SCNC: 3 MMOL/L — LOW (ref 3.5–5.3)
PROT SERPL-MCNC: 3.7 G/DL — LOW (ref 6–8.3)
PROT SERPL-MCNC: 4 G/DL — LOW (ref 6–8.3)
PROT UR-MCNC: ABNORMAL
PROTHROM AB SERPL-ACNC: 33.3 SEC — HIGH (ref 10.6–13.6)
PROTHROM AB SERPL-ACNC: 41.2 SEC — HIGH (ref 10.6–13.6)
PROTHROM AB SERPL-ACNC: 46.5 SEC — HIGH (ref 10.6–13.6)
RBC # BLD: 2.21 M/UL — LOW (ref 3.8–5.2)
RBC # BLD: 2.38 M/UL — LOW (ref 3.8–5.2)
RBC # BLD: 2.87 M/UL — LOW (ref 3.8–5.2)
RBC # FLD: 19.6 % — HIGH (ref 10.3–14.5)
RBC # FLD: 22.4 % — HIGH (ref 10.3–14.5)
RBC # FLD: 22.5 % — HIGH (ref 10.3–14.5)
REPTILASE TIME: 27.9 SEC — HIGH (ref 15–20.5)
RH IG SCN BLD-IMP: POSITIVE — SIGNIFICANT CHANGE UP
SODIUM SERPL-SCNC: 133 MMOL/L — LOW (ref 135–145)
SODIUM SERPL-SCNC: 134 MMOL/L — LOW (ref 135–145)
SP GR SPEC: 1.02 — SIGNIFICANT CHANGE UP (ref 1.01–1.02)
THROMBIN TIME: 41.3 SEC — HIGH (ref 16–25)
UROBILINOGEN FLD QL: NEGATIVE — SIGNIFICANT CHANGE UP
VANCOMYCIN TROUGH SERPL-MCNC: 14 UG/ML — SIGNIFICANT CHANGE UP (ref 10–20)
WBC # BLD: 8.8 K/UL — SIGNIFICANT CHANGE UP (ref 3.8–10.5)
WBC # BLD: 8.81 K/UL — SIGNIFICANT CHANGE UP (ref 3.8–10.5)
WBC # BLD: 9.93 K/UL — SIGNIFICANT CHANGE UP (ref 3.8–10.5)
WBC # FLD AUTO: 8.8 K/UL — SIGNIFICANT CHANGE UP (ref 3.8–10.5)
WBC # FLD AUTO: 8.81 K/UL — SIGNIFICANT CHANGE UP (ref 3.8–10.5)
WBC # FLD AUTO: 9.93 K/UL — SIGNIFICANT CHANGE UP (ref 3.8–10.5)

## 2021-01-01 PROCEDURE — 99232 SBSQ HOSP IP/OBS MODERATE 35: CPT | Mod: GC

## 2021-01-01 PROCEDURE — 99233 SBSQ HOSP IP/OBS HIGH 50: CPT | Mod: GC

## 2021-01-01 PROCEDURE — 99232 SBSQ HOSP IP/OBS MODERATE 35: CPT

## 2021-01-01 RX ORDER — POTASSIUM CHLORIDE 20 MEQ
40 PACKET (EA) ORAL EVERY 4 HOURS
Refills: 0 | Status: DISCONTINUED | OUTPATIENT
Start: 2021-01-01 | End: 2021-01-01

## 2021-01-01 RX ORDER — MIDODRINE HYDROCHLORIDE 2.5 MG/1
10 TABLET ORAL THREE TIMES A DAY
Refills: 0 | Status: DISCONTINUED | OUTPATIENT
Start: 2021-01-01 | End: 2021-01-01

## 2021-01-01 RX ORDER — HYDROMORPHONE HYDROCHLORIDE 2 MG/ML
0.5 INJECTION INTRAMUSCULAR; INTRAVENOUS; SUBCUTANEOUS ONCE
Refills: 0 | Status: DISCONTINUED | OUTPATIENT
Start: 2021-01-01 | End: 2021-01-01

## 2021-01-01 RX ORDER — PHYTONADIONE (VIT K1) 5 MG
5 TABLET ORAL ONCE
Refills: 0 | Status: DISCONTINUED | OUTPATIENT
Start: 2021-01-01 | End: 2021-01-01

## 2021-01-01 RX ORDER — PANTOPRAZOLE SODIUM 20 MG/1
40 TABLET, DELAYED RELEASE ORAL EVERY 12 HOURS
Refills: 0 | Status: DISCONTINUED | OUTPATIENT
Start: 2021-01-01 | End: 2021-01-06

## 2021-01-01 RX ORDER — PHYTONADIONE (VIT K1) 5 MG
10 TABLET ORAL ONCE
Refills: 0 | Status: COMPLETED | OUTPATIENT
Start: 2021-01-01 | End: 2021-01-01

## 2021-01-01 RX ORDER — HYDROMORPHONE HYDROCHLORIDE 2 MG/ML
0.5 INJECTION INTRAMUSCULAR; INTRAVENOUS; SUBCUTANEOUS EVERY 8 HOURS
Refills: 0 | Status: DISCONTINUED | OUTPATIENT
Start: 2021-01-01 | End: 2021-01-01

## 2021-01-01 RX ORDER — POTASSIUM CHLORIDE 20 MEQ
10 PACKET (EA) ORAL
Refills: 0 | Status: DISCONTINUED | OUTPATIENT
Start: 2021-01-01 | End: 2021-01-01

## 2021-01-01 RX ORDER — ACETYLCYSTEINE 200 MG/ML
7 VIAL (ML) MISCELLANEOUS ONCE
Refills: 0 | Status: COMPLETED | OUTPATIENT
Start: 2021-01-01 | End: 2021-01-01

## 2021-01-01 RX ORDER — MIDODRINE HYDROCHLORIDE 2.5 MG/1
20 TABLET ORAL THREE TIMES A DAY
Refills: 0 | Status: DISCONTINUED | OUTPATIENT
Start: 2021-01-01 | End: 2021-01-04

## 2021-01-01 RX ORDER — POTASSIUM CHLORIDE 20 MEQ
40 PACKET (EA) ORAL EVERY 4 HOURS
Refills: 0 | Status: COMPLETED | OUTPATIENT
Start: 2021-01-01 | End: 2021-01-01

## 2021-01-01 RX ORDER — ALBUMIN HUMAN 25 %
100 VIAL (ML) INTRAVENOUS EVERY 6 HOURS
Refills: 0 | Status: COMPLETED | OUTPATIENT
Start: 2021-01-01 | End: 2021-01-02

## 2021-01-01 RX ADMIN — Medication 40 MILLIEQUIVALENT(S): at 22:08

## 2021-01-01 RX ADMIN — Medication 40 MILLIEQUIVALENT(S): at 14:45

## 2021-01-01 RX ADMIN — Medication 250 MILLIGRAM(S): at 08:35

## 2021-01-01 RX ADMIN — HYDROMORPHONE HYDROCHLORIDE 0.5 MILLIGRAM(S): 2 INJECTION INTRAMUSCULAR; INTRAVENOUS; SUBCUTANEOUS at 13:13

## 2021-01-01 RX ADMIN — EMTRICITABINE AND TENOFOVIR DISOPROXIL FUMARATE 1 TABLET(S): 200; 300 TABLET, FILM COATED ORAL at 12:32

## 2021-01-01 RX ADMIN — PANTOPRAZOLE SODIUM 40 MILLIGRAM(S): 20 TABLET, DELAYED RELEASE ORAL at 09:45

## 2021-01-01 RX ADMIN — SIMETHICONE 80 MILLIGRAM(S): 80 TABLET, CHEWABLE ORAL at 12:32

## 2021-01-01 RX ADMIN — PIPERACILLIN AND TAZOBACTAM 25 GRAM(S): 4; .5 INJECTION, POWDER, LYOPHILIZED, FOR SOLUTION INTRAVENOUS at 01:48

## 2021-01-01 RX ADMIN — SIMETHICONE 80 MILLIGRAM(S): 80 TABLET, CHEWABLE ORAL at 06:16

## 2021-01-01 RX ADMIN — Medication 50 MILLILITER(S): at 06:15

## 2021-01-01 RX ADMIN — ENOXAPARIN SODIUM 40 MILLIGRAM(S): 100 INJECTION SUBCUTANEOUS at 06:16

## 2021-01-01 RX ADMIN — PANTOPRAZOLE SODIUM 40 MILLIGRAM(S): 20 TABLET, DELAYED RELEASE ORAL at 06:16

## 2021-01-01 RX ADMIN — HYDROMORPHONE HYDROCHLORIDE 0.5 MILLIGRAM(S): 2 INJECTION INTRAMUSCULAR; INTRAVENOUS; SUBCUTANEOUS at 12:51

## 2021-01-01 RX ADMIN — PANTOPRAZOLE SODIUM 40 MILLIGRAM(S): 20 TABLET, DELAYED RELEASE ORAL at 22:08

## 2021-01-01 RX ADMIN — DOLUTEGRAVIR SODIUM 50 MILLIGRAM(S): 25 TABLET, FILM COATED ORAL at 12:32

## 2021-01-01 RX ADMIN — ATOVAQUONE 1500 MILLIGRAM(S): 750 SUSPENSION ORAL at 12:32

## 2021-01-01 RX ADMIN — MIDODRINE HYDROCHLORIDE 10 MILLIGRAM(S): 2.5 TABLET ORAL at 12:51

## 2021-01-01 RX ADMIN — Medication 102 MILLIGRAM(S): at 10:10

## 2021-01-01 RX ADMIN — Medication 50 MILLILITER(S): at 22:19

## 2021-01-01 RX ADMIN — MIDODRINE HYDROCHLORIDE 20 MILLIGRAM(S): 2.5 TABLET ORAL at 17:58

## 2021-01-01 RX ADMIN — Medication 50 MILLILITER(S): at 14:38

## 2021-01-01 RX ADMIN — Medication 40 MILLIEQUIVALENT(S): at 08:35

## 2021-01-01 RX ADMIN — Medication 64.69 GRAM(S): at 14:39

## 2021-01-01 RX ADMIN — MIDODRINE HYDROCHLORIDE 10 MILLIGRAM(S): 2.5 TABLET ORAL at 08:35

## 2021-01-01 RX ADMIN — ONDANSETRON 4 MILLIGRAM(S): 8 TABLET, FILM COATED ORAL at 15:23

## 2021-01-01 NOTE — PROGRESS NOTE ADULT - SUBJECTIVE AND OBJECTIVE BOX
Chief Complaint:  Patient is a 50y old  Female who presents with a chief complaint of abdominal pain (2021 11:49)      Interval Events: patient developed dark red stool overnight with drop in hemoglobin and drastic increase in coags  - however feels ok - denies SOB, abd pain, n/v      Hospital Medications:  acetylcysteine IVPB 7 Gram(s) IV Intermittent once  albumin human 25% IVPB 100 milliLiter(s) IV Intermittent every 6 hours  aluminum hydroxide/magnesium hydroxide/simethicone Suspension 30 milliLiter(s) Oral every 6 hours PRN  atovaquone Suspension 1500 milliGRAM(s) Oral daily  dolutegravir 50 milliGRAM(s) Oral daily  emtricitabine 200 mG/tenofovir 300 mG (TRUVADA) 1 Tablet(s) Oral daily  ibuprofen  Tablet. 600 milliGRAM(s) Oral every 6 hours PRN  midodrine. 10 milliGRAM(s) Oral three times a day  ondansetron Injectable 4 milliGRAM(s) IV Push every 8 hours PRN  pantoprazole  Injectable 40 milliGRAM(s) IV Push every 12 hours  piperacillin/tazobactam IVPB.. 3.375 Gram(s) IV Intermittent every 8 hours  potassium chloride    Tablet ER 40 milliEquivalent(s) Oral every 4 hours  simethicone 80 milliGRAM(s) Chew four times a day      PMHX/PSHX:  Renal Vein Thrombosis    CMV Infection    Hepatitis B    Strongyloidosis    Histoplasmosis    HIV (Human Immunodeficiency Virus Infection)    Gallstones    S/P  Section    S/P Bilateral Tubal Ligation    S/P PEG (Percutaneous Endoscopic Gastrostomy) Placement and Removal    History of Cholecystectomy            ROS:     General:  No weight loss, fevers, chills, night sweats, fatigue   Eyes:  No vision changes  ENT:  No sore throat, pain, runny nose  CV:  No chest pain, palpitations, dizziness   Resp:  No SOB, cough, wheezing  GI:  See HPI  :  No burning with urination, hematuria  Muscle:  No pain, weakness  Neuro:  No weakness/tingling, memory problems  Psych:  No fatigue, insomnia, mood problems, depression  Heme:  No easy bruisability  Skin:  No rash, edema      PHYSICAL EXAM:     GENERAL:  no distress  HEENT:  NC/AT,  conjunctivae clear, scleral icterus  CHEST:  Full & symmetric excursion, no increased effort w/ respirations  HEART:  Regular rhythm & rate  ABDOMEN:  Soft, non-tender, non-distended  EXTREMITIES:  no LE  edema  SKIN:  No rash/erythema/ecchymoses/petechiae/wounds/jaundice  NEURO:  Alert, oriented    Vital Signs:  Vital Signs Last 24 Hrs  T(C): 36.5 (2021 14:09), Max: 36.8 (31 Dec 2020 16:32)  T(F): 97.7 (2021 14:09), Max: 98.3 (31 Dec 2020 16:32)  HR: 115 (2021 14:09) (103 - 139)  BP: 100/66 (2021 14:09) (92/60 - 110/85)  BP(mean): --  RR: 18 (2021 14:09) (16 - 18)  SpO2: 100% (2021 14:09) (96% - 100%)  Daily     Daily     LABS:                        6.8    8.81  )-----------( 93       ( 2021 08:39 )             19.9         133<L>  |  101  |  14  ----------------------------<  94  2.8<LL>   |  22  |  0.91    Ca    7.8<L>      2021 06:43  Phos  2.9       Mg     2.0         TPro  3.7<L>  /  Alb  2.5<L>  /  TBili  13.7<H>  /  DBili  >10.0<H>  /  AST  570<H>  /  ALT  189<H>  /  AlkPhos  47      LIVER FUNCTIONS - ( 2021 06:43 )  Alb: 2.5 g/dL / Pro: 3.7 g/dL / ALK PHOS: 47 U/L / ALT: 189 U/L / AST: 570 U/L / GGT: x           PT/INR - ( 2021 08:39 )   PT: 46.5 sec;   INR: 4.15 ratio         PTT - ( 2021 08:39 )  PTT:162.9 sec  Urinalysis Basic - ( 2021 06:43 )    Color: Dark Yellow / Appearance: Slightly Turbid / S.021 / pH: x  Gluc: x / Ketone: Small  / Bili: Moderate / Urobili: Negative   Blood: x / Protein: Trace / Nitrite: Negative   Leuk Esterase: Negative / RBC: 10 /hpf / WBC 9 /HPF   Sq Epi: x / Non Sq Epi: 3 /hpf / Bacteria: Negative          Imaging:

## 2021-01-01 NOTE — PROGRESS NOTE ADULT - ASSESSMENT
49 yo F w/ PMHx HIV/AIDS, HBV, previous histo, CMV, renal vein thrombus s/p coumadin (2010) admitted with abd pain x 4 days, found to have acute liver injury and covid +    # Acute liver injury - patient with predominantly hepatocellular liver injury, initially with AST >1800, Alt 600. Also w/ evidence of hepatic synthetic function, with elevated INR, decreased albumin. 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 Her outpatient viral load >400,000,000 corroborates this. Hepatitis delta coinfection has beenruled out as outpatient.   Patient was on biktarvy but switched to Truvada for TDF (as opposed to TAF), and adding double coverage with entecavir. Also on NAC. Currently with minimal concern for acute liver failure as she is mentating well.  #SBP with + para  # Abdominal pain: Likely due to hepatitis +SBP  # HIV/AIDS - CD4 114, previous histo, CMV, has been off medications for 1 year until 11/2020  # COVID + - currently covid positive, O2 sat normal > 95% on room air  #Coagulatopathy  #BRBPR    Recommendations:   - c/w broad spectrum ab   - Continue to trend CMP, INR, Mg, Phos, fibrinogen, vbg (lactate) daily   - c/w Truvada for HBV  - Stop entecavir  - F/U HBV resistance profile  - c/w NAC infusion for acute liver failure (100 mg/kg over 16 hours, will likely need to be reordered daily)  - Continue albumin 100cc 25% q 8 hours  - PPI IV BID  - unlikely that liver failure is causing drastic increase of PTT; apprec heme recs for coagulatopathy w/u  - given severity of illness with multiple comorbidities and severity of coagulopathy; risk of endoscopic evaluation outweigh benefit; also given current coagulation values the majority of endoscopic hemostatic treatments cannot be done  - consider CTA if unable to medically treat bleeding with factors and blood  - Not transplant candidate at this time because of COVID and AIDS      Lenore Gates PGY-4  Gastroenterology Fellow  Pager #[phone] or [phone]  Please page on-call Fellow on weekends/after 5 pm on weekdays   Please call answering service for on-call GI fellow ([phone]) after 5pm and before 8am, and on weekends.           - Rest of care per primary team

## 2021-01-01 NOTE — PROGRESS NOTE ADULT - ATTENDING COMMENTS
pt was seen and examined  she is reporting rectal bleeding  has developed coagulopathy and anemia  transfuse, ffp , prbc and cryo  vitamin k  abx for sbp  heme and gi follow up  - icu saw pt . not a candidate at this time  - prognosis is poor  d/w team

## 2021-01-01 NOTE — PROGRESS NOTE ADULT - ASSESSMENT
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     #Acute liver injury   Patient with predominantly hepatocellular liver injury, with AST >1800, Alt 600. Also w/ evidence of hepatic synthetic function, with elevated INR, decreased albumin. Etiology likely from HBV, which was previously being treated w/ Symtuza (tenofovir 10mg qd) but patient had been off medication for almost 1 year (until 11/2020) due to insurance lapse. Her outpatient viral load >400,000,000 corroborates this. Hepatitis delta coinfection has been ruled out as outpatient. Patient will require immediate treatment of her HBV, now on biktarvy (tenofovir 25mg). Currently patient is not in acute liver failure, she is mentating well, but is having worsening INR which is concerning. LFTs improving   - c/w truvada and entecavir for HBV  - c/w NAC infusion for acute liver failure (100 mg/kg over 16 hours, will likely need to be reordered daily)  - c/w albumin 100cc 25% q6h  - IVF PRN  - s/p vitamin K 10mg (12/26 - 12/28)  - dilaudid .5 mg IV q6h for pain  - ascitic fluid: SAAG 2, ascitic fluid protein .8 consistent with portal HTN    #Sepsis:  Pt noted to be tachy, HOTN, febrile 12/30.   - blood cx 12/31- ngtd  - c/w vanc (12/30- )  - c/w zosyn (12/30- )   - c/w midodrine 10 mg TID     #Anemia:   - likely dilutional 2/2 albumin. no s/s of bleed.   - per heme, coagulopathy 2/2 liver disease  - type and cross x2  - FOBT negative  - transfuse Hgb <7    #AIDS  needs ARV medications to help with her immunosuppression but need to change ARV meds to ones metabolized by the kidney  continue the TAF for help controlling the Hep B infection  - In light of significant transaminitis, discontinued the Symtuza and changed meds to Truvada/Tivicay  - CD4 116 consistent with AIDS  - RNA viral load in process - slightly detectable but cannot account for her acute decompensation in liver disease  - If viral load elevated will add Bactrim for PJP prophylaxis  - c/w atovaquone for PCP ppx per ID- no Bactrim given hepatic dysfunction    #COVID+  - not eligible for Remdesivir secondary to her LFT abnormalities  - could consider convalescent plasma if she decompensates  - D- dimer <150, fibrinogen 224 (uptrending), ferritin 957 (uptrending)  - Quant TB neg  - urine histo ag. in case recurrence  - blood cultures 12/22- no growth    #CMV viremia   - hold off on Valganciclovir   - CMV viral load <96    #Prophylactic measures  - DVT ppx: hold i/s/o hgb drop  - GERD: pantoprazole 40mg qd  - Diet: Vegetarian 50 year old female with past medical history of HIV, CMV, HBV, histoplasmosis, renal vein thrombosis s/p coumadin for 6 months, and strongyloidiasis who presents with abdominal pain found to be COVID-19 PCR positive.     #Acute liver injury   Patient with predominantly hepatocellular liver injury, with AST >1800, Alt 600. Also w/ evidence of hepatic synthetic function, with elevated INR, decreased albumin. Etiology likely from HBV, which was previously being treated w/ Symtuza (tenofovir 10mg qd) but patient had been off medication for almost 1 year (until 11/2020) due to insurance lapse. Her outpatient viral load >400,000,000 corroborates this. Hepatitis delta coinfection has been ruled out as outpatient. Patient will require immediate treatment of her HBV, now on biktarvy (tenofovir 25mg). Currently patient is not in acute liver failure, she is mentating well, but is having worsening INR which is concerning. LFTs improving   - c/w truvada and entecavir for HBV  - c/w NAC infusion for acute liver failure (100 mg/kg over 16 hours, will likely need to be reordered daily)  - c/w albumin 100cc 25% q6h  - IVF PRN  - s/p vitamin K 10mg (12/26 - 12/28). Vit K reordered 1/1 for elevated coags  - dilaudid .5 mg IV prn for pain, dose carefully given HOTN. However, pt has not responded to motrin (hold i/s/o GIB), tylenol (wary i/s/o hepatic dysfunction), ketorolac (hold i/s/o GIB)  - ascitic fluid: SAAG 2, ascitic fluid protein .8 likely portal HTN however granulocyte count from ascitic fluid >250, c/f SBP, especially given immunocompromised state  - c/w zosyn (12/30- )    #Sepsis:  Pt noted to be tachy, HOTN, febrile 12/30.   - blood cx 12/31- ngtd  - c/w vanc (12/30- )  - c/w zosyn (12/30- )   - c/w midodrine 10 mg TID   - c/f DIC given low plt, elevated coags, elevated D-dimer, low fibrinogen. will f/u blood smear to eval for schistocytes    #Anemia:   - Hgb acutely dropped 1/1. FOBT+. c/f GIB- will transfuse cryo 1u, pRBC 1u.   - per heme, coagulopathy 2/2 liver disease  - type and cross x2  - transfuse Hgb <7    #ANTOINETTE:  2/2 hypoperfusion i/s/o sepsis vs medication induced (Abx) vs HRS  - c/w albumin  - avoid nephrotoxic meds if possible  - continue to trend SCr  - if SCr continues to rise or oliguria, consult neph     #AIDS  needs ARV medications to help with her immunosuppression but need to change ARV meds to ones metabolized by the kidney  continue the TAF for help controlling the Hep B infection  - In light of significant transaminitis, discontinued the Symtuza and changed meds to Truvada/Tivicay  - CD4 116 consistent with AIDS  - RNA viral load in process - slightly detectable but cannot account for her acute decompensation in liver disease  - If viral load elevated will add Bactrim for PJP prophylaxis  - c/w atovaquone for PCP ppx per ID- no Bactrim given hepatic dysfunction    #COVID+  - not eligible for Remdesivir secondary to her LFT abnormalities  - could consider convalescent plasma if she decompensates  - D- dimer <150, fibrinogen 224 (uptrending), ferritin 605 (downtrending)  - Quant TB neg  - blood cx 12/22- no growth  - blood cx 12/31- ngtd  - f/u urine histo ag. in case recurrence    #CMV viremia   - hold off on Valganciclovir   - CMV viral load <96    #Prophylactic measures  - DVT ppx: hold i/s/o hgb drop  - GERD: pantoprazole 40mg BID for GIB  - Diet: NPO for GIB 50 year old female with past medical history of HIV, CMV, HBV, histoplasmosis, renal vein thrombosis s/p coumadin for 6 months, and strongyloidiasis who presents with abdominal pain found to be COVID-19 PCR positive.     #Acute liver injury   Patient with predominantly hepatocellular liver injury, with AST >1800, Alt 600. Also w/ evidence of hepatic synthetic function, with elevated INR, decreased albumin. Etiology likely from HBV, which was previously being treated w/ Symtuza (tenofovir 10mg qd) but patient had been off medication for almost 1 year (until 11/2020) due to insurance lapse. Her outpatient viral load >400,000,000 corroborates this. Hepatitis delta coinfection has been ruled out as outpatient. Patient will require immediate treatment of her HBV, now on biktarvy (tenofovir 25mg). Currently patient is not in acute liver failure, she is mentating well, but is having worsening INR which is concerning. LFTs improving   - c/w truvada and entecavir for HBV  - c/w NAC infusion for acute liver failure (100 mg/kg over 16 hours, will likely need to be reordered daily)  - c/w albumin 100cc 25% q6h  - IVF PRN  - s/p vitamin K 10mg (12/26 - 12/28). Vit K reordered 1/1 for elevated coags  - dilaudid .5 mg IV prn for pain, dose carefully given HOTN. However, pt has not responded to motrin (hold i/s/o GIB), tylenol (wary i/s/o hepatic dysfunction), ketorolac (hold i/s/o GIB)  - ascitic fluid: SAAG 2, ascitic fluid protein .8 likely portal HTN however granulocyte count from ascitic fluid >250, c/f SBP, especially given immunocompromised state  - c/w zosyn (12/30- )    #Sepsis:  Pt noted to be tachy, HOTN, febrile 12/30.   - blood cx 12/31- ngtd  - s/p vanc (12/30-1/1). ID rec to d/c 1/1  - c/w zosyn (12/30- )   - c/w midodrine 10 mg TID   - c/f DIC given low plt, elevated coags, elevated D-dimer, low fibrinogen. will f/u blood smear to eval for schistocytes    #Anemia:   - Hgb acutely dropped 1/1. FOBT+. c/f GIB- will transfuse cryo 1u, pRBC 1u.   - per heme, coagulopathy 2/2 liver disease  - type and cross x2  - transfuse Hgb <7    #ANTOINETTE:  2/2 hypoperfusion i/s/o sepsis vs medication induced (Abx) vs HRS  - c/w albumin  - avoid nephrotoxic meds if possible  - continue to trend SCr  - if SCr continues to rise or oliguria, consult neph     #AIDS  needs ARV medications to help with her immunosuppression but need to change ARV meds to ones metabolized by the kidney  continue the TAF for help controlling the Hep B infection  - In light of significant transaminitis, discontinued the Symtuza and changed meds to Truvada/Tivicay  - CD4 116 consistent with AIDS  - RNA viral load in process - slightly detectable but cannot account for her acute decompensation in liver disease  - If viral load elevated will add Bactrim for PJP prophylaxis  - c/w atovaquone for PCP ppx per ID- no Bactrim given hepatic dysfunction    #COVID+  - not eligible for Remdesivir secondary to her LFT abnormalities  - could consider convalescent plasma if she decompensates  - D- dimer <150, fibrinogen 224 (uptrending), ferritin 605 (downtrending)  - Quant TB neg  - blood cx 12/22- no growth  - blood cx 12/31- ngtd  - f/u urine histo ag. in case recurrence    #CMV viremia   - hold off on Valganciclovir   - CMV viral load <96    #Prophylactic measures  - DVT ppx: hold i/s/o hgb drop  - GERD: pantoprazole 40mg BID for GIB  - Diet: NPO for GIB

## 2021-01-01 NOTE — PROGRESS NOTE ADULT - SUBJECTIVE AND OBJECTIVE BOX
Follow Up: HIV, HBV, SBP    Interval History/ROS: Afebrile. Pain is better but still there. LFTs improving. Ascites fluid consistent with SBP.     Allergies  No Known Allergies        ANTIMICROBIALS:  atovaquone Suspension 1500 daily  dolutegravir 50 daily  emtricitabine 200 mG/tenofovir 300 mG (TRUVADA) 1 daily  piperacillin/tazobactam IVPB.. 3.375 every 8 hours      OTHER MEDS:  albumin human 25% IVPB 100 milliLiter(s) IV Intermittent every 6 hours  aluminum hydroxide/magnesium hydroxide/simethicone Suspension 30 milliLiter(s) Oral every 6 hours PRN  ibuprofen  Tablet. 600 milliGRAM(s) Oral every 6 hours PRN  midodrine. 20 milliGRAM(s) Oral three times a day  ondansetron Injectable 4 milliGRAM(s) IV Push every 8 hours PRN  pantoprazole  Injectable 40 milliGRAM(s) IV Push every 12 hours  potassium chloride    Tablet ER 40 milliEquivalent(s) Oral every 4 hours  simethicone 80 milliGRAM(s) Chew four times a day      Vital Signs Last 24 Hrs  T(C): 36.6 (2021 18:06), Max: 36.8 (31 Dec 2020 23:30)  T(F): 97.8 (2021 18:06), Max: 98.2 (31 Dec 2020 23:30)  HR: 120 (2021 18:06) (104 - 139)  BP: 109/76 (2021 18:06) (92/60 - 110/85)  BP(mean): --  RR: 18 (2021 18:06) (16 - 18)  SpO2: 100% (2021 18:06) (96% - 100%)    Physical Exam:  General: awake, alert, non toxic  Eye: a bit yellow   Cardio: tachycardic   Respiratory: nonlabored on room air  abd: soft, bowel sounds present, less tender today   Neurologic: no focal deficit  psych: normal affect                          8.8    8.80  )-----------( 83       ( 2021 16:43 )             25.7       01-    134<L>  |  102  |  13  ----------------------------<  91  3.0<L>   |  23  |  0.81    Ca    8.1<L>      2021 16:43  Phos  2.3       Mg     2.0         TPro  4.0<L>  /  Alb  3.0<L>  /  TBili  13.4<H>  /  DBili  x   /  AST  519<H>  /  ALT  166<H>  /  AlkPhos  46        Urinalysis Basic - ( 2021 06:43 )    Color: Dark Yellow / Appearance: Slightly Turbid / S.021 / pH: x  Gluc: x / Ketone: Small  / Bili: Moderate / Urobili: Negative   Blood: x / Protein: Trace / Nitrite: Negative   Leuk Esterase: Negative / RBC: 10 /hpf / WBC 9 /HPF   Sq Epi: x / Non Sq Epi: 3 /hpf / Bacteria: Negative        MICROBIOLOGY:  Vancomycin Level, Trough: 14.0 ug/mL (21 @ 06:43)    Culture - Fungal, Body Fluid (collected 20 @ 20:02)  Source: .Body Fluid Peritoneal Fluid  Preliminary Report (21 @ 07:44):    Testing in progress    Culture - Body Fluid with Gram Stain (collected 20 @ 20:02)  Source: .Body Fluid Peritoneal Fluid  Gram Stain (21 @ 12:45):    Growth in aerobic bottle: Gram Negative Rods    Growth in anaerobic bottle: Gram Negative Rods  Preliminary Report (21 @ 12:45):    Growth in aerobic bottle: Gram Negative Rods    Growth in anaerobic bottle: Gram Negative Rods    Culture - Blood (collected 20 @ 00:22)  Source: .Blood Blood-Peripheral  Preliminary Report (21 @ 01:03):    No growth to date.    Culture - Blood (collected 20 @ 00:22)  Source: .Blood Blood-Venous  Preliminary Report (21 @ 01:03):    No growth to date.    RADIOLOGY:  Images below reviewed personally  US Abdomen Limited (20 @ 15:08)   Moderate ascites in the right upper, left upper, and left lower quadrants. Left upper quadrant is not well visualized.

## 2021-01-01 NOTE — PROGRESS NOTE ADULT - SUBJECTIVE AND OBJECTIVE BOX
INTERVAL HPI/OVERNIGHT EVENTS:  Chart reviewed. Discussed events with primary team. She had two blood BM overnight, worsening coagulopathy with drop in hemoglobin. GI consulted. Rec 2 FFP, 4 cryo, mixing study, and Vit K. Will review smear. No bleeding from other areas including IV sites.     VITAL SIGNS:  T(F): 97.7 (21 @ 09:18)  HR: 139 (21 @ 09:18)  BP: 110/85 (21 @ 09:18)  RR: 18 (21 @ 09:18)  SpO2: 100% (21 @ 09:18)  Wt(kg): --    PHYSICAL EXAM:    Deferred in the setting of COVID19 pandemic to reduce exposure     MEDICATIONS  (STANDING):  acetylcysteine IVPB 7 Gram(s) IV Intermittent once  albumin human 25% IVPB 100 milliLiter(s) IV Intermittent every 6 hours  atovaquone Suspension 1500 milliGRAM(s) Oral daily  dolutegravir 50 milliGRAM(s) Oral daily  emtricitabine 200 mG/tenofovir 300 mG (TRUVADA) 1 Tablet(s) Oral daily  midodrine. 10 milliGRAM(s) Oral three times a day  pantoprazole  Injectable 40 milliGRAM(s) IV Push every 12 hours  piperacillin/tazobactam IVPB.. 3.375 Gram(s) IV Intermittent every 8 hours  potassium chloride    Tablet ER 40 milliEquivalent(s) Oral every 4 hours  simethicone 80 milliGRAM(s) Chew four times a day    MEDICATIONS  (PRN):  aluminum hydroxide/magnesium hydroxide/simethicone Suspension 30 milliLiter(s) Oral every 6 hours PRN Dyspepsia  ibuprofen  Tablet. 600 milliGRAM(s) Oral every 6 hours PRN Mild Pain (1 - 3)  ondansetron Injectable 4 milliGRAM(s) IV Push every 8 hours PRN Nausea and/or Vomiting      Allergies    No Known Allergies    Intolerances        LABS:                        6.8    8.81  )-----------( 93       ( 2021 08:39 )             19.9         133<L>  |  101  |  14  ----------------------------<  94  2.8<LL>   |  22  |  0.91    Ca    7.8<L>      2021 06:43  Phos  2.9       Mg     2.0         TPro  3.7<L>  /  Alb  2.5<L>  /  TBili  13.7<H>  /  DBili  >10.0<H>  /  AST  570<H>  /  ALT  189<H>  /  AlkPhos  47      PT/INR - ( 2021 08:39 )   PT: 46.5 sec;   INR: 4.15 ratio         PTT - ( 2021 08:39 )  PTT:162.9 sec  Urinalysis Basic - ( 2021 06:43 )    Color: Dark Yellow / Appearance: Slightly Turbid / S.021 / pH: x  Gluc: x / Ketone: Small  / Bili: Moderate / Urobili: Negative   Blood: x / Protein: Trace / Nitrite: Negative   Leuk Esterase: Negative / RBC: 10 /hpf / WBC 9 /HPF   Sq Epi: x / Non Sq Epi: 3 /hpf / Bacteria: Negative        RADIOLOGY & ADDITIONAL TESTS:  Studies reviewed.

## 2021-01-01 NOTE — PROGRESS NOTE ADULT - ASSESSMENT
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   Liver enzymes are finally improving today as is her pain although the latter could be attributed to starting antibiotics for SBP, diagnosed 12/31.   HBV viral load improved from 483 million 12/2 to 36 million 12/29.   Other causes for liver failure unlikely or ruled out- COVID PCR positive, CMV low level viremia, HDV, HCV, AIDS cholangiopathy,     Suggest  -stop Vancomycin, not indicated   -continue empiric Zosyn   -f/u GNR from peritoneal fluid   -f/u final blood cultures   -continue Tivicay and Truvada for HIV, the latter for HBV as well   -agree with stopping entecavir, unclear role of two nucleoside analogues and she already responded with just tenofovir   -f/u HBV resistance testing   -Atovaquone for PJP prophylaxis until CD4 >200 for three months   -supportive care and isolation precautions for COVID, currently on room air, remdesivir can contribute to hepatotoxicity     Spoke with primary team     Ag Merrill MD   Infectious Disease   Pager [phone]   After 5PM and on weekends please page fellow on call or call [phone]

## 2021-01-01 NOTE — PROVIDER CONTACT NOTE (CRITICAL VALUE NOTIFICATION) - SITUATION
patients morning labs showed a potassium level of 2.8 patients morning labs showed a potassium level of 2.8 and aptt of 134.2

## 2021-01-01 NOTE — PROGRESS NOTE ADULT - SUBJECTIVE AND OBJECTIVE BOX
Authored by Gee Gutiérrez MD ([phone]) [facility]    Patient is a 50y old  Female who presents with a chief complaint of abdominal pain (31 Dec 2020 20:27)    SUBJECTIVE / OVERNIGHT EVENTS:    ADDITIONAL REVIEW OF SYSTEMS:    MEDICATIONS  (STANDING):  acetylcysteine IVPB 7 Gram(s) IV Intermittent once  albumin human 25% IVPB 100 milliLiter(s) IV Intermittent every 6 hours  atovaquone Suspension 1500 milliGRAM(s) Oral daily  dolutegravir 50 milliGRAM(s) Oral daily  emtricitabine 200 mG/tenofovir 300 mG (TRUVADA) 1 Tablet(s) Oral daily  entecavir 0.5 milliGRAM(s) Oral daily  midodrine. 10 milliGRAM(s) Oral three times a day  pantoprazole    Tablet 40 milliGRAM(s) Oral before breakfast  piperacillin/tazobactam IVPB.. 3.375 Gram(s) IV Intermittent every 8 hours  potassium chloride    Tablet ER 40 milliEquivalent(s) Oral every 4 hours  potassium chloride  10 mEq/100 mL IVPB 10 milliEquivalent(s) IV Intermittent every 1 hour  simethicone 80 milliGRAM(s) Chew four times a day  vancomycin  IVPB      vancomycin  IVPB 1000 milliGRAM(s) IV Intermittent every 12 hours    MEDICATIONS  (PRN):  aluminum hydroxide/magnesium hydroxide/simethicone Suspension 30 milliLiter(s) Oral every 6 hours PRN Dyspepsia  ibuprofen  Tablet. 600 milliGRAM(s) Oral every 6 hours PRN Mild Pain (1 - 3)  ondansetron Injectable 4 milliGRAM(s) IV Push every 8 hours PRN Nausea and/or Vomiting  oxyCODONE    IR 10 milliGRAM(s) Oral every 6 hours PRN Severe Pain (7 - 10)    PHYSICAL EXAM:  Vital Signs Last 24 Hrs  T(C): 36.4 (2021 05:21), Max: 36.8 (31 Dec 2020 16:32)  T(F): 97.5 (2021 05:21), Max: 98.3 (31 Dec 2020 16:32)  HR: 111 (2021 05:21) (100 - 124)  BP: 92/60 (2021 05:21) (79/66 - 99/64)  BP(mean): --  RR: 18 (2021 05:21) (16 - 18)  SpO2: 100% (2021 05:21) (96% - 100%)    GENERAL: No acute distress, well-developed  HEAD:  Atraumatic, Normocephalic  EYES: EOMI, PERRLA, conjunctiva and sclera clear  NECK: Supple, no lymphadenopathy, no JVD  CHEST/LUNG: CTAB; No wheezes, rales, or rhonchi  HEART: Regular rate and rhythm; No murmurs, rubs, or gallops  ABDOMEN: Soft, non-tender, non-distended; normal bowel sounds, no organomegaly  EXTREMITIES:  2+ peripheral pulses b/l, No clubbing, cyanosis, or edema  NEUROLOGY: A&O x 3, no focal deficits  SKIN: No rashes or lesions    LABS:                        7.5    9.93  )-----------( 93       ( 2021 06:43 )             21.3     01-    133<L>  |  101  |  14  ----------------------------<  94  2.8<LL>   |  22  |  0.91    Ca    7.8<L>      2021 06:43  Phos  2.9     -  Mg     2.0     -    TPro  3.7<L>  /  Alb  2.5<L>  /  TBili  13.7<H>  /  DBili  >10.0<H>  /  AST  570<H>  /  ALT  189<H>  /  AlkPhos  47  -    PT/INR - ( 31 Dec 2020 07:11 )   PT: 27.6 sec;   INR: 2.40 ratio      PTT - ( 31 Dec 2020 07:11 )  PTT:85.5 sec    Urinalysis Basic - ( 2021 06:43 )    Color: Dark Yellow / Appearance: Slightly Turbid / S.021 / pH: x  Gluc: x / Ketone: Small  / Bili: Moderate / Urobili: Negative   Blood: x / Protein: Trace / Nitrite: Negative   Leuk Esterase: Negative / RBC: 10 /hpf / WBC 9 /HPF   Sq Epi: x / Non Sq Epi: 3 /hpf / Bacteria: Negative    Culture - Fungal, Body Fluid (collected 31 Dec 2020 20:02)  Source: .Body Fluid Peritoneal Fluid  Preliminary Report (2021 07:44):    Testing in progress    Culture - Blood (collected 31 Dec 2020 00:22)  Source: .Blood Blood-Peripheral  Preliminary Report (2021 01:03):    No growth to date.    Culture - Blood (collected 31 Dec 2020 00:22)  Source: .Blood Blood-Venous  Preliminary Report (2021 01:03):    No growth to date. Authored by Gee Gutiérrez MD ([phone]) [facility]    Patient is a 50y old  Female who presents with a chief complaint of abdominal pain (31 Dec 2020 20:27)    SUBJECTIVE / OVERNIGHT EVENTS: NAEON. This am pt notes her abd pain was much improved with the dilaudid .5 IV x1 she got. However she did not she had 2 bloody BMs  with "lots" of blood".     MEDICATIONS  (STANDING):  acetylcysteine IVPB 7 Gram(s) IV Intermittent once  albumin human 25% IVPB 100 milliLiter(s) IV Intermittent every 6 hours  atovaquone Suspension 1500 milliGRAM(s) Oral daily  dolutegravir 50 milliGRAM(s) Oral daily  emtricitabine 200 mG/tenofovir 300 mG (TRUVADA) 1 Tablet(s) Oral daily  entecavir 0.5 milliGRAM(s) Oral daily  midodrine. 10 milliGRAM(s) Oral three times a day  pantoprazole    Tablet 40 milliGRAM(s) Oral before breakfast  piperacillin/tazobactam IVPB.. 3.375 Gram(s) IV Intermittent every 8 hours  potassium chloride    Tablet ER 40 milliEquivalent(s) Oral every 4 hours  potassium chloride  10 mEq/100 mL IVPB 10 milliEquivalent(s) IV Intermittent every 1 hour  simethicone 80 milliGRAM(s) Chew four times a day  vancomycin  IVPB      vancomycin  IVPB 1000 milliGRAM(s) IV Intermittent every 12 hours    MEDICATIONS  (PRN):  aluminum hydroxide/magnesium hydroxide/simethicone Suspension 30 milliLiter(s) Oral every 6 hours PRN Dyspepsia  ibuprofen  Tablet. 600 milliGRAM(s) Oral every 6 hours PRN Mild Pain (1 - 3)  ondansetron Injectable 4 milliGRAM(s) IV Push every 8 hours PRN Nausea and/or Vomiting  oxyCODONE    IR 10 milliGRAM(s) Oral every 6 hours PRN Severe Pain (7 - 10)    PHYSICAL EXAM:  Vital Signs Last 24 Hrs  T(C): 36.4 (2021 05:21), Max: 36.8 (31 Dec 2020 16:32)  T(F): 97.5 (2021 05:21), Max: 98.3 (31 Dec 2020 16:32)  HR: 111 (2021 05:21) (100 - 124)  BP: 92/60 (2021 05:21) (79/66 - 99/64)  BP(mean): --  RR: 18 (2021 05:21) (16 - 18)  SpO2: 100% (2021 05:21) (96% - 100%)    GENERAL: No acute distress  HEAD: Atraumatic, Normocephalic  EYES: +scleral icterus. EOMI, PERRLA  NECK: Supple, no lymphadenopathy, no JVD  CHEST/LUNG: CTAB; No wheezes, rales, or rhonchi  HEART: Regular rate and rhythm; No murmurs, rubs, or gallops  ABDOMEN: Soft, non-tender, non-distended; normal bowel sounds, no organomegaly. No external hemorrhoids noted on rectal exam, rectal exam did not note hemorrhoids but expressed red-brown liquid stool, diaper with dried blood.   EXTREMITIES:  +Jaundiced. 2+ peripheral pulses b/l, No clubbing, cyanosis, or edema  NEUROLOGY: A&O x 3, no focal deficits  SKIN: No rashes or lesions    LABS:                        7.5    9.93  )-----------( 93       ( 2021 06:43 )             21.3         133<L>  |  101  |  14  ----------------------------<  94  2.8<LL>   |  22  |  0.91    Ca    7.8<L>      2021 06:43  Phos  2.9       Mg     2.0         TPro  3.7<L>  /  Alb  2.5<L>  /  TBili  13.7<H>  /  DBili  >10.0<H>  /  AST  570<H>  /  ALT  189<H>  /  AlkPhos  47      PT/INR - ( 31 Dec 2020 07:11 )   PT: 27.6 sec;   INR: 2.40 ratio      PTT - ( 31 Dec 2020 07:11 )  PTT:85.5 sec    Urinalysis Basic - ( 2021 06:43 )    Color: Dark Yellow / Appearance: Slightly Turbid / S.021 / pH: x  Gluc: x / Ketone: Small  / Bili: Moderate / Urobili: Negative   Blood: x / Protein: Trace / Nitrite: Negative   Leuk Esterase: Negative / RBC: 10 /hpf / WBC 9 /HPF   Sq Epi: x / Non Sq Epi: 3 /hpf / Bacteria: Negative    Culture - Fungal, Body Fluid (collected 31 Dec 2020 20:02)  Source: .Body Fluid Peritoneal Fluid  Preliminary Report (2021 07:44):    Testing in progress    Culture - Blood (collected 31 Dec 2020 00:22)  Source: .Blood Blood-Peripheral  Preliminary Report (2021 01:03):    No growth to date.    Culture - Blood (collected 31 Dec 2020 00:22)  Source: .Blood Blood-Venous  Preliminary Report (2021 01:03):    No growth to date.

## 2021-01-01 NOTE — PROGRESS NOTE ADULT - ATTENDING COMMENTS
51 yo F PMHx HIV, CMV, HBV, histoplasmosis, renal vein thrombosis s/p Coumadin x 6 months, admitted with COVID and acute liver injury.  Heme consulted for anemia and coagulopathy. 12/29 mixing study fully corrected, factor V and IX low, VIII normal indicating coagulopathy of liver disease.  smear reviewed on 12/29 and again today 1/1/21 which are similar showing markedly increased target cells, very rare occasional schistocyte which could be artifact, juno cells. Toxic neutrophils. No immature wbcs seen. true thrombocytopenia. She has worsening coagulopathy likely 2/2 liver dysfunction, infection (SBP) and may have an element of DIC due to overall condition. Fibrinogen 127 which can be low in liver failure. She is actively having a GI bleed, rec 4 cryo, 2 FFP, and Vitamin K. Tx to keep Hb>7, fibrinogen > 150-200.  GI/hepatology following

## 2021-01-01 NOTE — PROVIDER CONTACT NOTE (CHANGE IN STATUS NOTIFICATION) - BACKGROUND
Pt here for abdominal pain as well as COV19, had paracentetis earlier in day and has been getting PO oxy 10mg but now asking for IV formulation

## 2021-01-01 NOTE — PROGRESS NOTE ADULT - ASSESSMENT
49 yo F PMHx HIV, CMV, HBV, histoplasmosis, renal vein thrombosis s/p Coumadin x 6 months, admitted with COVID and acute liver injury.  Heme consulted for anemia and coagulopathy    #Coagulopathy  - 12/29 mixing study fully corrected, factor V and IX low, VIII normal indicating coagulopathy of liver disease  - smear reviewed on 12/29 and again today 1/1/21 which are similar showing markedly increased target cells, very rare occasional schistocyte which could be artifact, juno cells. Toxic neutrophils. No immature wbcs seen. true thrombocytopenia.   - coagulopathy worsening differential includes liver dysfunction and DIC. Although could represent early DIC in the setting of liver dysfunction (tbili >10) unlikely as rare if not any schistocytes seen. Her smear and factor levels consistent with liver disease   -Please send mixing study again   -Fibrinogen 127 which can be low in liver failure. She is actively having a GI bleed, rec 4 cryo, 2 FFP, and Vitamin K.   -If develops hemodynamic instability need call ICU   -continue to trend DIC labs q12hr and transfuse cryo if <150 and FFP if continues to bleed   -f/u any hepatology recs    #Normocytic Anemia: likely dilutional and worsening with active infection  - iron studies consistent with AOCD   - b12, folate normal  - retic 3%, inappropriately low  - transfuse if hg < 7.0    Osmar Scruggs MD   Hematology/Oncology Fellow  Pager: [phone] 51 yo F PMHx HIV, CMV, HBV, histoplasmosis, renal vein thrombosis s/p Coumadin x 6 months, admitted with COVID and acute liver injury.  Heme consulted for anemia and coagulopathy    #Coagulopathy  - 12/29 mixing study fully corrected, factor V and IX low, VIII normal indicating coagulopathy of liver disease  - smear reviewed on 12/29 and again today 1/1/21 which are similar showing markedly increased target cells, very rare occasional schistocyte which could be artifact, juno cells. Toxic neutrophils. No immature wbcs seen. true thrombocytopenia.   - coagulopathy worsening differential includes liver dysfunction and DIC. Although could represent early DIC in the setting of liver dysfunction (tbili >10) unlikely as rare if not any schistocytes seen. Her smear and factor levels consistent with liver disease   -Please send mixing study again   -Fibrinogen 127 which can be low in liver failure. She is actively having a GI bleed, rec 4 cryo, 2 FFP, and Vitamin K.   -If develops hemodynamic instability need call ICU   -continue to trend DIC labs q12hr and transfuse cryo if <150 and FFP if continues to bleed   -f/u any hepatology recs and GI consult for bleed     #Normocytic Anemia: likely dilutional and worsening with active infection  - iron studies consistent with AOCD   - b12, folate normal  - retic 3%, inappropriately low  - transfuse if hg < 7.0    Osmar Scruggs MD   Hematology/Oncology Fellow  Pager: [phone] 51 yo F PMHx HIV, CMV, HBV, histoplasmosis, renal vein thrombosis s/p Coumadin x 6 months, admitted with COVID and acute liver injury.  Heme consulted for anemia and coagulopathy    #Coagulopathy  - 12/29 mixing study fully corrected, factor V and IX low, VIII normal indicating coagulopathy of liver disease  - smear reviewed on 12/29 and again today 1/1/21 which are similar showing markedly increased target cells, very rare occasional schistocyte which could be artifact, juno cells. Toxic neutrophils. No immature wbcs seen. true thrombocytopenia.   - coagulopathy worsening differential includes liver dysfunction and DIC. Although could represent early DIC in the setting of liver dysfunction (tbili >10) unlikely as rare if not any schistocytes seen. Her smear and factor levels consistent with liver disease   -Please send mixing study again   -Fibrinogen 127 which can be low in liver failure. She is actively having a GI bleed, rec 4 cryo, 2 FFP, and Vitamin K.   -Hemoglobin 6.8, please transfuse to keep >7  -If develops hemodynamic instability need call ICU   -continue to trend DIC labs q12hr and transfuse cryo if <150 and FFP if continues to bleed   -f/u any hepatology recs and GI consult for bleed     #Normocytic Anemia: likely dilutional and worsening with active infection  - iron studies consistent with AOCD   - b12, folate normal  - retic 3%, inappropriately low  - transfuse if hg < 7.0    Osmar Scruggs MD   Hematology/Oncology Fellow  Pager: [phone] 51 yo F PMHx HIV, CMV, HBV, histoplasmosis, renal vein thrombosis s/p Coumadin x 6 months, admitted with COVID and acute liver injury.  Heme consulted for anemia and coagulopathy    #Coagulopathy  - 12/29 mixing study fully corrected, factor V and IX low, VIII normal indicating coagulopathy of liver disease  - smear reviewed on 12/29 and again today 1/1/21 which are similar showing markedly increased target cells, very rare occasional schistocyte which could be artifact, juno cells. Toxic neutrophils. No immature wbcs seen. true thrombocytopenia.   - coagulopathy worsening differential includes liver dysfunction and DIC. Although could represent early DIC in the setting of liver dysfunction (tbili >10). Her smear and factor levels consistent with liver disease however clinically coagulopathy worsened, acute GI bleed, and becoming more thrombocytopenic   -Please send mixing study again   -Fibrinogen 127 which can be low in liver failure. She is actively having a GI bleed, rec 4 cryo, 2 FFP, and Vitamin K.   -Hemoglobin 6.8, please transfuse to keep >7  -If develops hemodynamic instability need call ICU   -continue to trend DIC labs q12hr and transfuse cryo if <150 and FFP if continues to bleed   -f/u any hepatology recs and GI consult for bleed   -Patient could be developing DIC 2/2 infection (ascitic fluid positive 12/31 for GNR) although no schistocytes seen on smear. She is bleeding from the GI tract but no other bleeding sites (i.e. IV) which if this is the case the treatment is to treat the underlying cause and since she is high risk of bleeding with her coagulopathy and thrombocytopenia recommend transfusions as stated above.     #Thrombocytopenia   -likely in the setting of acute infection with element of consumption with active GI bleed   -transfuse for plt >50K if bleeding, >15K if febrile  -smear reviewed as stated above, occasional if not any schistocytes, not likely TTP/TMA   -continue to trend     #SBP  -12/31 ascitic fluid positive for GNR   -c/w antibiotics, zosyn can worsen thrombocytopenia, if plt count continues to drop consider changing abx     #Normocytic Anemia: likely dilutional and worsening with active infection  - iron studies consistent with AOCD   - b12, folate normal  - retic 3%, inappropriately low  - transfuse if hg < 7.0    Osmar Scruggs MD   Hematology/Oncology Fellow  Pager: [phone] 51 yo F PMHx HIV, CMV, HBV, histoplasmosis, renal vein thrombosis s/p Coumadin x 6 months, admitted with COVID and acute liver injury.  Heme consulted for anemia and coagulopathy    #Coagulopathy  - 12/29 mixing study fully corrected, factor V and IX low, VIII normal indicating coagulopathy of liver disease  - smear reviewed on 12/29 and again today 1/1/21 which are similar showing markedly increased target cells, very rare occasional schistocyte which could be artifact, juno cells. Toxic neutrophils. No immature wbcs seen. true thrombocytopenia.   - coagulopathy worsening differential includes liver dysfunction and DIC. Although could represent early DIC in the setting of liver dysfunction (tbili >10). Her smear and factor levels consistent with liver disease however clinically coagulopathy worsened, acute GI bleed, and becoming more thrombocytopenic   -Please send mixing study again   -Fibrinogen 127 which can be low in liver failure. She is actively having a GI bleed, rec 4 cryo, 2 FFP, and Vitamin K.   -Hemoglobin 6.8, please transfuse to keep >7  -If develops hemodynamic instability need call ICU   -continue to trend DIC labs q12hr and transfuse cryo if <150 and FFP if continues to bleed   -f/u any hepatology recs and GI consult for bleed   -Patient could be developing DIC 2/2 infection (ascitic fluid positive 12/31 for GNR) although no schistocytes seen on smear. She is bleeding from the GI tract but no other bleeding sites (i.e. IV) which if this is the case the treatment is to treat the underlying cause and since she is high risk of bleeding with her coagulopathy and thrombocytopenia recommend transfusions as stated above.     #Thrombocytopenia   -She has multiple causes for thrombocytopenia with viral infection that can cause decreased marrow production, decreased TPO production due to liver failure, and portal hypertension with splenomegaly which can cause sequestration. Acutely it appears she has a bacterial infection (SBP) which can further exacerbate her liver failure and cause worsening thrombocytopenia along with possible element of consumption (DIC). She also has been combating COVID19 and reactivation of HBV.   -transfuse for plt >50K if bleeding, >15K if febrile  -smear reviewed as stated above, occasional if not any schistocytes, not likely TTP/TMA   -continue to trend     #SBP  -12/31 ascitic fluid positive for GNR   -c/w antibiotics, zosyn can worsen thrombocytopenia, if plt count continues to drop consider changing abx     #Normocytic Anemia: likely dilutional and worsening with active infection  - iron studies consistent with AOCD   - b12, folate normal  - retic 3%, inappropriately low  - transfuse if hg < 7.0    Osmar Scruggs MD   Hematology/Oncology Fellow  Pager: [phone]

## 2021-01-02 LAB
ALBUMIN SERPL ELPH-MCNC: 3 G/DL — LOW (ref 3.3–5)
ALBUMIN SERPL ELPH-MCNC: 3 G/DL — LOW (ref 3.3–5)
ALBUMIN SERPL ELPH-MCNC: 3.1 G/DL — LOW (ref 3.3–5)
ALP SERPL-CCNC: 46 U/L — SIGNIFICANT CHANGE UP (ref 40–120)
ALP SERPL-CCNC: 57 U/L — SIGNIFICANT CHANGE UP (ref 40–120)
ALP SERPL-CCNC: 60 U/L — SIGNIFICANT CHANGE UP (ref 40–120)
ALT FLD-CCNC: 122 U/L — HIGH (ref 10–45)
ALT FLD-CCNC: 134 U/L — HIGH (ref 10–45)
ALT FLD-CCNC: 140 U/L — HIGH (ref 10–45)
ANION GAP SERPL CALC-SCNC: 11 MMOL/L — SIGNIFICANT CHANGE UP (ref 5–17)
ANION GAP SERPL CALC-SCNC: 9 MMOL/L — SIGNIFICANT CHANGE UP (ref 5–17)
ANION GAP SERPL CALC-SCNC: 9 MMOL/L — SIGNIFICANT CHANGE UP (ref 5–17)
APTT BLD: 71.1 SEC — HIGH (ref 27.5–35.5)
APTT BLD: 82.5 SEC — HIGH (ref 27.5–35.5)
AST SERPL-CCNC: 383 U/L — HIGH (ref 10–40)
AST SERPL-CCNC: 468 U/L — HIGH (ref 10–40)
AST SERPL-CCNC: 499 U/L — HIGH (ref 10–40)
BILIRUB SERPL-MCNC: 11.9 MG/DL — HIGH (ref 0.2–1.2)
BILIRUB SERPL-MCNC: 13.3 MG/DL — HIGH (ref 0.2–1.2)
BILIRUB SERPL-MCNC: 14.3 MG/DL — HIGH (ref 0.2–1.2)
BUN SERPL-MCNC: 11 MG/DL — SIGNIFICANT CHANGE UP (ref 7–23)
CALCIUM SERPL-MCNC: 8.3 MG/DL — LOW (ref 8.4–10.5)
CALCIUM SERPL-MCNC: 8.3 MG/DL — LOW (ref 8.4–10.5)
CALCIUM SERPL-MCNC: 8.4 MG/DL — SIGNIFICANT CHANGE UP (ref 8.4–10.5)
CHLORIDE SERPL-SCNC: 101 MMOL/L — SIGNIFICANT CHANGE UP (ref 96–108)
CHLORIDE SERPL-SCNC: 102 MMOL/L — SIGNIFICANT CHANGE UP (ref 96–108)
CHLORIDE SERPL-SCNC: 103 MMOL/L — SIGNIFICANT CHANGE UP (ref 96–108)
CO2 SERPL-SCNC: 22 MMOL/L — SIGNIFICANT CHANGE UP (ref 22–31)
CO2 SERPL-SCNC: 24 MMOL/L — SIGNIFICANT CHANGE UP (ref 22–31)
CO2 SERPL-SCNC: 24 MMOL/L — SIGNIFICANT CHANGE UP (ref 22–31)
CREAT SERPL-MCNC: 0.71 MG/DL — SIGNIFICANT CHANGE UP (ref 0.5–1.3)
CREAT SERPL-MCNC: 0.75 MG/DL — SIGNIFICANT CHANGE UP (ref 0.5–1.3)
CREAT SERPL-MCNC: 0.76 MG/DL — SIGNIFICANT CHANGE UP (ref 0.5–1.3)
D DIMER BLD IA.RAPID-MCNC: 1079 NG/ML DDU — HIGH
D DIMER BLD IA.RAPID-MCNC: 581 NG/ML DDU — HIGH
D DIMER BLD IA.RAPID-MCNC: 812 NG/ML DDU — HIGH
FERRITIN SERPL-MCNC: 596 NG/ML — HIGH (ref 15–150)
FERRITIN SERPL-MCNC: 610 NG/ML — HIGH (ref 15–150)
FERRITIN SERPL-MCNC: 757 NG/ML — HIGH (ref 15–150)
FIBRINOGEN PPP-MCNC: 147 MG/DL — LOW (ref 290–520)
FIBRINOGEN PPP-MCNC: 167 MG/DL — LOW (ref 290–520)
FIBRINOGEN PPP-MCNC: 167 MG/DL — LOW (ref 290–520)
GLUCOSE SERPL-MCNC: 102 MG/DL — HIGH (ref 70–99)
GLUCOSE SERPL-MCNC: 75 MG/DL — SIGNIFICANT CHANGE UP (ref 70–99)
GLUCOSE SERPL-MCNC: 95 MG/DL — SIGNIFICANT CHANGE UP (ref 70–99)
HAPTOGLOB SERPL-MCNC: 20 MG/DL — LOW (ref 34–200)
HAPTOGLOB SERPL-MCNC: 21 MG/DL — LOW (ref 34–200)
HAPTOGLOB SERPL-MCNC: <20 MG/DL — LOW (ref 34–200)
HCT VFR BLD CALC: 21.7 % — LOW (ref 34.5–45)
HCT VFR BLD CALC: 23.6 % — LOW (ref 34.5–45)
HCT VFR BLD CALC: 24 % — LOW (ref 34.5–45)
HCV RNA SERPL NAA DL=5-ACNC: SIGNIFICANT CHANGE UP IU/ML
HCV RNA SPEC NAA+PROBE-LOG IU: SIGNIFICANT CHANGE UP LOG10IU/ML
HGB BLD-MCNC: 7.7 G/DL — LOW (ref 11.5–15.5)
HGB BLD-MCNC: 8.1 G/DL — LOW (ref 11.5–15.5)
HGB BLD-MCNC: 8.5 G/DL — LOW (ref 11.5–15.5)
INR BLD: 2.2 RATIO — HIGH (ref 0.88–1.16)
INR BLD: 2.29 RATIO — HIGH (ref 0.88–1.16)
INR BLD: 2.37 RATIO — HIGH (ref 0.88–1.16)
MAGNESIUM SERPL-MCNC: 1.9 MG/DL — SIGNIFICANT CHANGE UP (ref 1.6–2.6)
MAGNESIUM SERPL-MCNC: 2 MG/DL — SIGNIFICANT CHANGE UP (ref 1.6–2.6)
MAGNESIUM SERPL-MCNC: 2 MG/DL — SIGNIFICANT CHANGE UP (ref 1.6–2.6)
MCHC RBC-ENTMCNC: 30.8 PG — SIGNIFICANT CHANGE UP (ref 27–34)
MCHC RBC-ENTMCNC: 31.3 PG — SIGNIFICANT CHANGE UP (ref 27–34)
MCHC RBC-ENTMCNC: 31.5 PG — SIGNIFICANT CHANGE UP (ref 27–34)
MCHC RBC-ENTMCNC: 34.3 GM/DL — SIGNIFICANT CHANGE UP (ref 32–36)
MCHC RBC-ENTMCNC: 35.4 GM/DL — SIGNIFICANT CHANGE UP (ref 32–36)
MCHC RBC-ENTMCNC: 35.5 GM/DL — SIGNIFICANT CHANGE UP (ref 32–36)
MCV RBC AUTO: 88.2 FL — SIGNIFICANT CHANGE UP (ref 80–100)
MCV RBC AUTO: 88.9 FL — SIGNIFICANT CHANGE UP (ref 80–100)
MCV RBC AUTO: 89.7 FL — SIGNIFICANT CHANGE UP (ref 80–100)
NRBC # BLD: 0 /100 WBCS — SIGNIFICANT CHANGE UP (ref 0–0)
PHOSPHATE SERPL-MCNC: 1.3 MG/DL — LOW (ref 2.5–4.5)
PHOSPHATE SERPL-MCNC: 1.5 MG/DL — LOW (ref 2.5–4.5)
PHOSPHATE SERPL-MCNC: 1.6 MG/DL — LOW (ref 2.5–4.5)
PLATELET # BLD AUTO: 84 K/UL — LOW (ref 150–400)
PLATELET # BLD AUTO: 89 K/UL — LOW (ref 150–400)
PLATELET # BLD AUTO: 90 K/UL — LOW (ref 150–400)
POTASSIUM SERPL-MCNC: 2.8 MMOL/L — CRITICAL LOW (ref 3.5–5.3)
POTASSIUM SERPL-MCNC: 3.1 MMOL/L — LOW (ref 3.5–5.3)
POTASSIUM SERPL-MCNC: 3.4 MMOL/L — LOW (ref 3.5–5.3)
POTASSIUM SERPL-SCNC: 2.8 MMOL/L — CRITICAL LOW (ref 3.5–5.3)
POTASSIUM SERPL-SCNC: 3.1 MMOL/L — LOW (ref 3.5–5.3)
POTASSIUM SERPL-SCNC: 3.4 MMOL/L — LOW (ref 3.5–5.3)
PROT SERPL-MCNC: 4 G/DL — LOW (ref 6–8.3)
PROT SERPL-MCNC: 4.2 G/DL — LOW (ref 6–8.3)
PROT SERPL-MCNC: 4.4 G/DL — LOW (ref 6–8.3)
PROTHROM AB SERPL-ACNC: 25.4 SEC — HIGH (ref 10.6–13.6)
PROTHROM AB SERPL-ACNC: 26.4 SEC — HIGH (ref 10.6–13.6)
PROTHROM AB SERPL-ACNC: 27.3 SEC — HIGH (ref 10.6–13.6)
RBC # BLD: 2.46 M/UL — LOW (ref 3.8–5.2)
RBC # BLD: 2.63 M/UL — LOW (ref 3.8–5.2)
RBC # BLD: 2.7 M/UL — LOW (ref 3.8–5.2)
RBC # FLD: 20 % — HIGH (ref 10.3–14.5)
RBC # FLD: 20.9 % — HIGH (ref 10.3–14.5)
RBC # FLD: 20.9 % — HIGH (ref 10.3–14.5)
SODIUM SERPL-SCNC: 134 MMOL/L — LOW (ref 135–145)
SODIUM SERPL-SCNC: 135 MMOL/L — SIGNIFICANT CHANGE UP (ref 135–145)
SODIUM SERPL-SCNC: 136 MMOL/L — SIGNIFICANT CHANGE UP (ref 135–145)
WBC # BLD: 10.38 K/UL — SIGNIFICANT CHANGE UP (ref 3.8–10.5)
WBC # BLD: 6.85 K/UL — SIGNIFICANT CHANGE UP (ref 3.8–10.5)
WBC # BLD: 7.25 K/UL — SIGNIFICANT CHANGE UP (ref 3.8–10.5)
WBC # FLD AUTO: 10.38 K/UL — SIGNIFICANT CHANGE UP (ref 3.8–10.5)
WBC # FLD AUTO: 6.85 K/UL — SIGNIFICANT CHANGE UP (ref 3.8–10.5)
WBC # FLD AUTO: 7.25 K/UL — SIGNIFICANT CHANGE UP (ref 3.8–10.5)

## 2021-01-02 PROCEDURE — 99232 SBSQ HOSP IP/OBS MODERATE 35: CPT | Mod: GC

## 2021-01-02 RX ORDER — TRAMADOL HYDROCHLORIDE 50 MG/1
25 TABLET ORAL EVERY 6 HOURS
Refills: 0 | Status: DISCONTINUED | OUTPATIENT
Start: 2021-01-02 | End: 2021-01-05

## 2021-01-02 RX ORDER — SODIUM,POTASSIUM PHOSPHATES 278-250MG
1 POWDER IN PACKET (EA) ORAL EVERY 4 HOURS
Refills: 0 | Status: COMPLETED | OUTPATIENT
Start: 2021-01-02 | End: 2021-01-02

## 2021-01-02 RX ORDER — HYDROMORPHONE HYDROCHLORIDE 2 MG/ML
0.5 INJECTION INTRAMUSCULAR; INTRAVENOUS; SUBCUTANEOUS ONCE
Refills: 0 | Status: DISCONTINUED | OUTPATIENT
Start: 2021-01-02 | End: 2021-01-02

## 2021-01-02 RX ORDER — PHYTONADIONE (VIT K1) 5 MG
10 TABLET ORAL ONCE
Refills: 0 | Status: DISCONTINUED | OUTPATIENT
Start: 2021-01-02 | End: 2021-01-02

## 2021-01-02 RX ORDER — ONDANSETRON 8 MG/1
4 TABLET, FILM COATED ORAL ONCE
Refills: 0 | Status: COMPLETED | OUTPATIENT
Start: 2021-01-02 | End: 2021-01-02

## 2021-01-02 RX ORDER — POTASSIUM PHOSPHATE, MONOBASIC POTASSIUM PHOSPHATE, DIBASIC 236; 224 MG/ML; MG/ML
30 INJECTION, SOLUTION INTRAVENOUS ONCE
Refills: 0 | Status: COMPLETED | OUTPATIENT
Start: 2021-01-02 | End: 2021-01-02

## 2021-01-02 RX ORDER — SODIUM,POTASSIUM PHOSPHATES 278-250MG
1 POWDER IN PACKET (EA) ORAL ONCE
Refills: 0 | Status: COMPLETED | OUTPATIENT
Start: 2021-01-02 | End: 2021-01-02

## 2021-01-02 RX ORDER — POTASSIUM PHOSPHATE, MONOBASIC POTASSIUM PHOSPHATE, DIBASIC 236; 224 MG/ML; MG/ML
30 INJECTION, SOLUTION INTRAVENOUS ONCE
Refills: 0 | Status: DISCONTINUED | OUTPATIENT
Start: 2021-01-02 | End: 2021-01-02

## 2021-01-02 RX ORDER — POTASSIUM CHLORIDE 20 MEQ
40 PACKET (EA) ORAL ONCE
Refills: 0 | Status: COMPLETED | OUTPATIENT
Start: 2021-01-02 | End: 2021-01-02

## 2021-01-02 RX ORDER — ALBUMIN HUMAN 25 %
100 VIAL (ML) INTRAVENOUS EVERY 6 HOURS
Refills: 0 | Status: DISCONTINUED | OUTPATIENT
Start: 2021-01-02 | End: 2021-01-03

## 2021-01-02 RX ORDER — POTASSIUM CHLORIDE 20 MEQ
10 PACKET (EA) ORAL
Refills: 0 | Status: COMPLETED | OUTPATIENT
Start: 2021-01-02 | End: 2021-01-02

## 2021-01-02 RX ORDER — POTASSIUM CHLORIDE 20 MEQ
10 PACKET (EA) ORAL
Refills: 0 | Status: COMPLETED | OUTPATIENT
Start: 2021-01-02 | End: 2021-01-03

## 2021-01-02 RX ORDER — PHYTONADIONE (VIT K1) 5 MG
10 TABLET ORAL ONCE
Refills: 0 | Status: COMPLETED | OUTPATIENT
Start: 2021-01-02 | End: 2021-01-02

## 2021-01-02 RX ORDER — ACETYLCYSTEINE 200 MG/ML
7 VIAL (ML) MISCELLANEOUS ONCE
Refills: 0 | Status: COMPLETED | OUTPATIENT
Start: 2021-01-02 | End: 2021-01-02

## 2021-01-02 RX ADMIN — ATOVAQUONE 1500 MILLIGRAM(S): 750 SUSPENSION ORAL at 13:28

## 2021-01-02 RX ADMIN — Medication 50 MILLILITER(S): at 13:26

## 2021-01-02 RX ADMIN — PANTOPRAZOLE SODIUM 40 MILLIGRAM(S): 20 TABLET, DELAYED RELEASE ORAL at 18:06

## 2021-01-02 RX ADMIN — ONDANSETRON 4 MILLIGRAM(S): 8 TABLET, FILM COATED ORAL at 18:52

## 2021-01-02 RX ADMIN — POTASSIUM PHOSPHATE, MONOBASIC POTASSIUM PHOSPHATE, DIBASIC 83.33 MILLIMOLE(S): 236; 224 INJECTION, SOLUTION INTRAVENOUS at 18:05

## 2021-01-02 RX ADMIN — DOLUTEGRAVIR SODIUM 50 MILLIGRAM(S): 25 TABLET, FILM COATED ORAL at 13:26

## 2021-01-02 RX ADMIN — SIMETHICONE 80 MILLIGRAM(S): 80 TABLET, CHEWABLE ORAL at 05:51

## 2021-01-02 RX ADMIN — PIPERACILLIN AND TAZOBACTAM 25 GRAM(S): 4; .5 INJECTION, POWDER, LYOPHILIZED, FOR SOLUTION INTRAVENOUS at 21:30

## 2021-01-02 RX ADMIN — MIDODRINE HYDROCHLORIDE 20 MILLIGRAM(S): 2.5 TABLET ORAL at 05:51

## 2021-01-02 RX ADMIN — Medication 50 MILLILITER(S): at 05:50

## 2021-01-02 RX ADMIN — ONDANSETRON 4 MILLIGRAM(S): 8 TABLET, FILM COATED ORAL at 12:51

## 2021-01-02 RX ADMIN — Medication 50 MILLILITER(S): at 19:11

## 2021-01-02 RX ADMIN — Medication 1 TABLET(S): at 13:28

## 2021-01-02 RX ADMIN — Medication 100 MILLIEQUIVALENT(S): at 06:24

## 2021-01-02 RX ADMIN — Medication 100 MILLIEQUIVALENT(S): at 07:29

## 2021-01-02 RX ADMIN — Medication 64.69 GRAM(S): at 13:28

## 2021-01-02 RX ADMIN — Medication 102 MILLIGRAM(S): at 18:32

## 2021-01-02 RX ADMIN — Medication 1 TABLET(S): at 18:06

## 2021-01-02 RX ADMIN — SIMETHICONE 80 MILLIGRAM(S): 80 TABLET, CHEWABLE ORAL at 18:06

## 2021-01-02 RX ADMIN — PANTOPRAZOLE SODIUM 40 MILLIGRAM(S): 20 TABLET, DELAYED RELEASE ORAL at 05:51

## 2021-01-02 RX ADMIN — HYDROMORPHONE HYDROCHLORIDE 0.5 MILLIGRAM(S): 2 INJECTION INTRAMUSCULAR; INTRAVENOUS; SUBCUTANEOUS at 00:16

## 2021-01-02 RX ADMIN — Medication 40 MILLIEQUIVALENT(S): at 04:16

## 2021-01-02 RX ADMIN — SIMETHICONE 80 MILLIGRAM(S): 80 TABLET, CHEWABLE ORAL at 13:26

## 2021-01-02 RX ADMIN — PIPERACILLIN AND TAZOBACTAM 25 GRAM(S): 4; .5 INJECTION, POWDER, LYOPHILIZED, FOR SOLUTION INTRAVENOUS at 01:03

## 2021-01-02 RX ADMIN — MIDODRINE HYDROCHLORIDE 20 MILLIGRAM(S): 2.5 TABLET ORAL at 13:25

## 2021-01-02 RX ADMIN — Medication 1 TABLET(S): at 13:25

## 2021-01-02 RX ADMIN — MIDODRINE HYDROCHLORIDE 20 MILLIGRAM(S): 2.5 TABLET ORAL at 18:06

## 2021-01-02 RX ADMIN — Medication 1 PACKET(S): at 23:53

## 2021-01-02 RX ADMIN — HYDROMORPHONE HYDROCHLORIDE 0.5 MILLIGRAM(S): 2 INJECTION INTRAMUSCULAR; INTRAVENOUS; SUBCUTANEOUS at 13:23

## 2021-01-02 RX ADMIN — PIPERACILLIN AND TAZOBACTAM 25 GRAM(S): 4; .5 INJECTION, POWDER, LYOPHILIZED, FOR SOLUTION INTRAVENOUS at 12:53

## 2021-01-02 RX ADMIN — SIMETHICONE 80 MILLIGRAM(S): 80 TABLET, CHEWABLE ORAL at 23:53

## 2021-01-02 RX ADMIN — EMTRICITABINE AND TENOFOVIR DISOPROXIL FUMARATE 1 TABLET(S): 200; 300 TABLET, FILM COATED ORAL at 13:26

## 2021-01-02 RX ADMIN — Medication 100 MILLIEQUIVALENT(S): at 04:16

## 2021-01-02 RX ADMIN — Medication 50 MILLILITER(S): at 00:00

## 2021-01-02 NOTE — PROGRESS NOTE ADULT - SUBJECTIVE AND OBJECTIVE BOX
Ita Banks MD  Internal Medicine PGY-2  Pager [phone]  Pager [phone]      Patient is a 50y old  Female who presents with a chief complaint of abdominal pain (2021 19:06)        SUBJECTIVE / OVERNIGHT EVENTS: Overnight, recieved 0.5mg dilaudid for pain and 1U plasma. Patient seen and examined at bedside this morning. Patient continues to have abdominal pain, improved from overnight 3/10. Denies hematuria, melena, hematochezia or hemoptysis. No tremors.     ROS: [ - ] Fever [ - ] Chills [ - ] Nausea/Vomiting [ - ] Chest Pain [ - ] Shortness of breath     MEDICATIONS  (STANDING):  albumin human 25% IVPB 100 milliLiter(s) IV Intermittent every 6 hours  atovaquone Suspension 1500 milliGRAM(s) Oral daily  dolutegravir 50 milliGRAM(s) Oral daily  emtricitabine 200 mG/tenofovir 300 mG (TRUVADA) 1 Tablet(s) Oral daily  midodrine. 20 milliGRAM(s) Oral three times a day  pantoprazole  Injectable 40 milliGRAM(s) IV Push every 12 hours  piperacillin/tazobactam IVPB.. 3.375 Gram(s) IV Intermittent every 8 hours  potassium phosphate / sodium phosphate Tablet (K-PHOS No. 2) 1 Tablet(s) Oral every 4 hours  simethicone 80 milliGRAM(s) Chew four times a day    MEDICATIONS  (PRN):  aluminum hydroxide/magnesium hydroxide/simethicone Suspension 30 milliLiter(s) Oral every 6 hours PRN Dyspepsia  ibuprofen  Tablet. 600 milliGRAM(s) Oral every 6 hours PRN Mild Pain (1 - 3)  ondansetron Injectable 4 milliGRAM(s) IV Push every 8 hours PRN Nausea and/or Vomiting      Vital Signs Last 24 Hrs  T(C): 37 (2021 05:05), Max: 37 (2021 05:05)  T(F): 98.6 (2021 05:05), Max: 98.6 (2021 05:05)  HR: 120 (2021 05:48) (114 - 126)  BP: 101/67 (2021 05:48) (94/67 - 117/76)  BP(mean): --  RR: 18 (2021 05:48) (18 - 19)  SpO2: 100% (2021 05:48) (100% - 100%)  CAPILLARY BLOOD GLUCOSE        I&O's Summary      PHYSICAL EXAM  GENERAL: NAD, lying comfortably in bed jaundiced  HEENT:  Atraumatic, Normocephalic, EOMI, scleral icterus, no LAD  CHEST/LUNG: Clear to auscultation bilaterally; No wheeze  HEART: RRR, S1 and S2 No murmurs, rubs, or gallops  ABDOMEN: Soft, diffuse tenderness all 4 quadrants, no reound. Bowel sounds present. Distended  EXTREMITIES:  2+ Peripheral Pulses, edema 2+ upper and lower extremities b/l. No asterxis   NEURO: AAOx3, non-focal  SKIN: No rashes or lesions    LABS:                        8.5    10.38 )-----------( 89       ( 2021 11:36 )             24.0     01-02    135  |  102  |  11  ----------------------------<  95  2.8<LL>   |  24  |  0.71    Ca    8.3<L>      2021 02:57  Phos  1.6     01-02  Mg     2.0     -02    TPro  4.4<L>  /  Alb  3.0<L>  /  TBili  11.9<H>  /  DBili  x   /  AST  383<H>  /  ALT  122<H>  /  AlkPhos  46  01-02    PT/INR - ( 2021 11:37 )   PT: 26.4 sec;   INR: 2.29 ratio         PTT - ( 2021 11:37 )  PTT:82.5 sec      Urinalysis Basic - ( 2021 06:43 )    Color: Dark Yellow / Appearance: Slightly Turbid / S.021 / pH: x  Gluc: x / Ketone: Small  / Bili: Moderate / Urobili: Negative   Blood: x / Protein: Trace / Nitrite: Negative   Leuk Esterase: Negative / RBC: 10 /hpf / WBC 9 /HPF   Sq Epi: x / Non Sq Epi: 3 /hpf / Bacteria: Negative          RADIOLOGY & ADDITIONAL TESTS:    Imaging Personally Reviewed:  Consultant(s) Notes Reviewed:

## 2021-01-02 NOTE — PROGRESS NOTE ADULT - ASSESSMENT
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     #Acute liver injury   Patient with predominantly hepatocellular liver injury, with AST >1800, Alt 600. Also w/ evidence of hepatic synthetic function, with elevated INR, decreased albumin. Etiology likely from HBV, which was previously being treated w/ Symtuza (tenofovir 10mg qd) but patient had been off medication for almost 1 year (until 11/2020) due to insurance lapse. Her outpatient viral load >400,000,000 corroborates this. Hepatitis delta coinfection has been ruled out as outpatient. Patient will require immediate treatment of her HBV, now on biktarvy (tenofovir 25mg). Currently patient is not in acute liver failure, she is mentating well, but is having worsening INR which is concerning. LFTs improving   - c/w truvada and entecavir for HBV  - c/w NAC infusion for acute liver failure (100 mg/kg over 16 hours, will likely need to be reordered daily)  - c/w albumin 100cc 25% q6h  - IVF PRN  - s/p vitamin K 10mg (12/26 - 12/28). Vit K reordered 1/1 for elevated coags  - dilaudid .5 mg IV prn for pain, dose carefully given HOTN. However, pt has not responded to motrin (hold i/s/o GIB), tylenol (wary i/s/o hepatic dysfunction), ketorolac (hold i/s/o GIB)  - ascitic fluid: SAAG 2, ascitic fluid protein .8 likely portal HTN however granulocyte count from ascitic fluid >250, c/f SBP, especially given immunocompromised state  - c/w zosyn (12/30- )    #Sepsis:  Pt noted to be tachy, HOTN, febrile 12/30.   - blood cx 12/31- ngtd  - s/p vanc (12/30-1/1). ID rec to d/c 1/1  - c/w zosyn (12/30- )   - c/w midodrine 10 mg TID   - c/f DIC given low plt, elevated coags, elevated D-dimer, low fibrinogen. will f/u blood smear to eval for schistocytes    #Anemia:   - Hgb acutely dropped 1/1. FOBT+. c/f GIB- will transfuse cryo 1u, pRBC 1u.   - per heme, coagulopathy 2/2 liver disease  - type and cross x2  - transfuse Hgb <7    #ANTOINETTE:  2/2 hypoperfusion i/s/o sepsis vs medication induced (Abx) vs HRS  - c/w albumin  - avoid nephrotoxic meds if possible  - continue to trend SCr  - if SCr continues to rise or oliguria, consult neph     #AIDS  needs ARV medications to help with her immunosuppression but need to change ARV meds to ones metabolized by the kidney  continue the TAF for help controlling the Hep B infection  - In light of significant transaminitis, discontinued the Symtuza and changed meds to Truvada/Tivicay  - CD4 116 consistent with AIDS  - RNA viral load in process - slightly detectable but cannot account for her acute decompensation in liver disease  - If viral load elevated will add Bactrim for PJP prophylaxis  - c/w atovaquone for PCP ppx per ID- no Bactrim given hepatic dysfunction    #COVID+  - not eligible for Remdesivir secondary to her LFT abnormalities  - could consider convalescent plasma if she decompensates  - D- dimer <150, fibrinogen 224 (uptrending), ferritin 605 (downtrending)  - Quant TB neg  - blood cx 12/22- no growth  - blood cx 12/31- ngtd  - f/u urine histo ag. in case recurrence    #CMV viremia   - hold off on Valganciclovir   - CMV viral load <96    #Prophylactic measures  - DVT ppx: hold i/s/o hgb drop  - GERD: pantoprazole 40mg BID for GIB  - Diet: NPO for GIB 50 year old female with past medical history of HIV, CMV, HBV, histoplasmosis, renal vein thrombosis s/p coumadin for 6 months, and strongyloidiasis who presents with abdominal pain found to be COVID-19 PCR positive with acute liver injury 2/2 Hep B reactivation    #Acute liver injury   Patient with predominantly hepatocellular liver injury, with AST >1800, Alt 600. Also w/ evidence of hepatic synthetic function, with elevated INR, decreased albumin. Etiology likely from HBV, which was previously being treated w/ Symtuza (tenofovir 10mg qd) but patient had been off medication for almost 1 year (until 11/2020) due to insurance lapse. Her outpatient viral load >400,000,000 corroborates this. Hepatitis delta coinfection has been ruled out as outpatient. Patient will require immediate treatment of her HBV, now on biktarvy (tenofovir 25mg). Currently patient is not in acute liver failure, she is mentating well, but is having worsening INR which is concerning. LFTs improving   - c/w truvada. Entecavir dc  - c/w NAC infusion for acute liver failure (100 mg/kg over 16 hours, will likely need to be reordered daily)  - c/w albumin 100cc 25% q6h  - IVF PRN. Hold off for now. Patient third spacing with bilateral edema  - s/p vitamin K 10mg (12/26 - 12/28). Vit K 1/1 and 1/2 for elevated coags  - dilaudid .5 mg IV prn for pain, dose carefully given HOTN. However, pt has not responded to motrin (hold i/s/o GIB), tylenol (wary i/s/o hepatic dysfunction), ketorolac (hold i/s/o GIB)  - ascitic fluid: SAAG 2, ascitic fluid protein .8 likely portal HTN however granulocyte count from ascitic fluid >250, c/f SBP, especially given immunocompromised state  - c/w zosyn (12/30- )    #Sepsis:  Pt noted to be tachy, HOTN, febrile 12/30.   - blood cx 12/31- ngtd  - s/p vanc (12/30-1/1). ID rec to d/c 1/1  - c/w zosyn (12/30- )   - c/w midodrine 10 mg TID   - c/f DIC given low plt, elevated coags, elevated D-dimer, low fibrinogen. will f/u blood smear to eval for schistocytes    #Anemia:   - Hgb acutely dropped 1/1. FOBT+. c/f GIB- will transfuse cryo 1u, pRBC 1u.   - per heme, coagulopathy 2/2 liver disease  - type and cross x2  - transfuse Hgb <7    #ANTOINETTE:  2/2 hypoperfusion i/s/o sepsis vs medication induced (Abx) vs HRS  - c/w albumin  - avoid nephrotoxic meds if possible  - continue to trend SCr  - if SCr continues to rise or oliguria, consult neph     #AIDS  needs ARV medications to help with her immunosuppression but need to change ARV meds to ones metabolized by the kidney  continue the TAF for help controlling the Hep B infection  - In light of significant transaminitis, discontinued the Symtuza and changed meds to Truvada/Tivicay  - CD4 116 consistent with AIDS  - RNA viral load in process - slightly detectable but cannot account for her acute decompensation in liver disease  - If viral load elevated will add Bactrim for PJP prophylaxis  - c/w atovaquone for PCP ppx per ID- no Bactrim given hepatic dysfunction    #COVID+  - not eligible for Remdesivir secondary to her LFT abnormalities  - could consider convalescent plasma if she decompensates  - D- dimer <150, fibrinogen 224 (uptrending), ferritin 605 (downtrending)  - Quant TB neg  - blood cx 12/22- no growth  - blood cx 12/31- ngtd  - f/u urine histo ag. in case recurrence    #CMV viremia   - hold off on Valganciclovir   - CMV viral load <96    #Prophylactic measures  - DVT ppx: hold i/s/o hgb drop  - GERD: pantoprazole 40mg BID for GIB  - Diet: NPO for GIB 50 year old female with past medical history of HIV, CMV, HBV, histoplasmosis, renal vein thrombosis s/p coumadin for 6 months, and strongyloidiasis who presents with abdominal pain found to be COVID-19 PCR positive with acute liver injury 2/2 Hep B reactivation with +SBP, complicated with coagulapathy     #Acute liver injury 2/2 HepB reactivation  Predominantly hepatocellular liver injury, with AST >1800, Alt 600. Also w/ evidence of hepatic synthetic function, with elevated INR, decreased albumin. Etiology likely from HBV, which was previously being treated w/ Symtuza (tenofovir 10mg qd) but patient had been off medication for almost 1 year (until 11/2020) due to insurance lapse. Her outpatient viral load >400,000,000 with hepatitis delta coinfection ruled out. Currently patient is not in acute liver failure, she is mentating well, but is having worsening INR which is concerning.  - c/w truvada. Entecavir stopped  - c/w NAC infusion for acute liver failure (100 mg/kg over 16 hours, will likely need to be reordered daily)  - c/w albumin 100cc 25% q6h  - IVF PRN. Hold off given patient third spacing with bilateral edema  - s/p vitamin K 10mg (12/26 - 12/28, 1/1-1/2)  - dilaudid .5 mg IV prn for pain, dose carefully given HOTN. However, pt has not responded to motrin (hold i/s/o GIB), tylenol (wary i/s/o hepatic dysfunction), ketorolac (hold i/s/o GIB).  - start Tramadol 25mg q6hr for mod pain   - ascitic fluid: SAAG 2, ascitic fluid protein .8 likely portal HTN with granulocyte count from ascitic fluid >250   consistent with SBP    #Coagulapathy  -worsening coags with initial concern for DIC (thrombocytopenia, elevated coags, d-dimer, low fibrogen). Ep of melena 1/1 s/p cryo, vitamin K, plasma  -heme recs: likely from liver failure. Unlikely hemolysis despite low haptoglobin (liver failure) given rare sischocytes. Initial mixing study negative. Follow up repeat mixing study   -hepatology recs: worsening PTT unlikely from liver failure  -Goal fibrinogen >150, give cyro PRN    #Sepsis 2/2 SBP   Pt noted to be tachy, HOTN, febrile 12/30.   - blood cx 12/31- ngtd  - s/p vanc (12/30-1/1)  - c/w zosyn (12/30- )   - c/w midodrine 20 mg TID   - f/u ascites culture    #Anemia:   - Hgb acutely dropped 1/1. FOBT+. c/f GIB s/p cryo 1u, pRBC 1u.   - per heme, coagulopathy 2/2 liver disease  - type and cross x2  - transfuse Hgb <7    #ANTOINETTE:  2/2 hypoperfusion i/s/o sepsis vs medication induced (Abx) vs HRS  - c/w albumin  - avoid nephrotoxic meds if possible  - continue to trend SCr  - if SCr continues to rise or oliguria, consult neph     #AIDS  needs ARV medications to help with her immunosuppression but need to change ARV meds to ones metabolized by the kidney  continue the TAF for help controlling the Hep B infection  - In light of significant transaminitis, discontinued the Symtuza and changed meds to Truvada/Tivicay  - CD4 116 consistent with AIDS  - RNA viral load in process - slightly detectable but cannot account for her acute decompensation in liver disease  - c/w atovaquone for PCP ppx per ID- no Bactrim given hepatic dysfunction    #COVID+  - not eligible for Remdesivir secondary to her LFT abnormalities  - could consider convalescent plasma if she decompensates  - D- dimer <150, fibrinogen 224 (uptrending), ferritin 605 (downtrending)  - Quant TB neg  - blood cx 12/22- no growth, 12/31 ngtd  - f/u urine histo ag. in case recurrence    #CMV viremia   - hold off on Valganciclovir   - CMV viral load <96    #Prophylactic measures  - DVT ppx: hold i/s/o hgb drop  - GERD: pantoprazole 40mg BID for GIB  - Diet: NPO for GIB

## 2021-01-02 NOTE — PROGRESS NOTE ADULT - ASSESSMENT
51 yo F w/ PMHx HIV/AIDS, HBV, previous histo, CMV, renal vein thrombus s/p coumadin (2010) admitted with abd pain x 4 days, found to have acute liver injury and covid +    # Acute liver injury - patient with predominantly hepatocellular liver injury, initially with AST >1800, Alt 600. Also w/ evidence of hepatic synthetic function, with elevated INR, decreased albumin. 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 Her outpatient viral load >400,000,000 corroborates this. Hepatitis delta coinfection has beenruled out as outpatient.   Patient was on biktarvy but switched to Truvada for TDF (as opposed to TAF), and adding double coverage with entecavir. Also on NAC. Currently with minimal concern for acute liver failure as she is mentating well.  #SBP with + para  # Abdominal pain: Likely due to hepatitis +SBP  # HIV/AIDS - CD4 114, previous histo, CMV, has been off medications for 1 year until 11/2020  # COVID + - currently covid positive, O2 sat normal > 95% on room air  #Coagulatopathy  #BRBPR    Recommendations:   - c/w broad spectrum ab   - Continue to trend CMP, INR, Mg, Phos, fibrinogen, vbg (lactate) daily   - c/w Truvada for HBV  - F/U HBV resistance profile  - c/w NAC infusion for acute liver failure (100 mg/kg over 16 hours, will likely need to be reordered daily)  - Continue albumin 100cc 25% q 8 hours  - PPI IV BID  - unlikely that liver failure is causing drastic increase of PTT; apprec heme recs for coagulatopathy w/u  - given severity of illness with multiple comorbidities and severity of coagulopathy; risk of endoscopic evaluation outweigh benefit; also given current coagulation values the majority of endoscopic hemostatic treatments cannot be done  - consider CTA if unable to medically treat bleeding with factors and blood  - Not transplant candidate at this time because of COVID and AIDS      Lenore Gates PGY-4  Gastroenterology Fellow  Pager #[phone] or [phone]  Please page on-call Fellow on weekends/after 5 pm on weekdays   Please call answering service for on-call GI fellow ([phone]) after 5pm and before 8am, and on weekends.

## 2021-01-02 NOTE — CHART NOTE - NSCHARTNOTEFT_GEN_A_CORE
Event Note    Notified by RN that patient had an episode of vomiting at around 7pm. VS significant for HR to 120 ( patient persistently tachycardic on chart review). At bedside, patient states nausea improved. States that the vomit had small specks of blood in it but was otherwise clear. Denies any active signs of gross bleeding. 8PM CBC Hgb 8.1 (from 8.5) previously.  Fibrinogen 147; will administer 1U cryprecipitate per primary team sign-out. Denies lightheadedness, dizziness, CP, SOB, palpitations. On physical exam, no active signs of bleeding noted at IV sites or otherwise. Conitnue to monitor.    - Deya Baer, PGY-1. Event Note    Notified by RN that patient had an episode of vomiting at around 7pm. VS significant for HR to 120 ( patient persistently tachycardic on chart review). At bedside, patient states nausea improved. States that the vomit had small specks of blood in it but was otherwise clear. Denies any active signs of gross bleeding. 8PM CBC Hgb 8.1 (from 8.5) previously.  Fibrinogen 147; will administer 1U cryprecipitate per primary team sign-out. Denies lightheadedness, dizziness, CP, SOB, palpitations. On physical exam, no active signs of bleeding noted at IV sites or otherwise. Continue to monitor.    Later, notified that patient had another episode of vomit. At bedside, tissues with some blood on them and clear vomit. Pending CBC.    - Deya Baer, PGY-1. Event Note    Notified by RN that patient had an episode of vomiting at around 7pm. VS significant for HR to 120 ( patient persistently tachycardic on chart review). At bedside, patient states nausea improved. States that the vomit had small specks of blood in it but was otherwise clear. Denies any active signs of gross bleeding. 8PM CBC Hgb 8.1 (from 8.5) previously.  Fibrinogen 147; will administer 1U cryprecipitate per primary team sign-out. Denies lightheadedness, dizziness, CP, SOB, palpitations. On physical exam, no active signs of bleeding noted at IV sites or otherwise. Continue to monitor.    Later, notified that patient had another episode of vomit. At bedside, tissues with some blood on them and clear vomit. Pending CBC.    Case discussed with LELO Baer, PGY-1. Event Note    Notified by RN that patient had an episode of vomiting at around 7pm. VS significant for HR to 120 ( patient persistently tachycardic on chart review). At bedside, patient states nausea improved. States that the vomit had small specks of blood in it but was otherwise clear. Denies any active signs of gross bleeding. 8PM CBC Hgb 8.1 (from 8.5) previously.  Fibrinogen 147; will administer 1U cryprecipitate per primary team sign-out. Denies lightheadedness, dizziness, CP, SOB, palpitations. On physical exam, no active signs of bleeding noted at IV sites or otherwise. Continue to monitor.    Later, notified that patient had another episode of vomit. At bedside, tissues with some blood on them and clear vomit. STAT CBC Hgb 7.8 (previously 8.1).     Case discussed with LELO Baer, PGY-1.

## 2021-01-02 NOTE — PROGRESS NOTE ADULT - SUBJECTIVE AND OBJECTIVE BOX
Chief Complaint:  Patient is a 50y old  Female who presents with a chief complaint of abdominal pain (2021 12:05)      Interval Events: no further bleeding  - patient states she feels ok today; denies abd pain, n/v  - denies sob, cp      Hospital Medications:  aluminum hydroxide/magnesium hydroxide/simethicone Suspension 30 milliLiter(s) Oral every 6 hours PRN  atovaquone Suspension 1500 milliGRAM(s) Oral daily  dolutegravir 50 milliGRAM(s) Oral daily  emtricitabine 200 mG/tenofovir 300 mG (TRUVADA) 1 Tablet(s) Oral daily  ibuprofen  Tablet. 600 milliGRAM(s) Oral every 6 hours PRN  midodrine. 20 milliGRAM(s) Oral three times a day  ondansetron Injectable 4 milliGRAM(s) IV Push every 8 hours PRN  pantoprazole  Injectable 40 milliGRAM(s) IV Push every 12 hours  phytonadione  IVPB 10 milliGRAM(s) IV Intermittent once  piperacillin/tazobactam IVPB.. 3.375 Gram(s) IV Intermittent every 8 hours  potassium phosphate / sodium phosphate Tablet (K-PHOS No. 2) 1 Tablet(s) Oral every 4 hours  potassium phosphate IVPB 30 milliMole(s) IV Intermittent once  simethicone 80 milliGRAM(s) Chew four times a day  traMADol 25 milliGRAM(s) Oral every 6 hours PRN      PMHX/PSHX:  Renal Vein Thrombosis    CMV Infection    Hepatitis B    Strongyloidosis    Histoplasmosis    HIV (Human Immunodeficiency Virus Infection)    Gallstones    S/P  Section    S/P Bilateral Tubal Ligation    S/P PEG (Percutaneous Endoscopic Gastrostomy) Placement and Removal    History of Cholecystectomy            ROS:     General:  No weight loss, fevers, chills, night sweats, fatigue   Eyes:  No vision changes  ENT:  No sore throat, pain, runny nose  CV:  No chest pain, palpitations, dizziness   Resp:  No SOB, cough, wheezing  GI:  See HPI  :  No burning with urination, hematuria  Muscle:  No pain, weakness  Neuro:  No weakness/tingling, memory problems  Psych:  No fatigue, insomnia, mood problems, depression  Heme:  No easy bruisability  Skin:  No rash, edema      PHYSICAL EXAM:   GENERAL:  no distress  HEENT:  NC/AT,  conjunctivae clear, scleral icterus  CHEST:  Full & symmetric excursion, no increased effort w/ respirations  HEART:  Regular rhythm & rate  ABDOMEN:  Soft, non-tender, non-distended  EXTREMITIES:  no LE  edema  SKIN:  No rash/erythema/ecchymoses/petechiae/wounds/jaundice  NEURO:  Alert, oriented    Vital Signs:  Vital Signs Last 24 Hrs  T(C): 36.9 (2021 14:06), Max: 37 (2021 05:05)  T(F): 98.5 (2021 14:06), Max: 98.6 (2021 05:05)  HR: 128 (2021 14:06) (114 - 128)  BP: 103/65 (2021 14:06) (94/67 - 117/76)  BP(mean): --  RR: 16 (2021 14:06) (16 - 19)  SpO2: 100% (2021 14:06) (100% - 100%)  Daily     Daily     LABS:                        8.5    10.38 )-----------( 89       ( 2021 11:36 )             24.0     01-02    136  |  103  |  11  ----------------------------<  75  3.4<L>   |  22  |  0.76    Ca    8.4      2021 11:36  Phos  1.3     01-02  Mg     2.0     01-02    TPro  4.2<L>  /  Alb  3.1<L>  /  TBili  13.3<H>  /  DBili  x   /  AST  468<H>  /  ALT  134<H>  /  AlkPhos  57  01-02    LIVER FUNCTIONS - ( 2021 11:36 )  Alb: 3.1 g/dL / Pro: 4.2 g/dL / ALK PHOS: 57 U/L / ALT: 134 U/L / AST: 468 U/L / GGT: x           PT/INR - ( 2021 11:37 )   PT: 26.4 sec;   INR: 2.29 ratio         PTT - ( 2021 11:37 )  PTT:82.5 sec  Urinalysis Basic - ( 2021 06:43 )    Color: Dark Yellow / Appearance: Slightly Turbid / S.021 / pH: x  Gluc: x / Ketone: Small  / Bili: Moderate / Urobili: Negative   Blood: x / Protein: Trace / Nitrite: Negative   Leuk Esterase: Negative / RBC: 10 /hpf / WBC 9 /HPF   Sq Epi: x / Non Sq Epi: 3 /hpf / Bacteria: Negative          Imaging:

## 2021-01-03 LAB
-  AMIKACIN: SIGNIFICANT CHANGE UP
-  AMOXICILLIN/CLAVULANIC ACID: SIGNIFICANT CHANGE UP
-  AMPICILLIN/SULBACTAM: SIGNIFICANT CHANGE UP
-  AMPICILLIN: SIGNIFICANT CHANGE UP
-  AZTREONAM: SIGNIFICANT CHANGE UP
-  CEFAZOLIN: SIGNIFICANT CHANGE UP
-  CEFEPIME: SIGNIFICANT CHANGE UP
-  CEFOXITIN: SIGNIFICANT CHANGE UP
-  CEFTRIAXONE: SIGNIFICANT CHANGE UP
-  CIPROFLOXACIN: SIGNIFICANT CHANGE UP
-  ERTAPENEM: SIGNIFICANT CHANGE UP
-  GENTAMICIN: SIGNIFICANT CHANGE UP
-  IMIPENEM: SIGNIFICANT CHANGE UP
-  LEVOFLOXACIN: SIGNIFICANT CHANGE UP
-  MEROPENEM: SIGNIFICANT CHANGE UP
-  PIPERACILLIN/TAZOBACTAM: SIGNIFICANT CHANGE UP
-  TOBRAMYCIN: SIGNIFICANT CHANGE UP
-  TRIMETHOPRIM/SULFAMETHOXAZOLE: SIGNIFICANT CHANGE UP
ALBUMIN SERPL ELPH-MCNC: 2.5 G/DL — LOW (ref 3.3–5)
ALBUMIN SERPL ELPH-MCNC: 2.8 G/DL — LOW (ref 3.3–5)
ALBUMIN SERPL ELPH-MCNC: 3 G/DL — LOW (ref 3.3–5)
ALP SERPL-CCNC: 57 U/L — SIGNIFICANT CHANGE UP (ref 40–120)
ALP SERPL-CCNC: 57 U/L — SIGNIFICANT CHANGE UP (ref 40–120)
ALP SERPL-CCNC: 58 U/L — SIGNIFICANT CHANGE UP (ref 40–120)
ALT FLD-CCNC: 108 U/L — HIGH (ref 10–45)
ALT FLD-CCNC: 115 U/L — HIGH (ref 10–45)
ALT FLD-CCNC: 128 U/L — HIGH (ref 10–45)
ANION GAP SERPL CALC-SCNC: 11 MMOL/L — SIGNIFICANT CHANGE UP (ref 5–17)
ANION GAP SERPL CALC-SCNC: 9 MMOL/L — SIGNIFICANT CHANGE UP (ref 5–17)
ANION GAP SERPL CALC-SCNC: 9 MMOL/L — SIGNIFICANT CHANGE UP (ref 5–17)
APTT BLD: 67.2 SEC — HIGH (ref 27.5–35.5)
APTT BLD: 81.4 SEC — HIGH (ref 27.5–35.5)
AST SERPL-CCNC: 341 U/L — HIGH (ref 10–40)
AST SERPL-CCNC: 389 U/L — HIGH (ref 10–40)
AST SERPL-CCNC: 414 U/L — HIGH (ref 10–40)
BASE EXCESS BLDV CALC-SCNC: 2.4 MMOL/L — HIGH (ref -2–2)
BILIRUB SERPL-MCNC: 13.4 MG/DL — HIGH (ref 0.2–1.2)
BILIRUB SERPL-MCNC: 14.1 MG/DL — HIGH (ref 0.2–1.2)
BILIRUB SERPL-MCNC: 14.7 MG/DL — HIGH (ref 0.2–1.2)
BUN SERPL-MCNC: 10 MG/DL — SIGNIFICANT CHANGE UP (ref 7–23)
CA-I SERPL-SCNC: 1.11 MMOL/L — LOW (ref 1.12–1.3)
CALCIUM SERPL-MCNC: 7.9 MG/DL — LOW (ref 8.4–10.5)
CALCIUM SERPL-MCNC: 8.1 MG/DL — LOW (ref 8.4–10.5)
CALCIUM SERPL-MCNC: 8.2 MG/DL — LOW (ref 8.4–10.5)
CHLORIDE BLDV-SCNC: 107 MMOL/L — SIGNIFICANT CHANGE UP (ref 96–108)
CHLORIDE SERPL-SCNC: 103 MMOL/L — SIGNIFICANT CHANGE UP (ref 96–108)
CHLORIDE SERPL-SCNC: 104 MMOL/L — SIGNIFICANT CHANGE UP (ref 96–108)
CHLORIDE SERPL-SCNC: 105 MMOL/L — SIGNIFICANT CHANGE UP (ref 96–108)
CO2 BLDV-SCNC: 27 MMOL/L — SIGNIFICANT CHANGE UP (ref 22–30)
CO2 SERPL-SCNC: 23 MMOL/L — SIGNIFICANT CHANGE UP (ref 22–31)
CREAT SERPL-MCNC: 0.69 MG/DL — SIGNIFICANT CHANGE UP (ref 0.5–1.3)
CREAT SERPL-MCNC: 0.7 MG/DL — SIGNIFICANT CHANGE UP (ref 0.5–1.3)
CREAT SERPL-MCNC: 0.73 MG/DL — SIGNIFICANT CHANGE UP (ref 0.5–1.3)
D DIMER BLD IA.RAPID-MCNC: 1067 NG/ML DDU — HIGH
D DIMER BLD IA.RAPID-MCNC: 1101 NG/ML DDU — HIGH
D DIMER BLD IA.RAPID-MCNC: 921 NG/ML DDU — HIGH
FERRITIN SERPL-MCNC: 650 NG/ML — HIGH (ref 15–150)
FERRITIN SERPL-MCNC: 671 NG/ML — HIGH (ref 15–150)
FERRITIN SERPL-MCNC: 766 NG/ML — HIGH (ref 15–150)
FIBRINOGEN PPP-MCNC: 149 MG/DL — LOW (ref 290–520)
FIBRINOGEN PPP-MCNC: 156 MG/DL — LOW (ref 290–520)
FIBRINOGEN PPP-MCNC: 196 MG/DL — LOW (ref 290–520)
GAS PNL BLDV: 134 MMOL/L — LOW (ref 135–145)
GAS PNL BLDV: SIGNIFICANT CHANGE UP
GAS PNL BLDV: SIGNIFICANT CHANGE UP
GLUCOSE BLDV-MCNC: 91 MG/DL — SIGNIFICANT CHANGE UP (ref 70–99)
GLUCOSE SERPL-MCNC: 101 MG/DL — HIGH (ref 70–99)
GLUCOSE SERPL-MCNC: 90 MG/DL — SIGNIFICANT CHANGE UP (ref 70–99)
GLUCOSE SERPL-MCNC: 92 MG/DL — SIGNIFICANT CHANGE UP (ref 70–99)
HAPTOGLOB SERPL-MCNC: 23 MG/DL — LOW (ref 34–200)
HAPTOGLOB SERPL-MCNC: 23 MG/DL — LOW (ref 34–200)
HAPTOGLOB SERPL-MCNC: 25 MG/DL — LOW (ref 34–200)
HCO3 BLDV-SCNC: 26 MMOL/L — SIGNIFICANT CHANGE UP (ref 21–29)
HCT VFR BLD CALC: 21.3 % — LOW (ref 34.5–45)
HCT VFR BLD CALC: 21.6 % — LOW (ref 34.5–45)
HCT VFR BLD CALC: 22 % — LOW (ref 34.5–45)
HCT VFR BLDA CALC: 24 % — LOW (ref 39–50)
HGB BLD CALC-MCNC: 7.6 G/DL — LOW (ref 11.5–15.5)
HGB BLD-MCNC: 7.3 G/DL — LOW (ref 11.5–15.5)
HGB BLD-MCNC: 7.4 G/DL — LOW (ref 11.5–15.5)
HGB BLD-MCNC: 7.8 G/DL — LOW (ref 11.5–15.5)
INR BLD: 2.21 RATIO — HIGH (ref 0.88–1.16)
INR BLD: 2.51 RATIO — HIGH (ref 0.88–1.16)
INR BLD: 2.7 RATIO — HIGH (ref 0.88–1.16)
LACTATE BLDV-MCNC: 2.2 MMOL/L — HIGH (ref 0.7–2)
LACTATE BLDV-MCNC: 2.2 MMOL/L — HIGH (ref 0.7–2)
MAGNESIUM SERPL-MCNC: 1.7 MG/DL — SIGNIFICANT CHANGE UP (ref 1.6–2.6)
MAGNESIUM SERPL-MCNC: 1.8 MG/DL — SIGNIFICANT CHANGE UP (ref 1.6–2.6)
MAGNESIUM SERPL-MCNC: 1.8 MG/DL — SIGNIFICANT CHANGE UP (ref 1.6–2.6)
MCHC RBC-ENTMCNC: 31.1 PG — SIGNIFICANT CHANGE UP (ref 27–34)
MCHC RBC-ENTMCNC: 31.5 PG — SIGNIFICANT CHANGE UP (ref 27–34)
MCHC RBC-ENTMCNC: 31.7 PG — SIGNIFICANT CHANGE UP (ref 27–34)
MCHC RBC-ENTMCNC: 34.3 GM/DL — SIGNIFICANT CHANGE UP (ref 32–36)
MCHC RBC-ENTMCNC: 34.3 GM/DL — SIGNIFICANT CHANGE UP (ref 32–36)
MCHC RBC-ENTMCNC: 35.5 GM/DL — SIGNIFICANT CHANGE UP (ref 32–36)
MCV RBC AUTO: 89.4 FL — SIGNIFICANT CHANGE UP (ref 80–100)
MCV RBC AUTO: 90.6 FL — SIGNIFICANT CHANGE UP (ref 80–100)
MCV RBC AUTO: 91.9 FL — SIGNIFICANT CHANGE UP (ref 80–100)
METHOD TYPE: SIGNIFICANT CHANGE UP
NRBC # BLD: 0 /100 WBCS — SIGNIFICANT CHANGE UP (ref 0–0)
OTHER CELLS CSF MANUAL: 8 ML/DL — LOW (ref 18–22)
PCO2 BLDV: 38 MMHG — SIGNIFICANT CHANGE UP (ref 35–50)
PH BLDV: 7.45 — SIGNIFICANT CHANGE UP (ref 7.35–7.45)
PHOSPHATE SERPL-MCNC: 1.3 MG/DL — LOW (ref 2.5–4.5)
PHOSPHATE SERPL-MCNC: 1.4 MG/DL — LOW (ref 2.5–4.5)
PHOSPHATE SERPL-MCNC: 2.3 MG/DL — LOW (ref 2.5–4.5)
PLATELET # BLD AUTO: 76 K/UL — LOW (ref 150–400)
PLATELET # BLD AUTO: 81 K/UL — LOW (ref 150–400)
PLATELET # BLD AUTO: 86 K/UL — LOW (ref 150–400)
PO2 BLDV: 40 MMHG — SIGNIFICANT CHANGE UP (ref 25–45)
POTASSIUM BLDV-SCNC: 3.3 MMOL/L — LOW (ref 3.5–5.3)
POTASSIUM SERPL-MCNC: 3 MMOL/L — LOW (ref 3.5–5.3)
POTASSIUM SERPL-MCNC: 3.3 MMOL/L — LOW (ref 3.5–5.3)
POTASSIUM SERPL-MCNC: 3.5 MMOL/L — SIGNIFICANT CHANGE UP (ref 3.5–5.3)
POTASSIUM SERPL-SCNC: 3 MMOL/L — LOW (ref 3.5–5.3)
POTASSIUM SERPL-SCNC: 3.3 MMOL/L — LOW (ref 3.5–5.3)
POTASSIUM SERPL-SCNC: 3.5 MMOL/L — SIGNIFICANT CHANGE UP (ref 3.5–5.3)
PROT SERPL-MCNC: 4 G/DL — LOW (ref 6–8.3)
PROT SERPL-MCNC: 4 G/DL — LOW (ref 6–8.3)
PROT SERPL-MCNC: 4.1 G/DL — LOW (ref 6–8.3)
PROTHROM AB SERPL-ACNC: 25.5 SEC — HIGH (ref 10.6–13.6)
PROTHROM AB SERPL-ACNC: 28.8 SEC — HIGH (ref 10.6–13.6)
PROTHROM AB SERPL-ACNC: 30.9 SEC — HIGH (ref 10.6–13.6)
RBC # BLD: 2.35 M/UL — LOW (ref 3.8–5.2)
RBC # BLD: 2.35 M/UL — LOW (ref 3.8–5.2)
RBC # BLD: 2.46 M/UL — LOW (ref 3.8–5.2)
RBC # FLD: 21.2 % — HIGH (ref 10.3–14.5)
RBC # FLD: 21.5 % — HIGH (ref 10.3–14.5)
RBC # FLD: 21.5 % — HIGH (ref 10.3–14.5)
SAO2 % BLDV: 75 % — SIGNIFICANT CHANGE UP (ref 67–88)
SODIUM SERPL-SCNC: 135 MMOL/L — SIGNIFICANT CHANGE UP (ref 135–145)
SODIUM SERPL-SCNC: 137 MMOL/L — SIGNIFICANT CHANGE UP (ref 135–145)
SODIUM SERPL-SCNC: 138 MMOL/L — SIGNIFICANT CHANGE UP (ref 135–145)
WBC # BLD: 5.42 K/UL — SIGNIFICANT CHANGE UP (ref 3.8–10.5)
WBC # BLD: 6.66 K/UL — SIGNIFICANT CHANGE UP (ref 3.8–10.5)
WBC # BLD: 6.78 K/UL — SIGNIFICANT CHANGE UP (ref 3.8–10.5)
WBC # FLD AUTO: 5.42 K/UL — SIGNIFICANT CHANGE UP (ref 3.8–10.5)
WBC # FLD AUTO: 6.66 K/UL — SIGNIFICANT CHANGE UP (ref 3.8–10.5)
WBC # FLD AUTO: 6.78 K/UL — SIGNIFICANT CHANGE UP (ref 3.8–10.5)

## 2021-01-03 PROCEDURE — 99232 SBSQ HOSP IP/OBS MODERATE 35: CPT

## 2021-01-03 PROCEDURE — 99232 SBSQ HOSP IP/OBS MODERATE 35: CPT | Mod: GC

## 2021-01-03 RX ORDER — CEFTRIAXONE 500 MG/1
2000 INJECTION, POWDER, FOR SOLUTION INTRAMUSCULAR; INTRAVENOUS EVERY 24 HOURS
Refills: 0 | Status: DISCONTINUED | OUTPATIENT
Start: 2021-01-03 | End: 2021-01-06

## 2021-01-03 RX ORDER — POTASSIUM CHLORIDE 20 MEQ
40 PACKET (EA) ORAL ONCE
Refills: 0 | Status: COMPLETED | OUTPATIENT
Start: 2021-01-03 | End: 2021-01-03

## 2021-01-03 RX ORDER — ALBUMIN HUMAN 25 %
100 VIAL (ML) INTRAVENOUS EVERY 6 HOURS
Refills: 0 | Status: COMPLETED | OUTPATIENT
Start: 2021-01-03 | End: 2021-01-05

## 2021-01-03 RX ORDER — HYDROMORPHONE HYDROCHLORIDE 2 MG/ML
0.5 INJECTION INTRAMUSCULAR; INTRAVENOUS; SUBCUTANEOUS ONCE
Refills: 0 | Status: DISCONTINUED | OUTPATIENT
Start: 2021-01-03 | End: 2021-01-03

## 2021-01-03 RX ORDER — POTASSIUM PHOSPHATE, MONOBASIC POTASSIUM PHOSPHATE, DIBASIC 236; 224 MG/ML; MG/ML
30 INJECTION, SOLUTION INTRAVENOUS ONCE
Refills: 0 | Status: COMPLETED | OUTPATIENT
Start: 2021-01-03 | End: 2021-01-03

## 2021-01-03 RX ORDER — ACETYLCYSTEINE 200 MG/ML
7 VIAL (ML) MISCELLANEOUS ONCE
Refills: 0 | Status: COMPLETED | OUTPATIENT
Start: 2021-01-03 | End: 2021-01-03

## 2021-01-03 RX ADMIN — ATOVAQUONE 1500 MILLIGRAM(S): 750 SUSPENSION ORAL at 11:57

## 2021-01-03 RX ADMIN — Medication 50 MILLILITER(S): at 08:49

## 2021-01-03 RX ADMIN — PANTOPRAZOLE SODIUM 40 MILLIGRAM(S): 20 TABLET, DELAYED RELEASE ORAL at 17:09

## 2021-01-03 RX ADMIN — SIMETHICONE 80 MILLIGRAM(S): 80 TABLET, CHEWABLE ORAL at 05:47

## 2021-01-03 RX ADMIN — MIDODRINE HYDROCHLORIDE 20 MILLIGRAM(S): 2.5 TABLET ORAL at 05:47

## 2021-01-03 RX ADMIN — Medication 100 MILLIEQUIVALENT(S): at 08:04

## 2021-01-03 RX ADMIN — Medication 50 MILLILITER(S): at 23:22

## 2021-01-03 RX ADMIN — PIPERACILLIN AND TAZOBACTAM 25 GRAM(S): 4; .5 INJECTION, POWDER, LYOPHILIZED, FOR SOLUTION INTRAVENOUS at 08:04

## 2021-01-03 RX ADMIN — SIMETHICONE 80 MILLIGRAM(S): 80 TABLET, CHEWABLE ORAL at 17:09

## 2021-01-03 RX ADMIN — HYDROMORPHONE HYDROCHLORIDE 0.5 MILLIGRAM(S): 2 INJECTION INTRAMUSCULAR; INTRAVENOUS; SUBCUTANEOUS at 21:23

## 2021-01-03 RX ADMIN — CEFTRIAXONE 100 MILLIGRAM(S): 500 INJECTION, POWDER, FOR SOLUTION INTRAMUSCULAR; INTRAVENOUS at 17:09

## 2021-01-03 RX ADMIN — TRAMADOL HYDROCHLORIDE 25 MILLIGRAM(S): 50 TABLET ORAL at 03:36

## 2021-01-03 RX ADMIN — MIDODRINE HYDROCHLORIDE 20 MILLIGRAM(S): 2.5 TABLET ORAL at 17:09

## 2021-01-03 RX ADMIN — HYDROMORPHONE HYDROCHLORIDE 0.5 MILLIGRAM(S): 2 INJECTION INTRAMUSCULAR; INTRAVENOUS; SUBCUTANEOUS at 06:41

## 2021-01-03 RX ADMIN — Medication 100 MILLIEQUIVALENT(S): at 02:29

## 2021-01-03 RX ADMIN — Medication 50 MILLILITER(S): at 16:12

## 2021-01-03 RX ADMIN — MIDODRINE HYDROCHLORIDE 20 MILLIGRAM(S): 2.5 TABLET ORAL at 11:58

## 2021-01-03 RX ADMIN — POTASSIUM PHOSPHATE, MONOBASIC POTASSIUM PHOSPHATE, DIBASIC 83.33 MILLIMOLE(S): 236; 224 INJECTION, SOLUTION INTRAVENOUS at 08:18

## 2021-01-03 RX ADMIN — Medication 50 MILLILITER(S): at 03:38

## 2021-01-03 RX ADMIN — SIMETHICONE 80 MILLIGRAM(S): 80 TABLET, CHEWABLE ORAL at 23:21

## 2021-01-03 RX ADMIN — SIMETHICONE 80 MILLIGRAM(S): 80 TABLET, CHEWABLE ORAL at 11:58

## 2021-01-03 RX ADMIN — DOLUTEGRAVIR SODIUM 50 MILLIGRAM(S): 25 TABLET, FILM COATED ORAL at 11:58

## 2021-01-03 RX ADMIN — HYDROMORPHONE HYDROCHLORIDE 0.5 MILLIGRAM(S): 2 INJECTION INTRAMUSCULAR; INTRAVENOUS; SUBCUTANEOUS at 07:05

## 2021-01-03 RX ADMIN — ONDANSETRON 4 MILLIGRAM(S): 8 TABLET, FILM COATED ORAL at 12:19

## 2021-01-03 RX ADMIN — TRAMADOL HYDROCHLORIDE 25 MILLIGRAM(S): 50 TABLET ORAL at 04:30

## 2021-01-03 RX ADMIN — HYDROMORPHONE HYDROCHLORIDE 0.5 MILLIGRAM(S): 2 INJECTION INTRAMUSCULAR; INTRAVENOUS; SUBCUTANEOUS at 21:43

## 2021-01-03 RX ADMIN — Medication 40 MILLIEQUIVALENT(S): at 08:06

## 2021-01-03 RX ADMIN — Medication 100 MILLIEQUIVALENT(S): at 05:47

## 2021-01-03 RX ADMIN — EMTRICITABINE AND TENOFOVIR DISOPROXIL FUMARATE 1 TABLET(S): 200; 300 TABLET, FILM COATED ORAL at 11:57

## 2021-01-03 RX ADMIN — Medication 64.69 GRAM(S): at 11:30

## 2021-01-03 RX ADMIN — PANTOPRAZOLE SODIUM 40 MILLIGRAM(S): 20 TABLET, DELAYED RELEASE ORAL at 05:47

## 2021-01-03 NOTE — PROGRESS NOTE ADULT - ASSESSMENT
51 yo F w/ PMHx HIV/AIDS, HBV, previous histo, CMV, renal vein thrombus s/p coumadin (2010) admitted with abd pain x 4 days, found to have acute liver injury and covid +    # Acute liver injury - patient with predominantly hepatocellular liver injury, initially with AST >1800, Alt 600. Also w/ evidence of hepatic synthetic function, with elevated INR, decreased albumin. 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 Her outpatient viral load >400,000,000 corroborates this. Hepatitis delta coinfection has beenruled out as outpatient.   Patient was on biktarvy but switched to Truvada for TDF (as opposed to TAF), and adding double coverage with entecavir. Also on NAC. Currently with minimal concern for acute liver failure as she is mentating well.  #SBP with + para  # Abdominal pain: Likely due to hepatitis +SBP  # HIV/AIDS - CD4 114, previous histo, CMV, has been off medications for 1 year until 11/2020  # COVID +   #Coagulatopathy  #BRBPR - resolved  #Vomiting - small flecks of blood noted, likely esophagitis vs gastritis vs pHTN gastropathy vs MW tear; hgb stable    Recommendations:   - c/w broad spectrum ab   - Continue to trend CMP, INR, Mg, Phos, fibrinogen, vbg (lactate) daily   - c/w Truvada for HBV  - F/U HBV resistance profile  - c/w NAC infusion for acute liver failure (100 mg/kg over 16 hours, will likely need to be reordered daily)  - Continue albumin 100cc 25% q 8 hours  - PPI IV BID  - given severity of illness with multiple comorbidities and severity of coagulopathy; risk of endoscopic evaluation outweigh benefit; also given current coagulation values the majority of endoscopic hemostatic treatments cannot be done  - Not transplant candidate at this time because of COVID and AIDS      Lenore Gates PGY-4  Gastroenterology Fellow  Pager #[phone] or [phone]  Please page on-call Fellow on weekends/after 5 pm on weekdays   Please call answering service for on-call GI fellow ([phone]) after 5pm and before 8am, and on weekends.

## 2021-01-03 NOTE — PROGRESS NOTE ADULT - ASSESSMENT
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   Overall improved liver enzymes and pain. HBV viral load down from 483 million 12/2 to 36 million 12/29.   Other causes for liver failure unlikely or ruled out- COVID PCR positive, CMV low level viremia, HDV, HCV, AIDS cholangiopathy,   Became febrile 12/30 night (not recorded) and found to have Klebsiella SBP.   Still nauseous but afebrile and heading in the right direction.     Suggest  -narrow Zosyn to Ceftriaxone 2GM IV q24h, can cause biliary sludge but it's being monitored anyways   -continue Tivicay and Truvada for HIV, the latter for HBV as well   -f/u HBV resistance testing   -Atovaquone for PJP prophylaxis until CD4 >200 for three months   -supportive care and isolation precautions for COVID, currently on room air, remdesivir can contribute to hepatotoxicity     Spoke with primary team     Ag Merrill MD   Infectious Disease   Pager [phone]   After 5PM and on weekends please page fellow on call or call [phone]

## 2021-01-03 NOTE — PROGRESS NOTE ADULT - SUBJECTIVE AND OBJECTIVE BOX
Authored by Gee Gutiérrez MD ([phone]) [facility]    Patient is a 50y old  Female who presents with a chief complaint of abdominal pain (02 Jan 2021 14:58)      SUBJECTIVE / OVERNIGHT EVENTS:    ADDITIONAL REVIEW OF SYSTEMS:    MEDICATIONS  (STANDING):  acetylcysteine IVPB 7 Gram(s) IV Intermittent once  albumin human 25% IVPB 100 milliLiter(s) IV Intermittent every 6 hours  atovaquone Suspension 1500 milliGRAM(s) Oral daily  dolutegravir 50 milliGRAM(s) Oral daily  emtricitabine 200 mG/tenofovir 300 mG (TRUVADA) 1 Tablet(s) Oral daily  midodrine. 20 milliGRAM(s) Oral three times a day  pantoprazole  Injectable 40 milliGRAM(s) IV Push every 12 hours  piperacillin/tazobactam IVPB.. 3.375 Gram(s) IV Intermittent every 8 hours  potassium chloride    Tablet ER 40 milliEquivalent(s) Oral once  potassium chloride  10 mEq/100 mL IVPB 10 milliEquivalent(s) IV Intermittent every 1 hour  potassium phosphate IVPB 30 milliMole(s) IV Intermittent once  simethicone 80 milliGRAM(s) Chew four times a day    MEDICATIONS  (PRN):  aluminum hydroxide/magnesium hydroxide/simethicone Suspension 30 milliLiter(s) Oral every 6 hours PRN Dyspepsia  ondansetron Injectable 4 milliGRAM(s) IV Push every 8 hours PRN Nausea and/or Vomiting  traMADol 25 milliGRAM(s) Oral every 6 hours PRN Moderate Pain (4 - 6)      CAPILLARY BLOOD GLUCOSE        I&O's Summary    02 Jan 2021 07:01  -  03 Jan 2021 07:00  --------------------------------------------------------  IN: 1232.5 mL / OUT: 50 mL / NET: 1182.5 mL        PHYSICAL EXAM:  Vital Signs Last 24 Hrs  T(C): 37.2 (03 Jan 2021 05:28), Max: 37.5 (03 Jan 2021 01:18)  T(F): 98.9 (03 Jan 2021 05:28), Max: 99.5 (03 Jan 2021 01:18)  HR: 135 (03 Jan 2021 05:28) (102 - 135)  BP: 113/76 (03 Jan 2021 05:28) (103/65 - 113/76)  BP(mean): --  RR: 18 (03 Jan 2021 05:28) (16 - 20)  SpO2: 98% (03 Jan 2021 05:28) (97% - 100%)    GENERAL: No acute distress, well-developed  HEAD:  Atraumatic, Normocephalic  EYES: EOMI, PERRLA, conjunctiva and sclera clear  NECK: Supple, no lymphadenopathy, no JVD  CHEST/LUNG: CTAB; No wheezes, rales, or rhonchi  HEART: Regular rate and rhythm; No murmurs, rubs, or gallops  ABDOMEN: Soft, non-tender, non-distended; normal bowel sounds, no organomegaly  EXTREMITIES:  2+ peripheral pulses b/l, No clubbing, cyanosis, or edema  NEUROLOGY: A&O x 3, no focal deficits  SKIN: No rashes or lesions    LABS:                        7.8    6.66  )-----------( 81       ( 03 Jan 2021 04:53 )             22.0     01-03    135  |  103  |  10  ----------------------------<  101<H>  3.0<L>   |  23  |  0.69    Ca    7.9<L>      03 Jan 2021 04:53  Phos  1.3     01-03  Mg     1.8     01-03    TPro  4.0<L>  /  Alb  2.5<L>  /  TBili  13.4<H>  /  DBili  x   /  AST  414<H>  /  ALT  128<H>  /  AlkPhos  58  01-03    PT/INR - ( 03 Jan 2021 04:53 )   PT: 25.5 sec;   INR: 2.21 ratio      PTT - ( 03 Jan 2021 04:53 )  PTT:67.2 sec    Culture - Fungal, Body Fluid (collected 31 Dec 2020 20:02)  Source: .Body Fluid Peritoneal Fluid  Preliminary Report (01 Jan 2021 07:44):    Testing in progress    Culture - Body Fluid with Gram Stain (collected 31 Dec 2020 20:02)  Source: .Body Fluid Peritoneal Fluid  Gram Stain (01 Jan 2021 12:45):    Growth in aerobic bottle: Gram Negative Rods    Growth in anaerobic bottle: Gram Negative Rods  Preliminary Report (02 Jan 2021 15:19):    Growth in aerobic and anaerobic bottles: Klebsiella oxytoca/Raoutella    ornithinolytica Susceptibility to follow. Authored by Gee Gutiérrez MD ([phone]) [facility]    Patient is a 50y old  Female who presents with a chief complaint of abdominal pain (02 Jan 2021 14:58)    SUBJECTIVE / OVERNIGHT EVENTS: o/n pt had emesis with small flecks of blood, HD stable, Hgb stable. This am pt is awake and alert in bed, NAD. Denies abd pain or HA, cp, SOB, dysuria, hematuria, diarrhea, constipation.     MEDICATIONS  (STANDING):  acetylcysteine IVPB 7 Gram(s) IV Intermittent once  albumin human 25% IVPB 100 milliLiter(s) IV Intermittent every 6 hours  atovaquone Suspension 1500 milliGRAM(s) Oral daily  dolutegravir 50 milliGRAM(s) Oral daily  emtricitabine 200 mG/tenofovir 300 mG (TRUVADA) 1 Tablet(s) Oral daily  midodrine. 20 milliGRAM(s) Oral three times a day  pantoprazole  Injectable 40 milliGRAM(s) IV Push every 12 hours  piperacillin/tazobactam IVPB.. 3.375 Gram(s) IV Intermittent every 8 hours  potassium chloride    Tablet ER 40 milliEquivalent(s) Oral once  potassium chloride  10 mEq/100 mL IVPB 10 milliEquivalent(s) IV Intermittent every 1 hour  potassium phosphate IVPB 30 milliMole(s) IV Intermittent once  simethicone 80 milliGRAM(s) Chew four times a day    MEDICATIONS  (PRN):  aluminum hydroxide/magnesium hydroxide/simethicone Suspension 30 milliLiter(s) Oral every 6 hours PRN Dyspepsia  ondansetron Injectable 4 milliGRAM(s) IV Push every 8 hours PRN Nausea and/or Vomiting  traMADol 25 milliGRAM(s) Oral every 6 hours PRN Moderate Pain (4 - 6)    I&O's Summary    02 Jan 2021 07:01  -  03 Jan 2021 07:00  --------------------------------------------------------  IN: 1232.5 mL / OUT: 50 mL / NET: 1182.5 mL    PHYSICAL EXAM:  Vital Signs Last 24 Hrs  T(C): 37.2 (03 Jan 2021 05:28), Max: 37.5 (03 Jan 2021 01:18)  T(F): 98.9 (03 Jan 2021 05:28), Max: 99.5 (03 Jan 2021 01:18)  HR: 135 (03 Jan 2021 05:28) (102 - 135)  BP: 113/76 (03 Jan 2021 05:28) (103/65 - 113/76)  BP(mean): --  RR: 18 (03 Jan 2021 05:28) (16 - 20)  SpO2: 98% (03 Jan 2021 05:28) (97% - 100%)    GENERAL: No acute distress, well-developed  HEAD:  Atraumatic, Normocephalic  EYES: +Scleral icterus. EOMI, PERRLA  NECK: Supple, no lymphadenopathy, no JVD  CHEST/LUNG: CTAB; No wheezes, rales, or rhonchi  HEART: Regular rate and rhythm; No murmurs, rubs, or gallops  ABDOMEN: _+Distended, upper abd TTP. Soft; normal bowel sounds, no organomegaly  EXTREMITIES: 2+ peripheral pulses b/l, No clubbing, cyanosis, or edema  NEUROLOGY: A&O x 3, no focal deficits  SKIN: +jaundiced. No rashes or lesions    LABS:                        7.8    6.66  )-----------( 81       ( 03 Jan 2021 04:53 )             22.0     01-03    135  |  103  |  10  ----------------------------<  101<H>  3.0<L>   |  23  |  0.69    Ca    7.9<L>      03 Jan 2021 04:53  Phos  1.3     01-03  Mg     1.8     01-03    TPro  4.0<L>  /  Alb  2.5<L>  /  TBili  13.4<H>  /  DBili  x   /  AST  414<H>  /  ALT  128<H>  /  AlkPhos  58  01-03    PT/INR - ( 03 Jan 2021 04:53 )   PT: 25.5 sec;   INR: 2.21 ratio      PTT - ( 03 Jan 2021 04:53 )  PTT:67.2 sec    Culture - Fungal, Body Fluid (collected 31 Dec 2020 20:02)  Source: .Body Fluid Peritoneal Fluid  Preliminary Report (01 Jan 2021 07:44):    Testing in progress    Culture - Body Fluid with Gram Stain (collected 31 Dec 2020 20:02)  Source: .Body Fluid Peritoneal Fluid  Gram Stain (01 Jan 2021 12:45):    Growth in aerobic bottle: Gram Negative Rods    Growth in anaerobic bottle: Gram Negative Rods  Preliminary Report (02 Jan 2021 15:19):    Growth in aerobic and anaerobic bottles: Klebsiella oxytoca/Raoutella    ornithinolytica Susceptibility to follow.

## 2021-01-03 NOTE — PROGRESS NOTE ADULT - SUBJECTIVE AND OBJECTIVE BOX
Chief Complaint:  Patient is a 50y old  Female who presents with a chief complaint of abdominal pain (2021 07:34)      Interval Events: overnight had episodes of vomiting with clear vomit and some specks of blood; hgb largely stable      Hospital Medications:  acetylcysteine IVPB 7 Gram(s) IV Intermittent once  albumin human 25% IVPB 100 milliLiter(s) IV Intermittent every 6 hours  aluminum hydroxide/magnesium hydroxide/simethicone Suspension 30 milliLiter(s) Oral every 6 hours PRN  atovaquone Suspension 1500 milliGRAM(s) Oral daily  dolutegravir 50 milliGRAM(s) Oral daily  emtricitabine 200 mG/tenofovir 300 mG (TRUVADA) 1 Tablet(s) Oral daily  midodrine. 20 milliGRAM(s) Oral three times a day  ondansetron Injectable 4 milliGRAM(s) IV Push every 8 hours PRN  pantoprazole  Injectable 40 milliGRAM(s) IV Push every 12 hours  piperacillin/tazobactam IVPB.. 3.375 Gram(s) IV Intermittent every 8 hours  simethicone 80 milliGRAM(s) Chew four times a day  traMADol 25 milliGRAM(s) Oral every 6 hours PRN      PMHX/PSHX:  Renal Vein Thrombosis    CMV Infection    Hepatitis B    Strongyloidosis    Histoplasmosis    HIV (Human Immunodeficiency Virus Infection)    Gallstones    S/P  Section    S/P Bilateral Tubal Ligation    S/P PEG (Percutaneous Endoscopic Gastrostomy) Placement and Removal    History of Cholecystectomy            ROS:     General:  No weight loss, fevers, chills, night sweats, fatigue   Eyes:  No vision changes  ENT:  No sore throat, pain, runny nose  CV:  No chest pain, palpitations, dizziness   Resp:  No SOB, cough, wheezing  GI:  See HPI  :  No burning with urination, hematuria  Muscle:  No pain, weakness  Neuro:  No weakness/tingling, memory problems  Psych:  No fatigue, insomnia, mood problems, depression  Heme:  No easy bruisability  Skin:  No rash, edema      PHYSICAL EXAM:   GENERAL:  no distress  HEENT:  NC/AT,  conjunctivae clear, scleral icterus  CHEST:  Full & symmetric excursion, no increased effort w/ respirations  HEART:  Regular rhythm & rate  ABDOMEN:  Soft, non-tender, non-distended  EXTREMITIES:  no LE  edema  SKIN:  No rash/erythema/ecchymoses/petechiae/wounds/jaundice  NEURO:  Alert, oriented    Vital Signs:  Vital Signs Last 24 Hrs  T(C): 37.2 (2021 05:28), Max: 37.5 (2021 01:18)  T(F): 98.9 (2021 05:28), Max: 99.5 (2021 01:18)  HR: 135 (2021 05:28) (102 - 135)  BP: 113/76 (2021 05:28) (103/65 - 113/76)  BP(mean): --  RR: 18 (2021 05:28) (16 - 20)  SpO2: 98% (2021 05:28) (97% - 100%)  Daily     Daily     LABS:                        7.8    6.66  )-----------( 81       ( 2021 04:53 )             22.0     01-03    135  |  103  |  10  ----------------------------<  101<H>  3.0<L>   |  23  |  0.69    Ca    7.9<L>      2021 04:53  Phos  1.3     01-03  Mg     1.8     01-03    TPro  4.0<L>  /  Alb  2.5<L>  /  TBili  13.4<H>  /  DBili  x   /  AST  414<H>  /  ALT  128<H>  /  AlkPhos  58  01-03    LIVER FUNCTIONS - ( 2021 04:53 )  Alb: 2.5 g/dL / Pro: 4.0 g/dL / ALK PHOS: 58 U/L / ALT: 128 U/L / AST: 414 U/L / GGT: x           PT/INR - ( 2021 04:53 )   PT: 25.5 sec;   INR: 2.21 ratio         PTT - ( 2021 04:53 )  PTT:67.2 sec        Imaging:

## 2021-01-03 NOTE — PROGRESS NOTE ADULT - ASSESSMENT
50 year old female with past medical history of HIV, CMV, HBV, histoplasmosis, renal vein thrombosis s/p coumadin for 6 months, and strongyloidiasis who presents with abdominal pain found to be COVID-19 PCR positive with acute liver injury 2/2 Hep B reactivation with +SBP, complicated with coagulapathy     #Acute liver injury 2/2 HepB reactivation  Predominantly hepatocellular liver injury, with AST >1800, Alt 600. Also w/ evidence of hepatic synthetic function, with elevated INR, decreased albumin. Etiology likely from HBV, which was previously being treated w/ Symtuza (tenofovir 10mg qd) but patient had been off medication for almost 1 year (until 11/2020) due to insurance lapse. Her outpatient viral load >400,000,000 with hepatitis delta coinfection ruled out. Currently patient is not in acute liver failure, she is mentating well, but is having worsening INR which is concerning.  - c/w truvada. Entecavir stopped  - c/w NAC infusion for acute liver failure (100 mg/kg over 16 hours, will likely need to be reordered daily)  - c/w albumin 100cc 25% q6h  - IVF PRN. Hold off given patient third spacing with bilateral edema  - s/p vitamin K 10mg (12/26 - 12/28, 1/1-1/2)  - dilaudid .5 mg IV prn for pain, dose carefully given HOTN. However, pt has not responded to motrin (hold i/s/o GIB), tylenol (wary i/s/o hepatic dysfunction), ketorolac (hold i/s/o GIB).  - start Tramadol 25mg q6hr for mod pain   - ascitic fluid: SAAG 2, ascitic fluid protein .8 likely portal HTN with granulocyte count from ascitic fluid >250   consistent with SBP    #Coagulapathy  -worsening coags with initial concern for DIC (thrombocytopenia, elevated coags, d-dimer, low fibrogen). Ep of melena 1/1 s/p cryo, vitamin K, plasma  -heme recs: likely from liver failure. Unlikely hemolysis despite low haptoglobin (liver failure) given rare sischocytes. Initial mixing study negative. Follow up repeat mixing study   -hepatology recs: worsening PTT unlikely from liver failure  -Goal fibrinogen >150, give cyro PRN    #Sepsis 2/2 SBP   Pt noted to be tachy, HOTN, febrile 12/30.   - blood cx 12/31- ngtd  - s/p vanc (12/30-1/1)  - c/w zosyn (12/30- )   - c/w midodrine 20 mg TID   - f/u ascites culture    #Anemia:   - Hgb acutely dropped 1/1. FOBT+. c/f GIB s/p cryo 1u, pRBC 1u.   - per heme, coagulopathy 2/2 liver disease  - type and cross x2  - transfuse Hgb <7    #ANTOINETTE:  2/2 hypoperfusion i/s/o sepsis vs medication induced (Abx) vs HRS  - c/w albumin  - avoid nephrotoxic meds if possible  - continue to trend SCr  - if SCr continues to rise or oliguria, consult neph     #AIDS  needs ARV medications to help with her immunosuppression but need to change ARV meds to ones metabolized by the kidney  continue the TAF for help controlling the Hep B infection  - In light of significant transaminitis, discontinued the Symtuza and changed meds to Truvada/Tivicay  - CD4 116 consistent with AIDS  - RNA viral load in process - slightly detectable but cannot account for her acute decompensation in liver disease  - c/w atovaquone for PCP ppx per ID- no Bactrim given hepatic dysfunction    #COVID+  - not eligible for Remdesivir secondary to her LFT abnormalities  - could consider convalescent plasma if she decompensates  - D- dimer <150, fibrinogen 224 (uptrending), ferritin 605 (downtrending)  - Quant TB neg  - blood cx 12/22- no growth, 12/31 ngtd  - f/u urine histo ag. in case recurrence    #CMV viremia   - hold off on Valganciclovir   - CMV viral load <96    #Prophylactic measures  - DVT ppx: hold i/s/o hgb drop  - GERD: pantoprazole 40mg BID for GIB  - Diet: NPO for GIB 50 year old female with past medical history of HIV, CMV, HBV, histoplasmosis, renal vein thrombosis s/p coumadin for 6 months, and strongyloidiasis who presents with abdominal pain found to be COVID-19 PCR positive with acute liver injury 2/2 Hep B reactivation with +SBP, complicated with coagulapathy     #Acute liver injury 2/2 HepB reactivation  Predominantly hepatocellular liver injury, with AST >1800, Alt 600. Also w/ evidence of hepatic synthetic function, with elevated INR, decreased albumin. Etiology likely from HBV, which was previously being treated w/ Symtuza (tenofovir 10mg qd) but patient had been off medication for almost 1 year (until 11/2020) due to insurance lapse. Her outpatient viral load >400,000,000 with hepatitis delta coinfection ruled out. Currently patient is not in acute liver failure, she is mentating well, but is having worsening INR which is concerning.  - c/w truvada. Entecavir stopped  - c/w NAC infusion for acute liver failure (100 mg/kg over 16 hours, will likely need to be reordered daily)  - c/w albumin 100cc 25% q6h  - IVF PRN. Hold off given patient third spacing with bilateral edema  - s/p vitamin K 10mg (12/26 - 12/28, 1/1-1/2)  - dilaudid .5 mg IV prn for pain, dose carefully given HOTN. However, pt has not responded to motrin (hold i/s/o GIB), tylenol (wary i/s/o hepatic dysfunction), ketorolac (hold i/s/o GIB).  - Tramadol 25mg q6hr for mod pain- not very effective per pt   - ascitic fluid: SAAG 2, ascitic fluid protein .8 likely portal HTN however granulocyte count from ascitic fluid >250- consistent with SBP. Grew Klebsiella oxytoca/Raoutella ornithinolytica. Sensitive to many Abx    #Coagulapathy  - worsening coags with initial concern for DIC (thrombocytopenia, elevated coags, d-dimer, low fibrogen). Ep of melena 1/1 s/p cryo, vitamin K, plasma  - heme recs: likely from liver failure. Unlikely hemolysis despite low haptoglobin (liver failure) given rare schistocytes. Initial mixing study negative. Follow up repeat mixing study   - hepatology recs: worsening PTT unlikely from liver failure  - Goal fibrinogen >150, give cyro PRN    #Sepsis 2/2 SBP   Pt noted to be tachy, HOTN, febrile 12/30.   - blood cx 12/31- ngtd  - s/p vanc (12/30-1/1)  - c/w zosyn (12/30- )   - c/w midodrine 20 mg TID   - f/u ascites culture    #Anemia:   - Hgb acutely dropped 1/1. FOBT+. c/f GIB s/p cryo 1u, pRBC 1u.   - per heme, coagulopathy 2/2 liver disease  - type and cross x2  - transfuse Hgb <7    #ANTOINETTE:  2/2 hypoperfusion i/s/o sepsis vs medication induced (Abx) vs HRS  - c/w albumin  - avoid nephrotoxic meds if possible  - continue to trend SCr  - if SCr continues to rise or oliguria, consult neph     #AIDS  needs ARV medications to help with her immunosuppression but need to change ARV meds to ones metabolized by the kidney  continue the TAF for help controlling the Hep B infection  - In light of significant transaminitis, discontinued the Symtuza and changed meds to Truvada/Tivicay  - CD4 116 consistent with AIDS  - RNA viral load in process - slightly detectable but cannot account for her acute decompensation in liver disease  - c/w atovaquone for PCP ppx per ID- no Bactrim given hepatic dysfunction    #COVID+  - not eligible for Remdesivir secondary to her LFT abnormalities  - could consider convalescent plasma if she decompensates  - D- dimer 1067 (stable), fibrinogen 196, ferritin 650 (downtrending)  - Quant TB neg  - blood cx 12/22- no growth, 12/31 ngtd  - f/u urine histo ag. in case recurrence    #CMV viremia   - hold off on Valganciclovir   - CMV viral load <96    #Prophylactic measures  - DVT ppx: hold i/s/o hgb drop  - GERD: pantoprazole 40mg BID for GIB  - Diet: NPO for GIB

## 2021-01-03 NOTE — PROGRESS NOTE ADULT - ATTENDING COMMENTS
seen and examined  agree with above  hepatology following  supplement potassium and phos judiciously  monitor H&H

## 2021-01-04 ENCOUNTER — APPOINTMENT (OUTPATIENT)
Dept: INFECTIOUS DISEASE | Facility: CLINIC | Age: 51
End: 2021-01-04

## 2021-01-04 LAB
ALBUMIN SERPL ELPH-MCNC: 2.7 G/DL — LOW (ref 3.3–5)
ALP SERPL-CCNC: 54 U/L — SIGNIFICANT CHANGE UP (ref 40–120)
ALT FLD-CCNC: 96 U/L — HIGH (ref 10–45)
ANION GAP SERPL CALC-SCNC: 8 MMOL/L — SIGNIFICANT CHANGE UP (ref 5–17)
APTT 50/50 2HOUR INCUB: 31.8 SEC — SIGNIFICANT CHANGE UP (ref 27.5–36.5)
APTT BLD: 26.5 SEC — LOW (ref 27.5–36.5)
APTT BLD: 70.7 SEC — HIGH (ref 27.5–35.5)
APTT BLD: 76.5 SEC — HIGH (ref 27.5–35.5)
AST SERPL-CCNC: 304 U/L — HIGH (ref 10–40)
BILIRUB SERPL-MCNC: 14.6 MG/DL — HIGH (ref 0.2–1.2)
BLD GP AB SCN SERPL QL: NEGATIVE — SIGNIFICANT CHANGE UP
BUN SERPL-MCNC: 10 MG/DL — SIGNIFICANT CHANGE UP (ref 7–23)
CALCIUM SERPL-MCNC: 8.4 MG/DL — SIGNIFICANT CHANGE UP (ref 8.4–10.5)
CHLORIDE SERPL-SCNC: 105 MMOL/L — SIGNIFICANT CHANGE UP (ref 96–108)
CO2 SERPL-SCNC: 25 MMOL/L — SIGNIFICANT CHANGE UP (ref 22–31)
CREAT SERPL-MCNC: 0.66 MG/DL — SIGNIFICANT CHANGE UP (ref 0.5–1.3)
D DIMER BLD IA.RAPID-MCNC: 1117 NG/ML DDU — HIGH
FERRITIN SERPL-MCNC: 657 NG/ML — HIGH (ref 15–150)
FERRITIN SERPL-MCNC: 666 NG/ML — HIGH (ref 15–150)
FIBRINOGEN PPP-MCNC: 107 MG/DL — LOW (ref 290–520)
FIBRINOGEN PPP-MCNC: 172 MG/DL — LOW (ref 290–520)
GLUCOSE SERPL-MCNC: 89 MG/DL — SIGNIFICANT CHANGE UP (ref 70–99)
H CAPSUL AG SPEC-ACNC: SIGNIFICANT CHANGE UP
H CAPSUL AG UR QL IA: SIGNIFICANT CHANGE UP
HAPTOGLOB SERPL-MCNC: 23 MG/DL — LOW (ref 34–200)
HAPTOGLOB SERPL-MCNC: 23 MG/DL — LOW (ref 34–200)
HCT VFR BLD CALC: 20.5 % — CRITICAL LOW (ref 34.5–45)
HCT VFR BLD CALC: 22.8 % — LOW (ref 34.5–45)
HGB BLD-MCNC: 6.9 G/DL — CRITICAL LOW (ref 11.5–15.5)
HGB BLD-MCNC: 8.2 G/DL — LOW (ref 11.5–15.5)
INR BLD: 2.53 RATIO — HIGH (ref 0.88–1.16)
INR BLD: 2.94 RATIO — HIGH (ref 0.88–1.16)
MAGNESIUM SERPL-MCNC: 1.8 MG/DL — SIGNIFICANT CHANGE UP (ref 1.6–2.6)
MCHC RBC-ENTMCNC: 31.2 PG — SIGNIFICANT CHANGE UP (ref 27–34)
MCHC RBC-ENTMCNC: 32.4 PG — SIGNIFICANT CHANGE UP (ref 27–34)
MCHC RBC-ENTMCNC: 33.7 GM/DL — SIGNIFICANT CHANGE UP (ref 32–36)
MCHC RBC-ENTMCNC: 36 GM/DL — SIGNIFICANT CHANGE UP (ref 32–36)
MCV RBC AUTO: 90.1 FL — SIGNIFICANT CHANGE UP (ref 80–100)
MCV RBC AUTO: 92.8 FL — SIGNIFICANT CHANGE UP (ref 80–100)
NON-GYNECOLOGICAL CYTOLOGY STUDY: SIGNIFICANT CHANGE UP
NRBC # BLD: 0 /100 WBCS — SIGNIFICANT CHANGE UP (ref 0–0)
NRBC # BLD: 1 /100 WBCS — HIGH (ref 0–0)
PAT CTL 2H: 29.1 SEC — SIGNIFICANT CHANGE UP (ref 27.5–36.5)
PHOSPHATE SERPL-MCNC: 1.1 MG/DL — LOW (ref 2.5–4.5)
PLATELET # BLD AUTO: 71 K/UL — LOW (ref 150–400)
PLATELET # BLD AUTO: 72 K/UL — LOW (ref 150–400)
POTASSIUM SERPL-MCNC: 3.4 MMOL/L — LOW (ref 3.5–5.3)
POTASSIUM SERPL-SCNC: 3.4 MMOL/L — LOW (ref 3.5–5.3)
PROT SERPL-MCNC: 4.2 G/DL — LOW (ref 6–8.3)
PROTHROM AB SERPL-ACNC: 29.1 SEC — HIGH (ref 10.6–13.6)
PROTHROM AB SERPL-ACNC: 33.5 SEC — HIGH (ref 10.6–13.6)
PT 100%: 33.5 SEC — HIGH (ref 10.6–13.6)
PT 50/50: 13.4 SEC — SIGNIFICANT CHANGE UP (ref 10.6–14.7)
RBC # BLD: 2.21 M/UL — LOW (ref 3.8–5.2)
RBC # BLD: 2.53 M/UL — LOW (ref 3.8–5.2)
RBC # FLD: 19.5 % — HIGH (ref 10.3–14.5)
RBC # FLD: 21.5 % — HIGH (ref 10.3–14.5)
RH IG SCN BLD-IMP: POSITIVE — SIGNIFICANT CHANGE UP
SODIUM SERPL-SCNC: 138 MMOL/L — SIGNIFICANT CHANGE UP (ref 135–145)
THROMBIN TIME: 34 SEC — HIGH (ref 16–25)
WBC # BLD: 5.81 K/UL — SIGNIFICANT CHANGE UP (ref 3.8–10.5)
WBC # BLD: 6.28 K/UL — SIGNIFICANT CHANGE UP (ref 3.8–10.5)
WBC # FLD AUTO: 5.81 K/UL — SIGNIFICANT CHANGE UP (ref 3.8–10.5)
WBC # FLD AUTO: 6.28 K/UL — SIGNIFICANT CHANGE UP (ref 3.8–10.5)

## 2021-01-04 PROCEDURE — 99232 SBSQ HOSP IP/OBS MODERATE 35: CPT | Mod: GC

## 2021-01-04 PROCEDURE — 99232 SBSQ HOSP IP/OBS MODERATE 35: CPT

## 2021-01-04 RX ORDER — POTASSIUM PHOSPHATE, MONOBASIC POTASSIUM PHOSPHATE, DIBASIC 236; 224 MG/ML; MG/ML
30 INJECTION, SOLUTION INTRAVENOUS ONCE
Refills: 0 | Status: COMPLETED | OUTPATIENT
Start: 2021-01-04 | End: 2021-01-04

## 2021-01-04 RX ORDER — ACETYLCYSTEINE 200 MG/ML
7 VIAL (ML) MISCELLANEOUS ONCE
Refills: 0 | Status: COMPLETED | OUTPATIENT
Start: 2021-01-04 | End: 2021-01-04

## 2021-01-04 RX ORDER — SODIUM,POTASSIUM PHOSPHATES 278-250MG
1 POWDER IN PACKET (EA) ORAL
Refills: 0 | Status: COMPLETED | OUTPATIENT
Start: 2021-01-04 | End: 2021-01-06

## 2021-01-04 RX ORDER — MIDODRINE HYDROCHLORIDE 2.5 MG/1
30 TABLET ORAL THREE TIMES A DAY
Refills: 0 | Status: DISCONTINUED | OUTPATIENT
Start: 2021-01-04 | End: 2021-01-13

## 2021-01-04 RX ORDER — FUROSEMIDE 40 MG
40 TABLET ORAL ONCE
Refills: 0 | Status: COMPLETED | OUTPATIENT
Start: 2021-01-04 | End: 2021-01-04

## 2021-01-04 RX ORDER — POTASSIUM CHLORIDE 20 MEQ
10 PACKET (EA) ORAL
Refills: 0 | Status: COMPLETED | OUTPATIENT
Start: 2021-01-04 | End: 2021-01-04

## 2021-01-04 RX ORDER — HYDROMORPHONE HYDROCHLORIDE 2 MG/ML
0.5 INJECTION INTRAMUSCULAR; INTRAVENOUS; SUBCUTANEOUS ONCE
Refills: 0 | Status: DISCONTINUED | OUTPATIENT
Start: 2021-01-04 | End: 2021-01-04

## 2021-01-04 RX ADMIN — HYDROMORPHONE HYDROCHLORIDE 0.5 MILLIGRAM(S): 2 INJECTION INTRAMUSCULAR; INTRAVENOUS; SUBCUTANEOUS at 23:07

## 2021-01-04 RX ADMIN — HYDROMORPHONE HYDROCHLORIDE 0.5 MILLIGRAM(S): 2 INJECTION INTRAMUSCULAR; INTRAVENOUS; SUBCUTANEOUS at 23:40

## 2021-01-04 RX ADMIN — MIDODRINE HYDROCHLORIDE 30 MILLIGRAM(S): 2.5 TABLET ORAL at 17:20

## 2021-01-04 RX ADMIN — HYDROMORPHONE HYDROCHLORIDE 0.5 MILLIGRAM(S): 2 INJECTION INTRAMUSCULAR; INTRAVENOUS; SUBCUTANEOUS at 13:35

## 2021-01-04 RX ADMIN — Medication 64.69 GRAM(S): at 13:04

## 2021-01-04 RX ADMIN — PANTOPRAZOLE SODIUM 40 MILLIGRAM(S): 20 TABLET, DELAYED RELEASE ORAL at 05:46

## 2021-01-04 RX ADMIN — Medication 50 MILLILITER(S): at 05:46

## 2021-01-04 RX ADMIN — Medication 1 PACKET(S): at 13:03

## 2021-01-04 RX ADMIN — SIMETHICONE 80 MILLIGRAM(S): 80 TABLET, CHEWABLE ORAL at 05:46

## 2021-01-04 RX ADMIN — DOLUTEGRAVIR SODIUM 50 MILLIGRAM(S): 25 TABLET, FILM COATED ORAL at 13:03

## 2021-01-04 RX ADMIN — MIDODRINE HYDROCHLORIDE 30 MILLIGRAM(S): 2.5 TABLET ORAL at 13:04

## 2021-01-04 RX ADMIN — SIMETHICONE 80 MILLIGRAM(S): 80 TABLET, CHEWABLE ORAL at 13:03

## 2021-01-04 RX ADMIN — HYDROMORPHONE HYDROCHLORIDE 0.5 MILLIGRAM(S): 2 INJECTION INTRAMUSCULAR; INTRAVENOUS; SUBCUTANEOUS at 13:22

## 2021-01-04 RX ADMIN — Medication 50 MILLILITER(S): at 23:43

## 2021-01-04 RX ADMIN — Medication 1 PACKET(S): at 17:20

## 2021-01-04 RX ADMIN — Medication 40 MILLIGRAM(S): at 17:20

## 2021-01-04 RX ADMIN — SIMETHICONE 80 MILLIGRAM(S): 80 TABLET, CHEWABLE ORAL at 21:09

## 2021-01-04 RX ADMIN — PANTOPRAZOLE SODIUM 40 MILLIGRAM(S): 20 TABLET, DELAYED RELEASE ORAL at 17:20

## 2021-01-04 RX ADMIN — Medication 50 MILLILITER(S): at 18:20

## 2021-01-04 RX ADMIN — MIDODRINE HYDROCHLORIDE 20 MILLIGRAM(S): 2.5 TABLET ORAL at 05:46

## 2021-01-04 RX ADMIN — Medication 100 MILLIEQUIVALENT(S): at 03:06

## 2021-01-04 RX ADMIN — CEFTRIAXONE 100 MILLIGRAM(S): 500 INJECTION, POWDER, FOR SOLUTION INTRAMUSCULAR; INTRAVENOUS at 17:20

## 2021-01-04 RX ADMIN — Medication 50 MILLILITER(S): at 12:41

## 2021-01-04 RX ADMIN — ATOVAQUONE 1500 MILLIGRAM(S): 750 SUSPENSION ORAL at 13:03

## 2021-01-04 RX ADMIN — Medication 100 MILLIEQUIVALENT(S): at 04:26

## 2021-01-04 RX ADMIN — POTASSIUM PHOSPHATE, MONOBASIC POTASSIUM PHOSPHATE, DIBASIC 83.33 MILLIMOLE(S): 236; 224 INJECTION, SOLUTION INTRAVENOUS at 08:45

## 2021-01-04 RX ADMIN — EMTRICITABINE AND TENOFOVIR DISOPROXIL FUMARATE 1 TABLET(S): 200; 300 TABLET, FILM COATED ORAL at 13:03

## 2021-01-04 RX ADMIN — Medication 100 MILLIEQUIVALENT(S): at 01:29

## 2021-01-04 NOTE — CONSULT NOTE ADULT - CONSULT REQUESTED DATE/TIME
04-Jan-2021 17:18
30-Dec-2020 18:07
29-Dec-2020 15:23
31-Dec-2020 20:28
22-Dec-2020 17:18
23-Dec-2020 09:26
22-Dec-2020 15:28

## 2021-01-04 NOTE — CONSULT NOTE ADULT - CONSULT REASON
HIV and COVID-19 management
Paracentesis
Repeat diagnostic paracentesis.
Hypotension
anemia
Acute liver injury
Abdominal pain

## 2021-01-04 NOTE — CONSULT NOTE ADULT - ATTENDING COMMENTS
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 Also with acute liver injury.   MICU consulted for Hypotension.  Currently with map and SBP at baseline though pt does complain of orthostatic dizziness, and does have ANTOINETTE.    - Would start midodrine and albumin for orthostatic symptoms and possible SBP/HRS.   - Follow up para results and repeat BMP Q12.  - Not in need of ICU
Admitted for COVID and acute liver injury likely 2/2 to Hep B reactivation  Coagulopathy, low hapto, fibrinogen likely 2/2 to liver dx  Check mixing studies, factors 5,8,9
Patient seen and examined with Dr Robbi PEREZ agree with his history and exam as noted above    Briefly, Patient is a 51 yo woman with a history of advanced AIDS, chronic hepatitis B infection, prior CMV infection, multiple OIs including histoplasmosis at presentation and disseminated strongyloides infection, past cholecystectomy, who presented with a month of nausea/vomiting and juandice along with mid epigastric abdominal discomfort.  Her labs are notable for T cells in the AIDS range with viral load pending, elevated total bili at 7+ with icterus on exam, ALT 1600/ range PT/INR slightly elevated, marked hep B viremia in recent labs in the 500million copies/ml range and COVID PCR swab+.  She denies recent travel, lives at home with her  and children who she reports tested COVID negative    She denies ETOH or drug use  Reports adherence with her ARVs despite the N/V by taking zofran prior to the doses    Exam notable for  breathing RA with face mask on and appears comfortable  icteric  lungs- clear  Cor- reg  Abd- soft mildly tender mid epigastric and RUQ pain radiates to the back  past hx of PEG while initial HIV diagnosis  Extrem- no CEE, no rash  Neuro- a and O x3    A/p  Extremely medically complicated 49yo with a history of advanced AIDS, chronic hepatitis B infection, COVID+, past cholecystectomy for gall stones who presents with both a cholestatic picture and a hepatitis picture on labs and exam    Liver issues:  acute hepatitis and cholestatic picture  no dilated ducts seen on CT imaging, no stones seen  check amylase/lipase  hepatitis pattern- chronic hep B, check delta hepatitis, hep C viral load , hep B viral load, hep E antibodies  Hold hepatotoxic medications- pt taking symtuza the darunavir portion is liver metabolized will change medications to biktarvy  check CMV viral load  check EBV viral load (lymphoma)- but adenopathy not reported on CT scan  ascites- suggest cirrhosis?  Hep B resistance testing if possible  Liver to consult for assistance ? MRCP ?  Blood cultures    AIDS-  needs ARV medications to help with her immunosuppression but need to change ARV meds. to ones metabolized by the kidney  continue the TAF for help controlling the Hep B infection  Biktarvy (bictegravir plus TAF)  viral load pending  If viral laod elevated will add Bactrim for PJP prophylaxis    COVID+  not eligible for Remdesivir secondary to her LFT abnormalities  could consider convalescent plasma if she decompensates  would send inflammatory markers    send QuantiFeron testing- given new onset ascites and abnormal appearing bowels  urine histo ag. in case recurrence  blood cultures x 2 sets    liver consult    Chencho Mcadams MD  [phone]  After 5pm/weekends [phone]
51 yo F with HIV/AIDS and chronic HBV, currently admitted with severe acute hepatocellular injury with concern for impending liver failure secondary to HBV reactivation (after being off HIV and HBV therapy for 11 months until 11/2020), also in the context of COVID-19 without respiratory symptoms or chest imaging findings. She is currently mentating normally without any definite evidence of hepatic encephalopathy, but with worsening liver synthetic function on labs, also with hypovolemic hyponatremia.    Recommendations:  - Discussed with ID Dr. Chencho Mcadams, and will switch from Biktarvy to Truvada plus additional ART, in order to switch from TAF to TDF, which may lead to more rapid viral clearance. Also added double coverage with entecavir 0.5 mg po daily starting today.  - Start N-acetylcysteine infusion with 3-dose protocol, then continue to repeat 3rd dose (100 mg/kg iv over 16 hours), continuously until there are signs of definite hepatic recovery with liver enzymes <1000 and INR <1.5.  - No acetaminophen or acetaminophen-containing products at any dose! Avoid hepatotoxic medications.  - Given hypovolemic hyponatremia, agree with IVF boluses and given profound hypoalbuminemia, would also add albumin 25% 100 mL iv q8h x6.  - Add PPI once daily for GI prophylaxis and given GERD and dyspepsia symptoms.  - Monitor labs q12h for now: CMP, direct bilirubin, CBC+diff, PT/INR, fibrinogen, lactate, ammonia.  - Monitor neurologic status closely, and immediately call our team if any changes (even if subtle).  - Awaiting STAT re-check labs currently, and may require transfer to MICU for higher level of care if liver function continuing to decline. She would also need transfer to MICU if any altered mentation. Discussed with [facility] MICU team, and given very limited COVID-19 ICU bed availability currently at [facility], transfer to ICU would likely necessitate transfer to [facility].  - If she were to develop fulminant liver failure, she is unfortunately not a liver transplant candidate due to her AIDS and COVID-19.    Please don't hesitate to call with any questions or concerns.    Allen Fraire M.D., Ph.D.  Transplant Hepatology  Cell: [phone]
As above, can repeat paracentesis for diagnostic purposes regarding infectious issues.  Would perform at the bedside given COVID status.

## 2021-01-04 NOTE — PROGRESS NOTE ADULT - SUBJECTIVE AND OBJECTIVE BOX
Authored by Gee Gutiérrez MD ([phone]) [facility]    Patient is a 50y old  Female who presents with a chief complaint of Abdominal pain (04 Jan 2021 09:10)    SUBJECTIVE / OVERNIGHT EVENTS: NAEON. This am pt notes abd pain. Otherwise denies HA, cp, SOB, dysuria, hematuria    MEDICATIONS  (STANDING):  albumin human 25% IVPB 100 milliLiter(s) IV Intermittent every 6 hours  atovaquone Suspension 1500 milliGRAM(s) Oral daily  cefTRIAXone   IVPB 2000 milliGRAM(s) IV Intermittent every 24 hours  dolutegravir 50 milliGRAM(s) Oral daily  emtricitabine 200 mG/tenofovir 300 mG (TRUVADA) 1 Tablet(s) Oral daily  midodrine. 30 milliGRAM(s) Oral three times a day  pantoprazole  Injectable 40 milliGRAM(s) IV Push every 12 hours  potassium phosphate / sodium phosphate Powder (PHOS-NaK) 1 Packet(s) Oral two times a day  simethicone 80 milliGRAM(s) Chew four times a day    MEDICATIONS  (PRN):  aluminum hydroxide/magnesium hydroxide/simethicone Suspension 30 milliLiter(s) Oral every 6 hours PRN Dyspepsia  ondansetron Injectable 4 milliGRAM(s) IV Push every 8 hours PRN Nausea and/or Vomiting  traMADol 25 milliGRAM(s) Oral every 6 hours PRN Moderate Pain (4 - 6)    I&O's Summary    03 Jan 2021 07:01  -  04 Jan 2021 07:00  --------------------------------------------------------  IN: 2563.5 mL / OUT: 1250 mL / NET: 1313.5 mL    PHYSICAL EXAM:  Vital Signs Last 24 Hrs  T(C): 37.3 (04 Jan 2021 13:00), Max: 37.6 (03 Jan 2021 17:00)  T(F): 99.2 (04 Jan 2021 13:00), Max: 99.6 (03 Jan 2021 17:00)  HR: 124 (04 Jan 2021 13:00) (122 - 136)  BP: 117/79 (04 Jan 2021 13:00) (90/61 - 134/77)  BP(mean): --  RR: 22 (04 Jan 2021 13:00) (18 - 22)  SpO2: 99% (04 Jan 2021 13:00) (97% - 100%)    GENERAL: No acute distress  HEAD: Atraumatic, Normocephalic  EYES: EOMI, PERRLA, conjunctiva and sclera clear  NECK: Supple, no lymphadenopathy, no JVD  CHEST/LUNG: CTAB; No wheezes, rales, or rhonchi  HEART: +Tachycardic. No murmurs, rubs, or gallops  ABDOMEN: +Distended. Soft, non-tender; normal bowel sounds, no organomegaly  EXTREMITIES: 2+ peripheral pulses b/l, No clubbing, cyanosis, or edema  NEUROLOGY: A&O x 3, no focal deficits  SKIN: No rashes or lesions    LABS:                        6.9    6.28  )-----------( 72       ( 04 Jan 2021 07:15 )             20.5     01-04    138  |  105  |  10  ----------------------------<  89  3.4<L>   |  25  |  0.66    Ca    8.4      04 Jan 2021 07:06  Phos  1.1     01-04  Mg     1.8     01-04    TPro  4.2<L>  /  Alb  2.7<L>  /  TBili  14.6<H>  /  DBili  x   /  AST  304<H>  /  ALT  96<H>  /  AlkPhos  54  01-04    PT/INR - ( 04 Jan 2021 07:15 )   PT: 29.1 sec;   INR: 2.53 ratio      PTT - ( 04 Jan 2021 07:15 )  PTT:70.7 sec

## 2021-01-04 NOTE — PROGRESS NOTE ADULT - ASSESSMENT
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   Overall improved liver enzymes and pain. HBV viral load down from 483 million 12/2 to 36 million 12/29.   Other causes for liver failure unlikely or ruled out- COVID PCR positive, CMV low level viremia, HDV, HCV, AIDS cholangiopathy,   Became febrile 12/30 night (not recorded) and found to have Klebsiella SBP.   Still nauseous but afebrile and heading in the right direction.     Suggest:    -narrow Zosyn to Ceftriaxone 2GM IV q24h, can cause biliary sludge but it's being monitored anyways  - agree with repeat paracentesis with goal of stopping antibiotics     -continue Tivicay and Truvada for HIV, the latter for HBV as well   -f/u HBV resistance testing   - repeat HBV viral load as LFTS have improved  -Atovaquone for PJP prophylaxis until CD4 >200 for three months     -supportive care and isolation precautions for COVID, currently on room air and no cough    Chencho Mcadams MD  [phone]  After 5pm/weekends [phone]

## 2021-01-04 NOTE — PROGRESS NOTE ADULT - ATTENDING COMMENTS
51 y/o F w/ HIV/AIDS, HBV reactivation, was off HIV meds due to insurance reasons now decompensated with culture positive bacterial peritonitis, hemolytic anemia, DIC, COVID.     Continue supportive care.  Recommend repeat paracentesis to monitor for improvement for peritonitis.

## 2021-01-04 NOTE — PROGRESS NOTE ADULT - ASSESSMENT
49 yo F w/ PMHx HIV/AIDS, HBV, previous histo, CMV, renal vein thrombus s/p coumadin (2010) admitted with abd pain x 4 days, found to have acute liver injury and covid +    # Acute liver injury - patient with predominantly hepatocellular liver injury, initially with AST >1800, Alt 600. Also w/ evidence of hepatic synthetic function, with elevated INR, decreased albumin. 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 Her outpatient viral load >400,000,000 corroborates this. Hepatitis delta coinfection has beenruled out as outpatient.   Patient was on biktarvy but switched to Truvada for TDF (as opposed to TAF), and adding double coverage with entecavir. Also on NAC. Currently with minimal concern for acute liver failure as she is mentating well.  #SBP with + para  # Abdominal pain: Likely due to hepatitis +SBP  # HIV/AIDS - CD4 114, previous histo, CMV, has been off medications for 1 year until 11/2020  # COVID +   #Coagulatopathy  #BRBPR - resolved  #Vomiting - small flecks of blood noted, likely esophagitis vs gastritis vs pHTN gastropathy vs MW tear; hgb stable    Recommendations:   - c/w broad spectrum ab   - Continue to trend CMP, INR, Mg, Phos, fibrinogen, vbg (lactate) daily   - c/w Truvada for HBV  - F/U HBV resistance profile  - c/w NAC infusion for acute liver failure (100 mg/kg over 16 hours, will likely need to be reordered daily)  - Continue albumin 100cc 25% q 8 hours  - PPI IV BID  - given severity of illness with multiple comorbidities and severity of coagulopathy; risk of endoscopic evaluation outweigh benefit; also given current coagulation values the majority of endoscopic hemostatic treatments cannot be done  - Not transplant candidate at this time because of COVID and AIDS      Lenore Gates PGY-4  Gastroenterology Fellow  Pager #[phone] or [phone]  Please page on-call Fellow on weekends/after 5 pm on weekdays   Please call answering service for on-call GI fellow ([phone]) after 5pm and before 8am, and on weekends.  49 yo F w/ PMHx HIV/AIDS, HBV, previous histo, CMV, renal vein thrombus s/p coumadin (2010) admitted with abd pain x 4 days, found to have acute liver injury and covid +    # Acute liver injury - patient with predominantly hepatocellular liver injury, initially with AST >1800, Alt 600. Also w/ evidence of hepatic synthetic function, with elevated INR, decreased albumin. 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 Her outpatient viral load >400,000,000 corroborates this. Hepatitis delta coinfection has beenruled out as outpatient.   Patient was on biktarvy but switched to Truvada for TDF (as opposed to TAF), and adding double coverage with entecavir. Also on NAC. Currently with minimal concern for acute liver failure as she is mentating well.  # Normocytic anemia: Patient with isolated episode of BRBPR and episode of emesis with "flecks of blood." Hgb downtrending in the setting of thrombocytopenia and coagulopathy, with concern for DIC. Currently tachycardic, however, normotensive.  # Tachycardia: May be due to sepsis, intravascular volume depletion, or PE.   # Klebsiella SBP  # Abdominal pain: Likely due to hepatitis and SBP  # HIV/AIDS - CD4 114, previous histo, CMV, has been off medications for 1 year until 11/2020  # COVID +     Recommendations:   - Antibiotics per ID  - Continue to trend CMP, INR, Mg, Phos, fibrinogen, vbg (lactate) daily   - Continue Truvada for HBV  - F/U HBV resistance profile  - Continue NAC infusion for acute liver injury (100 mg/kg over 16 hours, will likely need to be reordered daily)  - PPI IV BID  - Given severity of illness with multiple comorbidities and severity of coagulopathy, risk of endoscopic evaluation outweighs benefit; also given current coagulation values the majority of endoscopic hemostatic treatments cannot be done  - Not transplant candidate at this time because of COVID and AIDS    Alvarado Marin MD  Gastroenterology and Hepatology Fellow  Please contact via [employer] Teams    Please call Hepatology ([phone]) if there are any additional questions or concerns.    Please call answering service for on-call GI fellow ([phone]) after 5pm and before 8am, and on weekends. 49 yo F w/ PMHx HIV/AIDS, HBV, previous histo, CMV, renal vein thrombus s/p coumadin (2010) admitted with abd pain x 4 days, found to have acute liver injury and covid +    # Acute liver injury - patient with predominantly hepatocellular liver injury, initially with AST >1800, Alt 600. Also w/ evidence of hepatic synthetic function, with elevated INR, decreased albumin. 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 Her outpatient viral load >400,000,000 corroborates this. Hepatitis delta coinfection has beenruled out as outpatient.   Patient was on biktarvy but switched to Truvada for TDF (as opposed to TAF), and adding double coverage with entecavir. Also on NAC. Currently with minimal concern for acute liver failure as she is mentating well.  # Normocytic anemia: Patient with isolated episode of BRBPR and episode of emesis with "flecks of blood." Hgb downtrending in the setting of thrombocytopenia and coagulopathy, with concern for DIC. Currently tachycardic, however, normotensive.  # Tachycardia: May be due to sepsis, intravascular volume depletion, or PE.   # Klebsiella SBP  # Abdominal pain: Likely due to hepatitis and SBP  # HIV/AIDS - CD4 114, previous histo, CMV, has been off medications for 1 year until 11/2020  # COVID +     Recommendations:   - Antibiotics per ID  - Repeat paracentesis today  - Continue to trend CMP, INR, Mg, Phos, fibrinogen, vbg (lactate) daily   - Continue Truvada for HBV  - F/U HBV resistance profile  - Continue NAC infusion for acute liver injury (100 mg/kg over 16 hours, will likely need to be reordered daily)  - PPI IV BID  - Given severity of illness with multiple comorbidities and severity of coagulopathy, risk of endoscopic evaluation outweighs benefit; also given current coagulation values the majority of endoscopic hemostatic treatments cannot be done  - Not transplant candidate at this time because of COVID and AIDS    Alvarado Marin MD  Gastroenterology and Hepatology Fellow  Please contact via [email or web address] Teams    Please call Hepatology ([phone]) if there are any additional questions or concerns.    Please call answering service for on-call GI fellow ([phone]) after 5pm and before 8am, and on weekends.

## 2021-01-04 NOTE — CONSULT NOTE ADULT - REASON FOR ADMISSION
abdominal pain

## 2021-01-04 NOTE — PROGRESS NOTE ADULT - SUBJECTIVE AND OBJECTIVE BOX
INFECTIOUS DISEASES FOLLOW UP-- Hollie Mcadams  [phone]    This is a follow up note for this  50yFemale with  High liver transaminase level  HIV, HBV with flare in chronic active disease, COVID      ROS:  CONSTITUTIONAL:  No fever, poor appetite  CARDIOVASCULAR:  No chest pain or palpitations  RESPIRATORY:  No dyspnea  GASTROINTESTINAL:  reports nausea, vomiting, no diarrhea, or abdominal pain  GENITOURINARY:  No dysuria  NEUROLOGIC:  No headache,     Allergies    No Known Allergies    Intolerances        ANTIBIOTICS/RELEVANT:  antimicrobials  atovaquone Suspension 1500 milliGRAM(s) Oral daily  cefTRIAXone   IVPB 2000 milliGRAM(s) IV Intermittent every 24 hours  dolutegravir 50 milliGRAM(s) Oral daily  emtricitabine 200 mG/tenofovir 300 mG (TRUVADA) 1 Tablet(s) Oral daily    immunologic:    OTHER:  albumin human 25% IVPB 100 milliLiter(s) IV Intermittent every 6 hours  aluminum hydroxide/magnesium hydroxide/simethicone Suspension 30 milliLiter(s) Oral every 6 hours PRN  midodrine. 30 milliGRAM(s) Oral three times a day  ondansetron Injectable 4 milliGRAM(s) IV Push every 8 hours PRN  pantoprazole  Injectable 40 milliGRAM(s) IV Push every 12 hours  potassium phosphate / sodium phosphate Powder (PHOS-NaK) 1 Packet(s) Oral two times a day  simethicone 80 milliGRAM(s) Chew four times a day  traMADol 25 milliGRAM(s) Oral every 6 hours PRN      Objective:  Vital Signs Last 24 Hrs  T(C): 36.8 (04 Jan 2021 16:51), Max: 37.6 (04 Jan 2021 03:40)  T(F): 98.3 (04 Jan 2021 16:51), Max: 99.6 (04 Jan 2021 03:40)  HR: 130 (04 Jan 2021 16:51) (122 - 136)  BP: 115/68 (04 Jan 2021 16:51) (90/61 - 134/77)  BP(mean): --  RR: 19 (04 Jan 2021 16:51) (18 - 22)  SpO2: 98% (04 Jan 2021 16:51) (97% - 100%)    PHYSICAL EXAM:  Constitutional:no acute distress  Eyes:PIPO, EOMI  Ear/Nose/Throat: no oral lesions, 	  Respiratory: clear BL  Cardiovascular: S1S2  Gastrointestinal:soft, (+) BS, no tenderness, abdomen is doughy but soft  Extremities:no e/e/c trace edema  No Lymphadenopathy  IV sites not inflammed.    LABS:                        8.2    5.81  )-----------( 71       ( 04 Jan 2021 16:10 )             22.8     01-04    138  |  105  |  10  ----------------------------<  89  3.4<L>   |  25  |  0.66    Ca    8.4      04 Jan 2021 07:06  Phos  1.1     01-04  Mg     1.8     01-04    TPro  4.2<L>  /  Alb  2.7<L>  /  TBili  14.6<H>  /  DBili  x   /  AST  304<H>  /  ALT  96<H>  /  AlkPhos  54  01-04    PT/INR - ( 04 Jan 2021 17:54 )   PT: 33.5 sec;   INR: 2.94 ratio         PTT - ( 04 Jan 2021 07:15 )  PTT:70.7 sec      MICROBIOLOGY:            RECENT CULTURES:  12-31 @ 20:02  .Body Fluid Peritoneal Fluid  Klebsiella oxytoca /Raoutella ornithinolytica  Klebsiella oxytoca /Raoutella ornithinolytica  FLORIDALMA    Growth in aerobic and anaerobic bottles: Klebsiella oxytoca/Raoutella  ornithinolytica  --  12-31 @ 00:22  .Blood Blood-Venous  --  --  --    No growth to date.  --      RADIOLOGY & ADDITIONAL STUDIES:    < from: US Abdomen Limited (12.30.20 @ 15:08) >  IMPRESSION:    Moderate ascites in the right upper, left upper, and left lower quadrants. Left upper quadrant is not well visualized.    < end of copied text >

## 2021-01-04 NOTE — CONSULT NOTE ADULT - SUBJECTIVE AND OBJECTIVE BOX
Vascular & Interventional Radiology Brief Consult Note    Evaluate for Procedure: Repeat diagnostic paracentesis.     HPI: 50y Female with PMHx  HIV/AIDS, Hep B, COVID-> klebsiella SBP, not improving on Abx, hepatology would like repeat para for clearance and ID agrees.     Allergies:   Medications (Abx/Cardiac/Anticoagulation/Blood Products)  atovaquone Suspension: 1500 milliGRAM(s) Oral (01-04 @ 13:03)  cefTRIAXone   IVPB: 100 mL/Hr IV Intermittent (01-03 @ 17:09)  dolutegravir: 50 milliGRAM(s) Oral (01-04 @ 13:03)  emtricitabine 200 mG/tenofovir 300 mG (TRUVADA): 1 Tablet(s) Oral (01-04 @ 13:03)  midodrine.: 20 milliGRAM(s) Oral (01-04 @ 05:46)  midodrine.: 30 milliGRAM(s) Oral (01-04 @ 13:04)  piperacillin/tazobactam IVPB..: 25 mL/Hr IV Intermittent (01-03 @ 08:04)    Data:    T(C): 36.8  HR: 130  BP: 115/68  RR: 19  SpO2: 98%    -WBC 5.81 / HgB 8.2 / Hct 22.8 / Plt 71  -Na 138 / Cl 105 / BUN 10 / Glucose 89  -K 3.4 / CO2 25 / Cr 0.66  -ALT 96 / Alk Phos 54 / T.Bili 14.6  -INR 2.53 / PTT 70.7    Imaging:   US abdomen 12/30/20    IMPRESSION:    Moderate ascites in the right upper, left upper, and left lower quadrants. Left upper quadrant is not well visualized.     Assessment/Plan:   50y Female with PMHx  HIV/AIDS, Hep B, COVID-> klebsiella SBP, not improving on Abx, hepatology would like repeat para for clearance and ID agrees , IR consulted for repeat paracentesis.     - Plan for repeat bedside diagnostic and therapeutic paracentesis in 24-48 hours, schedule permitting.   -  Discussed with Dr. Christie.    Vascular & Interventional Radiology Brief Consult Note    Evaluate for Procedure: Repeat diagnostic paracentesis.     HPI: 50y Female with PMHx  HIV/AIDS, Hep B, COVID-> klebsiella SBP, not improving on Abx.    Allergies:   Medications (Abx/Cardiac/Anticoagulation/Blood Products)  atovaquone Suspension: 1500 milliGRAM(s) Oral (01-04 @ 13:03)  cefTRIAXone   IVPB: 100 mL/Hr IV Intermittent (01-03 @ 17:09)  dolutegravir: 50 milliGRAM(s) Oral (01-04 @ 13:03)  emtricitabine 200 mG/tenofovir 300 mG (TRUVADA): 1 Tablet(s) Oral (01-04 @ 13:03)  midodrine.: 20 milliGRAM(s) Oral (01-04 @ 05:46)  midodrine.: 30 milliGRAM(s) Oral (01-04 @ 13:04)  piperacillin/tazobactam IVPB..: 25 mL/Hr IV Intermittent (01-03 @ 08:04)    Data:    T(C): 36.8  HR: 130  BP: 115/68  RR: 19  SpO2: 98%    -WBC 5.81 / HgB 8.2 / Hct 22.8 / Plt 71  -Na 138 / Cl 105 / BUN 10 / Glucose 89  -K 3.4 / CO2 25 / Cr 0.66  -ALT 96 / Alk Phos 54 / T.Bili 14.6  -INR 2.53 / PTT 70.7    Imaging:   US abdomen 12/30/20    IMPRESSION:    Moderate ascites in the right upper, left upper, and left lower quadrants. Left upper quadrant is not well visualized.     Assessment/Plan:   50y Female with PMHx  HIV/AIDS, Hep B, COVID-> klebsiella SBP, not improving on Abx, hepatology would like repeat para for clearance and ID agrees , IR consulted for repeat paracentesis.     - Plan for repeat bedside diagnostic and therapeutic paracentesis in 24-48 hours, schedule permitting.   -  Discussed with Dr. Christie.    Vascular & Interventional Radiology Brief Consult Note    Evaluate for Procedure: Repeat diagnostic paracentesis.     HPI: 50y Female with PMHx  HIV/AIDS, Hep B, COVID-> klebsiella SBP, not improving on Abx.    Allergies:   Medications (Abx/Cardiac/Anticoagulation/Blood Products)  atovaquone Suspension: 1500 milliGRAM(s) Oral (01-04 @ 13:03)  cefTRIAXone   IVPB: 100 mL/Hr IV Intermittent (01-03 @ 17:09)  dolutegravir: 50 milliGRAM(s) Oral (01-04 @ 13:03)  emtricitabine 200 mG/tenofovir 300 mG (TRUVADA): 1 Tablet(s) Oral (01-04 @ 13:03)  midodrine.: 20 milliGRAM(s) Oral (01-04 @ 05:46)  midodrine.: 30 milliGRAM(s) Oral (01-04 @ 13:04)  piperacillin/tazobactam IVPB..: 25 mL/Hr IV Intermittent (01-03 @ 08:04)    Data:    T(C): 36.8  HR: 130  BP: 115/68  RR: 19  SpO2: 98%    -WBC 5.81 / HgB 8.2 / Hct 22.8 / Plt 71  -Na 138 / Cl 105 / BUN 10 / Glucose 89  -K 3.4 / CO2 25 / Cr 0.66  -ALT 96 / Alk Phos 54 / T.Bili 14.6  -INR 2.53 / PTT 70.7    Imaging:   US abdomen 12/30/20    IMPRESSION:    Moderate ascites in the right upper, left upper, and left lower quadrants. Left upper quadrant is not well visualized.     Assessment/Plan:   50y Female with PMHx  HIV/AIDS, Hep B, COVID-> klebsiella SBP, not improving on Abx, hepatology would like repeat para for clearance and ID agrees , IR consulted for repeat paracentesis.     - Plan for repeat bedside diagnostic and therapeutic paracentesis in 24-48 hours, schedule permitting.   - Please place IR procedure order under Dr. hCristie.   -  Discussed with Dr. Christie.

## 2021-01-04 NOTE — PROGRESS NOTE ADULT - SUBJECTIVE AND OBJECTIVE BOX
Chief Complaint: Abdominal pain  Reason for consult:     Interval Events: overnight had episodes of vomiting with clear vomit and some specks of blood; hgb largely stable    Hospital Medications:  acetylcysteine IVPB 7 Gram(s) IV Intermittent once  albumin human 25% IVPB 100 milliLiter(s) IV Intermittent every 6 hours  aluminum hydroxide/magnesium hydroxide/simethicone Suspension 30 milliLiter(s) Oral every 6 hours PRN  atovaquone Suspension 1500 milliGRAM(s) Oral daily  dolutegravir 50 milliGRAM(s) Oral daily  emtricitabine 200 mG/tenofovir 300 mG (TRUVADA) 1 Tablet(s) Oral daily  midodrine. 20 milliGRAM(s) Oral three times a day  ondansetron Injectable 4 milliGRAM(s) IV Push every 8 hours PRN  pantoprazole  Injectable 40 milliGRAM(s) IV Push every 12 hours  piperacillin/tazobactam IVPB.. 3.375 Gram(s) IV Intermittent every 8 hours  simethicone 80 milliGRAM(s) Chew four times a day  traMADol 25 milliGRAM(s) Oral every 6 hours PRN      PMHX/PSHX:  Renal Vein Thrombosis    CMV Infection    Hepatitis B    Strongyloidosis    Histoplasmosis    HIV (Human Immunodeficiency Virus Infection)    Gallstones    S/P  Section    S/P Bilateral Tubal Ligation    S/P PEG (Percutaneous Endoscopic Gastrostomy) Placement and Removal    History of Cholecystectomy            ROS:     General:  No weight loss, fevers, chills, night sweats, fatigue   Eyes:  No vision changes  ENT:  No sore throat, pain, runny nose  CV:  No chest pain, palpitations, dizziness   Resp:  No SOB, cough, wheezing  GI:  See HPI  :  No burning with urination, hematuria  Muscle:  No pain, weakness  Neuro:  No weakness/tingling, memory problems  Psych:  No fatigue, insomnia, mood problems, depression  Heme:  No easy bruisability  Skin:  No rash, edema      PHYSICAL EXAM:   GENERAL:  no distress  HEENT:  NC/AT,  conjunctivae clear, scleral icterus  CHEST:  Full & symmetric excursion, no increased effort w/ respirations  HEART:  Regular rhythm & rate  ABDOMEN:  Soft, non-tender, non-distended  EXTREMITIES:  no LE  edema  SKIN:  No rash/erythema/ecchymoses/petechiae/wounds/jaundice  NEURO:  Alert, oriented    Vital Signs:  Vital Signs Last 24 Hrs  T(C): 37.2 (2021 05:28), Max: 37.5 (2021 01:18)  T(F): 98.9 (2021 05:28), Max: 99.5 (:18)  HR: 135 (:) (102 - 135)  BP: 113/76 (:28) (103/65 - 113/76)  BP(mean): --  RR: 18 (:28) (16 - 20)  SpO2: 98% (:) (97% - 100%)  Daily     Daily     LABS:                        7.8    6.66  )-----------( 81       ( 2021 04:53 )             22.0     01-03    135  |  103  |  10  ----------------------------<  101<H>  3.0<L>   |  23  |  0.69    Ca    7.9<L>      2021 04:53  Phos  1.3     01-03  Mg     1.8     -03    TPro  4.0<L>  /  Alb  2.5<L>  /  TBili  13.4<H>  /  DBili  x   /  AST  414<H>  /  ALT  128<H>  /  AlkPhos  58  01-03    LIVER FUNCTIONS - ( 2021 04:53 )  Alb: 2.5 g/dL / Pro: 4.0 g/dL / ALK PHOS: 58 U/L / ALT: 128 U/L / AST: 414 U/L / GGT: x           PT/INR - ( 2021 04:53 )   PT: 25.5 sec;   INR: 2.21 ratio         PTT - ( 2021 04:53 )  PTT:67.2 sec        Imaging:           Chief Complaint: Abdominal pain  Reason for consult: Abnormal liver enzymes, Hep B reactivation    Interval Events:   - No acute events overnight  - Patient endorses nausea, but denies vomiting. Denies bloody emesis, bloody stools, and black tarry stools.    MEDICATIONS  (STANDING):  acetylcysteine IVPB 7 Gram(s) IV Intermittent once  albumin human 25% IVPB 100 milliLiter(s) IV Intermittent every 6 hours  atovaquone Suspension 1500 milliGRAM(s) Oral daily  cefTRIAXone   IVPB 2000 milliGRAM(s) IV Intermittent every 24 hours  dolutegravir 50 milliGRAM(s) Oral daily  emtricitabine 200 mG/tenofovir 300 mG (TRUVADA) 1 Tablet(s) Oral daily  midodrine. 20 milliGRAM(s) Oral three times a day  pantoprazole  Injectable 40 milliGRAM(s) IV Push every 12 hours  potassium phosphate / sodium phosphate Powder (PHOS-NaK) 1 Packet(s) Oral two times a day  potassium phosphate IVPB 30 milliMole(s) IV Intermittent once  simethicone 80 milliGRAM(s) Chew four times a day    MEDICATIONS  (PRN):  aluminum hydroxide/magnesium hydroxide/simethicone Suspension 30 milliLiter(s) Oral every 6 hours PRN Dyspepsia  ondansetron Injectable 4 milliGRAM(s) IV Push every 8 hours PRN Nausea and/or Vomiting  traMADol 25 milliGRAM(s) Oral every 6 hours PRN Moderate Pain (4 - 6)    PMHX/PSHX:  Renal Vein Thrombosis    CMV Infection    Hepatitis B    Strongyloidosis    Histoplasmosis    HIV (Human Immunodeficiency Virus Infection)    Gallstones    S/P  Section    S/P Bilateral Tubal Ligation    S/P PEG (Percutaneous Endoscopic Gastrostomy) Placement and Removal    History of Cholecystectomy    ROS:     General:  No weight loss, fevers, chills, night sweats, fatigue   Eyes:  No vision changes  ENT:  No sore throat, pain, runny nose  CV:  No chest pain, palpitations, dizziness   Resp:  No SOB, cough, wheezing  GI:  + pain, + nausea, no vomiting, no bloody stools, no black tarry stools  :  No burning with urination, hematuria  Muscle:  No pain, weakness  Neuro:  No weakness/tingling, memory problems  Psych:  No fatigue, insomnia, mood problems, depression  Heme:  No easy bruisability  Skin:  No rash, edema    PHYSICAL EXAM:     Vital Signs:  Vital Signs Last 24 Hrs  T(C): 37.4 (2021 05:01), Max: 37.6 (2021 17:00)  T(F): 99.4 (2021 05:01), Max: 99.6 (2021 17:00)  HR: 134 (2021 05:01) (122 - 134)  BP: 114/75 (2021 05:01) (101/68 - 134/77)  BP(mean): --  RR: 20 (2021 05:01) (18 - 20)  SpO2: 97% (2021 05:) (97% - 100%)    GENERAL: Ill-appearing, no acute distress  HEENT:  NC/AT,  conjunctivae clear, scleral icterus  CHEST:  Full & symmetric excursion, no increased effort w/ respirations  HEART:  Regular rhythm & rate  ABDOMEN:  Soft, mild diffuse tenderness, distended  EXTREMITIES:  no LE  edema  SKIN:  No rash/erythema, + jaundice  NEURO:  Alert, oriented    LABS:                        6.9    6.28  )-----------( 72       ( 2021 07:15 )             20.5     01-04    138  |  105  |  10  ----------------------------<  89  3.4<L>   |  25  |  0.66    Ca    8.4      2021 07:06  Phos  1.1     01-04  Mg     1.8     01-04    TPro  4.2<L>  /  Alb  2.7<L>  /  TBili  14.6<H>  /  DBili  x   /  AST  304<H>  /  ALT  96<H>  /  AlkPhos  54  01-04    LIVER FUNCTIONS - ( 2021 07:06 )  Alb: 2.7 g/dL / Pro: 4.2 g/dL / ALK PHOS: 54 U/L / ALT: 96 U/L / AST: 304 U/L / GGT: x           PT/INR - ( 2021 07:15 )   PT: 29.1 sec;   INR: 2.53 ratio         PTT - ( 2021 07:15 )  PTT:70.7 sec    Imaging:    Reviewed

## 2021-01-04 NOTE — PROGRESS NOTE ADULT - ASSESSMENT
50 year old female with past medical history of HIV, CMV, HBV, histoplasmosis, renal vein thrombosis s/p coumadin for 6 months, and strongyloidiasis who presents with abdominal pain found to be COVID-19 PCR positive with acute liver injury 2/2 Hep B reactivation with +SBP, complicated with coagulapathy     #Acute liver injury 2/2 HepB reactivation  Predominantly hepatocellular liver injury, with AST >1800, Alt 600. Also w/ evidence of hepatic synthetic function, with elevated INR, decreased albumin. Etiology likely from HBV, which was previously being treated w/ Symtuza (tenofovir 10mg qd) but patient had been off medication for almost 1 year (until 11/2020) due to insurance lapse. Her outpatient viral load >400,000,000 with hepatitis delta coinfection ruled out. Currently patient is not in acute liver failure, she is mentating well, but is having worsening INR which is concerning.  - c/w truvada. Entecavir stopped  - c/w NAC infusion for acute liver failure (100 mg/kg over 16 hours, will likely need to be reordered daily)  - c/w albumin 100cc 25% q6h  - IVF PRN. Hold off given patient third spacing with bilateral edema  - s/p vitamin K 10mg (12/26 - 12/28, 1/1-1/2)  - dilaudid .5 mg IV prn for pain, dose carefully given HOTN. However, pt has not responded to motrin (hold i/s/o GIB), tylenol (wary i/s/o hepatic dysfunction), ketorolac (hold i/s/o GIB).  - Tramadol 25mg q6hr for mod pain- not very effective per pt   - ascitic fluid: SAAG 2, ascitic fluid protein .8 likely portal HTN however granulocyte count from ascitic fluid >250- consistent with SBP. Grew Klebsiella oxytoca/Raoutella ornithinolytica. Sensitive to many Abx    #Coagulapathy  - worsening coags with initial concern for DIC (thrombocytopenia, elevated coags, d-dimer, low fibrogen). Ep of melena 1/1 s/p cryo, vitamin K, plasma  - heme recs: likely from liver failure. Unlikely hemolysis despite low haptoglobin (liver failure) given rare schistocytes, factor 8 elevated. Initial mixing study negative. Follow up repeat mixing study   - hepatology recs: worsening PTT unlikely from liver failure  - Goal fibrinogen >150, give cyro PRN    #Sepsis 2/2 SBP   Pt noted to be tachy, HOTN, febrile 12/30.   - blood cx 12/31- ngtd  - Ascites fluid grew Klebsiella oxytoca/Raoutella ornithinolytica.   - s/p vanc (12/30-1/1)  - s/p zosyn (12/30-1/3)   - c/w ceftriaxone (1/3- )   - c/w midodrine 30 mg TID     #Anemia:   - Hgb acutely dropped 1/1. FOBT+. c/f GIB s/p cryo 1u, pRBC 1u.   - per heme, coagulopathy 2/2 liver disease  - type and cross x2  - transfuse Hgb <7    #ANTOINETTE:  2/2 hypoperfusion i/s/o sepsis vs medication induced (Abx) vs HRS  - c/w albumin  - avoid nephrotoxic meds if possible  - continue to trend SCr  - if SCr continues to rise or oliguria, consult neph     #AIDS  needs ARV medications to help with her immunosuppression but need to change ARV meds to ones metabolized by the kidney  continue the TAF for help controlling the Hep B infection  - In light of significant transaminitis, discontinued the Symtuza and changed meds to Truvada/Tivicay  - CD4 116 consistent with AIDS  - RNA viral load in process - slightly detectable but cannot account for her acute decompensation in liver disease  - c/w atovaquone for PCP ppx per ID- no Bactrim given hepatic dysfunction    #COVID+  - not eligible for Remdesivir secondary to her LFT abnormalities  - could consider convalescent plasma if she decompensates  - D- dimer rising, fibrinogen labile, ferritin elevated, stable  - Quant TB neg  - blood cx 12/22- no growth, 12/31 ngtd  - f/u urine histo ag. in case recurrence    #CMV viremia   - hold off on Valganciclovir   - CMV viral load <96    #Prophylactic measures  - DVT ppx: hold i/s/o hgb drop  - GERD: pantoprazole 40mg BID for GIB  - Diet: CLD

## 2021-01-04 NOTE — PROGRESS NOTE ADULT - ATTENDING COMMENTS
pt was seen examined  c/o abdominal pain  anayeli pitting edema  - s/p transfusion  - f/u repeat H&H  - for repeat paracentesis  - c/w abx for sbp  - gi and ID following  - prognosis is poor

## 2021-01-04 NOTE — CONSULT NOTE ADULT - PROVIDER SPECIALTY LIST ADULT
Intervent Radiology
Intervent Radiology
Heme/Onc
Hepatology
Infectious Disease
MICU
Infectious Disease

## 2021-01-05 LAB
ALBUMIN FLD-MCNC: 1.6 G/DL — SIGNIFICANT CHANGE UP
ALBUMIN SERPL ELPH-MCNC: 2.8 G/DL — LOW (ref 3.3–5)
ALBUMIN SERPL ELPH-MCNC: 3.3 G/DL — SIGNIFICANT CHANGE UP (ref 3.3–5)
ALP SERPL-CCNC: 50 U/L — SIGNIFICANT CHANGE UP (ref 40–120)
ALP SERPL-CCNC: 51 U/L — SIGNIFICANT CHANGE UP (ref 40–120)
ALT FLD-CCNC: 86 U/L — HIGH (ref 10–45)
ALT FLD-CCNC: 87 U/L — HIGH (ref 10–45)
ANION GAP SERPL CALC-SCNC: 10 MMOL/L — SIGNIFICANT CHANGE UP (ref 5–17)
ANION GAP SERPL CALC-SCNC: 12 MMOL/L — SIGNIFICANT CHANGE UP (ref 5–17)
APTT BLD: 71.1 SEC — HIGH (ref 27.5–35.5)
APTT BLD: 76.1 SEC — HIGH (ref 27.5–35.5)
AST SERPL-CCNC: 275 U/L — HIGH (ref 10–40)
AST SERPL-CCNC: 278 U/L — HIGH (ref 10–40)
B PERT IGG+IGM PNL SER: ABNORMAL
BILIRUB SERPL-MCNC: 14.7 MG/DL — HIGH (ref 0.2–1.2)
BILIRUB SERPL-MCNC: 14.8 MG/DL — HIGH (ref 0.2–1.2)
BUN SERPL-MCNC: 10 MG/DL — SIGNIFICANT CHANGE UP (ref 7–23)
BUN SERPL-MCNC: 9 MG/DL — SIGNIFICANT CHANGE UP (ref 7–23)
CALCIUM SERPL-MCNC: 8 MG/DL — LOW (ref 8.4–10.5)
CALCIUM SERPL-MCNC: 8.4 MG/DL — SIGNIFICANT CHANGE UP (ref 8.4–10.5)
CHLORIDE SERPL-SCNC: 105 MMOL/L — SIGNIFICANT CHANGE UP (ref 96–108)
CHLORIDE SERPL-SCNC: 107 MMOL/L — SIGNIFICANT CHANGE UP (ref 96–108)
CO2 SERPL-SCNC: 24 MMOL/L — SIGNIFICANT CHANGE UP (ref 22–31)
CO2 SERPL-SCNC: 24 MMOL/L — SIGNIFICANT CHANGE UP (ref 22–31)
COLOR FLD: YELLOW — SIGNIFICANT CHANGE UP
CREAT SERPL-MCNC: 0.56 MG/DL — SIGNIFICANT CHANGE UP (ref 0.5–1.3)
CREAT SERPL-MCNC: 0.63 MG/DL — SIGNIFICANT CHANGE UP (ref 0.5–1.3)
CULTURE RESULTS: SIGNIFICANT CHANGE UP
CULTURE RESULTS: SIGNIFICANT CHANGE UP
D DIMER BLD IA.RAPID-MCNC: 1291 NG/ML DDU — HIGH
D DIMER BLD IA.RAPID-MCNC: 1390 NG/ML DDU — HIGH
FACT IX PPP CHRO-ACNC: 20 % — LOW (ref 80–165)
FACT V ACT/NOR PPP: 25 % — LOW (ref 50–150)
FACT VIII ACT/NOR PPP: 320 % — HIGH (ref 60–125)
FERRITIN SERPL-MCNC: 781 NG/ML — HIGH (ref 15–150)
FIBRINOGEN PPP-MCNC: 142 MG/DL — LOW (ref 290–520)
FIBRINOGEN PPP-MCNC: 97 MG/DL — CRITICAL LOW (ref 290–520)
FLUID INTAKE SUBSTANCE CLASS: SIGNIFICANT CHANGE UP
FLUID SEGMENTED GRANULOCYTES: 50 % — SIGNIFICANT CHANGE UP
GLUCOSE FLD-MCNC: 96 MG/DL — SIGNIFICANT CHANGE UP
GLUCOSE SERPL-MCNC: 107 MG/DL — HIGH (ref 70–99)
GLUCOSE SERPL-MCNC: 89 MG/DL — SIGNIFICANT CHANGE UP (ref 70–99)
HAPTOGLOB SERPL-MCNC: <20 MG/DL — LOW (ref 34–200)
HCT VFR BLD CALC: 22.7 % — LOW (ref 34.5–45)
HCT VFR BLD CALC: 23.2 % — LOW (ref 34.5–45)
HDV AB SER-ACNC: NEGATIVE — SIGNIFICANT CHANGE UP
HGB BLD-MCNC: 8 G/DL — LOW (ref 11.5–15.5)
HGB BLD-MCNC: 8 G/DL — LOW (ref 11.5–15.5)
INR BLD: 2.68 RATIO — HIGH (ref 0.88–1.16)
LDH SERPL L TO P-CCNC: 175 U/L — SIGNIFICANT CHANGE UP
LYMPHOCYTES # FLD: 30 % — SIGNIFICANT CHANGE UP
MAGNESIUM SERPL-MCNC: 1.5 MG/DL — LOW (ref 1.6–2.6)
MAGNESIUM SERPL-MCNC: 1.8 MG/DL — SIGNIFICANT CHANGE UP (ref 1.6–2.6)
MCHC RBC-ENTMCNC: 31.9 PG — SIGNIFICANT CHANGE UP (ref 27–34)
MCHC RBC-ENTMCNC: 32 PG — SIGNIFICANT CHANGE UP (ref 27–34)
MCHC RBC-ENTMCNC: 34.5 GM/DL — SIGNIFICANT CHANGE UP (ref 32–36)
MCHC RBC-ENTMCNC: 35.2 GM/DL — SIGNIFICANT CHANGE UP (ref 32–36)
MCV RBC AUTO: 90.8 FL — SIGNIFICANT CHANGE UP (ref 80–100)
MCV RBC AUTO: 92.4 FL — SIGNIFICANT CHANGE UP (ref 80–100)
MONOS+MACROS # FLD: 20 % — SIGNIFICANT CHANGE UP
NRBC # BLD: 0 /100 WBCS — SIGNIFICANT CHANGE UP (ref 0–0)
NRBC # BLD: 0 /100 WBCS — SIGNIFICANT CHANGE UP (ref 0–0)
PHOSPHATE SERPL-MCNC: 1.3 MG/DL — LOW (ref 2.5–4.5)
PHOSPHATE SERPL-MCNC: 1.9 MG/DL — LOW (ref 2.5–4.5)
PLATELET # BLD AUTO: 65 K/UL — LOW (ref 150–400)
PLATELET # BLD AUTO: 70 K/UL — LOW (ref 150–400)
POTASSIUM SERPL-MCNC: 3.1 MMOL/L — LOW (ref 3.5–5.3)
POTASSIUM SERPL-MCNC: 3.3 MMOL/L — LOW (ref 3.5–5.3)
POTASSIUM SERPL-SCNC: 3.1 MMOL/L — LOW (ref 3.5–5.3)
POTASSIUM SERPL-SCNC: 3.3 MMOL/L — LOW (ref 3.5–5.3)
PROT FLD-MCNC: 2.2 G/DL — SIGNIFICANT CHANGE UP
PROT SERPL-MCNC: 3.8 G/DL — LOW (ref 6–8.3)
PROT SERPL-MCNC: 4.4 G/DL — LOW (ref 6–8.3)
PROTHROM AB SERPL-ACNC: 30.7 SEC — HIGH (ref 10.6–13.6)
RBC # BLD: 2.5 M/UL — LOW (ref 3.8–5.2)
RBC # BLD: 2.51 M/UL — LOW (ref 3.8–5.2)
RBC # FLD: 20 % — HIGH (ref 10.3–14.5)
RBC # FLD: 20.3 % — HIGH (ref 10.3–14.5)
RCV VOL RI: 6875 /UL — HIGH (ref 0–0)
SODIUM SERPL-SCNC: 141 MMOL/L — SIGNIFICANT CHANGE UP (ref 135–145)
SODIUM SERPL-SCNC: 141 MMOL/L — SIGNIFICANT CHANGE UP (ref 135–145)
SPECIMEN SOURCE: SIGNIFICANT CHANGE UP
SPECIMEN SOURCE: SIGNIFICANT CHANGE UP
TOTAL NUCLEATED CELL COUNT, BODY FLUID: 710 /UL — SIGNIFICANT CHANGE UP
TUBE TYPE: SIGNIFICANT CHANGE UP
WBC # BLD: 5.23 K/UL — SIGNIFICANT CHANGE UP (ref 3.8–10.5)
WBC # BLD: 5.91 K/UL — SIGNIFICANT CHANGE UP (ref 3.8–10.5)
WBC # FLD AUTO: 5.23 K/UL — SIGNIFICANT CHANGE UP (ref 3.8–10.5)
WBC # FLD AUTO: 5.91 K/UL — SIGNIFICANT CHANGE UP (ref 3.8–10.5)

## 2021-01-05 PROCEDURE — 99446 NTRPROF PH1/NTRNET/EHR 5-10: CPT

## 2021-01-05 PROCEDURE — 49083 ABD PARACENTESIS W/IMAGING: CPT

## 2021-01-05 PROCEDURE — 99232 SBSQ HOSP IP/OBS MODERATE 35: CPT

## 2021-01-05 PROCEDURE — 99231 SBSQ HOSP IP/OBS SF/LOW 25: CPT | Mod: GC

## 2021-01-05 RX ORDER — ACETYLCYSTEINE 200 MG/ML
7 VIAL (ML) MISCELLANEOUS ONCE
Refills: 0 | Status: DISCONTINUED | OUTPATIENT
Start: 2021-01-05 | End: 2021-01-05

## 2021-01-05 RX ORDER — ACETAMINOPHEN 500 MG
650 TABLET ORAL ONCE
Refills: 0 | Status: COMPLETED | OUTPATIENT
Start: 2021-01-05 | End: 2021-01-05

## 2021-01-05 RX ORDER — MAGNESIUM SULFATE 500 MG/ML
2 VIAL (ML) INJECTION ONCE
Refills: 0 | Status: COMPLETED | OUTPATIENT
Start: 2021-01-05 | End: 2021-01-05

## 2021-01-05 RX ORDER — ACETYLCYSTEINE 200 MG/ML
7 VIAL (ML) MISCELLANEOUS
Refills: 0 | Status: DISCONTINUED | OUTPATIENT
Start: 2021-01-05 | End: 2021-01-12

## 2021-01-05 RX ORDER — POTASSIUM CHLORIDE 20 MEQ
40 PACKET (EA) ORAL EVERY 4 HOURS
Refills: 0 | Status: COMPLETED | OUTPATIENT
Start: 2021-01-05 | End: 2021-01-05

## 2021-01-05 RX ORDER — ACETYLCYSTEINE 200 MG/ML
7 VIAL (ML) MISCELLANEOUS
Refills: 0 | Status: DISCONTINUED | OUTPATIENT
Start: 2021-01-05 | End: 2021-01-05

## 2021-01-05 RX ORDER — POTASSIUM PHOSPHATE, MONOBASIC POTASSIUM PHOSPHATE, DIBASIC 236; 224 MG/ML; MG/ML
30 INJECTION, SOLUTION INTRAVENOUS ONCE
Refills: 0 | Status: COMPLETED | OUTPATIENT
Start: 2021-01-05 | End: 2021-01-05

## 2021-01-05 RX ADMIN — Medication 50 GRAM(S): at 09:32

## 2021-01-05 RX ADMIN — Medication 40 MILLIEQUIVALENT(S): at 09:32

## 2021-01-05 RX ADMIN — PANTOPRAZOLE SODIUM 40 MILLIGRAM(S): 20 TABLET, DELAYED RELEASE ORAL at 05:23

## 2021-01-05 RX ADMIN — Medication 650 MILLIGRAM(S): at 22:47

## 2021-01-05 RX ADMIN — DOLUTEGRAVIR SODIUM 50 MILLIGRAM(S): 25 TABLET, FILM COATED ORAL at 13:40

## 2021-01-05 RX ADMIN — ONDANSETRON 4 MILLIGRAM(S): 8 TABLET, FILM COATED ORAL at 13:45

## 2021-01-05 RX ADMIN — Medication 650 MILLIGRAM(S): at 21:57

## 2021-01-05 RX ADMIN — SIMETHICONE 80 MILLIGRAM(S): 80 TABLET, CHEWABLE ORAL at 18:18

## 2021-01-05 RX ADMIN — CEFTRIAXONE 100 MILLIGRAM(S): 500 INJECTION, POWDER, FOR SOLUTION INTRAMUSCULAR; INTRAVENOUS at 18:19

## 2021-01-05 RX ADMIN — PANTOPRAZOLE SODIUM 40 MILLIGRAM(S): 20 TABLET, DELAYED RELEASE ORAL at 18:18

## 2021-01-05 RX ADMIN — SIMETHICONE 80 MILLIGRAM(S): 80 TABLET, CHEWABLE ORAL at 13:41

## 2021-01-05 RX ADMIN — MIDODRINE HYDROCHLORIDE 30 MILLIGRAM(S): 2.5 TABLET ORAL at 05:23

## 2021-01-05 RX ADMIN — Medication 64.69 GRAM(S): at 10:17

## 2021-01-05 RX ADMIN — ATOVAQUONE 1500 MILLIGRAM(S): 750 SUSPENSION ORAL at 13:40

## 2021-01-05 RX ADMIN — Medication 40 MILLIEQUIVALENT(S): at 13:41

## 2021-01-05 RX ADMIN — Medication 1 PACKET(S): at 05:23

## 2021-01-05 RX ADMIN — POTASSIUM PHOSPHATE, MONOBASIC POTASSIUM PHOSPHATE, DIBASIC 83.33 MILLIMOLE(S): 236; 224 INJECTION, SOLUTION INTRAVENOUS at 10:18

## 2021-01-05 RX ADMIN — Medication 50 MILLILITER(S): at 05:23

## 2021-01-05 RX ADMIN — MIDODRINE HYDROCHLORIDE 30 MILLIGRAM(S): 2.5 TABLET ORAL at 18:18

## 2021-01-05 RX ADMIN — Medication 1 PACKET(S): at 18:18

## 2021-01-05 RX ADMIN — Medication 40 MILLIEQUIVALENT(S): at 18:18

## 2021-01-05 RX ADMIN — EMTRICITABINE AND TENOFOVIR DISOPROXIL FUMARATE 1 TABLET(S): 200; 300 TABLET, FILM COATED ORAL at 13:40

## 2021-01-05 RX ADMIN — SIMETHICONE 80 MILLIGRAM(S): 80 TABLET, CHEWABLE ORAL at 05:24

## 2021-01-05 RX ADMIN — SIMETHICONE 80 MILLIGRAM(S): 80 TABLET, CHEWABLE ORAL at 21:54

## 2021-01-05 NOTE — PROGRESS NOTE ADULT - SUBJECTIVE AND OBJECTIVE BOX
INFECTIOUS DISEASES FOLLOW UP-- Hollie Mcadams  [phone]    This is a follow up note for this  50yFemale with  High liver transaminase level  underwent repeat paracentesis this afternoon- fluid studies lending removed 2.7liters        ROS:  CONSTITUTIONAL:  No fever, fair appetite  CARDIOVASCULAR:  No chest pain or palpitations  RESPIRATORY:  No dyspnea  GASTROINTESTINAL:  less nausea, vomiting, diarrhea, or abdominal pain  GENITOURINARY:  No dysuria  NEUROLOGIC:  No headache,     Allergies    No Known Allergies    Intolerances        ANTIBIOTICS/RELEVANT:  antimicrobials  atovaquone Suspension 1500 milliGRAM(s) Oral daily  cefTRIAXone   IVPB 2000 milliGRAM(s) IV Intermittent every 24 hours  dolutegravir 50 milliGRAM(s) Oral daily  emtricitabine 200 mG/tenofovir 300 mG (TRUVADA) 1 Tablet(s) Oral daily    immunologic:    OTHER:  acetylcysteine IVPB 7 Gram(s) IV Intermittent <User Schedule>  aluminum hydroxide/magnesium hydroxide/simethicone Suspension 30 milliLiter(s) Oral every 6 hours PRN  midodrine. 30 milliGRAM(s) Oral three times a day  ondansetron Injectable 4 milliGRAM(s) IV Push every 8 hours PRN  pantoprazole  Injectable 40 milliGRAM(s) IV Push every 12 hours  potassium phosphate / sodium phosphate Powder (PHOS-NaK) 1 Packet(s) Oral two times a day  simethicone 80 milliGRAM(s) Chew four times a day      Objective:  Vital Signs Last 24 Hrs  T(C): 36.9 (05 Jan 2021 18:16), Max: 37.9 (05 Jan 2021 13:43)  T(F): 98.4 (05 Jan 2021 18:16), Max: 100.3 (05 Jan 2021 13:43)  HR: 100 (05 Jan 2021 18:16) (100 - 136)  BP: 99/60 (05 Jan 2021 18:16) (98/64 - 137/76)  BP(mean): --  RR: 18 (05 Jan 2021 18:16) (18 - 20)  SpO2: 100% (05 Jan 2021 18:16) (98% - 100%)    PHYSICAL EXAM:  Constitutional:no acute distress, low grade temp noted  Eyes:PIPO, EOMI, icteric  Ear/Nose/Throat: no oral lesions, 	  Respiratory: clear BL  Cardiovascular: S1S2  Gastrointestinal:soft, (+) BS, no tenderness soft, paracentesis site without erythema  Extremities:no e/e/c  No Lymphadenopathy  IV sites not inflammed.    LABS:                        8.0    5.91  )-----------( 70       ( 05 Jan 2021 07:08 )             23.2     01-05    141  |  105  |  9   ----------------------------<  89  3.1<L>   |  24  |  0.63    Ca    8.4      05 Jan 2021 07:06  Phos  1.3     01-05  Mg     1.5     01-05    TPro  4.4<L>  /  Alb  3.3  /  TBili  14.7<H>  /  DBili  x   /  AST  278<H>  /  ALT  86<H>  /  AlkPhos  50  01-05    PT/INR - ( 05 Jan 2021 06:59 )   PT: 30.7 sec;   INR: 2.68 ratio         PTT - ( 05 Jan 2021 06:59 )  PTT:71.1 sec      MICROBIOLOGY:            RECENT CULTURES:  12-31 @ 20:02  .Body Fluid Peritoneal Fluid  Klebsiella oxytoca /Raoutella ornithinolytica  Klebsiella oxytoca /Raoutella ornithinolytica  FLORIDALMA    Growth in aerobic and anaerobic bottles: Klebsiella oxytoca/Raoutella  ornithinolytica  --  12-31 @ 00:22  .Blood Blood-Venous  --  --  --    No Growth Final  --      RADIOLOGY & ADDITIONAL STUDIES:

## 2021-01-05 NOTE — PROCEDURE NOTE - NSPROCDETAILS_GEN_ALL_CORE
location identified, sterile technique used, catheter introduced, fluid drained/Seldinger technique/sterile dressing applied

## 2021-01-05 NOTE — PROGRESS NOTE ADULT - ASSESSMENT
50 year old female with past medical history of HIV, CMV, HBV, histoplasmosis, renal vein thrombosis s/p coumadin for 6 months, and strongyloidiasis who presents with abdominal pain found to be COVID-19 PCR positive with acute liver injury 2/2 Hep B reactivation with +SBP, complicated with coagulapathy     #Acute liver injury 2/2 HepB reactivation  Predominantly hepatocellular liver injury, with AST >1800, Alt 600. Also w/ evidence of hepatic synthetic function, with elevated INR, decreased albumin. Etiology likely from HBV, which was previously being treated w/ Symtuza (tenofovir 10mg qd) but patient had been off medication for almost 1 year (until 11/2020) due to insurance lapse. Her outpatient viral load >400,000,000 with hepatitis delta coinfection ruled out. Currently patient is not in acute liver failure, she is mentating well, but is having worsening INR which is concerning.  - c/w truvada. Entecavir stopped  - c/w NAC infusion for acute liver failure (100 mg/kg over 16 hours, will likely need to be reordered daily)  - c/w albumin 100cc 25% q6h  - IVF PRN. Hold off given patient third spacing with bilateral edema  - s/p vitamin K 10mg (12/26 - 12/28, 1/1-1/2)  - dilaudid .5 mg IV prn for pain, dose carefully given HOTN. However, pt has not responded to motrin (hold i/s/o GIB), tylenol (wary i/s/o hepatic dysfunction), ketorolac (hold i/s/o GIB).  - Tramadol 25mg q6hr for mod pain- not very effective per pt   - ascitic fluid: SAAG 2, ascitic fluid protein .8 likely portal HTN however granulocyte count from ascitic fluid >250- consistent with SBP. Grew Klebsiella oxytoca/Raoutella ornithinolytica. Sensitive to many Abx    #Coagulapathy  - worsening coags with initial concern for DIC (thrombocytopenia, elevated coags, d-dimer, low fibrogen). Ep of melena 1/1 s/p cryo, vitamin K, plasma  - heme recs: likely from liver failure. Unlikely hemolysis despite low haptoglobin (liver failure) given rare schistocytes, factor 8 elevated. Initial mixing study negative. Follow up repeat mixing study   - hepatology recs: worsening PTT unlikely from liver failure  - Goal fibrinogen >150, give cyro PRN    #Sepsis 2/2 SBP   Pt noted to be tachy, HOTN, febrile 12/30.   - blood cx 12/31- ngtd  - Ascites fluid grew Klebsiella oxytoca/Raoutella ornithinolytica.   - s/p vanc (12/30-1/1)  - s/p zosyn (12/30-1/3)   - c/w ceftriaxone (1/3- )   - c/w midodrine 30 mg TID     #Anemia:   - Hgb acutely dropped 1/1. FOBT+. c/f GIB s/p cryo 1u, pRBC 1u.   - per heme, coagulopathy 2/2 liver disease  - type and cross x2  - transfuse Hgb <7    #ANTOINETTE:  2/2 hypoperfusion i/s/o sepsis vs medication induced (Abx) vs HRS  - c/w albumin  - avoid nephrotoxic meds if possible  - continue to trend SCr  - if SCr continues to rise or oliguria, consult neph     #AIDS  needs ARV medications to help with her immunosuppression but need to change ARV meds to ones metabolized by the kidney  continue the TAF for help controlling the Hep B infection  - In light of significant transaminitis, discontinued the Symtuza and changed meds to Truvada/Tivicay  - CD4 116 consistent with AIDS  - RNA viral load in process - slightly detectable but cannot account for her acute decompensation in liver disease  - c/w atovaquone for PCP ppx per ID- no Bactrim given hepatic dysfunction    #COVID+  - not eligible for Remdesivir secondary to her LFT abnormalities  - could consider convalescent plasma if she decompensates  - D- dimer rising, fibrinogen labile, ferritin elevated, stable  - Quant TB neg  - blood cx 12/22- no growth, 12/31 ngtd  - f/u urine histo ag. in case recurrence    #CMV viremia   - hold off on Valganciclovir   - CMV viral load <96    #Prophylactic measures  - DVT ppx: hold i/s/o hgb drop  - GERD: pantoprazole 40mg BID for GIB  - Diet: CLD 50 year old female with past medical history of HIV, CMV, HBV, histoplasmosis, renal vein thrombosis s/p coumadin for 6 months, and strongyloidiasis who presents with abdominal pain found to be COVID-19 PCR positive with acute liver injury 2/2 Hep B reactivation with +SBP, complicated with coagulapathy     #Acute liver injury 2/2 HepB reactivation  Predominantly hepatocellular liver injury, with AST >1800, Alt 600. Also w/ evidence of hepatic synthetic function, with elevated INR, decreased albumin. Etiology likely from HBV, which was previously being treated w/ Symtuza (tenofovir 10mg qd) but patient had been off medication for almost 1 year (until 11/2020) due to insurance lapse. Her outpatient viral load >400,000,000 with hepatitis delta coinfection ruled out. Currently patient is not in acute liver failure, she is mentating well, but is having worsening INR which is concerning.  - c/w truvada. Entecavir stopped  - c/w NAC infusion for acute liver failure (100 mg/kg over 16 hours, will likely need to be reordered daily)  - c/w albumin 100cc 25% q6h  - IVF PRN. Hold off given patient third spacing with bilateral edema  - s/p vitamin K 10mg (12/26 - 12/28, 1/1-1/2)  - dilaudid .5 mg IV prn for pain, dose carefully given HOTN. However, pt has not responded to motrin (hold i/s/o GIB), tylenol (wary i/s/o hepatic dysfunction), ketorolac (hold i/s/o GIB).  - d/c Tramadol 25mg q6hr for mod pain- not very effective per pt   - ascitic fluid: SAAG 2, ascitic fluid protein .8 likely portal HTN however granulocyte count from ascitic fluid >250- consistent with SBP. Grew Klebsiella oxytoca/Raoutella ornithinolytica. Sensitive to many Abx    #Coagulapathy  - worsening coags with initial concern for DIC (thrombocytopenia, elevated coags, d-dimer, low fibrogen). Ep of melena 1/1 s/p cryo, vitamin K, plasma  - heme recs: likely from liver failure. Unlikely hemolysis despite low haptoglobin (liver failure) given rare schistocytes, factor 8 elevated. Initial mixing study negative. Follow up repeat mixing study   - hepatology recs: worsening PTT unlikely from liver failure  - Goal fibrinogen >150, give cyro PRN    #Sepsis 2/2 SBP   Pt noted to be tachy, HOTN, febrile 12/30.   - blood cx 12/31- ngtd  - Ascites fluid grew Klebsiella oxytoca/Raoutella ornithinolytica.   - s/p vanc (12/30-1/1)  - s/p zosyn (12/30-1/3)   - c/w ceftriaxone (1/3- )   - c/w midodrine 30 mg TID   - IR consulted for repeat para to determine resolution of SBP     #Anemia:   - Hgb acutely dropped 1/1. FOBT+. c/f GIB s/p cryo 1u, pRBC 1u.   - per heme, coagulopathy 2/2 liver disease  - type and cross x2  - transfuse Hgb <7    #ANTOINETTE:  2/2 hypoperfusion i/s/o sepsis vs medication induced (Abx) vs HRS  - c/w albumin  - avoid nephrotoxic meds if possible  - continue to trend SCr  - if SCr continues to rise or oliguria, consult neph     #AIDS  needs ARV medications to help with her immunosuppression but need to change ARV meds to ones metabolized by the kidney  continue the TAF for help controlling the Hep B infection  - In light of significant transaminitis, discontinued the Symtuza and changed meds to Truvada/Tivicay  - CD4 116 consistent with AIDS  - RNA viral load in process - slightly detectable but cannot account for her acute decompensation in liver disease  - c/w atovaquone for PCP ppx per ID- no Bactrim given hepatic dysfunction    #COVID+  - not eligible for Remdesivir secondary to her LFT abnormalities  - could consider convalescent plasma if she decompensates  - D- dimer rising, fibrinogen labile, ferritin elevated, stable  - Quant TB neg  - blood cx 12/22- no growth, 12/31 ngtd  - f/u urine histo ag. in case recurrence    #CMV viremia   - hold off on Valganciclovir   - CMV viral load <96    #Prophylactic measures  - DVT ppx: hold i/s/o hgb drop  - GERD: pantoprazole 40mg BID for GIB  - Diet: CLD

## 2021-01-05 NOTE — PROGRESS NOTE ADULT - SUBJECTIVE AND OBJECTIVE BOX
INTERVAL HPI/OVERNIGHT EVENTS:  Patient S&E at bedside. No o/n events.     VITAL SIGNS:  T(F): 98.6 (01-05-21 @ 09:21)  HR: 116 (01-05-21 @ 09:21)  BP: 137/76 (01-05-21 @ 09:21)  RR: 19 (01-05-21 @ 09:21)  SpO2: 98% (01-05-21 @ 09:21)  Wt(kg): --    PHYSICAL EXAM:    Constitutional: NAD  Eyes: EOMI, sclera non-icteric  Neck: supple  Respiratory: CTAB, no wheezes or crackles   Cardiovascular: RRR  Gastrointestinal: soft, NTND, + BS  Extremities: no cyanosis, clubbing or edema   Neurological: awake and alert      MEDICATIONS  (STANDING):  acetylcysteine IVPB 7 Gram(s) IV Intermittent <User Schedule>  atovaquone Suspension 1500 milliGRAM(s) Oral daily  cefTRIAXone   IVPB 2000 milliGRAM(s) IV Intermittent every 24 hours  dolutegravir 50 milliGRAM(s) Oral daily  emtricitabine 200 mG/tenofovir 300 mG (TRUVADA) 1 Tablet(s) Oral daily  midodrine. 30 milliGRAM(s) Oral three times a day  pantoprazole  Injectable 40 milliGRAM(s) IV Push every 12 hours  potassium chloride    Tablet ER 40 milliEquivalent(s) Oral every 4 hours  potassium phosphate / sodium phosphate Powder (PHOS-NaK) 1 Packet(s) Oral two times a day  simethicone 80 milliGRAM(s) Chew four times a day    MEDICATIONS  (PRN):  aluminum hydroxide/magnesium hydroxide/simethicone Suspension 30 milliLiter(s) Oral every 6 hours PRN Dyspepsia  ondansetron Injectable 4 milliGRAM(s) IV Push every 8 hours PRN Nausea and/or Vomiting      Allergies    No Known Allergies    Intolerances        LABS:                        8.0    5.91  )-----------( 70       ( 05 Jan 2021 07:08 )             23.2     01-05    141  |  105  |  9   ----------------------------<  89  3.1<L>   |  24  |  0.63    Ca    8.4      05 Jan 2021 07:06  Phos  1.3     01-05  Mg     1.5     01-05    TPro  4.4<L>  /  Alb  3.3  /  TBili  14.7<H>  /  DBili  x   /  AST  278<H>  /  ALT  86<H>  /  AlkPhos  50  01-05    PT/INR - ( 05 Jan 2021 06:59 )   PT: 30.7 sec;   INR: 2.68 ratio         PTT - ( 05 Jan 2021 06:59 )  PTT:71.1 sec      RADIOLOGY & ADDITIONAL TESTS:  Studies reviewed.   INTERVAL HPI/OVERNIGHT EVENTS:  Patient S&E at bedside. No o/n events.     VITAL SIGNS:  T(F): 98.6 (01-05-21 @ 09:21)  HR: 116 (01-05-21 @ 09:21)  BP: 137/76 (01-05-21 @ 09:21)  RR: 19 (01-05-21 @ 09:21)  SpO2: 98% (01-05-21 @ 09:21)  Wt(kg): --    PHYSICAL EXAM:  Deferred to limit COVID exposure      MEDICATIONS  (STANDING):  acetylcysteine IVPB 7 Gram(s) IV Intermittent <User Schedule>  atovaquone Suspension 1500 milliGRAM(s) Oral daily  cefTRIAXone   IVPB 2000 milliGRAM(s) IV Intermittent every 24 hours  dolutegravir 50 milliGRAM(s) Oral daily  emtricitabine 200 mG/tenofovir 300 mG (TRUVADA) 1 Tablet(s) Oral daily  midodrine. 30 milliGRAM(s) Oral three times a day  pantoprazole  Injectable 40 milliGRAM(s) IV Push every 12 hours  potassium chloride    Tablet ER 40 milliEquivalent(s) Oral every 4 hours  potassium phosphate / sodium phosphate Powder (PHOS-NaK) 1 Packet(s) Oral two times a day  simethicone 80 milliGRAM(s) Chew four times a day    MEDICATIONS  (PRN):  aluminum hydroxide/magnesium hydroxide/simethicone Suspension 30 milliLiter(s) Oral every 6 hours PRN Dyspepsia  ondansetron Injectable 4 milliGRAM(s) IV Push every 8 hours PRN Nausea and/or Vomiting      Allergies    No Known Allergies    Intolerances        LABS:                        8.0    5.91  )-----------( 70       ( 05 Jan 2021 07:08 )             23.2     01-05    141  |  105  |  9   ----------------------------<  89  3.1<L>   |  24  |  0.63    Ca    8.4      05 Jan 2021 07:06  Phos  1.3     01-05  Mg     1.5     01-05    TPro  4.4<L>  /  Alb  3.3  /  TBili  14.7<H>  /  DBili  x   /  AST  278<H>  /  ALT  86<H>  /  AlkPhos  50  01-05    PT/INR - ( 05 Jan 2021 06:59 )   PT: 30.7 sec;   INR: 2.68 ratio         PTT - ( 05 Jan 2021 06:59 )  PTT:71.1 sec      RADIOLOGY & ADDITIONAL TESTS:  Studies reviewed.

## 2021-01-05 NOTE — PROVIDER CONTACT NOTE (CHANGE IN STATUS NOTIFICATION) - SITUATION
Patient in abdominal discomfort endorsing pain, asking for an IV push medication for pain because previous PO med has not been working well.
pt. noted with high Heart rate, T 37.9, Cryoprecipitate to be administered.
Patient hypotensive w/ BP of 86/67 & tachy to 116. C/o of 10/10 abdominal pain. Nausea noted w/ decreased appetite.

## 2021-01-05 NOTE — PROCEDURE NOTE - ADDITIONAL PROCEDURE DETAILS
RUQ ascites acccessed with a 5 [place] drainer. Removed 2700cc of dark yellow fluid. Specimen sent for chem and culture.

## 2021-01-05 NOTE — PROGRESS NOTE ADULT - ASSESSMENT
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   Overall improved liver enzymes and pain. HBV viral load down from 483 million 12/2 to 36 million 12/29.   Other causes for liver failure unlikely or ruled out- COVID PCR positive, CMV low level viremia, HDV, HCV, AIDS cholangiopathy,   Became febrile 12/30 night (not recorded) and found to have Klebsiella SBP.   Still nauseous but afebrile and heading in the right direction.     Suggest:    # SBP  follow up ascitic fluid cell count and cultures  will add fluid for AFB testing  Ceftriaxone may be contributing to her cholestatic picture- await fluid studies but if antibiotics still needed will change to ertapenem or cefepime  -      -continue Tivicay and Truvada for HIV, the latter for HBV as well   -f/u HBV resistance testing   - repeat HBV viral load as LFTS have improved  -Atovaquone for PJP prophylaxis until CD4 >200 for three months     -supportive care and isolation precautions for COVID, currently on room air and no cough    Chencho Mcadams MD  [phone]  After 5pm/weekends [phone]

## 2021-01-05 NOTE — PROGRESS NOTE ADULT - ASSESSMENT
51 yo F PMHx HIV, CMV, HBV, histoplasmosis, renal vein thrombosis s/p Coumadin x 6 months, admitted with COVID and acute liver injury.  Heme consulted for anemia and coagulopathy    #Coagulopathy  - 12/29 mixing study fully corrected, factor V and IX low, VIII normal indicating coagulopathy of liver disease  - smear reviewed on 12/29 and again today 1/1/21 which are similar showing markedly increased target cells, very rare occasional schistocyte which could be artifact, juno cells. Toxic neutrophils. No immature wbcs seen. true thrombocytopenia.   - coagulopathy worsening differential includes liver dysfunction and DIC. Although could represent early DIC in the setting of liver dysfunction (tbili >10). Her smear and factor levels consistent with liver disease however clinically coagulopathy worsened, acute GI bleed, and becoming more thrombocytopenic   -Please send mixing study again   -Fibrinogen 127 which can be low in liver failure. She is actively having a GI bleed, rec 4 cryo, 2 FFP, and Vitamin K.   -Hemoglobin 6.8, please transfuse to keep >7  -If develops hemodynamic instability need call ICU   -continue to trend DIC labs q12hr and transfuse cryo if <150 and FFP if continues to bleed   -f/u any hepatology recs and GI consult for bleed   -Patient could be developing DIC 2/2 infection (ascitic fluid positive 12/31 for GNR) although no schistocytes seen on smear. She is bleeding from the GI tract but no other bleeding sites (i.e. IV) which if this is the case the treatment is to treat the underlying cause and since she is high risk of bleeding with her coagulopathy and thrombocytopenia recommend transfusions as stated above.     #Thrombocytopenia   -She has multiple causes for thrombocytopenia with viral infection that can cause decreased marrow production, decreased TPO production due to liver failure, and portal hypertension with splenomegaly which can cause sequestration. Acutely it appears she has a bacterial infection (SBP) which can further exacerbate her liver failure and cause worsening thrombocytopenia along with possible element of consumption (DIC). She also has been combating COVID19 and reactivation of HBV.   -transfuse for plt >50K if bleeding, >15K if febrile  -smear reviewed as stated above, occasional if not any schistocytes, not likely TTP/TMA   -continue to trend     #SBP  -12/31 ascitic fluid positive for GNR   -c/w antibiotics, zosyn can worsen thrombocytopenia, if plt count continues to drop consider changing abx     #Normocytic Anemia: likely dilutional and worsening with active infection  - iron studies consistent with AOCD   - b12, folate normal  - retic 3%, inappropriately low  - transfuse if hg < 7.0   49 yo F PMHx HIV, CMV, HBV, histoplasmosis, renal vein thrombosis s/p Coumadin x 6 months, admitted with COVID and acute liver injury.  Heme consulted for anemia and coagulopathy    #Coagulopathy  - 12/29 mixing study fully corrected, factor V and IX low, VIII normal indicating coagulopathy of liver disease  - smear reviewed on 12/29 and again 1/1/21 which are similar showing markedly increased target cells, very rare occasional schistocyte which could be artifact, juno cells. Toxic neutrophils. No immature wbcs seen. true thrombocytopenia.   - coagulopathy worsening differential includes liver dysfunction and DIC. Although could represent early DIC in the setting of liver dysfunction (tbili >10). Her smear and factor levels consistent with liver disease however clinically coagulopathy worsened, acute GI bleed, and becoming more thrombocytopenic   -Repeat mixing study on 1/4 again fully corrected  -Factor V, IX, VIII pending  -Please transfuse to keep >7  -continue to trend DIC labs q12hr and transfuse cryo if <150 and FFP if continues to bleed   -We will await the results of her repeat factor studies. If this is DIC, treatment would need to address the underlying issues (ie her liver failure and infection). Transfuse Cryo and FFP as needed.    #Thrombocytopenia   -She has multiple causes for thrombocytopenia with viral infection that can cause decreased marrow production, decreased TPO production due to liver failure, and portal hypertension with splenomegaly which can cause sequestration. Acutely it appears she has a bacterial infection (SBP) which can further exacerbate her liver failure and cause worsening thrombocytopenia along with possible element of consumption (DIC). She also has been combating COVID19 and reactivation of HBV.   -transfuse for plt >50K if bleeding, >15K if febrile  -smear reviewed as stated above, occasional if not any schistocytes, not likely TTP/TMA   -continue to trend     #SBP  -12/31 ascitic fluid positive for GNR   -c/w antibiotics, zosyn can worsen thrombocytopenia, if plt count continues to drop consider changing abx     #Normocytic Anemia: likely dilutional and worsening with active infection  - iron studies consistent with AOCD   - b12, folate normal  - retic 3%, inappropriately low  - transfuse if hg < 7.0    Windy Goodwin MD  Hematology/Oncology Fellow, PGY-4  Pager: [phone]  After 5pm and on weekends please page on-call fellow 51 yo F PMHx HIV, CMV, HBV, histoplasmosis, renal vein thrombosis s/p Coumadin x 6 months, admitted with COVID and acute liver injury.  Heme consulted for anemia and coagulopathy    #Coagulopathy  - 12/29 mixing study fully corrected, factor V and IX low, VIII normal indicating coagulopathy of liver disease  - smear reviewed on 12/29 and again 1/1/21 which are similar showing markedly increased target cells, very rare occasional schistocyte which could be artifact, juno cells. Toxic neutrophils. No immature wbcs seen. true thrombocytopenia.   - coagulopathy worsening differential includes liver dysfunction and DIC. Although could represent early DIC in the setting of liver dysfunction (tbili >10). Her smear and factor levels consistent with liver disease however clinically coagulopathy worsened, acute GI bleed, and becoming more thrombocytopenic   -Repeat mixing study on 1/4 again fully corrected  -Repeat Factor VIII level on 1/5 is again normal. This is more indicative of coagulopathy due to liver disease.   -Please transfuse to keep >7  -continue to trend DIC labs q12hr and transfuse cryo if <150 and FFP if continues to bleed   -Transfuse Cryo and FFP as needed.    #Thrombocytopenia   -She has multiple causes for thrombocytopenia with viral infection that can cause decreased marrow production, decreased TPO production due to liver failure, and portal hypertension with splenomegaly which can cause sequestration. Acutely it appears she has a bacterial infection (SBP) which can further exacerbate her liver failure and cause worsening thrombocytopenia along with possible element of consumption (DIC). She also has been combating COVID19 and reactivation of HBV.   -transfuse for plt >50K if bleeding, >15K if febrile  -smear reviewed as stated above, occasional if not any schistocytes, not likely TTP/TMA   -continue to trend     #SBP  -12/31 ascitic fluid positive for GNR   -c/w antibiotics, zosyn can worsen thrombocytopenia, if plt count continues to drop consider changing abx     #Normocytic Anemia: likely dilutional and worsening with active infection  - iron studies consistent with AOCD   - b12, folate normal  - retic 3%, inappropriately low  - transfuse if hg < 7.0    Windy Goodwin MD  Hematology/Oncology Fellow, PGY-4  Pager: [phone]  After 5pm and on weekends please page on-call fellow 51 yo F PMHx HIV, CMV, HBV, histoplasmosis, renal vein thrombosis s/p Coumadin x 6 months, admitted with COVID and acute liver injury.  Heme consulted for anemia and coagulopathy    #Coagulopathy  - 12/29 mixing study fully corrected, factor V and IX low, VIII normal indicating coagulopathy of liver disease  - smear reviewed on 12/29 and again 1/1/21 which are similar showing markedly increased target cells, very rare occasional schistocyte which could be artifact, juno cells. Toxic neutrophils. No immature wbcs seen. true thrombocytopenia.   - coagulopathy worsening differential includes liver dysfunction and DIC. Although could represent early DIC in the setting of liver dysfunction (tbili >10). Her smear and factor levels consistent with liver disease however clinically coagulopathy worsened, acute GI bleed, and becoming more thrombocytopenic   -Repeat mixing study on 1/4 again fully corrected  -Repeat Factor VIII level on 1/5 is again normal. This is more indicative of coagulopathy due to liver disease.   -Please transfuse to keep >7  -continue to trend DIC labs q12hr and transfuse cryo if <150 and FFP if continues to bleed   -Transfuse Cryo and FFP as needed.    #Thrombocytopenia   -She has multiple causes for thrombocytopenia with viral infection that can cause decreased marrow production, decreased TPO production due to liver failure, and portal hypertension with splenomegaly which can cause sequestration. Acutely it appears she has a bacterial infection (SBP) which can further exacerbate her liver failure and cause worsening thrombocytopenia along with possible element of consumption (DIC). She also has been combating COVID19 and reactivation of HBV.   -transfuse for plt >50K if bleeding, >15K if febrile  -smear reviewed as stated above, occasional if not any schistocytes, not likely TTP/TMA   -continue to trend     Hematology will sign off at this time. Please page or call with questions.     Windy Goodwin MD  Hematology/Oncology Fellow, PGY-4  Pager: [phone]  After 5pm and on weekends please page on-call fellow

## 2021-01-05 NOTE — PROGRESS NOTE ADULT - SUBJECTIVE AND OBJECTIVE BOX
Authored by Gee Gutiérrez MD ([phone]) [facility]    Patient is a 50y old  Female who presents with a chief complaint of abdominal pain (05 Jan 2021 07:33)    SUBJECTIVE / OVERNIGHT EVENTS: NAEON. This am pt appears comfortable and well sitting up in her chair, NAD. Notes her abd pain is controlled. Denies BRBPR, melena. Denies HA, cp, SOB, dysuria, hematuria.     MEDICATIONS  (STANDING):  acetylcysteine IVPB 7 Gram(s) IV Intermittent <User Schedule>  atovaquone Suspension 1500 milliGRAM(s) Oral daily  cefTRIAXone   IVPB 2000 milliGRAM(s) IV Intermittent every 24 hours  dolutegravir 50 milliGRAM(s) Oral daily  emtricitabine 200 mG/tenofovir 300 mG (TRUVADA) 1 Tablet(s) Oral daily  midodrine. 30 milliGRAM(s) Oral three times a day  pantoprazole  Injectable 40 milliGRAM(s) IV Push every 12 hours  potassium chloride    Tablet ER 40 milliEquivalent(s) Oral every 4 hours  potassium phosphate / sodium phosphate Powder (PHOS-NaK) 1 Packet(s) Oral two times a day  simethicone 80 milliGRAM(s) Chew four times a day    MEDICATIONS  (PRN):  aluminum hydroxide/magnesium hydroxide/simethicone Suspension 30 milliLiter(s) Oral every 6 hours PRN Dyspepsia  ondansetron Injectable 4 milliGRAM(s) IV Push every 8 hours PRN Nausea and/or Vomiting  traMADol 25 milliGRAM(s) Oral every 6 hours PRN Moderate Pain (4 - 6)    I&O's Summary    04 Jan 2021 07:01  -  05 Jan 2021 07:00  --------------------------------------------------------  IN: 2754 mL / OUT: 1150 mL / NET: 1604 mL    PHYSICAL EXAM:  Vital Signs Last 24 Hrs  T(C): 37 (05 Jan 2021 09:21), Max: 37.3 (04 Jan 2021 13:00)  T(F): 98.6 (05 Jan 2021 09:21), Max: 99.2 (04 Jan 2021 13:00)  HR: 116 (05 Jan 2021 09:21) (116 - 136)  BP: 137/76 (05 Jan 2021 09:21) (98/64 - 137/76)  BP(mean): --  RR: 19 (05 Jan 2021 09:21) (19 - 22)  SpO2: 98% (05 Jan 2021 09:21) (98% - 100%)    GENERAL: No acute distress  HEAD: Atraumatic, Normocephalic  EYES: EOMI, PERRLA, conjunctiva and sclera clear  NECK: Supple, no lymphadenopathy, no JVD  CHEST/LUNG: CTAB; No wheezes, rales, or rhonchi  HEART: +Tachycardic. No murmurs, rubs, or gallops  ABDOMEN: +Distended. Soft, non-tender; normal bowel sounds, no organomegaly  EXTREMITIES: 2+ peripheral pulses b/l, No clubbing, cyanosis, or edema  NEUROLOGY: A&O x 3, no focal deficits  SKIN: No rashes or lesions    LABS:                        8.0    5.91  )-----------( 70       ( 05 Jan 2021 07:08 )             23.2     01-05    141  |  105  |  9   ----------------------------<  89  3.1<L>   |  24  |  0.63    Ca    8.4      05 Jan 2021 07:06  Phos  1.3     01-05  Mg     1.5     01-05    TPro  4.4<L>  /  Alb  3.3  /  TBili  14.7<H>  /  DBili  x   /  AST  278<H>  /  ALT  86<H>  /  AlkPhos  50  01-05    PT/INR - ( 05 Jan 2021 06:59 )   PT: 30.7 sec;   INR: 2.68 ratio      PTT - ( 05 Jan 2021 06:59 )  PTT:71.1 sec

## 2021-01-05 NOTE — PROVIDER CONTACT NOTE (CHANGE IN STATUS NOTIFICATION) - ACTION/TREATMENT ORDERED:
"hold Cryoprecipitate, pt. is been having high heart rate"
MD aware; assessed pt at bedside. Ordered for bolus & will consult w/ hepatology. More medications to be ordered.
MD nunezolwedged request and will order 0.5 Dilauded IVP one time dose and for VS to be rechecked 1 hour after administration to ensure no hypotension or decreased oxygenation.

## 2021-01-06 LAB
ALBUMIN SERPL ELPH-MCNC: 2.4 G/DL — LOW (ref 3.3–5)
ALBUMIN SERPL ELPH-MCNC: 2.7 G/DL — LOW (ref 3.3–5)
ALP SERPL-CCNC: 63 U/L — SIGNIFICANT CHANGE UP (ref 40–120)
ALP SERPL-CCNC: 64 U/L — SIGNIFICANT CHANGE UP (ref 40–120)
ALT FLD-CCNC: 98 U/L — HIGH (ref 10–45)
ALT FLD-CCNC: 98 U/L — HIGH (ref 10–45)
ANION GAP SERPL CALC-SCNC: 10 MMOL/L — SIGNIFICANT CHANGE UP (ref 5–17)
ANION GAP SERPL CALC-SCNC: 7 MMOL/L — SIGNIFICANT CHANGE UP (ref 5–17)
APTT BLD: 64.5 SEC — HIGH (ref 27.5–35.5)
APTT BLD: 68.2 SEC — HIGH (ref 27.5–35.5)
AST SERPL-CCNC: 324 U/L — HIGH (ref 10–40)
AST SERPL-CCNC: 330 U/L — HIGH (ref 10–40)
BCP MUTATIONS: SIGNIFICANT CHANGE UP
BILIRUB SERPL-MCNC: 16.4 MG/DL — HIGH (ref 0.2–1.2)
BILIRUB SERPL-MCNC: 16.8 MG/DL — HIGH (ref 0.2–1.2)
BUN SERPL-MCNC: 12 MG/DL — SIGNIFICANT CHANGE UP (ref 7–23)
BUN SERPL-MCNC: 14 MG/DL — SIGNIFICANT CHANGE UP (ref 7–23)
CALCIUM SERPL-MCNC: 7.8 MG/DL — LOW (ref 8.4–10.5)
CALCIUM SERPL-MCNC: 8.1 MG/DL — LOW (ref 8.4–10.5)
CHLORIDE SERPL-SCNC: 106 MMOL/L — SIGNIFICANT CHANGE UP (ref 96–108)
CHLORIDE SERPL-SCNC: 106 MMOL/L — SIGNIFICANT CHANGE UP (ref 96–108)
CO2 SERPL-SCNC: 24 MMOL/L — SIGNIFICANT CHANGE UP (ref 22–31)
CO2 SERPL-SCNC: 26 MMOL/L — SIGNIFICANT CHANGE UP (ref 22–31)
CREAT SERPL-MCNC: 0.61 MG/DL — SIGNIFICANT CHANGE UP (ref 0.5–1.3)
CREAT SERPL-MCNC: 0.64 MG/DL — SIGNIFICANT CHANGE UP (ref 0.5–1.3)
CULTURE RESULTS: SIGNIFICANT CHANGE UP
D DIMER BLD IA.RAPID-MCNC: 1553 NG/ML DDU — HIGH
D DIMER BLD IA.RAPID-MCNC: 1664 NG/ML DDU — HIGH
FERRITIN SERPL-MCNC: 1027 NG/ML — HIGH (ref 15–150)
FERRITIN SERPL-MCNC: 787 NG/ML — HIGH (ref 15–150)
FIBRINOGEN PPP-MCNC: 124 MG/DL — LOW (ref 290–520)
FIBRINOGEN PPP-MCNC: 164 MG/DL — LOW (ref 290–520)
GLUCOSE SERPL-MCNC: 83 MG/DL — SIGNIFICANT CHANGE UP (ref 70–99)
GLUCOSE SERPL-MCNC: 95 MG/DL — SIGNIFICANT CHANGE UP (ref 70–99)
HAPTOGLOB SERPL-MCNC: <20 MG/DL — LOW (ref 34–200)
HAPTOGLOB SERPL-MCNC: <20 MG/DL — LOW (ref 34–200)
HBV GENTYP SERPL NAA+PROBE: SIGNIFICANT CHANGE UP
HCT VFR BLD CALC: 23.6 % — LOW (ref 34.5–45)
HCT VFR BLD CALC: 25.6 % — LOW (ref 34.5–45)
HEV IGM SER QL: SIGNIFICANT CHANGE UP
HGB BLD-MCNC: 8.1 G/DL — LOW (ref 11.5–15.5)
HGB BLD-MCNC: 9.2 G/DL — LOW (ref 11.5–15.5)
INR BLD: 2.37 RATIO — HIGH (ref 0.88–1.16)
INR BLD: 2.88 RATIO — HIGH (ref 0.88–1.16)
MAGNESIUM SERPL-MCNC: 1.8 MG/DL — SIGNIFICANT CHANGE UP (ref 1.6–2.6)
MAGNESIUM SERPL-MCNC: 1.9 MG/DL — SIGNIFICANT CHANGE UP (ref 1.6–2.6)
MCHC RBC-ENTMCNC: 31.8 PG — SIGNIFICANT CHANGE UP (ref 27–34)
MCHC RBC-ENTMCNC: 32.6 PG — SIGNIFICANT CHANGE UP (ref 27–34)
MCHC RBC-ENTMCNC: 34.3 GM/DL — SIGNIFICANT CHANGE UP (ref 32–36)
MCHC RBC-ENTMCNC: 35.9 GM/DL — SIGNIFICANT CHANGE UP (ref 32–36)
MCV RBC AUTO: 90.8 FL — SIGNIFICANT CHANGE UP (ref 80–100)
MCV RBC AUTO: 92.5 FL — SIGNIFICANT CHANGE UP (ref 80–100)
NIGHT BLUE STAIN TISS: SIGNIFICANT CHANGE UP
NRBC # BLD: 0 /100 WBCS — SIGNIFICANT CHANGE UP (ref 0–0)
NRBC # BLD: 0 /100 WBCS — SIGNIFICANT CHANGE UP (ref 0–0)
ORGANISM # SPEC MICROSCOPIC CNT: SIGNIFICANT CHANGE UP
ORGANISM # SPEC MICROSCOPIC CNT: SIGNIFICANT CHANGE UP
PHOSPHATE SERPL-MCNC: 1.4 MG/DL — LOW (ref 2.5–4.5)
PHOSPHATE SERPL-MCNC: 2.4 MG/DL — LOW (ref 2.5–4.5)
PLATELET # BLD AUTO: 70 K/UL — LOW (ref 150–400)
PLATELET # BLD AUTO: 76 K/UL — LOW (ref 150–400)
POLYMERASE MUTATIONS: SIGNIFICANT CHANGE UP
POTASSIUM SERPL-MCNC: 3.2 MMOL/L — LOW (ref 3.5–5.3)
POTASSIUM SERPL-MCNC: 3.8 MMOL/L — SIGNIFICANT CHANGE UP (ref 3.5–5.3)
POTASSIUM SERPL-SCNC: 3.2 MMOL/L — LOW (ref 3.5–5.3)
POTASSIUM SERPL-SCNC: 3.8 MMOL/L — SIGNIFICANT CHANGE UP (ref 3.5–5.3)
PRECORE MUTATIONS: DETECTED — SIGNIFICANT CHANGE UP
PROT SERPL-MCNC: 4.1 G/DL — LOW (ref 6–8.3)
PROT SERPL-MCNC: 4.1 G/DL — LOW (ref 6–8.3)
PROTHROM AB SERPL-ACNC: 27.3 SEC — HIGH (ref 10.6–13.6)
PROTHROM AB SERPL-ACNC: 32.9 SEC — HIGH (ref 10.6–13.6)
RBC # BLD: 2.55 M/UL — LOW (ref 3.8–5.2)
RBC # BLD: 2.82 M/UL — LOW (ref 3.8–5.2)
RBC # FLD: 19.7 % — HIGH (ref 10.3–14.5)
RBC # FLD: 20 % — HIGH (ref 10.3–14.5)
SODIUM SERPL-SCNC: 139 MMOL/L — SIGNIFICANT CHANGE UP (ref 135–145)
SODIUM SERPL-SCNC: 140 MMOL/L — SIGNIFICANT CHANGE UP (ref 135–145)
SPECIMEN SOURCE: SIGNIFICANT CHANGE UP
SPECIMEN SOURCE: SIGNIFICANT CHANGE UP
WBC # BLD: 6.36 K/UL — SIGNIFICANT CHANGE UP (ref 3.8–10.5)
WBC # BLD: 8.8 K/UL — SIGNIFICANT CHANGE UP (ref 3.8–10.5)
WBC # FLD AUTO: 6.36 K/UL — SIGNIFICANT CHANGE UP (ref 3.8–10.5)
WBC # FLD AUTO: 8.8 K/UL — SIGNIFICANT CHANGE UP (ref 3.8–10.5)

## 2021-01-06 PROCEDURE — 99232 SBSQ HOSP IP/OBS MODERATE 35: CPT

## 2021-01-06 RX ORDER — ERTAPENEM SODIUM 1 G/1
INJECTION, POWDER, LYOPHILIZED, FOR SOLUTION INTRAMUSCULAR; INTRAVENOUS
Refills: 0 | Status: DISCONTINUED | OUTPATIENT
Start: 2021-01-06 | End: 2021-01-06

## 2021-01-06 RX ORDER — CEFEPIME 1 G/1
2000 INJECTION, POWDER, FOR SOLUTION INTRAMUSCULAR; INTRAVENOUS EVERY 8 HOURS
Refills: 0 | Status: COMPLETED | OUTPATIENT
Start: 2021-01-06 | End: 2021-01-07

## 2021-01-06 RX ORDER — MAGNESIUM SULFATE 500 MG/ML
2 VIAL (ML) INJECTION ONCE
Refills: 0 | Status: COMPLETED | OUTPATIENT
Start: 2021-01-06 | End: 2021-01-06

## 2021-01-06 RX ORDER — POTASSIUM PHOSPHATE, MONOBASIC POTASSIUM PHOSPHATE, DIBASIC 236; 224 MG/ML; MG/ML
30 INJECTION, SOLUTION INTRAVENOUS ONCE
Refills: 0 | Status: COMPLETED | OUTPATIENT
Start: 2021-01-06 | End: 2021-01-06

## 2021-01-06 RX ORDER — SODIUM,POTASSIUM PHOSPHATES 278-250MG
2 POWDER IN PACKET (EA) ORAL
Refills: 0 | Status: COMPLETED | OUTPATIENT
Start: 2021-01-06 | End: 2021-01-08

## 2021-01-06 RX ORDER — PANTOPRAZOLE SODIUM 20 MG/1
40 TABLET, DELAYED RELEASE ORAL
Refills: 0 | Status: DISCONTINUED | OUTPATIENT
Start: 2021-01-06 | End: 2021-01-08

## 2021-01-06 RX ORDER — POTASSIUM CHLORIDE 20 MEQ
40 PACKET (EA) ORAL EVERY 4 HOURS
Refills: 0 | Status: COMPLETED | OUTPATIENT
Start: 2021-01-06 | End: 2021-01-06

## 2021-01-06 RX ORDER — ACETAMINOPHEN 500 MG
1000 TABLET ORAL ONCE
Refills: 0 | Status: COMPLETED | OUTPATIENT
Start: 2021-01-06 | End: 2021-01-06

## 2021-01-06 RX ORDER — FUROSEMIDE 40 MG
40 TABLET ORAL ONCE
Refills: 0 | Status: COMPLETED | OUTPATIENT
Start: 2021-01-06 | End: 2021-01-06

## 2021-01-06 RX ADMIN — DOLUTEGRAVIR SODIUM 50 MILLIGRAM(S): 25 TABLET, FILM COATED ORAL at 13:19

## 2021-01-06 RX ADMIN — POTASSIUM PHOSPHATE, MONOBASIC POTASSIUM PHOSPHATE, DIBASIC 83.33 MILLIMOLE(S): 236; 224 INJECTION, SOLUTION INTRAVENOUS at 10:32

## 2021-01-06 RX ADMIN — SIMETHICONE 80 MILLIGRAM(S): 80 TABLET, CHEWABLE ORAL at 13:19

## 2021-01-06 RX ADMIN — Medication 50 GRAM(S): at 08:27

## 2021-01-06 RX ADMIN — SIMETHICONE 80 MILLIGRAM(S): 80 TABLET, CHEWABLE ORAL at 17:44

## 2021-01-06 RX ADMIN — SIMETHICONE 80 MILLIGRAM(S): 80 TABLET, CHEWABLE ORAL at 22:47

## 2021-01-06 RX ADMIN — Medication 64.69 GRAM(S): at 05:52

## 2021-01-06 RX ADMIN — EMTRICITABINE AND TENOFOVIR DISOPROXIL FUMARATE 1 TABLET(S): 200; 300 TABLET, FILM COATED ORAL at 13:19

## 2021-01-06 RX ADMIN — MIDODRINE HYDROCHLORIDE 30 MILLIGRAM(S): 2.5 TABLET ORAL at 05:53

## 2021-01-06 RX ADMIN — Medication 40 MILLIGRAM(S): at 13:41

## 2021-01-06 RX ADMIN — Medication 40 MILLIEQUIVALENT(S): at 08:31

## 2021-01-06 RX ADMIN — PANTOPRAZOLE SODIUM 40 MILLIGRAM(S): 20 TABLET, DELAYED RELEASE ORAL at 05:53

## 2021-01-06 RX ADMIN — ATOVAQUONE 1500 MILLIGRAM(S): 750 SUSPENSION ORAL at 13:17

## 2021-01-06 RX ADMIN — Medication 2 PACKET(S): at 08:32

## 2021-01-06 RX ADMIN — MIDODRINE HYDROCHLORIDE 30 MILLIGRAM(S): 2.5 TABLET ORAL at 13:19

## 2021-01-06 RX ADMIN — CEFEPIME 100 MILLIGRAM(S): 1 INJECTION, POWDER, FOR SOLUTION INTRAMUSCULAR; INTRAVENOUS at 21:38

## 2021-01-06 RX ADMIN — SIMETHICONE 80 MILLIGRAM(S): 80 TABLET, CHEWABLE ORAL at 05:53

## 2021-01-06 RX ADMIN — CEFEPIME 100 MILLIGRAM(S): 1 INJECTION, POWDER, FOR SOLUTION INTRAMUSCULAR; INTRAVENOUS at 13:20

## 2021-01-06 RX ADMIN — PANTOPRAZOLE SODIUM 40 MILLIGRAM(S): 20 TABLET, DELAYED RELEASE ORAL at 13:26

## 2021-01-06 RX ADMIN — Medication 400 MILLIGRAM(S): at 22:13

## 2021-01-06 RX ADMIN — Medication 40 MILLIEQUIVALENT(S): at 10:32

## 2021-01-06 RX ADMIN — Medication 1 PACKET(S): at 05:53

## 2021-01-06 RX ADMIN — MIDODRINE HYDROCHLORIDE 30 MILLIGRAM(S): 2.5 TABLET ORAL at 17:43

## 2021-01-06 RX ADMIN — Medication 2 PACKET(S): at 17:44

## 2021-01-06 NOTE — PROGRESS NOTE ADULT - SUBJECTIVE AND OBJECTIVE BOX
Authored by Gee Gutiérrez MD ([phone]) [facility]    Patient is a 50y old  Female who presents with a chief complaint of abdominal pain (05 Jan 2021 19:20)    SUBJECTIVE / OVERNIGHT EVENTS:    ADDITIONAL REVIEW OF SYSTEMS:    MEDICATIONS  (STANDING):  acetylcysteine IVPB 7 Gram(s) IV Intermittent <User Schedule>  atovaquone Suspension 1500 milliGRAM(s) Oral daily  cefTRIAXone   IVPB 2000 milliGRAM(s) IV Intermittent every 24 hours  dolutegravir 50 milliGRAM(s) Oral daily  emtricitabine 200 mG/tenofovir 300 mG (TRUVADA) 1 Tablet(s) Oral daily  magnesium sulfate  IVPB 2 Gram(s) IV Intermittent once  midodrine. 30 milliGRAM(s) Oral three times a day  pantoprazole  Injectable 40 milliGRAM(s) IV Push every 12 hours  potassium chloride    Tablet ER 40 milliEquivalent(s) Oral every 4 hours  potassium phosphate / sodium phosphate Powder (PHOS-NaK) 2 Packet(s) Oral two times a day  simethicone 80 milliGRAM(s) Chew four times a day    MEDICATIONS  (PRN):  aluminum hydroxide/magnesium hydroxide/simethicone Suspension 30 milliLiter(s) Oral every 6 hours PRN Dyspepsia  ondansetron Injectable 4 milliGRAM(s) IV Push every 8 hours PRN Nausea and/or Vomiting    I&O's Summary    05 Jan 2021 07:01  -  06 Jan 2021 07:00  --------------------------------------------------------  IN: 2172.5 mL / OUT: 1100 mL / NET: 1072.5 mL    PHYSICAL EXAM:  Vital Signs Last 24 Hrs  T(C): 36.8 (06 Jan 2021 05:45), Max: 38.2 (05 Jan 2021 21:41)  T(F): 98.2 (06 Jan 2021 05:45), Max: 100.7 (05 Jan 2021 21:41)  HR: 116 (06 Jan 2021 05:45) (100 - 124)  BP: 108/76 (06 Jan 2021 05:45) (99/60 - 137/76)  BP(mean): --  RR: 19 (06 Jan 2021 05:45) (18 - 20)  SpO2: 98% (06 Jan 2021 05:45) (98% - 100%)    GENERAL: No acute distress, well-developed  HEAD:  Atraumatic, Normocephalic  EYES: EOMI, PERRLA, conjunctiva and sclera clear  NECK: Supple, no lymphadenopathy, no JVD  CHEST/LUNG: CTAB; No wheezes, rales, or rhonchi  HEART: Regular rate and rhythm; No murmurs, rubs, or gallops  ABDOMEN: Soft, non-tender, non-distended; normal bowel sounds, no organomegaly  EXTREMITIES:  2+ peripheral pulses b/l, No clubbing, cyanosis, or edema  NEUROLOGY: A&O x 3, no focal deficits  SKIN: No rashes or lesions    LABS:                        9.2    8.80  )-----------( 76       ( 06 Jan 2021 06:47 )             25.6     01-05    141  |  107  |  10  ----------------------------<  107<H>  3.3<L>   |  24  |  0.56    Ca    8.0<L>      05 Jan 2021 19:14  Phos  1.9     01-05  Mg     1.8     01-05    TPro  3.8<L>  /  Alb  2.8<L>  /  TBili  14.8<H>  /  DBili  x   /  AST  275<H>  /  ALT  87<H>  /  AlkPhos  51  01-05    PT/INR - ( 05 Jan 2021 06:59 )   PT: 30.7 sec;   INR: 2.68 ratio       PTT - ( 05 Jan 2021 19:14 )  PTT:76.1 sec   Authored by Gee Gutiérrez MD ([phone]) [facility]    Patient is a 50y old  Female who presents with a chief complaint of abdominal pain (05 Jan 2021 19:20)    SUBJECTIVE / OVERNIGHT EVENTS: NAEON. This am pt notes her abd pain is significantly improved s/p para. Denies HA, cp, SOB, abd pain, dysuria, hematuria, diarrhea, constipation.    MEDICATIONS  (STANDING):  acetylcysteine IVPB 7 Gram(s) IV Intermittent <User Schedule>  atovaquone Suspension 1500 milliGRAM(s) Oral daily  cefTRIAXone   IVPB 2000 milliGRAM(s) IV Intermittent every 24 hours  dolutegravir 50 milliGRAM(s) Oral daily  emtricitabine 200 mG/tenofovir 300 mG (TRUVADA) 1 Tablet(s) Oral daily  magnesium sulfate  IVPB 2 Gram(s) IV Intermittent once  midodrine. 30 milliGRAM(s) Oral three times a day  pantoprazole  Injectable 40 milliGRAM(s) IV Push every 12 hours  potassium chloride    Tablet ER 40 milliEquivalent(s) Oral every 4 hours  potassium phosphate / sodium phosphate Powder (PHOS-NaK) 2 Packet(s) Oral two times a day  simethicone 80 milliGRAM(s) Chew four times a day    MEDICATIONS  (PRN):  aluminum hydroxide/magnesium hydroxide/simethicone Suspension 30 milliLiter(s) Oral every 6 hours PRN Dyspepsia  ondansetron Injectable 4 milliGRAM(s) IV Push every 8 hours PRN Nausea and/or Vomiting    I&O's Summary    05 Jan 2021 07:01  -  06 Jan 2021 07:00  --------------------------------------------------------  IN: 2172.5 mL / OUT: 1100 mL / NET: 1072.5 mL    PHYSICAL EXAM:  Vital Signs Last 24 Hrs  T(C): 36.8 (06 Jan 2021 05:45), Max: 38.2 (05 Jan 2021 21:41)  T(F): 98.2 (06 Jan 2021 05:45), Max: 100.7 (05 Jan 2021 21:41)  HR: 116 (06 Jan 2021 05:45) (100 - 124)  BP: 108/76 (06 Jan 2021 05:45) (99/60 - 137/76)  BP(mean): --  RR: 19 (06 Jan 2021 05:45) (18 - 20)  SpO2: 98% (06 Jan 2021 05:45) (98% - 100%)    GENERAL: No acute distress  HEAD: Atraumatic, Normocephalic  EYES: +Scleral icterus. EOMI, PERRLA  NECK: Supple, no lymphadenopathy, no JVD  CHEST/LUNG: CTAB; No wheezes, rales, or rhonchi  HEART: +Tachycardic. No murmurs, rubs, or gallops  ABDOMEN: +Distended. Soft, non-tender; normal bowel sounds, no organomegaly  EXTREMITIES: 2+ peripheral pulses b/l, No clubbing, cyanosis, or edema  NEUROLOGY: A&O x 3, no focal deficits  SKIN: +Jaundiced. No rashes or lesions    LABS:                        9.2    8.80  )-----------( 76       ( 06 Jan 2021 06:47 )             25.6     01-05    141  |  107  |  10  ----------------------------<  107<H>  3.3<L>   |  24  |  0.56    Ca    8.0<L>      05 Jan 2021 19:14  Phos  1.9     01-05  Mg     1.8     01-05    TPro  3.8<L>  /  Alb  2.8<L>  /  TBili  14.8<H>  /  DBili  x   /  AST  275<H>  /  ALT  87<H>  /  AlkPhos  51  01-05    PT/INR - ( 05 Jan 2021 06:59 )   PT: 30.7 sec;   INR: 2.68 ratio       PTT - ( 05 Jan 2021 19:14 )  PTT:76.1 sec

## 2021-01-06 NOTE — PROGRESS NOTE ADULT - ASSESSMENT
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   Overall improved liver enzymes and pain. HBV viral load down from 483 million 12/2 to 36 million 12/29.   Other causes for liver failure unlikely or ruled out- COVID PCR positive, CMV low level viremia, HDV, HCV, AIDS cholangiopathy,   Became febrile 12/30 night (not recorded) and found to have Klebsiella SBP.   Still nauseous but afebrile and heading in the right direction.     Suggest:    # SBP  follow up ascitic fluid cell count and cultures- cell count suggests ongoing infection after correcting for the RBC numbers  cultures for bacteria, fungal and AFB sent and pending  Ceftriaxone changed/broadened to Cefepime   - would suggest repeat imaging of abdomen with CT scan to look for occult pocket of infection/abscess    Edema/liver failure  would ask Hepatology to reconsult for assistance with management  ? albumin and lasix for diuresis and help with her lower extremity edema     -continue Tivicay and Truvada for HIV, the latter for HBV as well   -f/u HBV resistance testing   - repeat HBV viral load as LFTS have improved  -Atovaquone for PJP prophylaxis until CD4 >200 for three months     -supportive care and isolation precautions for COVID, currently on room air and no cough can discontinue after 21 days from first positive test    Chencho Mcadams MD  [phone]  After 5pm/weekends [phone]

## 2021-01-06 NOTE — PROGRESS NOTE ADULT - SUBJECTIVE AND OBJECTIVE BOX
INFECTIOUS DISEASES FOLLOW UP-- Hollie Mcadams  [phone]    This is a follow up note for this  50yFemale with  High liver transaminase level with HBV flare but with worsening cholestatic picture and increasing distal leg edema,        ROS:  CONSTITUTIONAL:  No fever,  no appetite  CARDIOVASCULAR:  No chest pain or palpitations  RESPIRATORY:  No dyspnea  GASTROINTESTINAL:  No nausea, vomiting, diarrhea, or abdominal pain  GENITOURINARY:  No dysuria  NEUROLOGIC:  No headache,     Allergies    No Known Allergies    Intolerances        ANTIBIOTICS/RELEVANT:  antimicrobials  atovaquone Suspension 1500 milliGRAM(s) Oral daily  cefepime   IVPB 2000 milliGRAM(s) IV Intermittent every 8 hours  dolutegravir 50 milliGRAM(s) Oral daily  emtricitabine 200 mG/tenofovir 300 mG (TRUVADA) 1 Tablet(s) Oral daily    immunologic:    OTHER:  acetylcysteine IVPB 7 Gram(s) IV Intermittent <User Schedule>  aluminum hydroxide/magnesium hydroxide/simethicone Suspension 30 milliLiter(s) Oral every 6 hours PRN  midodrine. 30 milliGRAM(s) Oral three times a day  ondansetron Injectable 4 milliGRAM(s) IV Push every 8 hours PRN  pantoprazole    Tablet 40 milliGRAM(s) Oral before breakfast  potassium phosphate / sodium phosphate Powder (PHOS-NaK) 2 Packet(s) Oral two times a day  simethicone 80 milliGRAM(s) Chew four times a day      Objective:  Vital Signs Last 24 Hrs  T(C): 37.2 (06 Jan 2021 17:41), Max: 38.2 (05 Jan 2021 21:41)  T(F): 99 (06 Jan 2021 17:41), Max: 100.7 (05 Jan 2021 21:41)  HR: 99 (06 Jan 2021 17:41) (99 - 121)  BP: 107/66 (06 Jan 2021 17:41) (101/67 - 124/73)  BP(mean): --  RR: 18 (06 Jan 2021 17:41) (18 - 19)  SpO2: 99% (06 Jan 2021 17:41) (98% - 100%)    PHYSICAL EXAM:  Constitutional:no acute distress but marked icterus  Eyes:PIPO, EOMI, icteric  Ear/Nose/Throat: no oral lesions, 	  Respiratory: clear BL  Cardiovascular: S1S2  Gastrointestinal:soft, (+) BS, no tenderness on exam decreased fullness after paracentesis on 1/5  Extremities 3+ edema to her thighs  No Lymphadenopathy  IV sites not inflammed.    LABS:                        8.1    6.36  )-----------( 70       ( 06 Jan 2021 18:18 )             23.6     01-06    139  |  106  |  14  ----------------------------<  95  3.8   |  26  |  0.64    Ca    7.8<L>      06 Jan 2021 18:18  Phos  2.4     01-06  Mg     1.8     01-06    TPro  4.1<L>  /  Alb  2.4<L>  /  TBili  16.4<H>  /  DBili  x   /  AST  330<H>  /  ALT  98<H>  /  AlkPhos  64  01-06    PT/INR - ( 06 Jan 2021 18:19 )   PT: 32.9 sec;   INR: 2.88 ratio         PTT - ( 06 Jan 2021 18:18 )  PTT:68.2 sec      MICROBIOLOGY:  Culture Results:   Testing in progress (01-05 @ 22:32)  Culture Results:   No growth (01-05 @ 22:26)            RECENT CULTURES:  01-05 @ 22:33  .Body Fluid Peritoneal  --  --  --  --  --  01-05 @ 22:32  .Body Fluid Peritoneal Fluid  --  --  --    Testing in progress  --  01-05 @ 22:26  .Body Fluid Peritoneal Fluid  --  --  --    No growth  --  12-31 @ 20:02  .Body Fluid Peritoneal Fluid  Klebsiella oxytoca /Raoutella ornithinolytica  Klebsiella oxytoca /Raoutella ornithinolytica  FLORIDALMA    Growth in aerobic and anaerobic bottles: Klebsiella oxytoca/Raoutella  ornithinolytica  --  12-31 @ 00:22  .Blood Blood-Venous  --  --  --    No Growth Final  --      RADIOLOGY & ADDITIONAL STUDIES:

## 2021-01-06 NOTE — PROGRESS NOTE ADULT - ASSESSMENT
50 year old female with past medical history of HIV, CMV, HBV, histoplasmosis, renal vein thrombosis s/p coumadin for 6 months, and strongyloidiasis who presents with abdominal pain found to be COVID-19 PCR positive with acute liver injury 2/2 Hep B reactivation with +SBP, complicated with coagulapathy     #Acute liver injury 2/2 HepB reactivation  Predominantly hepatocellular liver injury, with AST >1800, Alt 600. Also w/ evidence of hepatic synthetic function, with elevated INR, decreased albumin. Etiology likely from HBV, which was previously being treated w/ Symtuza (tenofovir 10mg qd) but patient had been off medication for almost 1 year (until 11/2020) due to insurance lapse. Her outpatient viral load >400,000,000 with hepatitis delta coinfection ruled out. Currently patient is not in acute liver failure, she is mentating well, but is having worsening INR which is concerning.  - c/w truvada. Entecavir stopped  - c/w NAC infusion for acute liver failure (100 mg/kg over 16 hours, will likely need to be reordered daily)  - c/w albumin 100cc 25% q6h  - IVF PRN. Hold off given patient third spacing with bilateral edema  - s/p vitamin K 10mg (12/26 - 12/28, 1/1-1/2)  - dilaudid .5 mg IV prn for pain, dose carefully given HOTN. However, pt has not responded to motrin (hold i/s/o GIB), tylenol (wary i/s/o hepatic dysfunction), ketorolac (hold i/s/o GIB).  - d/c Tramadol 25mg q6hr for mod pain- not very effective per pt   - ascitic fluid: SAAG 2, ascitic fluid protein .8 likely portal HTN however granulocyte count from ascitic fluid >250- consistent with SBP. Grew Klebsiella oxytoca/Raoutella ornithinolytica. Sensitive to many Abx    #Coagulapathy  - worsening coags with initial concern for DIC (thrombocytopenia, elevated coags, d-dimer, low fibrogen). Ep of melena 1/1 s/p cryo, vitamin K, plasma  - heme recs: likely from liver failure. Unlikely hemolysis despite low haptoglobin (liver failure) given rare schistocytes, factor 8 elevated. Initial mixing study negative. Follow up repeat mixing study   - hepatology recs: worsening PTT unlikely from liver failure  - Goal fibrinogen >150, give cyro PRN    #Sepsis 2/2 SBP   Pt noted to be tachy, HOTN, febrile 12/30.   - blood cx 12/31- ngtd  - Ascites fluid grew Klebsiella oxytoca/Raoutella ornithinolytica.   - s/p vanc (12/30-1/1)  - s/p zosyn (12/30-1/3)   - c/w ceftriaxone (1/3- )   - c/w midodrine 30 mg TID   - IR consulted for repeat para to determine resolution of SBP     #Anemia:   - Hgb acutely dropped 1/1. FOBT+. c/f GIB s/p cryo 1u, pRBC 1u.   - per heme, coagulopathy 2/2 liver disease  - type and cross x2  - transfuse Hgb <7    #ANTOINETTE:  2/2 hypoperfusion i/s/o sepsis vs medication induced (Abx) vs HRS  - c/w albumin  - avoid nephrotoxic meds if possible  - continue to trend SCr  - if SCr continues to rise or oliguria, consult neph     #AIDS  needs ARV medications to help with her immunosuppression but need to change ARV meds to ones metabolized by the kidney  continue the TAF for help controlling the Hep B infection  - In light of significant transaminitis, discontinued the Symtuza and changed meds to Truvada/Tivicay  - CD4 116 consistent with AIDS  - RNA viral load in process - slightly detectable but cannot account for her acute decompensation in liver disease  - c/w atovaquone for PCP ppx per ID- no Bactrim given hepatic dysfunction    #COVID+  - not eligible for Remdesivir secondary to her LFT abnormalities  - could consider convalescent plasma if she decompensates  - D- dimer rising, fibrinogen labile, ferritin elevated, stable  - Quant TB neg  - blood cx 12/22- no growth, 12/31 ngtd  - f/u urine histo ag. in case recurrence    #CMV viremia   - hold off on Valganciclovir   - CMV viral load <96    #Prophylactic measures  - DVT ppx: hold i/s/o hgb drop  - GERD: pantoprazole 40mg BID for GIB  - Diet: CLD 50 year old female with past medical history of HIV, CMV, HBV, histoplasmosis, renal vein thrombosis s/p coumadin for 6 months, and strongyloidiasis who presents with abdominal pain found to be COVID-19 PCR positive with acute liver injury 2/2 Hep B reactivation with +SBP, complicated with coagulapathy     #Acute liver injury 2/2 HepB reactivation  Predominantly hepatocellular liver injury, with AST >1800, Alt 600. Also w/ evidence of hepatic synthetic function, with elevated INR, decreased albumin. Etiology likely from HBV, which was previously being treated w/ Symtuza (tenofovir 10mg qd) but patient had been off medication for almost 1 year (until 11/2020) due to insurance lapse. Her outpatient viral load >400,000,000 with hepatitis delta coinfection ruled out. Currently patient is not in acute liver failure, she is mentating well, but is having worsening INR which is concerning.  - c/w truvada. Entecavir stopped  - c/w NAC infusion for acute liver failure (100 mg/kg over 16 hours, will likely need to be reordered daily)  - c/w albumin 100cc 25% q6h  - IVF PRN. Hold off given patient third spacing with bilateral edema  - s/p vitamin K 10mg (12/26 - 12/28, 1/1-1/2)  - dilaudid .5 mg IV prn for pain, dose carefully given HOTN. However, pt has not responded to motrin (hold i/s/o GIB), tylenol (wary i/s/o hepatic dysfunction), ketorolac (hold i/s/o GIB).  - d/c Tramadol 25mg q6hr for mod pain- not very effective per pt   - ascitic fluid: SAAG 2, ascitic fluid protein .8 likely portal HTN however granulocyte count from ascitic fluid >250- consistent with SBP. Grew Klebsiella oxytoca/Raoutella ornithinolytica. Sensitive to many Abx    #Coagulapathy  - worsening coags with initial concern for DIC (thrombocytopenia, elevated coags, d-dimer, low fibrogen). Ep of melena 1/1 s/p cryo, vitamin K, plasma  - heme recs: likely from liver failure. Unlikely hemolysis despite low haptoglobin (liver failure) given rare schistocytes, factor 8 elevated. Initial mixing study negative. Follow up repeat mixing study   - hepatology recs: worsening PTT unlikely from liver failure  - Goal fibrinogen >150, give cyro PRN    #Sepsis 2/2 SBP   Pt noted to be tachy, HOTN, febrile 12/30.   - blood cx 12/31- ngtd  - Ascites fluid grew Klebsiella oxytoca/Raoutella ornithinolytica.   - s/p vanc (12/30-1/1)  - s/p zosyn (12/30-1/3)   - s/p ceftriaxone (1/3-1/6)   - c/w cefepime (1/6- )  - c/w midodrine 30 mg TID   - IR consulted for repeat para to determine resolution of SBP     #Anemia:   - Hgb acutely dropped 1/1. FOBT+. c/f GIB s/p cryo 1u, pRBC 1u.   - per heme, coagulopathy 2/2 liver disease  - type and cross x2  - transfuse Hgb <7    #AIDS  needs ARV medications to help with her immunosuppression but need to change ARV meds to ones metabolized by the kidney  continue the TAF for help controlling the Hep B infection  - In light of significant transaminitis, discontinued the Symtuza and changed meds to Truvada/Tivicay  - CD4 116 consistent with AIDS  - RNA viral load in process - slightly detectable but cannot account for her acute decompensation in liver disease  - c/w atovaquone for PCP ppx per ID- no Bactrim given hepatic dysfunction    #COVID+  - not eligible for Remdesivir secondary to her LFT abnormalities  - could consider convalescent plasma if she decompensates  - D- dimer rising, fibrinogen labile, ferritin elevated, stable  - Quant TB neg  - blood cx 12/22- no growth, 12/31 no growth final  - urine histo ag. neg    #CMV viremia   - hold off on Valganciclovir   - CMV viral load <96    #Prophylactic measures  - DVT ppx: hold i/s/o hgb drop  - GERD: pantoprazole   - Diet: soft

## 2021-01-07 LAB
ALBUMIN SERPL ELPH-MCNC: 2.1 G/DL — LOW (ref 3.3–5)
ALBUMIN SERPL ELPH-MCNC: 2.4 G/DL — LOW (ref 3.3–5)
ALP SERPL-CCNC: 63 U/L — SIGNIFICANT CHANGE UP (ref 40–120)
ALP SERPL-CCNC: 64 U/L — SIGNIFICANT CHANGE UP (ref 40–120)
ALT FLD-CCNC: 89 U/L — HIGH (ref 10–45)
ALT FLD-CCNC: 94 U/L — HIGH (ref 10–45)
ANION GAP SERPL CALC-SCNC: 7 MMOL/L — SIGNIFICANT CHANGE UP (ref 5–17)
ANION GAP SERPL CALC-SCNC: 8 MMOL/L — SIGNIFICANT CHANGE UP (ref 5–17)
APTT BLD: 70.1 SEC — HIGH (ref 27.5–35.5)
APTT BLD: 74.9 SEC — HIGH (ref 27.5–35.5)
AST SERPL-CCNC: 301 U/L — HIGH (ref 10–40)
AST SERPL-CCNC: 331 U/L — HIGH (ref 10–40)
BILIRUB SERPL-MCNC: 15.1 MG/DL — HIGH (ref 0.2–1.2)
BILIRUB SERPL-MCNC: 16.1 MG/DL — HIGH (ref 0.2–1.2)
BUN SERPL-MCNC: 15 MG/DL — SIGNIFICANT CHANGE UP (ref 7–23)
BUN SERPL-MCNC: 18 MG/DL — SIGNIFICANT CHANGE UP (ref 7–23)
CALCIUM SERPL-MCNC: 7.6 MG/DL — LOW (ref 8.4–10.5)
CALCIUM SERPL-MCNC: 7.8 MG/DL — LOW (ref 8.4–10.5)
CHLORIDE SERPL-SCNC: 102 MMOL/L — SIGNIFICANT CHANGE UP (ref 96–108)
CHLORIDE SERPL-SCNC: 104 MMOL/L — SIGNIFICANT CHANGE UP (ref 96–108)
CO2 SERPL-SCNC: 25 MMOL/L — SIGNIFICANT CHANGE UP (ref 22–31)
CO2 SERPL-SCNC: 25 MMOL/L — SIGNIFICANT CHANGE UP (ref 22–31)
CREAT SERPL-MCNC: 0.7 MG/DL — SIGNIFICANT CHANGE UP (ref 0.5–1.3)
CREAT SERPL-MCNC: 0.77 MG/DL — SIGNIFICANT CHANGE UP (ref 0.5–1.3)
D DIMER BLD IA.RAPID-MCNC: 1104 NG/ML DDU — HIGH
D DIMER BLD IA.RAPID-MCNC: 1353 NG/ML DDU — HIGH
FERRITIN SERPL-MCNC: 1007 NG/ML — HIGH (ref 15–150)
FERRITIN SERPL-MCNC: 1241 NG/ML — HIGH (ref 15–150)
FIBRINOGEN PPP-MCNC: 130 MG/DL — LOW (ref 290–520)
FIBRINOGEN PPP-MCNC: 163 MG/DL — LOW (ref 290–520)
GLUCOSE SERPL-MCNC: 105 MG/DL — HIGH (ref 70–99)
GLUCOSE SERPL-MCNC: 88 MG/DL — SIGNIFICANT CHANGE UP (ref 70–99)
HAPTOGLOB SERPL-MCNC: <20 MG/DL — LOW (ref 34–200)
HAPTOGLOB SERPL-MCNC: <20 MG/DL — LOW (ref 34–200)
HBV DNA # SERPL NAA+PROBE: SIGNIFICANT CHANGE UP IU/ML
HBV DNA SERPL NAA+PROBE-LOG#: 7.33 LOG10IU/ML — SIGNIFICANT CHANGE UP
HCT VFR BLD CALC: 21.7 % — LOW (ref 34.5–45)
HCT VFR BLD CALC: 23 % — LOW (ref 34.5–45)
HGB BLD-MCNC: 7.5 G/DL — LOW (ref 11.5–15.5)
HGB BLD-MCNC: 7.9 G/DL — LOW (ref 11.5–15.5)
INR BLD: 2.73 RATIO — HIGH (ref 0.88–1.16)
INR BLD: 3.04 RATIO — HIGH (ref 0.88–1.16)
MAGNESIUM SERPL-MCNC: 1.9 MG/DL — SIGNIFICANT CHANGE UP (ref 1.6–2.6)
MAGNESIUM SERPL-MCNC: 2 MG/DL — SIGNIFICANT CHANGE UP (ref 1.6–2.6)
MCHC RBC-ENTMCNC: 32.1 PG — SIGNIFICANT CHANGE UP (ref 27–34)
MCHC RBC-ENTMCNC: 32.5 PG — SIGNIFICANT CHANGE UP (ref 27–34)
MCHC RBC-ENTMCNC: 34.3 GM/DL — SIGNIFICANT CHANGE UP (ref 32–36)
MCHC RBC-ENTMCNC: 34.6 GM/DL — SIGNIFICANT CHANGE UP (ref 32–36)
MCV RBC AUTO: 93.5 FL — SIGNIFICANT CHANGE UP (ref 80–100)
MCV RBC AUTO: 93.9 FL — SIGNIFICANT CHANGE UP (ref 80–100)
NRBC # BLD: 0 /100 WBCS — SIGNIFICANT CHANGE UP (ref 0–0)
NRBC # BLD: 0 /100 WBCS — SIGNIFICANT CHANGE UP (ref 0–0)
PHOSPHATE SERPL-MCNC: 1.9 MG/DL — LOW (ref 2.5–4.5)
PHOSPHATE SERPL-MCNC: 3.2 MG/DL — SIGNIFICANT CHANGE UP (ref 2.5–4.5)
PLATELET # BLD AUTO: 68 K/UL — LOW (ref 150–400)
PLATELET # BLD AUTO: 71 K/UL — LOW (ref 150–400)
POTASSIUM SERPL-MCNC: 3.4 MMOL/L — LOW (ref 3.5–5.3)
POTASSIUM SERPL-MCNC: 3.5 MMOL/L — SIGNIFICANT CHANGE UP (ref 3.5–5.3)
POTASSIUM SERPL-SCNC: 3.4 MMOL/L — LOW (ref 3.5–5.3)
POTASSIUM SERPL-SCNC: 3.5 MMOL/L — SIGNIFICANT CHANGE UP (ref 3.5–5.3)
PROT SERPL-MCNC: 3.6 G/DL — LOW (ref 6–8.3)
PROT SERPL-MCNC: 4.1 G/DL — LOW (ref 6–8.3)
PROTHROM AB SERPL-ACNC: 31.2 SEC — HIGH (ref 10.6–13.6)
PROTHROM AB SERPL-ACNC: 34.6 SEC — HIGH (ref 10.6–13.6)
RBC # BLD: 2.31 M/UL — LOW (ref 3.8–5.2)
RBC # BLD: 2.46 M/UL — LOW (ref 3.8–5.2)
RBC # FLD: 19.9 % — HIGH (ref 10.3–14.5)
RBC # FLD: 20 % — HIGH (ref 10.3–14.5)
SODIUM SERPL-SCNC: 135 MMOL/L — SIGNIFICANT CHANGE UP (ref 135–145)
SODIUM SERPL-SCNC: 136 MMOL/L — SIGNIFICANT CHANGE UP (ref 135–145)
WBC # BLD: 7.33 K/UL — SIGNIFICANT CHANGE UP (ref 3.8–10.5)
WBC # BLD: 7.8 K/UL — SIGNIFICANT CHANGE UP (ref 3.8–10.5)
WBC # FLD AUTO: 7.33 K/UL — SIGNIFICANT CHANGE UP (ref 3.8–10.5)
WBC # FLD AUTO: 7.8 K/UL — SIGNIFICANT CHANGE UP (ref 3.8–10.5)

## 2021-01-07 PROCEDURE — 99232 SBSQ HOSP IP/OBS MODERATE 35: CPT

## 2021-01-07 RX ORDER — HYDROMORPHONE HYDROCHLORIDE 2 MG/ML
0.5 INJECTION INTRAMUSCULAR; INTRAVENOUS; SUBCUTANEOUS ONCE
Refills: 0 | Status: DISCONTINUED | OUTPATIENT
Start: 2021-01-07 | End: 2021-01-07

## 2021-01-07 RX ORDER — POTASSIUM PHOSPHATE, MONOBASIC POTASSIUM PHOSPHATE, DIBASIC 236; 224 MG/ML; MG/ML
30 INJECTION, SOLUTION INTRAVENOUS ONCE
Refills: 0 | Status: COMPLETED | OUTPATIENT
Start: 2021-01-07 | End: 2021-01-07

## 2021-01-07 RX ORDER — POTASSIUM CHLORIDE 20 MEQ
40 PACKET (EA) ORAL EVERY 4 HOURS
Refills: 0 | Status: COMPLETED | OUTPATIENT
Start: 2021-01-07 | End: 2021-01-07

## 2021-01-07 RX ORDER — ALBUMIN HUMAN 25 %
100 VIAL (ML) INTRAVENOUS EVERY 8 HOURS
Refills: 0 | Status: COMPLETED | OUTPATIENT
Start: 2021-01-07 | End: 2021-01-09

## 2021-01-07 RX ORDER — FUROSEMIDE 40 MG
40 TABLET ORAL EVERY 12 HOURS
Refills: 0 | Status: DISCONTINUED | OUTPATIENT
Start: 2021-01-07 | End: 2021-01-08

## 2021-01-07 RX ORDER — FUROSEMIDE 40 MG
40 TABLET ORAL EVERY 12 HOURS
Refills: 0 | Status: DISCONTINUED | OUTPATIENT
Start: 2021-01-07 | End: 2021-01-07

## 2021-01-07 RX ORDER — CHLORHEXIDINE GLUCONATE 213 G/1000ML
1 SOLUTION TOPICAL DAILY
Refills: 0 | Status: DISCONTINUED | OUTPATIENT
Start: 2021-01-07 | End: 2021-01-13

## 2021-01-07 RX ORDER — HYDROMORPHONE HYDROCHLORIDE 2 MG/ML
0.25 INJECTION INTRAMUSCULAR; INTRAVENOUS; SUBCUTANEOUS ONCE
Refills: 0 | Status: DISCONTINUED | OUTPATIENT
Start: 2021-01-07 | End: 2021-01-07

## 2021-01-07 RX ORDER — MAGNESIUM SULFATE 500 MG/ML
2 VIAL (ML) INJECTION ONCE
Refills: 0 | Status: COMPLETED | OUTPATIENT
Start: 2021-01-07 | End: 2021-01-07

## 2021-01-07 RX ORDER — CEFEPIME 1 G/1
1000 INJECTION, POWDER, FOR SOLUTION INTRAMUSCULAR; INTRAVENOUS EVERY 8 HOURS
Refills: 0 | Status: DISCONTINUED | OUTPATIENT
Start: 2021-01-07 | End: 2021-01-10

## 2021-01-07 RX ORDER — ALBUMIN HUMAN 25 %
100 VIAL (ML) INTRAVENOUS EVERY 6 HOURS
Refills: 0 | Status: DISCONTINUED | OUTPATIENT
Start: 2021-01-07 | End: 2021-01-07

## 2021-01-07 RX ADMIN — Medication 2 PACKET(S): at 05:05

## 2021-01-07 RX ADMIN — Medication 50 MILLILITER(S): at 13:29

## 2021-01-07 RX ADMIN — SIMETHICONE 80 MILLIGRAM(S): 80 TABLET, CHEWABLE ORAL at 12:06

## 2021-01-07 RX ADMIN — CEFEPIME 100 MILLIGRAM(S): 1 INJECTION, POWDER, FOR SOLUTION INTRAMUSCULAR; INTRAVENOUS at 05:04

## 2021-01-07 RX ADMIN — ONDANSETRON 4 MILLIGRAM(S): 8 TABLET, FILM COATED ORAL at 12:13

## 2021-01-07 RX ADMIN — Medication 50 GRAM(S): at 08:24

## 2021-01-07 RX ADMIN — Medication 40 MILLIEQUIVALENT(S): at 08:28

## 2021-01-07 RX ADMIN — Medication 64.69 GRAM(S): at 05:04

## 2021-01-07 RX ADMIN — DOLUTEGRAVIR SODIUM 50 MILLIGRAM(S): 25 TABLET, FILM COATED ORAL at 12:04

## 2021-01-07 RX ADMIN — Medication 40 MILLIEQUIVALENT(S): at 12:04

## 2021-01-07 RX ADMIN — HYDROMORPHONE HYDROCHLORIDE 0.5 MILLIGRAM(S): 2 INJECTION INTRAMUSCULAR; INTRAVENOUS; SUBCUTANEOUS at 01:19

## 2021-01-07 RX ADMIN — SIMETHICONE 80 MILLIGRAM(S): 80 TABLET, CHEWABLE ORAL at 22:07

## 2021-01-07 RX ADMIN — Medication 40 MILLIGRAM(S): at 13:33

## 2021-01-07 RX ADMIN — MIDODRINE HYDROCHLORIDE 30 MILLIGRAM(S): 2.5 TABLET ORAL at 05:05

## 2021-01-07 RX ADMIN — Medication 50 MILLILITER(S): at 21:51

## 2021-01-07 RX ADMIN — POTASSIUM PHOSPHATE, MONOBASIC POTASSIUM PHOSPHATE, DIBASIC 83.33 MILLIMOLE(S): 236; 224 INJECTION, SOLUTION INTRAVENOUS at 12:00

## 2021-01-07 RX ADMIN — SIMETHICONE 80 MILLIGRAM(S): 80 TABLET, CHEWABLE ORAL at 18:06

## 2021-01-07 RX ADMIN — SIMETHICONE 80 MILLIGRAM(S): 80 TABLET, CHEWABLE ORAL at 05:05

## 2021-01-07 RX ADMIN — Medication 2 PACKET(S): at 18:06

## 2021-01-07 RX ADMIN — CEFEPIME 100 MILLIGRAM(S): 1 INJECTION, POWDER, FOR SOLUTION INTRAMUSCULAR; INTRAVENOUS at 21:52

## 2021-01-07 RX ADMIN — CHLORHEXIDINE GLUCONATE 1 APPLICATION(S): 213 SOLUTION TOPICAL at 13:36

## 2021-01-07 RX ADMIN — ATOVAQUONE 1500 MILLIGRAM(S): 750 SUSPENSION ORAL at 12:05

## 2021-01-07 RX ADMIN — MIDODRINE HYDROCHLORIDE 30 MILLIGRAM(S): 2.5 TABLET ORAL at 12:22

## 2021-01-07 RX ADMIN — CEFEPIME 100 MILLIGRAM(S): 1 INJECTION, POWDER, FOR SOLUTION INTRAMUSCULAR; INTRAVENOUS at 12:06

## 2021-01-07 RX ADMIN — HYDROMORPHONE HYDROCHLORIDE 0.25 MILLIGRAM(S): 2 INJECTION INTRAMUSCULAR; INTRAVENOUS; SUBCUTANEOUS at 22:55

## 2021-01-07 RX ADMIN — EMTRICITABINE AND TENOFOVIR DISOPROXIL FUMARATE 1 TABLET(S): 200; 300 TABLET, FILM COATED ORAL at 12:04

## 2021-01-07 RX ADMIN — PANTOPRAZOLE SODIUM 40 MILLIGRAM(S): 20 TABLET, DELAYED RELEASE ORAL at 05:05

## 2021-01-07 NOTE — PROGRESS NOTE ADULT - ASSESSMENT
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   Overall improved liver enzymes and pain. HBV viral load down from 483 million 12/2 to 36 million 12/29.   Other causes for liver failure unlikely or ruled out- COVID PCR positive, CMV low level viremia, HDV, HCV, AIDS cholangiopathy,   Became febrile 12/30 night (not recorded) and found to have Klebsiella SBP.   Still nauseous but afebrile and heading in the right direction.     Suggest:    # SBP  follow up ascitic fluid cell count and cultures- cell count suggests ongoing infection after correcting for the RBC numbers but fluid cultures are all no growth or NGTD  gram stain, fungal stain, AFB stain of ascitic fluid is negative   - would suggest repeat imaging of abdomen with CT scan to look for occult pocket of infection/abscess and if negative will discontinue antibiotics and observe    Edema/liver failure  would ask Hepatology to reconsult for assistance with management  continue albumin and lasix for diuresis and help with her lower extremity edema     -continue Tivicay and Truvada for HIV, the latter for HBV as well   -f/u HBV resistance testing   - repeat HBV viral load as LFTS have improved  -Atovaquone for PJP prophylaxis until CD4 >200 for three months     -supportive care and isolation precautions for COVID, currently on room air and no cough can discontinue after 21 days from first positive test= 1/12/21    Chencho Mcadams MD  [phone]  After 5pm/weekends [phone]

## 2021-01-07 NOTE — PROGRESS NOTE ADULT - SUBJECTIVE AND OBJECTIVE BOX
Authored by Gee Gutiérrez MD ([phone]) [facility]    Patient is a 50y old  Female who presents with a chief complaint of abdominal pain (06 Jan 2021 19:13)    SUBJECTIVE / OVERNIGHT EVENTS:    ADDITIONAL REVIEW OF SYSTEMS:    MEDICATIONS  (STANDING):  acetylcysteine IVPB 7 Gram(s) IV Intermittent <User Schedule>  atovaquone Suspension 1500 milliGRAM(s) Oral daily  dolutegravir 50 milliGRAM(s) Oral daily  emtricitabine 200 mG/tenofovir 300 mG (TRUVADA) 1 Tablet(s) Oral daily  magnesium sulfate  IVPB 2 Gram(s) IV Intermittent once  midodrine. 30 milliGRAM(s) Oral three times a day  pantoprazole    Tablet 40 milliGRAM(s) Oral before breakfast  potassium chloride    Tablet ER 40 milliEquivalent(s) Oral every 4 hours  potassium phosphate / sodium phosphate Powder (PHOS-NaK) 2 Packet(s) Oral two times a day  simethicone 80 milliGRAM(s) Chew four times a day    MEDICATIONS  (PRN):  aluminum hydroxide/magnesium hydroxide/simethicone Suspension 30 milliLiter(s) Oral every 6 hours PRN Dyspepsia  ondansetron Injectable 4 milliGRAM(s) IV Push every 8 hours PRN Nausea and/or Vomiting    I&O's Summary    06 Jan 2021 07:01  -  07 Jan 2021 07:00  --------------------------------------------------------  IN: 2399 mL / OUT: 1600 mL / NET: 799 mL    PHYSICAL EXAM:  Vital Signs Last 24 Hrs  T(C): 36.3 (07 Jan 2021 05:34), Max: 37.2 (06 Jan 2021 17:41)  T(F): 97.4 (07 Jan 2021 05:34), Max: 99 (06 Jan 2021 17:41)  HR: 100 (07 Jan 2021 05:34) (93 - 104)  BP: 103/69 (07 Jan 2021 05:34) (91/53 - 117/75)  BP(mean): --  RR: 20 (07 Jan 2021 05:34) (18 - 20)  SpO2: 100% (07 Jan 2021 05:34) (96% - 100%)    GENERAL: No acute distress, well-developed  HEAD:  Atraumatic, Normocephalic  EYES: EOMI, PERRLA, conjunctiva and sclera clear  NECK: Supple, no lymphadenopathy, no JVD  CHEST/LUNG: CTAB; No wheezes, rales, or rhonchi  HEART: Regular rate and rhythm; No murmurs, rubs, or gallops  ABDOMEN: Soft, non-tender, non-distended; normal bowel sounds, no organomegaly  EXTREMITIES:  2+ peripheral pulses b/l, No clubbing, cyanosis, or edema  NEUROLOGY: A&O x 3, no focal deficits  SKIN: No rashes or lesions    LABS:                        7.9    7.80  )-----------( 68       ( 07 Jan 2021 06:20 )             23.0     01-07    136  |  104  |  15  ----------------------------<  88  3.4<L>   |  25  |  0.70    Ca    7.8<L>      07 Jan 2021 06:20  Phos  1.9     01-07  Mg     1.9     01-07    TPro  3.6<L>  /  Alb  2.1<L>  /  TBili  15.1<H>  /  DBili  x   /  AST  331<H>  /  ALT  94<H>  /  AlkPhos  64  01-07    PT/INR - ( 07 Jan 2021 06:20 )   PT: 31.2 sec;   INR: 2.73 ratio      PTT - ( 07 Jan 2021 06:20 )  PTT:74.9 sec    Culture - Acid Fast - Body Fluid w/Smear (collected 05 Jan 2021 22:33)  Source: .Body Fluid Peritoneal    Culture - Fungal, Body Fluid (collected 05 Jan 2021 22:32)  Source: .Body Fluid Peritoneal Fluid  Preliminary Report (06 Jan 2021 07:55):    Testing in progress    Culture - Body Fluid with Gram Stain (collected 05 Jan 2021 22:26)  Source: .Body Fluid Peritoneal Fluid  Preliminary Report (06 Jan 2021 15:53):    No growth Authored by Gee Gutiérrez MD ([phone]) [facility]    Patient is a 50y old  Female who presents with a chief complaint of abdominal pain (06 Jan 2021 19:13)    SUBJECTIVE / OVERNIGHT EVENTS: NAEON. This am pt is awake and alert in bed, NAD. Denies HA, cp, SOB, abd pain, dysuria, hematuria diarrhea, constipation     MEDICATIONS  (STANDING):  acetylcysteine IVPB 7 Gram(s) IV Intermittent <User Schedule>  atovaquone Suspension 1500 milliGRAM(s) Oral daily  dolutegravir 50 milliGRAM(s) Oral daily  emtricitabine 200 mG/tenofovir 300 mG (TRUVADA) 1 Tablet(s) Oral daily  magnesium sulfate  IVPB 2 Gram(s) IV Intermittent once  midodrine. 30 milliGRAM(s) Oral three times a day  pantoprazole    Tablet 40 milliGRAM(s) Oral before breakfast  potassium chloride    Tablet ER 40 milliEquivalent(s) Oral every 4 hours  potassium phosphate / sodium phosphate Powder (PHOS-NaK) 2 Packet(s) Oral two times a day  simethicone 80 milliGRAM(s) Chew four times a day    MEDICATIONS  (PRN):  aluminum hydroxide/magnesium hydroxide/simethicone Suspension 30 milliLiter(s) Oral every 6 hours PRN Dyspepsia  ondansetron Injectable 4 milliGRAM(s) IV Push every 8 hours PRN Nausea and/or Vomiting    I&O's Summary    06 Jan 2021 07:01  -  07 Jan 2021 07:00  --------------------------------------------------------  IN: 2399 mL / OUT: 1600 mL / NET: 799 mL    PHYSICAL EXAM:  Vital Signs Last 24 Hrs  T(C): 36.3 (07 Jan 2021 05:34), Max: 37.2 (06 Jan 2021 17:41)  T(F): 97.4 (07 Jan 2021 05:34), Max: 99 (06 Jan 2021 17:41)  HR: 100 (07 Jan 2021 05:34) (93 - 104)  BP: 103/69 (07 Jan 2021 05:34) (91/53 - 117/75)  BP(mean): --  RR: 20 (07 Jan 2021 05:34) (18 - 20)  SpO2: 100% (07 Jan 2021 05:34) (96% - 100%)    GENERAL: No acute distress  HEAD: Atraumatic, Normocephalic  EYES: +Scleral icterus. EOMI, PERRLA  NECK: Supple, no lymphadenopathy, no JVD  CHEST/LUNG: CTAB; No wheezes, rales, or rhonchi  HEART: +Tachycardic. No murmurs, rubs, or gallops  ABDOMEN: +Distended. Soft, non-tender; normal bowel sounds, no organomegaly  EXTREMITIES: 2+ peripheral pulses b/l, No clubbing, cyanosis, or edema  NEUROLOGY: A&O x 3, no focal deficits  SKIN: +Jaundiced. No rashes or lesions    LABS:                        7.9    7.80  )-----------( 68       ( 07 Jan 2021 06:20 )             23.0     01-07    136  |  104  |  15  ----------------------------<  88  3.4<L>   |  25  |  0.70    Ca    7.8<L>      07 Jan 2021 06:20  Phos  1.9     01-07  Mg     1.9     01-07    TPro  3.6<L>  /  Alb  2.1<L>  /  TBili  15.1<H>  /  DBili  x   /  AST  331<H>  /  ALT  94<H>  /  AlkPhos  64  01-07    PT/INR - ( 07 Jan 2021 06:20 )   PT: 31.2 sec;   INR: 2.73 ratio      PTT - ( 07 Jan 2021 06:20 )  PTT:74.9 sec    Culture - Acid Fast - Body Fluid w/Smear (collected 05 Jan 2021 22:33)  Source: .Body Fluid Peritoneal    Culture - Fungal, Body Fluid (collected 05 Jan 2021 22:32)  Source: .Body Fluid Peritoneal Fluid  Preliminary Report (06 Jan 2021 07:55):    Testing in progress    Culture - Body Fluid with Gram Stain (collected 05 Jan 2021 22:26)  Source: .Body Fluid Peritoneal Fluid  Preliminary Report (06 Jan 2021 15:53):    No growth

## 2021-01-07 NOTE — PROGRESS NOTE ADULT - ASSESSMENT
50 year old female with past medical history of HIV, CMV, HBV, histoplasmosis, renal vein thrombosis s/p coumadin for 6 months, and strongyloidiasis who presents with abdominal pain found to be COVID-19 PCR positive with acute liver injury 2/2 Hep B reactivation with +SBP, complicated with coagulapathy     #Acute liver injury 2/2 HepB reactivation  Predominantly hepatocellular liver injury, with AST >1800, Alt 600. Also w/ evidence of hepatic synthetic function, with elevated INR, decreased albumin. Etiology likely from HBV, which was previously being treated w/ Symtuza (tenofovir 10mg qd) but patient had been off medication for almost 1 year (until 11/2020) due to insurance lapse. Her outpatient viral load >400,000,000 with hepatitis delta coinfection ruled out. Currently patient is not in acute liver failure, she is mentating well, but is having worsening INR which is concerning.  - c/w truvada. Entecavir stopped  - c/w NAC infusion for acute liver failure (100 mg/kg over 16 hours, will likely need to be reordered daily)  - c/w albumin 100cc 25% q6h  - IVF PRN. Hold off given patient third spacing with bilateral edema  - s/p vitamin K 10mg (12/26 - 12/28, 1/1-1/2)  - dilaudid .5 mg IV prn for pain, dose carefully given HOTN. However, pt has not responded to motrin (hold i/s/o GIB), tylenol (wary i/s/o hepatic dysfunction), ketorolac (hold i/s/o GIB).  - d/c Tramadol 25mg q6hr for mod pain- not very effective per pt   - ascitic fluid: SAAG 2, ascitic fluid protein .8 likely portal HTN however granulocyte count from ascitic fluid >250- consistent with SBP. Grew Klebsiella oxytoca/Raoutella ornithinolytica. Sensitive to many Abx    #Coagulapathy  - worsening coags with initial concern for DIC (thrombocytopenia, elevated coags, d-dimer, low fibrogen). Ep of melena 1/1 s/p cryo, vitamin K, plasma  - heme recs: likely from liver failure. Unlikely hemolysis despite low haptoglobin (liver failure) given rare schistocytes, factor 8 elevated. Initial mixing study negative. Follow up repeat mixing study   - hepatology recs: worsening PTT unlikely from liver failure  - Goal fibrinogen >150, give cyro PRN    #Sepsis 2/2 SBP   Pt noted to be tachy, HOTN, febrile 12/30.   - blood cx 12/31- ngtd  - Ascites fluid grew Klebsiella oxytoca/Raoutella ornithinolytica.   - s/p vanc (12/30-1/1)  - s/p zosyn (12/30-1/3)   - s/p ceftriaxone (1/3-1/6)   - c/w cefepime (1/6- )  - c/w midodrine 30 mg TID   - IR consulted for repeat para to determine resolution of SBP     #Anemia:   - Hgb acutely dropped 1/1. FOBT+. c/f GIB s/p cryo 1u, pRBC 1u.   - per heme, coagulopathy 2/2 liver disease  - type and cross x2  - transfuse Hgb <7    #AIDS  needs ARV medications to help with her immunosuppression but need to change ARV meds to ones metabolized by the kidney  continue the TAF for help controlling the Hep B infection  - In light of significant transaminitis, discontinued the Symtuza and changed meds to Truvada/Tivicay  - CD4 116 consistent with AIDS  - RNA viral load in process - slightly detectable but cannot account for her acute decompensation in liver disease  - c/w atovaquone for PCP ppx per ID- no Bactrim given hepatic dysfunction    #COVID+  - not eligible for Remdesivir secondary to her LFT abnormalities  - could consider convalescent plasma if she decompensates  - D- dimer rising, fibrinogen labile, ferritin elevated, stable  - Quant TB neg  - blood cx 12/22- no growth, 12/31 no growth final  - urine histo ag. neg    #CMV viremia   - hold off on Valganciclovir   - CMV viral load <96    #Prophylactic measures  - DVT ppx: hold i/s/o hgb drop  - GERD: pantoprazole   - Diet: soft 50 year old female with past medical history of HIV, CMV, HBV, histoplasmosis, renal vein thrombosis s/p coumadin for 6 months, and strongyloidiasis who presents with abdominal pain found to be COVID-19 PCR positive with acute liver injury 2/2 Hep B reactivation with +SBP, complicated with coagulapathy     #Acute liver injury 2/2 HepB reactivation  Predominantly hepatocellular liver injury, with AST >1800, Alt 600. Also w/ evidence of hepatic synthetic function, with elevated INR, decreased albumin. Etiology likely from HBV, which was previously being treated w/ Symtuza (tenofovir 10mg qd) but patient had been off medication for almost 1 year (until 11/2020) due to insurance lapse. Her outpatient viral load >400,000,000 with hepatitis delta coinfection ruled out. Currently patient is not in acute liver failure, she is mentating well, but is having worsening INR which is concerning.  - c/w truvada. Entecavir stopped  - c/w NAC infusion for acute liver failure (100 mg/kg over 16 hours, will likely need to be reordered daily)  - c/w albumin 100cc 25% q6h  - IVF PRN. Hold off given patient third spacing with bilateral edema  - s/p vitamin K 10mg (12/26 - 12/28, 1/1-1/2)  - dilaudid .5 mg IV prn for pain, dose carefully given HOTN. However, pt has not responded to motrin (hold i/s/o GIB), tylenol (wary i/s/o hepatic dysfunction), ketorolac (hold i/s/o GIB).  - d/c Tramadol 25mg q6hr for mod pain- not very effective per pt   - ascitic fluid: SAAG 2, ascitic fluid protein .8 likely portal HTN however granulocyte count from ascitic fluid >250- consistent with SBP. Grew Klebsiella oxytoca/Raoutella ornithinolytica. Sensitive to many Abx  - will give albumin and diurese w/lasix 40mg IV BID for LE edema/third spacing 2/2 low albumin    #Coagulapathy  - worsening coags with initial concern for DIC (thrombocytopenia, elevated coags, d-dimer, low fibrogen). Ep of melena 1/1 s/p cryo, vitamin K, plasma  - heme recs: likely from liver failure. Unlikely hemolysis despite low haptoglobin (liver failure) given rare schistocytes, factor 8 elevated. Initial mixing study negative. Follow up repeat mixing study   - hepatology recs: worsening PTT unlikely from liver failure  - Goal fibrinogen >150, give cyro PRN    #Sepsis 2/2 SBP   Pt noted to be tachy, HOTN, febrile 12/30.   - blood cx 12/31- ngtd  - Ascites fluid 12/31 grew Klebsiella oxytoca/Raoutella ornithinolytica.   - repeat para 1/5 shows SBP but markedly reduced granulocytes  - s/p vanc (12/30-1/1)  - s/p zosyn (12/30-1/3)   - s/p ceftriaxone (1/3-1/6)   - c/w cefepime (1/6- )  - c/w midodrine 30 mg TID     #Anemia:   - Hgb acutely dropped 1/1. FOBT+. c/f GIB s/p cryo 1u, pRBC 1u.   - per heme, coagulopathy 2/2 liver disease  - type and cross x2  - transfuse Hgb <7    #AIDS  needs ARV medications to help with her immunosuppression but need to change ARV meds to ones metabolized by the kidney  continue the TAF for help controlling the Hep B infection  - In light of significant transaminitis, discontinued the Symtuza and changed meds to Truvada/Tivicay  - CD4 116 consistent with AIDS  - RNA viral load in process - slightly detectable but cannot account for her acute decompensation in liver disease  - c/w atovaquone for PCP ppx per ID- no Bactrim given hepatic dysfunction    #COVID+  - not eligible for Remdesivir secondary to her LFT abnormalities  - could consider convalescent plasma if she decompensates  - D- dimer rising, fibrinogen labile, ferritin elevated, stable  - Quant TB neg  - blood cx 12/22- no growth, 12/31 no growth final  - urine histo ag. neg    #CMV viremia   - hold off on Valganciclovir   - CMV viral load <96    #Prophylactic measures  - DVT ppx: hold i/s/o hgb drop  - GERD: pantoprazole   - Diet: CLD

## 2021-01-07 NOTE — PROGRESS NOTE ADULT - SUBJECTIVE AND OBJECTIVE BOX
INFECTIOUS DISEASES FOLLOW UP-- Hollie Mcadams  [phone]    This is a follow up note for this  50yFemale with  High liver transaminase level  chronic active hepatitis B with flare, HIV flare  SBP  COVID-19        ROS:  CONSTITUTIONAL:  No fever, poor appetite  CARDIOVASCULAR:  No chest pain or palpitations  RESPIRATORY:  No dyspnea  GASTROINTESTINAL:  No nausea, vomiting, diarrhea, moderate abdominal pain  GENITOURINARY:  No dysuria  NEUROLOGIC:  No headache,     Allergies    No Known Allergies    Intolerances        ANTIBIOTICS/RELEVANT:  antimicrobials  atovaquone Suspension 1500 milliGRAM(s) Oral daily  cefepime   IVPB 1000 milliGRAM(s) IV Intermittent every 8 hours  dolutegravir 50 milliGRAM(s) Oral daily  emtricitabine 200 mG/tenofovir 300 mG (TRUVADA) 1 Tablet(s) Oral daily    immunologic:    OTHER:  acetylcysteine IVPB 7 Gram(s) IV Intermittent <User Schedule>  albumin human 25% IVPB 100 milliLiter(s) IV Intermittent every 8 hours  aluminum hydroxide/magnesium hydroxide/simethicone Suspension 30 milliLiter(s) Oral every 6 hours PRN  chlorhexidine 2% Cloths 1 Application(s) Topical daily  furosemide   Injectable 40 milliGRAM(s) IV Push every 12 hours  midodrine. 30 milliGRAM(s) Oral three times a day  ondansetron Injectable 4 milliGRAM(s) IV Push every 8 hours PRN  pantoprazole    Tablet 40 milliGRAM(s) Oral before breakfast  potassium phosphate / sodium phosphate Powder (PHOS-NaK) 2 Packet(s) Oral two times a day  simethicone 80 milliGRAM(s) Chew four times a day      Objective:  Vital Signs Last 24 Hrs  T(C): 36.8 (07 Jan 2021 17:03), Max: 37.1 (06 Jan 2021 22:33)  T(F): 98.3 (07 Jan 2021 17:03), Max: 98.7 (06 Jan 2021 22:33)  HR: 105 (07 Jan 2021 17:03) (93 - 105)  BP: 128/72 (07 Jan 2021 17:03) (91/53 - 128/80)  BP(mean): --  RR: 18 (07 Jan 2021 17:03) (18 - 20)  SpO2: 100% (07 Jan 2021 17:03) (96% - 100%)    PHYSICAL EXAM:  Constitutional:no acute distress  Eyes:PIPO, EOMI icteric  Ear/Nose/Throat: no oral lesions, 	  Respiratory: clear BL  Cardiovascular: S1S2  Gastrointestinal:soft, (+) BS, tender RUQ  Extremities:no e/e/c edema 2-3+ to knees bilateral  No Lymphadenopathy  IV sites not inflammed.    LABS:                        7.5    7.33  )-----------( 71       ( 07 Jan 2021 19:45 )             21.7     01-07    136  |  104  |  15  ----------------------------<  88  3.4<L>   |  25  |  0.70    Ca    7.8<L>      07 Jan 2021 06:20  Phos  1.9     01-07  Mg     1.9     01-07    TPro  3.6<L>  /  Alb  2.1<L>  /  TBili  15.1<H>  /  DBili  x   /  AST  331<H>  /  ALT  94<H>  /  AlkPhos  64  01-07    PT/INR - ( 07 Jan 2021 06:20 )   PT: 31.2 sec;   INR: 2.73 ratio         PTT - ( 07 Jan 2021 06:20 )  PTT:74.9 sec      MICROBIOLOGY:            RECENT CULTURES:  01-05 @ 22:33  .Body Fluid Peritoneal  --  --  --  --  --  01-05 @ 22:32  .Body Fluid Peritoneal Fluid  --  --  --    Testing in progress  --  01-05 @ 22:26  .Body Fluid Peritoneal Fluid  --  --  --    No growth  --      RADIOLOGY & ADDITIONAL STUDIES:

## 2021-01-08 LAB
ALBUMIN SERPL ELPH-MCNC: 2.6 G/DL — LOW (ref 3.3–5)
ALBUMIN SERPL ELPH-MCNC: 2.7 G/DL — LOW (ref 3.3–5)
ALP SERPL-CCNC: 65 U/L — SIGNIFICANT CHANGE UP (ref 40–120)
ALP SERPL-CCNC: 66 U/L — SIGNIFICANT CHANGE UP (ref 40–120)
ALT FLD-CCNC: 82 U/L — HIGH (ref 10–45)
ALT FLD-CCNC: 88 U/L — HIGH (ref 10–45)
ANION GAP SERPL CALC-SCNC: 10 MMOL/L — SIGNIFICANT CHANGE UP (ref 5–17)
ANION GAP SERPL CALC-SCNC: 8 MMOL/L — SIGNIFICANT CHANGE UP (ref 5–17)
APTT BLD: 71.4 SEC — HIGH (ref 27.5–35.5)
APTT BLD: 76 SEC — HIGH (ref 27.5–35.5)
AST SERPL-CCNC: 311 U/L — HIGH (ref 10–40)
AST SERPL-CCNC: 320 U/L — HIGH (ref 10–40)
BILIRUB SERPL-MCNC: 15.6 MG/DL — HIGH (ref 0.2–1.2)
BILIRUB SERPL-MCNC: 16 MG/DL — HIGH (ref 0.2–1.2)
BLD GP AB SCN SERPL QL: NEGATIVE — SIGNIFICANT CHANGE UP
BLD GP AB SCN SERPL QL: NEGATIVE — SIGNIFICANT CHANGE UP
BUN SERPL-MCNC: 16 MG/DL — SIGNIFICANT CHANGE UP (ref 7–23)
BUN SERPL-MCNC: 18 MG/DL — SIGNIFICANT CHANGE UP (ref 7–23)
CALCIUM SERPL-MCNC: 7.7 MG/DL — LOW (ref 8.4–10.5)
CALCIUM SERPL-MCNC: 8.1 MG/DL — LOW (ref 8.4–10.5)
CHLORIDE SERPL-SCNC: 100 MMOL/L — SIGNIFICANT CHANGE UP (ref 96–108)
CHLORIDE SERPL-SCNC: 103 MMOL/L — SIGNIFICANT CHANGE UP (ref 96–108)
CO2 SERPL-SCNC: 26 MMOL/L — SIGNIFICANT CHANGE UP (ref 22–31)
CO2 SERPL-SCNC: 27 MMOL/L — SIGNIFICANT CHANGE UP (ref 22–31)
CREAT SERPL-MCNC: 0.69 MG/DL — SIGNIFICANT CHANGE UP (ref 0.5–1.3)
CREAT SERPL-MCNC: 0.76 MG/DL — SIGNIFICANT CHANGE UP (ref 0.5–1.3)
D DIMER BLD IA.RAPID-MCNC: 1037 NG/ML DDU — HIGH
D DIMER BLD IA.RAPID-MCNC: 1057 NG/ML DDU — HIGH
FERRITIN SERPL-MCNC: 1414 NG/ML — HIGH (ref 15–150)
FERRITIN SERPL-MCNC: 1639 NG/ML — HIGH (ref 15–150)
FIBRINOGEN PPP-MCNC: 110 MG/DL — LOW (ref 290–520)
FIBRINOGEN PPP-MCNC: 152 MG/DL — LOW (ref 290–520)
GLUCOSE SERPL-MCNC: 80 MG/DL — SIGNIFICANT CHANGE UP (ref 70–99)
GLUCOSE SERPL-MCNC: 89 MG/DL — SIGNIFICANT CHANGE UP (ref 70–99)
HAPTOGLOB SERPL-MCNC: <20 MG/DL — LOW (ref 34–200)
HAPTOGLOB SERPL-MCNC: <20 MG/DL — LOW (ref 34–200)
HCT VFR BLD CALC: 20.6 % — CRITICAL LOW (ref 34.5–45)
HCT VFR BLD CALC: 23.7 % — LOW (ref 34.5–45)
HGB BLD-MCNC: 7 G/DL — CRITICAL LOW (ref 11.5–15.5)
HGB BLD-MCNC: 8.4 G/DL — LOW (ref 11.5–15.5)
INR BLD: 2.65 RATIO — HIGH (ref 0.88–1.16)
INR BLD: 3.11 RATIO — HIGH (ref 0.88–1.16)
MACROCYTES BLD QL: SIGNIFICANT CHANGE UP
MAGNESIUM SERPL-MCNC: 1.7 MG/DL — SIGNIFICANT CHANGE UP (ref 1.6–2.6)
MAGNESIUM SERPL-MCNC: 2 MG/DL — SIGNIFICANT CHANGE UP (ref 1.6–2.6)
MANUAL SMEAR VERIFICATION: SIGNIFICANT CHANGE UP
MCHC RBC-ENTMCNC: 31.7 PG — SIGNIFICANT CHANGE UP (ref 27–34)
MCHC RBC-ENTMCNC: 31.8 PG — SIGNIFICANT CHANGE UP (ref 27–34)
MCHC RBC-ENTMCNC: 34 GM/DL — SIGNIFICANT CHANGE UP (ref 32–36)
MCHC RBC-ENTMCNC: 35.4 GM/DL — SIGNIFICANT CHANGE UP (ref 32–36)
MCV RBC AUTO: 89.4 FL — SIGNIFICANT CHANGE UP (ref 80–100)
MCV RBC AUTO: 93.6 FL — SIGNIFICANT CHANGE UP (ref 80–100)
NRBC # BLD: 1 /100 WBCS — HIGH (ref 0–0)
NRBC # BLD: 5 /100 — HIGH (ref 0–0)
PHOSPHATE SERPL-MCNC: 1.1 MG/DL — LOW (ref 2.5–4.5)
PHOSPHATE SERPL-MCNC: 2.2 MG/DL — LOW (ref 2.5–4.5)
PLAT MORPH BLD: NORMAL — SIGNIFICANT CHANGE UP
PLATELET # BLD AUTO: 77 K/UL — LOW (ref 150–400)
PLATELET # BLD AUTO: 91 K/UL — LOW (ref 150–400)
POTASSIUM SERPL-MCNC: 2.6 MMOL/L — CRITICAL LOW (ref 3.5–5.3)
POTASSIUM SERPL-MCNC: 3 MMOL/L — LOW (ref 3.5–5.3)
POTASSIUM SERPL-SCNC: 2.6 MMOL/L — CRITICAL LOW (ref 3.5–5.3)
POTASSIUM SERPL-SCNC: 3 MMOL/L — LOW (ref 3.5–5.3)
PROT SERPL-MCNC: 4.1 G/DL — LOW (ref 6–8.3)
PROT SERPL-MCNC: 4.3 G/DL — LOW (ref 6–8.3)
PROTHROM AB SERPL-ACNC: 30.4 SEC — HIGH (ref 10.6–13.6)
PROTHROM AB SERPL-ACNC: 35.4 SEC — HIGH (ref 10.6–13.6)
RBC # BLD: 2.2 M/UL — LOW (ref 3.8–5.2)
RBC # BLD: 2.65 M/UL — LOW (ref 3.8–5.2)
RBC # FLD: 18.5 % — HIGH (ref 10.3–14.5)
RBC # FLD: 19.6 % — HIGH (ref 10.3–14.5)
RBC BLD AUTO: SIGNIFICANT CHANGE UP
RH IG SCN BLD-IMP: POSITIVE — SIGNIFICANT CHANGE UP
RH IG SCN BLD-IMP: POSITIVE — SIGNIFICANT CHANGE UP
SODIUM SERPL-SCNC: 137 MMOL/L — SIGNIFICANT CHANGE UP (ref 135–145)
SODIUM SERPL-SCNC: 137 MMOL/L — SIGNIFICANT CHANGE UP (ref 135–145)
WBC # BLD: 7.18 K/UL — SIGNIFICANT CHANGE UP (ref 3.8–10.5)
WBC # BLD: 7.82 K/UL — SIGNIFICANT CHANGE UP (ref 3.8–10.5)
WBC # FLD AUTO: 7.18 K/UL — SIGNIFICANT CHANGE UP (ref 3.8–10.5)
WBC # FLD AUTO: 7.82 K/UL — SIGNIFICANT CHANGE UP (ref 3.8–10.5)

## 2021-01-08 PROCEDURE — 99232 SBSQ HOSP IP/OBS MODERATE 35: CPT | Mod: GC

## 2021-01-08 PROCEDURE — 99232 SBSQ HOSP IP/OBS MODERATE 35: CPT

## 2021-01-08 PROCEDURE — 74177 CT ABD & PELVIS W/CONTRAST: CPT | Mod: 26

## 2021-01-08 RX ORDER — PANTOPRAZOLE SODIUM 20 MG/1
40 TABLET, DELAYED RELEASE ORAL EVERY 12 HOURS
Refills: 0 | Status: DISCONTINUED | OUTPATIENT
Start: 2021-01-08 | End: 2021-01-13

## 2021-01-08 RX ORDER — FUROSEMIDE 40 MG
80 TABLET ORAL EVERY 12 HOURS
Refills: 0 | Status: DISCONTINUED | OUTPATIENT
Start: 2021-01-08 | End: 2021-01-08

## 2021-01-08 RX ORDER — POTASSIUM CHLORIDE 20 MEQ
40 PACKET (EA) ORAL EVERY 4 HOURS
Refills: 0 | Status: COMPLETED | OUTPATIENT
Start: 2021-01-08 | End: 2021-01-08

## 2021-01-08 RX ORDER — SODIUM,POTASSIUM PHOSPHATES 278-250MG
1 POWDER IN PACKET (EA) ORAL THREE TIMES A DAY
Refills: 0 | Status: COMPLETED | OUTPATIENT
Start: 2021-01-08 | End: 2021-01-10

## 2021-01-08 RX ORDER — POTASSIUM PHOSPHATE, MONOBASIC POTASSIUM PHOSPHATE, DIBASIC 236; 224 MG/ML; MG/ML
30 INJECTION, SOLUTION INTRAVENOUS ONCE
Refills: 0 | Status: COMPLETED | OUTPATIENT
Start: 2021-01-08 | End: 2021-01-09

## 2021-01-08 RX ORDER — FUROSEMIDE 40 MG
80 TABLET ORAL EVERY 12 HOURS
Refills: 0 | Status: DISCONTINUED | OUTPATIENT
Start: 2021-01-08 | End: 2021-01-13

## 2021-01-08 RX ORDER — THIAMINE MONONITRATE (VIT B1) 100 MG
100 TABLET ORAL DAILY
Refills: 0 | Status: DISCONTINUED | OUTPATIENT
Start: 2021-01-08 | End: 2021-01-13

## 2021-01-08 RX ORDER — POTASSIUM CHLORIDE 20 MEQ
40 PACKET (EA) ORAL EVERY 4 HOURS
Refills: 0 | Status: COMPLETED | OUTPATIENT
Start: 2021-01-08 | End: 2021-01-09

## 2021-01-08 RX ADMIN — SIMETHICONE 80 MILLIGRAM(S): 80 TABLET, CHEWABLE ORAL at 11:48

## 2021-01-08 RX ADMIN — ATOVAQUONE 1500 MILLIGRAM(S): 750 SUSPENSION ORAL at 11:48

## 2021-01-08 RX ADMIN — Medication 80 MILLIGRAM(S): at 16:37

## 2021-01-08 RX ADMIN — Medication 100 MILLIGRAM(S): at 22:38

## 2021-01-08 RX ADMIN — MIDODRINE HYDROCHLORIDE 30 MILLIGRAM(S): 2.5 TABLET ORAL at 05:51

## 2021-01-08 RX ADMIN — Medication 40 MILLIEQUIVALENT(S): at 11:47

## 2021-01-08 RX ADMIN — PANTOPRAZOLE SODIUM 40 MILLIGRAM(S): 20 TABLET, DELAYED RELEASE ORAL at 05:50

## 2021-01-08 RX ADMIN — Medication 1 TABLET(S): at 16:37

## 2021-01-08 RX ADMIN — SIMETHICONE 80 MILLIGRAM(S): 80 TABLET, CHEWABLE ORAL at 05:50

## 2021-01-08 RX ADMIN — Medication 50 MILLILITER(S): at 14:07

## 2021-01-08 RX ADMIN — CEFEPIME 100 MILLIGRAM(S): 1 INJECTION, POWDER, FOR SOLUTION INTRAMUSCULAR; INTRAVENOUS at 05:48

## 2021-01-08 RX ADMIN — CHLORHEXIDINE GLUCONATE 1 APPLICATION(S): 213 SOLUTION TOPICAL at 11:48

## 2021-01-08 RX ADMIN — MIDODRINE HYDROCHLORIDE 30 MILLIGRAM(S): 2.5 TABLET ORAL at 16:37

## 2021-01-08 RX ADMIN — EMTRICITABINE AND TENOFOVIR DISOPROXIL FUMARATE 1 TABLET(S): 200; 300 TABLET, FILM COATED ORAL at 11:48

## 2021-01-08 RX ADMIN — Medication 40 MILLIEQUIVALENT(S): at 13:26

## 2021-01-08 RX ADMIN — Medication 50 MILLILITER(S): at 22:36

## 2021-01-08 RX ADMIN — Medication 2 PACKET(S): at 05:50

## 2021-01-08 RX ADMIN — SIMETHICONE 80 MILLIGRAM(S): 80 TABLET, CHEWABLE ORAL at 22:38

## 2021-01-08 RX ADMIN — CEFEPIME 100 MILLIGRAM(S): 1 INJECTION, POWDER, FOR SOLUTION INTRAMUSCULAR; INTRAVENOUS at 22:39

## 2021-01-08 RX ADMIN — Medication 1 PACKET(S): at 13:27

## 2021-01-08 RX ADMIN — Medication 40 MILLIGRAM(S): at 05:48

## 2021-01-08 RX ADMIN — PANTOPRAZOLE SODIUM 40 MILLIGRAM(S): 20 TABLET, DELAYED RELEASE ORAL at 16:37

## 2021-01-08 RX ADMIN — Medication 50 MILLILITER(S): at 05:47

## 2021-01-08 RX ADMIN — SIMETHICONE 80 MILLIGRAM(S): 80 TABLET, CHEWABLE ORAL at 16:37

## 2021-01-08 RX ADMIN — Medication 64.69 GRAM(S): at 05:49

## 2021-01-08 RX ADMIN — Medication 1 PACKET(S): at 22:39

## 2021-01-08 RX ADMIN — CEFEPIME 100 MILLIGRAM(S): 1 INJECTION, POWDER, FOR SOLUTION INTRAMUSCULAR; INTRAVENOUS at 13:26

## 2021-01-08 RX ADMIN — Medication 40 MILLIEQUIVALENT(S): at 22:38

## 2021-01-08 RX ADMIN — DOLUTEGRAVIR SODIUM 50 MILLIGRAM(S): 25 TABLET, FILM COATED ORAL at 11:48

## 2021-01-08 RX ADMIN — MIDODRINE HYDROCHLORIDE 30 MILLIGRAM(S): 2.5 TABLET ORAL at 11:47

## 2021-01-08 NOTE — PROGRESS NOTE ADULT - ASSESSMENT
51 yo F w/ PMHx HIV/AIDS, HBV, previous histoplasmosis, CMV, renal vein thrombus s/p coumadin (2010) admitted with abd pain x 4 days, found to have acute liver injury and covid +    # Acute on chronic liver failure: In the setting of HIV/AIDS, HBV reactivation, Klebsiella SBP, and COVID  # Acute liver injury - patient with predominantly hepatocellular liver injury, initially with AST >1800, Alt 600. Also w/ evidence of hepatic synthetic function, with elevated INR, decreased albumin. 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 Her outpatient viral load >400,000,000 corroborates this. Hepatitis delta coinfection has beenruled out as outpatient.   Patient was on biktarvy but switched to Truvada for TDF (as opposed to TAF), and adding double coverage with entecavir. Also on NAC. Currently with minimal concern for acute liver failure as she is mentating well.  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 Hep B resistance testing negative.  # Klebsiella SBP  # Abdominal pain: Likely due to hepatitis and SBP  # HIV/AIDS - CD4 114, previous histo, CMV, has been off medications for 1 year until 11/2020  # COVID +     Recommendations:   - F/U repeat CT A/P  - Re-check CD4, HIV viral load  - Re-check COVID PCR  - Check COVID antibodies  - Antibiotics per ID  - Continue to trend CMP, INR, Mg, Phos, fibrinogen, vbg (lactate) daily   - Transfuse for goal Hgb 7.0 to 8.0, platelets > 50  - Continue Truvada for HBV  - Continue NAC infusion for acute liver injury (100 mg/kg over 16 hours, will likely need to be reordered daily)  - PPI IV BID  - Patient may be OLT candidate via [facility] at participating center  - Rest of care per primary team    Alvarado Marin MD  Gastroenterology and Hepatology Fellow  Please contact via [employer] Teams    Please call Hepatology ([phone]) if there are any additional questions or concerns.    Please call answering service for on-call GI fellow ([phone]) after 5pm and before 8am, and on weekends. 51 yo F w/ PMHx HIV/AIDS, HBV, previous histoplasmosis, CMV, renal vein thrombus s/p coumadin (2010) admitted with abd pain x 4 days, found to have acute liver injury and covid +    # Acute on chronic liver failure: In the setting of HIV/AIDS, HBV reactivation, Klebsiella SBP, and COVID  # Acute liver injury - patient with predominantly hepatocellular liver injury, initially with AST >1800, Alt 600. Also w/ evidence of hepatic synthetic function, with elevated INR, decreased albumin. 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 Her outpatient viral load >400,000,000 corroborates this. Hepatitis delta coinfection has beenruled out as outpatient.   Patient was on biktarvy but switched to Truvada for TDF (as opposed to TAF), and adding double coverage with entecavir. Also on NAC. Currently with minimal concern for acute liver failure as she is mentating well.  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 Hep B resistance testing negative.  # Klebsiella SBP  # Abdominal pain: Likely due to hepatitis and SBP  # HIV/AIDS - CD4 114, previous histo, CMV, has been off medications for 1 year until 11/2020  # COVID +     Recommendations:   - Increase furosemide to 80 mg IV BID  - F/U repeat CT A/P  - Re-check CD4, HIV viral load  - Re-check COVID PCR  - Check COVID antibodies  - Antibiotics per ID  - Continue to trend CMP, INR, Mg, Phos, fibrinogen, vbg (lactate) daily   - Transfuse for goal Hgb 7.0 to 8.0, platelets > 50  - Continue Truvada for HBV  - Continue NAC infusion for acute liver injury (100 mg/kg over 16 hours, will likely need to be reordered daily)  - PPI IV BID  - Patient may be OLT candidate via [facility] at participating center  - Rest of care per primary team    Alvarado Marin MD  Gastroenterology and Hepatology Fellow  Please contact via [employer] Teams    Please call Hepatology ([phone]) if there are any additional questions or concerns.    Please call answering service for on-call GI fellow ([phone]) after 5pm and before 8am, and on weekends.

## 2021-01-08 NOTE — PROGRESS NOTE ADULT - ATTENDING COMMENTS
Resent t-cell subsets, HIV viral load, COVID nasal PCR and antibodies.  She has acute on chronic liver failure due to HBV reactivation, SBP, and COVID.  Due to history of opportunistic infection (histoplasmosis), suspect that she will need a CD4 count of 200+ before eligible for liver transplant (hope act), but we can prepare and she may be best served at a transplant center with hope act protocol.  She has 3+ pitting edema, would double diuresis with a albumin

## 2021-01-08 NOTE — PROGRESS NOTE ADULT - ASSESSMENT
50 year old female with past medical history of HIV, CMV, HBV, histoplasmosis, renal vein thrombosis s/p coumadin for 6 months, and strongyloidiasis who presents with abdominal pain found to be COVID-19 PCR positive with acute liver injury 2/2 Hep B reactivation with +SBP, complicated with coagulapathy     #Acute liver injury 2/2 HepB reactivation  Predominantly hepatocellular liver injury, with AST >1800, Alt 600. Also w/ evidence of hepatic synthetic function, with elevated INR, decreased albumin. Etiology likely from HBV, which was previously being treated w/ Symtuza (tenofovir 10mg qd) but patient had been off medication for almost 1 year (until 11/2020) due to insurance lapse. Her outpatient viral load >400,000,000 with hepatitis delta coinfection ruled out. Currently patient is not in acute liver failure, she is mentating well, but is having worsening INR which is concerning.  - c/w truvada. Entecavir stopped  - c/w NAC infusion for acute liver failure (100 mg/kg over 16 hours, will likely need to be reordered daily)  - c/w albumin 100cc 25% q6h  - IVF PRN. Hold off given patient third spacing with bilateral edema  - s/p vitamin K 10mg (12/26 - 12/28, 1/1-1/2)  - dilaudid .5 mg IV prn for pain, dose carefully given HOTN. However, pt has not responded to motrin (hold i/s/o GIB), tylenol (wary i/s/o hepatic dysfunction), ketorolac (hold i/s/o GIB).  - d/c Tramadol 25mg q6hr for mod pain- not very effective per pt   - ascitic fluid: SAAG 2, ascitic fluid protein .8 likely portal HTN however granulocyte count from ascitic fluid >250- consistent with SBP. Grew Klebsiella oxytoca/Raoutella ornithinolytica. Sensitive to many Abx  - will give albumin and diurese w/lasix 40mg IV BID for LE edema/third spacing 2/2 low albumin    #Coagulapathy  - worsening coags with initial concern for DIC (thrombocytopenia, elevated coags, d-dimer, low fibrogen). Ep of melena 1/1 s/p cryo, vitamin K, plasma  - heme recs: likely from liver failure. Unlikely hemolysis despite low haptoglobin (liver failure) given rare schistocytes, factor 8 elevated. Initial mixing study negative. Follow up repeat mixing study   - hepatology recs: worsening PTT unlikely from liver failure  - Goal fibrinogen >150, give cyro PRN    #Sepsis 2/2 SBP   Pt noted to be tachy, HOTN, febrile 12/30.   - blood cx 12/31- ngtd  - Ascites fluid 12/31 grew Klebsiella oxytoca/Raoutella ornithinolytica.   - repeat para 1/5 shows SBP but markedly reduced granulocytes  - s/p vanc (12/30-1/1)  - s/p zosyn (12/30-1/3)   - s/p ceftriaxone (1/3-1/6)   - c/w cefepime (1/6- )  - c/w midodrine 30 mg TID     #Anemia:   - Hgb acutely dropped 1/1. FOBT+. c/f GIB s/p cryo 1u, pRBC 1u.   - per heme, coagulopathy 2/2 liver disease  - type and cross x2  - transfuse Hgb <7    #AIDS  needs ARV medications to help with her immunosuppression but need to change ARV meds to ones metabolized by the kidney  continue the TAF for help controlling the Hep B infection  - In light of significant transaminitis, discontinued the Symtuza and changed meds to Truvada/Tivicay  - CD4 116 consistent with AIDS  - RNA viral load in process - slightly detectable but cannot account for her acute decompensation in liver disease  - c/w atovaquone for PCP ppx per ID- no Bactrim given hepatic dysfunction    #COVID+  - not eligible for Remdesivir secondary to her LFT abnormalities  - could consider convalescent plasma if she decompensates  - D- dimer rising, fibrinogen labile, ferritin elevated, stable  - Quant TB neg  - blood cx 12/22- no growth, 12/31 no growth final  - urine histo ag. neg    #CMV viremia   - hold off on Valganciclovir   - CMV viral load <96    #Prophylactic measures  - DVT ppx: hold i/s/o hgb drop  - GERD: pantoprazole   - Diet: CLD 50 year old female with past medical history of HIV, CMV, HBV, histoplasmosis, renal vein thrombosis s/p coumadin for 6 months, and strongyloidiasis who presents with abdominal pain found to be COVID-19 PCR positive with acute liver injury 2/2 Hep B reactivation with +SBP, complicated with coagulapathy     #Acute liver injury 2/2 HepB reactivation  Predominantly hepatocellular liver injury, with AST >1800, Alt 600. Also w/ evidence of hepatic synthetic function, with elevated INR, decreased albumin. Etiology likely from HBV, which was previously being treated w/ Symtuza (tenofovir 10mg qd) but patient had been off medication for almost 1 year (until 11/2020) due to insurance lapse. Her outpatient viral load >400,000,000 with hepatitis delta coinfection ruled out. Currently patient is not in acute liver failure, she is mentating well, but is having worsening INR which is concerning.  - Hep B viral load continues to downtrend (21 million 1/6, down from >400 million outpt)  - c/w truvada. Entecavir stopped  - c/w NAC infusion for acute liver failure (100 mg/kg over 16 hours)  - c/w albumin 100cc 25% q8h with lasix 40 mg IBV BID for LE edema/third spacing 2/2 low albumin  - s/p vitamin K 10mg (12/26 - 12/28, 1/1-1/2)  - dilaudid .5 mg IV prn for pain, dose carefully given HOTN. However, pt has not responded to motrin (hold i/s/o GIB), tylenol (wary i/s/o hepatic dysfunction), ketorolac (hold i/s/o GIB), Tramadol (not very effective per pt)  - ascitic fluid: SAAG 2, ascitic fluid protein .8 likely portal HTN however granulocyte count from ascitic fluid >250- consistent with SBP. Grew Klebsiella oxytoca/Raoutella ornithinolytica. Sensitive to many Abx    #Coagulapathy  - worsening coags with initial concern for DIC (thrombocytopenia, elevated coags, d-dimer, low fibrogen). Ep of melena 1/1 s/p cryo, vitamin K, plasma  - heme recs: likely from liver failure. Unlikely hemolysis despite low haptoglobin (liver failure) given rare schistocytes, factor 8 elevated. Initial mixing study negative. Follow up repeat mixing study   - hepatology recs: worsening PTT unlikely from liver failure  - Goal fibrinogen >150, give cyro PRN    #Sepsis 2/2 SBP   Pt noted to be tachy, HOTN, febrile 12/30.   - blood cx 12/31- ngtd  - Ascites fluid 12/31 grew Klebsiella oxytoca/Raoutella ornithinolytica.   - repeat para 1/5 shows SBP but markedly reduced granulocytes  - s/p vanc (12/30-1/1)  - s/p zosyn (12/30-1/3)   - s/p ceftriaxone (1/3-1/6)   - c/w cefepime (1/6- )  - c/w midodrine 30 mg TID     #Anemia:   - Hgb acutely dropped 1/1. FOBT+. c/f GIB s/p cryo 1u, pRBC 1u.   - per heme, coagulopathy 2/2 liver disease  - type and cross x2  - transfuse Hgb <7    #AIDS  needs ARV medications to help with her immunosuppression but need to change ARV meds to ones metabolized by the kidney  continue the TAF for help controlling the Hep B infection  - In light of significant transaminitis, discontinued the Symtuza and changed meds to Truvada/Tivicay  - CD4 116 consistent with AIDS  - RNA viral load in process - slightly detectable but cannot account for her acute decompensation in liver disease  - c/w atovaquone for PCP ppx per ID- no Bactrim given hepatic dysfunction    #COVID+  - not eligible for Remdesivir secondary to her LFT abnormalities  - could consider convalescent plasma if she decompensates  - D- dimer rising, fibrinogen labile, ferritin elevated, stable  - Quant TB neg  - blood cx 12/22- no growth, 12/31 no growth final  - urine histo ag. neg    #CMV viremia   - hold off on Valganciclovir   - CMV viral load <96    #Prophylactic measures  - DVT ppx: hold i/s/o hgb drop  - GERD: pantoprazole   - Diet: CLD

## 2021-01-08 NOTE — CHART NOTE - NSCHARTNOTEFT_GEN_A_CORE
Nutrition Follow Up Note    Seen for routine follow up.     Source: EMR, RN, Unable to conduct a face-to-face interview at this time due to limited contact restrictions related to pt's medical condition and isolation precautions. Attempted to contact pt via phone: first attempt no answer, second attempt pt requested  (preferred language Macanese), third attempt with  no answer.     Chart reviewed, events noted.     Per chart, Pt is a 51 yo female with PMH of HIV, CMV, HBV, histoplasmosis, renal vein thrombosis s/p coumadin for 6 months, and strongyloidiasis, who presented with abdominal pain, found to be COVID-19+, admitted 12/22. Pt with acute liver injury, Hepatology following.     Diet : Full Liquid, 1500ML Fluid Restriction, Ensure Enlive x1/daily      Per RN, pt tolerating full liquid diet. Last BM documented 01/06; no bowel regimen ordered. Emesis noted on 01/07; pt noted to be on Zofran; continue to monitor.       PO intake:  0-25%      Source for PO intake: flowsheet     Enteral /Parenteral Nutrition:     Current Weight: Weight (kg): 73.1kg (01/05- Dosing weight); 73.1kg (12/23- chart daily weight)   % Weight Change: No significant weight change noted. Pt noted to be on Lasix, may result in fluid shifts. Continue to monitor weight trends.     Pertinent Medications: Lasix, Protonix, Acetadote, Proamatine, Mylicon, Zofran, Maxipime, Mepron, Tivicay, e    Pertinent Labs: 01-08 Na137 mmol/L Glu 80 mg/dL K+ 3.0 mmol/L<L> Cr  0.76 mg/dL BUN 18 mg/dL 01-08 Phos 2.2 mg/dL<L> 01-08 Alb 2.6 g/dL<L>01-08 ALT 88 U/L<H>  U/L<H>   Low K; noted. Potassium chloride, potassium phosphate/sodium phosphate ordered.     Skin per chart:   Edema per chart: none noted    Estimated Needs:   [ ] no change since previous assessment  [ ] recalculated:       Previous Nutrition Diagnosis:   Nutrition Diagnosis is:      New Nutrition Diagnosis:     Interventions:     Recommendations  1)    Monitoring and Evaluation:     [X] PO intake [X] Tolerance to diet prescription [X] weight trends [x] skin integrity     RD remains available and will follow up per protocol.     Ele Murrieta  Pager #: [phone] Nutrition Follow Up Note    Seen for routine follow up.     Source: EMR, RN, Unable to conduct a face-to-face interview at this time due to limited contact restrictions related to pt's medical condition and isolation precautions. Attempted to contact pt via phone: first attempt no answer, second attempt pt requested  (preferred language Hungarian), third attempt with  no answer.     Chart reviewed, events noted.     Per chart, Pt is a 49 yo female with PMH of HIV, CMV, HBV, histoplasmosis, renal vein thrombosis s/p coumadin for 6 months, and strongyloidiasis, who presented with abdominal pain, found to be COVID-19+, admitted 12/22. Pt with acute liver injury, Hepatology following.     Diet : Full Liquid, 1500ML Fluid Restriction, Ensure Enlive x1/daily      Per RN, pt tolerating full liquid diet. Last BM documented 01/06; no bowel regimen ordered. Emesis noted on 01/07; pt noted to be on Zofran; continue to monitor.       PO intake:  0-25%      Source for PO intake: flowsheet    Current Weight: Weight (kg): 73.1kg (01/05- Dosing weight); 73.1kg (12/23- chart daily weight)   % Weight Change: No significant weight change noted. Pt noted to be on Lasix, may result in fluid shifts. Continue to monitor weight trends.     Pertinent Medications: Lasix, Protonix, Acetadote, Proamatine, Mylicon, Zofran, Maxipime, Mepron, Tivicay    Pertinent Labs: 01-08 Na137 mmol/L Glu 80 mg/dL K+ 3.0 mmol/L<L> Cr  0.76 mg/dL BUN 18 mg/dL 01-08 Phos 2.2 mg/dL<L> 01-08 Alb 2.6 g/dL<L>01-08 ALT 88 U/L<H>  U/L<H>   Low K; noted. Potassium chloride, potassium phosphate/sodium phosphate ordered.     Skin per chart: no pressure injury noted   Edema per chart: none noted    Estimated Needs: Based on upper .5 pounds  Estimated Energy Needs (25-30 kcal/kg): 7096-7823 kcal/day  Estimated Protein Needs (1-1.2 g/kg): 62-75 g/day  Fluid Needs: now defer to team; pt on fluid restriction     Previous Nutrition Diagnosis: Inadequate Oral Intake   Nutrition Diagnosis is: ongoing, being addressed with PO diet, Ensure Enlive supplements, RD provision of Mighty Shakes and high protein apple juice     New Nutrition Diagnosis: n/a    Interventions and Recommendations  1) Continue current diet: Full Liquid + 1500mL fluid restriction; defer consistency to SLP and Team; advance as medically feasible.   2) Continue Ensure Enlive x2/day, High Protein Apple Juice x3/day, MightyShakes x3/day; aid in meeting nutrient needs; monitor to see if pt is continuing to consume   3) Recommend adding Multivitamin daily; aid in meeting needs   4) Monitor weight trends, electrolytes, PO intake      Monitoring and Evaluation:     [X] PO intake [X] Tolerance to diet prescription [X] weight trends [x] skin integrity     RD remains available and will follow up per protocol.     Ele Murrieta (Dietetic Intern)   Pager #: [phone] Nutrition Follow Up Note    Seen for routine follow up.     Source: EMR, RN, Unable to conduct a face-to-face interview at this time due to limited contact restrictions related to pt's medical condition and isolation precautions. Attempted to contact pt via phone: first attempt no answer, second attempt pt requested  (preferred language Samoan), third attempt with  no answer.     Chart reviewed, events noted.     Per chart, Pt is a 51 yo female with PMH of HIV, CMV, HBV, histoplasmosis, renal vein thrombosis s/p coumadin for 6 months, and strongyloidiasis, who presented with abdominal pain, found to be COVID-19+, admitted 12/22. Pt with acute liver injury, Hepatology following.     Diet : Full Liquid, 1500ML Fluid Restriction, Ensure Enlive x1/daily      Per RN, pt tolerating full liquid diet. Last BM documented 01/06; no bowel regimen ordered. Emesis noted on 01/07; pt noted to be on Zofran; continue to monitor. No known diarrhea/constipation. Unknown if pt is accepting Ensure Enlive x1/day at this time.      PO intake:  0-25%      Source for PO intake: flowsheet    Current Weight: Weight (kg): 73.1kg (01/05- Dosing weight); 73.1kg (12/23- chart daily weight)   % Weight Change: No significant weight change noted. Pt noted to be on Lasix, may result in fluid shifts. Continue to monitor weight trends.     Pertinent Medications: Lasix, Protonix, Acetadote, Proamatine, Mylicon, Zofran, Maxipime, Mepron, Tivicay    Pertinent Labs: 01-08 Na137 mmol/L Glu 80 mg/dL K+ 3.0 mmol/L<L> Cr  0.76 mg/dL BUN 18 mg/dL 01-08 Phos 2.2 mg/dL<L> 01-08 Alb 2.6 g/dL<L>01-08 ALT 88 U/L<H>  U/L<H>   Low K, Phos; noted. Potassium chloride, potassium phosphate/sodium phosphate ordered.     Skin per chart: no pressure injury noted   Edema per chart: none noted    Estimated Needs: Based on upper .5 pounds  Estimated Energy Needs (25-30 kcal/kg): 8595-0796 kcal/day  Estimated Protein Needs (1-1.2 g/kg): 62-75 g/day  Fluid Needs: now defer to team; pt on fluid restriction     Previous Nutrition Diagnosis: Inadequate Oral Intake   Nutrition Diagnosis is: ongoing, being addressed with PO diet, Ensure Enlive supplements, RD provision of Mighty Shakes and high protein apple juice  Pt does not currently meet criteria given weight stable per documentation and unable to visually assess pt with no edema. Will continue to monitor pt as able.     New Nutrition Diagnosis: n/a     Interventions and Recommendations  1) Continue current diet: Full Liquid + 1500mL fluid restriction; defer consistency to SLP and Team; advance as medically feasible. Fluids deferred to team    2)  Recommend increase Ensure Enlive x2/day ; aid in meeting nutrient needs; monitor for acceptance   3) Recommend adding Multivitamin + thiamine daily; aid in meeting micronutrient needs and preventing risk for refeeding syndrome   - Trend K, Phos, Mg  4) Will continue to provide High protein apple juice x3/day, MightyShakes x3/day; aid in meeting nutrient needs; monitor for acceptance  4) Monitor weight trends, electrolytes, PO intake      Monitoring and Evaluation:     [X] PO intake [X] Tolerance to diet prescription [X] weight trends [x] skin integrity     RD remains available and will follow up per protocol.     Ele Murrieta (Dietetic Intern)   Pager #: [phone] Nutrition Follow Up Note    Seen for routine follow up.     Source: EMR, RN, Unable to conduct a face-to-face interview at this time due to limited contact restrictions related to pt's medical condition and isolation precautions. Attempted to contact pt via phone: first attempt no answer, second attempt pt requested  (preferred language Micronesian), third attempt with  no answer.     Chart reviewed, events noted.     Per chart, Pt is a 51 yo female with PMH of HIV, CMV, HBV, histoplasmosis, renal vein thrombosis s/p coumadin for 6 months, and strongyloidiasis, who presented with abdominal pain, found to be COVID-19+, admitted 12/22. Pt with acute liver injury, Hepatology following.     Diet : Full Liquid, 1500ML Fluid Restriction, Ensure Enlive x1/daily      Per RN, pt tolerating full liquid diet. Last BM documented 01/06; no bowel regimen ordered. Emesis noted on 01/07; pt noted to be on Zofran; continue to monitor. No known diarrhea/constipation. Unknown if pt is accepting Ensure Enlive x1/day, MightyShakes and High Protein Apple Juice at this time.      PO intake:  0-25%      Source for PO intake: flowsheet     Weight (kg): 73.1kg (01/05- Dosing weight); 73.1kg (12/23- chart daily weight)   % Weight Change: No significant weight change noted. Pt noted to be on Lasix, may result in fluid shifts. Continue to monitor weight trends.     Pertinent Medications: Lasix, Protonix, Acetadote, Proamatine, Mylicon, Zofran, Maxipime, Mepron, Tivicay    Pertinent Labs: 01-08 Na137 mmol/L Glu 80 mg/dL K+ 3.0 mmol/L<L> Cr  0.76 mg/dL BUN 18 mg/dL 01-08 Phos 2.2 mg/dL<L> 01-08 Alb 2.6 g/dL<L>01-08 ALT 88 U/L<H>  U/L<H>   Low K, Phos; noted. Potassium chloride, potassium phosphate/sodium phosphate ordered.     Skin per chart: no pressure injury noted   Edema per chart: none noted    Estimated Needs: Based on upper .5 pounds  Estimated Energy Needs (25-30 kcal/kg): 6747-7792 kcal/day  Estimated Protein Needs (1-1.2 g/kg): 62-75 g/day  Fluid Needs: now defer to team; pt on fluid restriction     Previous Nutrition Diagnosis: Inadequate Oral Intake   Nutrition Diagnosis is: ongoing, being addressed with PO diet, Ensure Enlive supplements, RD provision of Mighty Shakes and high protein apple juice  - Pt does not currently meet criteria given weight stable per documentation and unable to visually assess pt with no edema. Will continue to monitor pt as able.     New Nutrition Diagnosis: n/a     Interventions and Recommendations  1) Continue current diet: Full Liquid + 1500mL fluid restriction; defer consistency to SLP and Team; advance as medically feasible. Fluids deferred to team    2)  Recommend increase Ensure Enlive x2/day ; aid in meeting nutrient needs; monitor for acceptance   3) Recommend adding Multivitamin + thiamine daily; aid in meeting micronutrient needs and preventing risk for refeeding syndrome   - Trend K, Phos, Mg  4) Will continue to provide High protein apple juice x3/day, MightyShakes x3/day; aid in meeting nutrient needs; monitor for acceptance  4) Monitor weight trends, electrolytes, PO intake      Monitoring and Evaluation:     [X] PO intake [X] Tolerance to diet prescription [X] weight trends [x] skin integrity     RD remains available and will follow up per protocol.     Ele Murrieta (Dietetic Intern)   Pager #: [phone] Nutrition Follow Up Note    Seen for routine follow up.     Source: EMR, RN, Unable to conduct a face-to-face interview at this time due to limited contact restrictions related to pt's medical condition and isolation precautions. Attempted to contact pt via phone: first attempt no answer, second attempt pt requested  (preferred language Indian), third attempt with  no answer.     Chart reviewed, events noted.     Per chart, Pt is a 49 yo female with PMH of HIV, CMV, HBV, histoplasmosis, renal vein thrombosis s/p coumadin for 6 months, and strongyloidiasis, who presented with abdominal pain, found to be COVID-19+, admitted 12/22. Pt with acute liver injury, Hepatology following.     Diet : Full Liquid, 1500ML Fluid Restriction, Ensure Enlive x1/daily      Per RN, pt tolerating full liquid diet. Last BM documented 01/06; no bowel regimen ordered. Emesis noted on 01/07; pt noted to be on Zofran; continue to monitor. No known diarrhea/constipation. Unknown if pt is accepting Ensure Enlive x1/day, MightyShakes and High Protein Apple Juice at this time.      PO intake:  0-25%      Source for PO intake: flowsheet     Weight (kg): 73.1kg (01/05- Dosing weight); 73.1kg (12/23- chart daily weight)   % Weight Change: No significant weight change noted. Pt noted to be on Lasix, may result in fluid shifts. Continue to monitor weight trends.     Pertinent Medications: Lasix, Protonix, Acetadote, Proamatine, Mylicon, Zofran, Maxipime, Mepron, Tivicay    Pertinent Labs: 01-08 Na137 mmol/L Glu 80 mg/dL K+ 3.0 mmol/L<L> Cr  0.76 mg/dL BUN 18 mg/dL 01-08 Phos 2.2 mg/dL<L> 01-08 Alb 2.6 g/dL<L>01-08 ALT 88 U/L<H>  U/L<H>   Low K, Phos; noted. Potassium chloride, potassium phosphate/sodium phosphate ordered.     Skin per chart: no pressure injury noted   Edema per chart: none noted    Estimated Needs: Based on upper .5 pounds  Estimated Energy Needs (25-30 kcal/kg): 9848-6056 kcal/day  Estimated Protein Needs (1-1.2 g/kg): 62-75 g/day  Fluid Needs: now defer to team; pt on fluid restriction     Previous Nutrition Diagnosis: Inadequate Oral Intake   Nutrition Diagnosis is: ongoing, being addressed with PO diet, Ensure Enlive supplements, RD provision of Mighty Shakes and high protein apple juice  - Pt does not currently meet criteria given weight stable per documentation and unable to visually assess pt with no edema. Will continue to monitor pt as able.     New Nutrition Diagnosis: n/a     Interventions and Recommendations  1) Continue current diet: Full Liquid + 1500mL fluid restriction; defer consistency to SLP and Team; advance as medically feasible. Fluids deferred to team    2)  Recommend increase Ensure Enlive x2/day ; aid in meeting nutrient needs; monitor for acceptance   3) Recommend adding Multivitamin + thiamine daily; aid in meeting micronutrient needs and preventing risk for refeeding syndrome   - Trend K, Phos, Mg  4) Will continue to provide High protein apple juice x3/day, MightyShakes x3/day; aid in meeting nutrient needs; monitor for acceptance  5) Monitor weight trends, electrolytes, PO intake      Monitoring and Evaluation:     [X] PO intake [X] Tolerance to diet prescription [X] weight trends [x] skin integrity     RD remains available and will follow up per protocol.     Ele Murrieta (Dietetic Intern)   Pager #: [phone]

## 2021-01-08 NOTE — PROGRESS NOTE ADULT - SUBJECTIVE AND OBJECTIVE BOX
Chief Complaint: Abdominal pain  Reason for consult: Abnormal liver enzymes, Hep B reactivation    Interval Events:   - Patient tachycardic. No fevers.  - Repeat CT A/P pending  - Lower extremity swelling worsening    MEDICATIONS  (STANDING):  acetylcysteine IVPB 7 Gram(s) IV Intermittent <User Schedule>  albumin human 25% IVPB 100 milliLiter(s) IV Intermittent every 8 hours  atovaquone Suspension 1500 milliGRAM(s) Oral daily  cefepime   IVPB 1000 milliGRAM(s) IV Intermittent every 8 hours  chlorhexidine 2% Cloths 1 Application(s) Topical daily  dolutegravir 50 milliGRAM(s) Oral daily  emtricitabine 200 mG/tenofovir 300 mG (TRUVADA) 1 Tablet(s) Oral daily  furosemide   Injectable 40 milliGRAM(s) IV Push every 12 hours  midodrine. 30 milliGRAM(s) Oral three times a day  pantoprazole    Tablet 40 milliGRAM(s) Oral before breakfast  potassium phosphate / sodium phosphate Powder (PHOS-NaK) 1 Packet(s) Oral three times a day  simethicone 80 milliGRAM(s) Chew four times a day    MEDICATIONS  (PRN):  aluminum hydroxide/magnesium hydroxide/simethicone Suspension 30 milliLiter(s) Oral every 6 hours PRN Dyspepsia  ondansetron Injectable 4 milliGRAM(s) IV Push every 8 hours PRN Nausea and/or Vomiting    PMHX/PSHX:  Renal Vein Thrombosis    CMV Infection    Hepatitis B    Strongyloidosis    Histoplasmosis    HIV (Human Immunodeficiency Virus Infection)    Gallstones    S/P  Section    S/P Bilateral Tubal Ligation    S/P PEG (Percutaneous Endoscopic Gastrostomy) Placement and Removal    History of Cholecystectomy    ROS:     General:  No weight loss, fevers, chills, night sweats, fatigue   Eyes:  No vision changes  ENT:  No sore throat, pain, runny nose  CV:  No chest pain, palpitations, dizziness   Resp:  No SOB, cough, wheezing  GI:  + pain, + nausea, no vomiting, no bloody stools, no black tarry stools  :  No burning with urination, hematuria  Muscle:  No pain, weakness  Neuro:  No weakness/tingling, memory problems  Psych:  No fatigue, insomnia, mood problems, depression  Heme:  No easy bruisability  Skin:  No rash, edema    PHYSICAL EXAM:     Vital Signs:  Vital Signs Last 24 Hrs  T(C): 36.9 (2021 10:40), Max: 37.2 (2021 23:12)  T(F): 98.5 (2021 10:40), Max: 98.9 (2021 23:12)  HR: 108 (2021 10:40) (99 - 117)  BP: 97/64 (2021 10:40) (92/63 - 128/80)  BP(mean): --  RR: 18 (2021 10:40) (18 - 20)  SpO2: 98% (2021 10:40) (95% - 100%)    GENERAL: Ill-appearing, no acute distress  HEENT:  NC/AT,  conjunctivae clear, scleral icterus  CHEST:  Full & symmetric excursion, no increased effort w/ respirations  HEART:  Regular rhythm & rate  ABDOMEN:  Soft, mild diffuse tenderness, distended  EXTREMITIES:  + edema  SKIN:  No rash/erythema, + jaundice  NEURO:  Alert, oriented    LABS:                        7.0    7.18  )-----------( 77       ( 2021 07:42 )             20.6     01-08    137  |  103  |  18  ----------------------------<  80  3.0<L>   |  26  |  0.76    Ca    7.7<L>      2021 07:41  Phos  2.2     01-08  Mg     2.0     -08    TPro  4.1<L>  /  Alb  2.6<L>  /  TBili  15.6<H>  /  DBili  x   /  AST  320<H>  /  ALT  88<H>  /  AlkPhos  66  01-08    LIVER FUNCTIONS - ( 2021 07:41 )  Alb: 2.6 g/dL / Pro: 4.1 g/dL / ALK PHOS: 66 U/L / ALT: 88 U/L / AST: 320 U/L / GGT: x           PT/INR - ( 2021 07:42 )   PT: 30.4 sec;   INR: 2.65 ratio         PTT - ( 2021 07:42 )  PTT:71.4 sec      Imaging:    Reviewed

## 2021-01-08 NOTE — PROGRESS NOTE ADULT - ASSESSMENT
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   Overall improved liver enzymes and pain. HBV viral load down from 483 million 12/2 to 36 million 12/29.   Other causes for liver failure unlikely or ruled out- COVID PCR positive, CMV low level viremia, HDV, HCV, AIDS cholangiopathy,   Became febrile 12/30 night (not recorded) and found to have Klebsiella SBP.   Still nauseous but afebrile and heading in the right direction.     Suggest:    # SBP  follow up ascitic fluid cell count and cultures- cell count suggests ongoing infection after correcting for the RBC numbers but fluid cultures are all no growth or NGTD  gram stain, fungal stain, AFB stain of ascitic fluid is negative   - Ct scan of abdomen notable for ascites but no loculations or abscess seen  can discontinue the Cefepime    Edema/liver failure  worsening PT/INR synthetic function despite improvement in her LFTs  may need to hold dolutegravir as it is metabolized by the liver if things progress further  continue albumin and lasix for diuresis and help with her lower extremity edema     -continue Tivicay and Truvada for HIV, the latter for HBV as well   -f/u HBV resistance testing   - repeat HBV viral load as ALT/AST have improved  -Atovaquone - will hold in the face of increasing cholestasis     -supportive care and isolation precautions for COVID, currently on room air and no cough can discontinue after 21 days from first positive test= 1/12/21    Chencho Mcadams MD  [phone]  After 5pm/weekends [phone]

## 2021-01-08 NOTE — PROGRESS NOTE ADULT - SUBJECTIVE AND OBJECTIVE BOX
INFECTIOUS DISEASES FOLLOW UP-- Hollie Mcadams  [phone]    This is a follow up note for this  50yFemale with  High liver transaminase level  Covid infection, Hep B flare        ROS:  CONSTITUTIONAL:  No fever, good appetite  CARDIOVASCULAR:  No chest pain or palpitations  RESPIRATORY:  No dyspnea  GASTROINTESTINAL:  No nausea, vomiting, diarrhea, moderate abdominal pain  GENITOURINARY:  No dysuria  NEUROLOGIC:  No headache,     Allergies    No Known Allergies    Intolerances        ANTIBIOTICS/RELEVANT:  antimicrobials  atovaquone Suspension 1500 milliGRAM(s) Oral daily  cefepime   IVPB 1000 milliGRAM(s) IV Intermittent every 8 hours  dolutegravir 50 milliGRAM(s) Oral daily  emtricitabine 200 mG/tenofovir 300 mG (TRUVADA) 1 Tablet(s) Oral daily    immunologic:    OTHER:  acetylcysteine IVPB 7 Gram(s) IV Intermittent <User Schedule>  albumin human 25% IVPB 100 milliLiter(s) IV Intermittent every 8 hours  aluminum hydroxide/magnesium hydroxide/simethicone Suspension 30 milliLiter(s) Oral every 6 hours PRN  chlorhexidine 2% Cloths 1 Application(s) Topical daily  furosemide   Injectable 80 milliGRAM(s) IV Push every 12 hours  midodrine. 30 milliGRAM(s) Oral three times a day  multivitamin 1 Tablet(s) Oral daily  ondansetron Injectable 4 milliGRAM(s) IV Push every 8 hours PRN  pantoprazole  Injectable 40 milliGRAM(s) IV Push every 12 hours  potassium chloride    Tablet ER 40 milliEquivalent(s) Oral every 4 hours  potassium phosphate / sodium phosphate Powder (PHOS-NaK) 1 Packet(s) Oral three times a day  potassium phosphate IVPB 30 milliMole(s) IV Intermittent once  simethicone 80 milliGRAM(s) Chew four times a day  thiamine 100 milliGRAM(s) Oral daily      Objective:  Vital Signs Last 24 Hrs  T(C): 36.8 (08 Jan 2021 21:21), Max: 37.2 (07 Jan 2021 23:12)  T(F): 98.3 (08 Jan 2021 21:21), Max: 98.9 (07 Jan 2021 23:12)  HR: 96 (08 Jan 2021 21:21) (96 - 117)  BP: 120/73 (08 Jan 2021 21:21) (97/64 - 122/71)  BP(mean): --  RR: 18 (08 Jan 2021 21:21) (18 - 20)  SpO2: 100% (08 Jan 2021 21:21) (95% - 100%)    PHYSICAL EXAM:  Constitutional:no acute distress  Eyes:PIPO, EOMI  Ear/Nose/Throat: no oral lesions, 	  Respiratory: clear BL  Cardiovascular: S1S2  Gastrointestinal:soft, (+) BS, no tenderness except RUQ  Extremities:no e/e/c  No Lymphadenopathy  IV sites not inflammed.    LABS:                        8.4    7.82  )-----------( 91       ( 08 Jan 2021 20:46 )             23.7     01-08    137  |  100  |  16  ----------------------------<  89  2.6<LL>   |  27  |  0.69    Ca    8.1<L>      08 Jan 2021 20:46  Phos  1.1     01-08  Mg     1.7     01-08    TPro  4.3<L>  /  Alb  2.7<L>  /  TBili  16.0<H>  /  DBili  x   /  AST  311<H>  /  ALT  82<H>  /  AlkPhos  65  01-08    PT/INR - ( 08 Jan 2021 20:46 )   PT: 35.4 sec;   INR: 3.11 ratio         PTT - ( 08 Jan 2021 20:46 )  PTT:76.0 sec      MICROBIOLOGY:            RECENT CULTURES:  01-05 @ 22:33  .Body Fluid Peritoneal  --  --  --  --  --  01-05 @ 22:32  .Body Fluid Peritoneal Fluid  --  --  --    Testing in progress  --  01-05 @ 22:26  .Body Fluid Peritoneal Fluid  --  --  --    No growth  --      RADIOLOGY & ADDITIONAL STUDIES:    < from: CT Abdomen and Pelvis w/ IV Cont (01.08.21 @ 14:50) >  IMPRESSION:  Bilateral small pleural effusions.    Moderate to large volume ascites, increased compared to prior. No peritoneal nodularity or enhancement. No abscess.    Anasarca.    < end of copied text >

## 2021-01-09 LAB
ALBUMIN SERPL ELPH-MCNC: 2.5 G/DL — LOW (ref 3.3–5)
ALBUMIN SERPL ELPH-MCNC: 3 G/DL — LOW (ref 3.3–5)
ALP SERPL-CCNC: 62 U/L — SIGNIFICANT CHANGE UP (ref 40–120)
ALP SERPL-CCNC: 66 U/L — SIGNIFICANT CHANGE UP (ref 40–120)
ALT FLD-CCNC: 79 U/L — HIGH (ref 10–45)
ALT FLD-CCNC: 80 U/L — HIGH (ref 10–45)
ANION GAP SERPL CALC-SCNC: 11 MMOL/L — SIGNIFICANT CHANGE UP (ref 5–17)
ANION GAP SERPL CALC-SCNC: 11 MMOL/L — SIGNIFICANT CHANGE UP (ref 5–17)
APTT BLD: 75.1 SEC — HIGH (ref 27.5–35.5)
APTT BLD: 82 SEC — HIGH (ref 27.5–35.5)
AST SERPL-CCNC: 282 U/L — HIGH (ref 10–40)
AST SERPL-CCNC: 299 U/L — HIGH (ref 10–40)
BILIRUB SERPL-MCNC: 15.2 MG/DL — HIGH (ref 0.2–1.2)
BILIRUB SERPL-MCNC: 15.6 MG/DL — HIGH (ref 0.2–1.2)
BUN SERPL-MCNC: 15 MG/DL — SIGNIFICANT CHANGE UP (ref 7–23)
BUN SERPL-MCNC: 16 MG/DL — SIGNIFICANT CHANGE UP (ref 7–23)
CALCIUM SERPL-MCNC: 8.1 MG/DL — LOW (ref 8.4–10.5)
CALCIUM SERPL-MCNC: 8.2 MG/DL — LOW (ref 8.4–10.5)
CHLORIDE SERPL-SCNC: 100 MMOL/L — SIGNIFICANT CHANGE UP (ref 96–108)
CHLORIDE SERPL-SCNC: 101 MMOL/L — SIGNIFICANT CHANGE UP (ref 96–108)
CO2 SERPL-SCNC: 25 MMOL/L — SIGNIFICANT CHANGE UP (ref 22–31)
CO2 SERPL-SCNC: 26 MMOL/L — SIGNIFICANT CHANGE UP (ref 22–31)
CREAT SERPL-MCNC: 0.62 MG/DL — SIGNIFICANT CHANGE UP (ref 0.5–1.3)
CREAT SERPL-MCNC: 0.68 MG/DL — SIGNIFICANT CHANGE UP (ref 0.5–1.3)
D DIMER BLD IA.RAPID-MCNC: 1121 NG/ML DDU — HIGH
D DIMER BLD IA.RAPID-MCNC: 1259 NG/ML DDU — HIGH
FERRITIN SERPL-MCNC: 1492 NG/ML — HIGH (ref 15–150)
FERRITIN SERPL-MCNC: 1519 NG/ML — HIGH (ref 15–150)
FERRITIN SERPL-MCNC: 1822 NG/ML — HIGH (ref 15–150)
FIBRINOGEN PPP-MCNC: 134 MG/DL — LOW (ref 290–520)
FIBRINOGEN PPP-MCNC: 161 MG/DL — LOW (ref 290–520)
GLUCOSE SERPL-MCNC: 103 MG/DL — HIGH (ref 70–99)
GLUCOSE SERPL-MCNC: 96 MG/DL — SIGNIFICANT CHANGE UP (ref 70–99)
HAPTOGLOB SERPL-MCNC: <20 MG/DL — LOW (ref 34–200)
HAPTOGLOB SERPL-MCNC: <20 MG/DL — LOW (ref 34–200)
HCT VFR BLD CALC: 22.9 % — LOW (ref 34.5–45)
HCT VFR BLD CALC: 23.2 % — LOW (ref 34.5–45)
HGB BLD-MCNC: 8.2 G/DL — LOW (ref 11.5–15.5)
HGB BLD-MCNC: 8.4 G/DL — LOW (ref 11.5–15.5)
INR BLD: 2.76 RATIO — HIGH (ref 0.88–1.16)
INR BLD: 3.09 RATIO — HIGH (ref 0.88–1.16)
MAGNESIUM SERPL-MCNC: 1.7 MG/DL — SIGNIFICANT CHANGE UP (ref 1.6–2.6)
MAGNESIUM SERPL-MCNC: 1.7 MG/DL — SIGNIFICANT CHANGE UP (ref 1.6–2.6)
MCHC RBC-ENTMCNC: 32.3 PG — SIGNIFICANT CHANGE UP (ref 27–34)
MCHC RBC-ENTMCNC: 32.4 PG — SIGNIFICANT CHANGE UP (ref 27–34)
MCHC RBC-ENTMCNC: 35.8 GM/DL — SIGNIFICANT CHANGE UP (ref 32–36)
MCHC RBC-ENTMCNC: 36.2 GM/DL — HIGH (ref 32–36)
MCV RBC AUTO: 89.6 FL — SIGNIFICANT CHANGE UP (ref 80–100)
MCV RBC AUTO: 90.2 FL — SIGNIFICANT CHANGE UP (ref 80–100)
NRBC # BLD: 2 /100 WBCS — HIGH (ref 0–0)
NRBC # BLD: 2 /100 WBCS — HIGH (ref 0–0)
PHOSPHATE SERPL-MCNC: 2 MG/DL — LOW (ref 2.5–4.5)
PHOSPHATE SERPL-MCNC: 2.2 MG/DL — LOW (ref 2.5–4.5)
PLATELET # BLD AUTO: 104 K/UL — LOW (ref 150–400)
PLATELET # BLD AUTO: 97 K/UL — LOW (ref 150–400)
POTASSIUM SERPL-MCNC: 2.7 MMOL/L — CRITICAL LOW (ref 3.5–5.3)
POTASSIUM SERPL-MCNC: 2.9 MMOL/L — CRITICAL LOW (ref 3.5–5.3)
POTASSIUM SERPL-SCNC: 2.7 MMOL/L — CRITICAL LOW (ref 3.5–5.3)
POTASSIUM SERPL-SCNC: 2.9 MMOL/L — CRITICAL LOW (ref 3.5–5.3)
PROT SERPL-MCNC: 4.2 G/DL — LOW (ref 6–8.3)
PROT SERPL-MCNC: 4.6 G/DL — LOW (ref 6–8.3)
PROTHROM AB SERPL-ACNC: 31.6 SEC — HIGH (ref 10.6–13.6)
PROTHROM AB SERPL-ACNC: 35.1 SEC — HIGH (ref 10.6–13.6)
RBC # BLD: 2.54 M/UL — LOW (ref 3.8–5.2)
RBC # BLD: 2.59 M/UL — LOW (ref 3.8–5.2)
RBC # FLD: 18.6 % — HIGH (ref 10.3–14.5)
RBC # FLD: 18.9 % — HIGH (ref 10.3–14.5)
SODIUM SERPL-SCNC: 137 MMOL/L — SIGNIFICANT CHANGE UP (ref 135–145)
SODIUM SERPL-SCNC: 137 MMOL/L — SIGNIFICANT CHANGE UP (ref 135–145)
WBC # BLD: 7.86 K/UL — SIGNIFICANT CHANGE UP (ref 3.8–10.5)
WBC # BLD: 8.32 K/UL — SIGNIFICANT CHANGE UP (ref 3.8–10.5)
WBC # FLD AUTO: 7.86 K/UL — SIGNIFICANT CHANGE UP (ref 3.8–10.5)
WBC # FLD AUTO: 8.32 K/UL — SIGNIFICANT CHANGE UP (ref 3.8–10.5)

## 2021-01-09 RX ORDER — ALBUMIN HUMAN 25 %
100 VIAL (ML) INTRAVENOUS EVERY 6 HOURS
Refills: 0 | Status: COMPLETED | OUTPATIENT
Start: 2021-01-09 | End: 2021-01-11

## 2021-01-09 RX ORDER — POTASSIUM PHOSPHATE, MONOBASIC POTASSIUM PHOSPHATE, DIBASIC 236; 224 MG/ML; MG/ML
30 INJECTION, SOLUTION INTRAVENOUS ONCE
Refills: 0 | Status: COMPLETED | OUTPATIENT
Start: 2021-01-09 | End: 2021-01-09

## 2021-01-09 RX ORDER — HYDROMORPHONE HYDROCHLORIDE 2 MG/ML
0.5 INJECTION INTRAMUSCULAR; INTRAVENOUS; SUBCUTANEOUS ONCE
Refills: 0 | Status: DISCONTINUED | OUTPATIENT
Start: 2021-01-09 | End: 2021-01-09

## 2021-01-09 RX ORDER — POTASSIUM CHLORIDE 20 MEQ
20 PACKET (EA) ORAL ONCE
Refills: 0 | Status: COMPLETED | OUTPATIENT
Start: 2021-01-09 | End: 2021-01-09

## 2021-01-09 RX ORDER — MAGNESIUM SULFATE 500 MG/ML
2 VIAL (ML) INJECTION ONCE
Refills: 0 | Status: COMPLETED | OUTPATIENT
Start: 2021-01-09 | End: 2021-01-09

## 2021-01-09 RX ORDER — ACETAMINOPHEN 500 MG
1000 TABLET ORAL ONCE
Refills: 0 | Status: COMPLETED | OUTPATIENT
Start: 2021-01-09 | End: 2021-01-09

## 2021-01-09 RX ORDER — POTASSIUM CHLORIDE 20 MEQ
40 PACKET (EA) ORAL EVERY 4 HOURS
Refills: 0 | Status: COMPLETED | OUTPATIENT
Start: 2021-01-09 | End: 2021-01-09

## 2021-01-09 RX ADMIN — SIMETHICONE 80 MILLIGRAM(S): 80 TABLET, CHEWABLE ORAL at 17:56

## 2021-01-09 RX ADMIN — Medication 64.69 GRAM(S): at 10:02

## 2021-01-09 RX ADMIN — EMTRICITABINE AND TENOFOVIR DISOPROXIL FUMARATE 1 TABLET(S): 200; 300 TABLET, FILM COATED ORAL at 11:56

## 2021-01-09 RX ADMIN — MIDODRINE HYDROCHLORIDE 30 MILLIGRAM(S): 2.5 TABLET ORAL at 06:21

## 2021-01-09 RX ADMIN — ONDANSETRON 4 MILLIGRAM(S): 8 TABLET, FILM COATED ORAL at 01:16

## 2021-01-09 RX ADMIN — CHLORHEXIDINE GLUCONATE 1 APPLICATION(S): 213 SOLUTION TOPICAL at 18:48

## 2021-01-09 RX ADMIN — PANTOPRAZOLE SODIUM 40 MILLIGRAM(S): 20 TABLET, DELAYED RELEASE ORAL at 17:56

## 2021-01-09 RX ADMIN — Medication 50 MILLILITER(S): at 13:38

## 2021-01-09 RX ADMIN — CEFEPIME 100 MILLIGRAM(S): 1 INJECTION, POWDER, FOR SOLUTION INTRAMUSCULAR; INTRAVENOUS at 05:21

## 2021-01-09 RX ADMIN — Medication 50 MILLIEQUIVALENT(S): at 20:39

## 2021-01-09 RX ADMIN — MIDODRINE HYDROCHLORIDE 30 MILLIGRAM(S): 2.5 TABLET ORAL at 17:56

## 2021-01-09 RX ADMIN — CEFEPIME 100 MILLIGRAM(S): 1 INJECTION, POWDER, FOR SOLUTION INTRAMUSCULAR; INTRAVENOUS at 17:54

## 2021-01-09 RX ADMIN — Medication 50 MILLIEQUIVALENT(S): at 10:02

## 2021-01-09 RX ADMIN — HYDROMORPHONE HYDROCHLORIDE 0.5 MILLIGRAM(S): 2 INJECTION INTRAMUSCULAR; INTRAVENOUS; SUBCUTANEOUS at 21:45

## 2021-01-09 RX ADMIN — MIDODRINE HYDROCHLORIDE 30 MILLIGRAM(S): 2.5 TABLET ORAL at 11:57

## 2021-01-09 RX ADMIN — Medication 40 MILLIEQUIVALENT(S): at 10:02

## 2021-01-09 RX ADMIN — SIMETHICONE 80 MILLIGRAM(S): 80 TABLET, CHEWABLE ORAL at 05:38

## 2021-01-09 RX ADMIN — Medication 1 TABLET(S): at 11:57

## 2021-01-09 RX ADMIN — Medication 1 PACKET(S): at 17:56

## 2021-01-09 RX ADMIN — SIMETHICONE 80 MILLIGRAM(S): 80 TABLET, CHEWABLE ORAL at 11:57

## 2021-01-09 RX ADMIN — ATOVAQUONE 1500 MILLIGRAM(S): 750 SUSPENSION ORAL at 11:56

## 2021-01-09 RX ADMIN — DOLUTEGRAVIR SODIUM 50 MILLIGRAM(S): 25 TABLET, FILM COATED ORAL at 11:56

## 2021-01-09 RX ADMIN — POTASSIUM PHOSPHATE, MONOBASIC POTASSIUM PHOSPHATE, DIBASIC 83.33 MILLIMOLE(S): 236; 224 INJECTION, SOLUTION INTRAVENOUS at 10:02

## 2021-01-09 RX ADMIN — Medication 1 PACKET(S): at 06:20

## 2021-01-09 RX ADMIN — Medication 50 MILLILITER(S): at 17:53

## 2021-01-09 RX ADMIN — SIMETHICONE 80 MILLIGRAM(S): 80 TABLET, CHEWABLE ORAL at 23:23

## 2021-01-09 RX ADMIN — CEFEPIME 100 MILLIGRAM(S): 1 INJECTION, POWDER, FOR SOLUTION INTRAMUSCULAR; INTRAVENOUS at 20:39

## 2021-01-09 RX ADMIN — Medication 400 MILLIGRAM(S): at 04:47

## 2021-01-09 RX ADMIN — Medication 80 MILLIGRAM(S): at 06:21

## 2021-01-09 RX ADMIN — Medication 50 MILLILITER(S): at 06:21

## 2021-01-09 RX ADMIN — Medication 40 MILLIEQUIVALENT(S): at 17:55

## 2021-01-09 RX ADMIN — Medication 50 GRAM(S): at 11:54

## 2021-01-09 RX ADMIN — Medication 100 MILLIGRAM(S): at 17:55

## 2021-01-09 RX ADMIN — PANTOPRAZOLE SODIUM 40 MILLIGRAM(S): 20 TABLET, DELAYED RELEASE ORAL at 05:39

## 2021-01-09 RX ADMIN — POTASSIUM PHOSPHATE, MONOBASIC POTASSIUM PHOSPHATE, DIBASIC 83.33 MILLIMOLE(S): 236; 224 INJECTION, SOLUTION INTRAVENOUS at 01:26

## 2021-01-09 RX ADMIN — Medication 80 MILLIGRAM(S): at 17:57

## 2021-01-09 NOTE — PROGRESS NOTE ADULT - ASSESSMENT
50 year old female with past medical history of HIV, CMV, HBV, histoplasmosis, renal vein thrombosis s/p coumadin for 6 months, and strongyloidiasis who presents with abdominal pain found to be COVID-19 PCR positive with acute liver injury 2/2 Hep B reactivation with +SBP, complicated with coagulapathy     #Acute liver injury 2/2 HepB reactivation  Predominantly hepatocellular liver injury, with AST >1800, Alt 600. Also w/ evidence of hepatic synthetic function, with elevated INR, decreased albumin. Etiology likely from HBV, which was previously being treated w/ Symtuza (tenofovir 10mg qd) but patient had been off medication for almost 1 year (until 11/2020) due to insurance lapse. Her outpatient viral load >400,000,000 with hepatitis delta coinfection ruled out. Currently patient is not in acute liver failure, she is mentating well, but is having worsening INR which is concerning.  - Hep B viral load continues to downtrend (21 million 1/6, down from >400 million outpt)  - c/w truvada. Entecavir stopped  - c/w NAC infusion for acute liver failure (100 mg/kg over 16 hours)  - c/w albumin 100cc 25% q8h with lasix 40 mg IBV BID for LE edema/third spacing 2/2 low albumin  - s/p vitamin K 10mg (12/26 - 12/28, 1/1-1/2)  - dilaudid .5 mg IV prn for pain, dose carefully given HOTN. However, pt has not responded to motrin (hold i/s/o GIB), tylenol (wary i/s/o hepatic dysfunction), ketorolac (hold i/s/o GIB), Tramadol (not very effective per pt)  - ascitic fluid: SAAG 2, ascitic fluid protein .8 likely portal HTN however granulocyte count from ascitic fluid >250- consistent with SBP. Grew Klebsiella oxytoca/Raoutella ornithinolytica. Sensitive to many Abx    #Coagulapathy  - worsening coags with initial concern for DIC (thrombocytopenia, elevated coags, d-dimer, low fibrogen). Ep of melena 1/1 s/p cryo, vitamin K, plasma  - heme recs: likely from liver failure. Unlikely hemolysis despite low haptoglobin (liver failure) given rare schistocytes, factor 8 elevated. Initial mixing study negative. Follow up repeat mixing study   - hepatology recs: worsening PTT unlikely from liver failure  - Goal fibrinogen >150, give cyro PRN    #Sepsis 2/2 SBP   Pt noted to be tachy, HOTN, febrile 12/30.   - blood cx 12/31- ngtd  - Ascites fluid 12/31 grew Klebsiella oxytoca/Raoutella ornithinolytica.   - repeat para 1/5 shows SBP but markedly reduced granulocytes  - s/p vanc (12/30-1/1)  - s/p zosyn (12/30-1/3)   - s/p ceftriaxone (1/3-1/6)   - c/w cefepime (1/6- )  - c/w midodrine 30 mg TID     #Anemia:   - Hgb acutely dropped 1/1. FOBT+. c/f GIB s/p cryo 1u, pRBC 1u.   - per heme, coagulopathy 2/2 liver disease  - type and cross x2  - transfuse Hgb <7    #AIDS  needs ARV medications to help with her immunosuppression but need to change ARV meds to ones metabolized by the kidney  continue the TAF for help controlling the Hep B infection  - In light of significant transaminitis, discontinued the Symtuza and changed meds to Truvada/Tivicay  - CD4 116 consistent with AIDS  - RNA viral load in process - slightly detectable but cannot account for her acute decompensation in liver disease  - c/w atovaquone for PCP ppx per ID- no Bactrim given hepatic dysfunction    #COVID+  - not eligible for Remdesivir secondary to her LFT abnormalities  - could consider convalescent plasma if she decompensates  - D- dimer rising, fibrinogen labile, ferritin elevated, stable  - Quant TB neg  - blood cx 12/22- no growth, 12/31 no growth final  - urine histo ag. neg    #CMV viremia   - hold off on Valganciclovir   - CMV viral load <96    #Prophylactic measures  - DVT ppx: hold i/s/o hgb drop  - GERD: pantoprazole   - Diet: CLD

## 2021-01-09 NOTE — PROVIDER CONTACT NOTE (CRITICAL VALUE NOTIFICATION) - ACTION/TREATMENT ORDERED:
MD Zuleta notified. IV potassium phosphate and PO potassium to be ordered. Will continue to monitor.

## 2021-01-09 NOTE — PROGRESS NOTE ADULT - SUBJECTIVE AND OBJECTIVE BOX
Authored by Gee Gutiérrez MD ([phone]) [facility]    Patient is a 50y old  Female who presents with a chief complaint of abdominal pain (08 Jan 2021 22:22)    SUBJECTIVE / OVERNIGHT EVENTS:    ADDITIONAL REVIEW OF SYSTEMS:    MEDICATIONS  (STANDING):  acetylcysteine IVPB 7 Gram(s) IV Intermittent <User Schedule>  atovaquone Suspension 1500 milliGRAM(s) Oral daily  cefepime   IVPB 1000 milliGRAM(s) IV Intermittent every 8 hours  chlorhexidine 2% Cloths 1 Application(s) Topical daily  dolutegravir 50 milliGRAM(s) Oral daily  emtricitabine 200 mG/tenofovir 300 mG (TRUVADA) 1 Tablet(s) Oral daily  furosemide   Injectable 80 milliGRAM(s) IV Push every 12 hours  midodrine. 30 milliGRAM(s) Oral three times a day  multivitamin 1 Tablet(s) Oral daily  pantoprazole  Injectable 40 milliGRAM(s) IV Push every 12 hours  potassium chloride  20 mEq/100 mL IVPB 20 milliEquivalent(s) IV Intermittent once  potassium phosphate / sodium phosphate Powder (PHOS-NaK) 1 Packet(s) Oral three times a day  simethicone 80 milliGRAM(s) Chew four times a day  thiamine 100 milliGRAM(s) Oral daily    MEDICATIONS  (PRN):  aluminum hydroxide/magnesium hydroxide/simethicone Suspension 30 milliLiter(s) Oral every 6 hours PRN Dyspepsia  ondansetron Injectable 4 milliGRAM(s) IV Push every 8 hours PRN Nausea and/or Vomiting    I&O's Summary    08 Jan 2021 07:01  -  09 Jan 2021 07:00  --------------------------------------------------------  IN: 1337.5 mL / OUT: 1700 mL / NET: -362.5 mL    PHYSICAL EXAM:  Vital Signs Last 24 Hrs  T(C): 37 (09 Jan 2021 03:32), Max: 37 (08 Jan 2021 14:46)  T(F): 98.6 (09 Jan 2021 03:32), Max: 98.6 (08 Jan 2021 14:46)  HR: 122 (09 Jan 2021 03:32) (96 - 122)  BP: 134/73 (09 Jan 2021 03:32) (97/64 - 134/73)  BP(mean): --  RR: 18 (09 Jan 2021 03:32) (18 - 19)  SpO2: 100% (09 Jan 2021 03:32) (97% - 100%)    GENERAL: No acute distress, well-developed  HEAD:  Atraumatic, Normocephalic  EYES: EOMI, PERRLA, conjunctiva and sclera clear  NECK: Supple, no lymphadenopathy, no JVD  CHEST/LUNG: CTAB; No wheezes, rales, or rhonchi  HEART: Regular rate and rhythm; No murmurs, rubs, or gallops  ABDOMEN: Soft, non-tender, non-distended; normal bowel sounds, no organomegaly  EXTREMITIES:  2+ peripheral pulses b/l, No clubbing, cyanosis, or edema  NEUROLOGY: A&O x 3, no focal deficits  SKIN: No rashes or lesions    LABS:                        8.4    7.82  )-----------( 91       ( 08 Jan 2021 20:46 )             23.7     01-08    137  |  100  |  16  ----------------------------<  89  2.6<LL>   |  27  |  0.69    Ca    8.1<L>      08 Jan 2021 20:46  Phos  1.1     01-08  Mg     1.7     01-08    TPro  4.3<L>  /  Alb  2.7<L>  /  TBili  16.0<H>  /  DBili  x   /  AST  311<H>  /  ALT  82<H>  /  AlkPhos  65  01-08    PT/INR - ( 08 Jan 2021 20:46 )   PT: 35.4 sec;   INR: 3.11 ratio      PTT - ( 08 Jan 2021 20:46 )  PTT:76.0 sec   Authored by Gee Gutiérrez MD ([phone]) [facility]    Patient is a 50y old  Female who presents with a chief complaint of abdominal pain (08 Jan 2021 22:22)    SUBJECTIVE / OVERNIGHT EVENTS: NAEON. This am pt is walking around room with IV pole. Denies HA, cp, SOB, abd pain, dysuria, hematuria, diarrhea, constipation     MEDICATIONS  (STANDING):  acetylcysteine IVPB 7 Gram(s) IV Intermittent <User Schedule>  atovaquone Suspension 1500 milliGRAM(s) Oral daily  cefepime   IVPB 1000 milliGRAM(s) IV Intermittent every 8 hours  chlorhexidine 2% Cloths 1 Application(s) Topical daily  dolutegravir 50 milliGRAM(s) Oral daily  emtricitabine 200 mG/tenofovir 300 mG (TRUVADA) 1 Tablet(s) Oral daily  furosemide   Injectable 80 milliGRAM(s) IV Push every 12 hours  midodrine. 30 milliGRAM(s) Oral three times a day  multivitamin 1 Tablet(s) Oral daily  pantoprazole  Injectable 40 milliGRAM(s) IV Push every 12 hours  potassium chloride  20 mEq/100 mL IVPB 20 milliEquivalent(s) IV Intermittent once  potassium phosphate / sodium phosphate Powder (PHOS-NaK) 1 Packet(s) Oral three times a day  simethicone 80 milliGRAM(s) Chew four times a day  thiamine 100 milliGRAM(s) Oral daily    MEDICATIONS  (PRN):  aluminum hydroxide/magnesium hydroxide/simethicone Suspension 30 milliLiter(s) Oral every 6 hours PRN Dyspepsia  ondansetron Injectable 4 milliGRAM(s) IV Push every 8 hours PRN Nausea and/or Vomiting    I&O's Summary    08 Jan 2021 07:01  -  09 Jan 2021 07:00  --------------------------------------------------------  IN: 1337.5 mL / OUT: 1700 mL / NET: -362.5 mL    PHYSICAL EXAM:  Vital Signs Last 24 Hrs  T(C): 37 (09 Jan 2021 03:32), Max: 37 (08 Jan 2021 14:46)  T(F): 98.6 (09 Jan 2021 03:32), Max: 98.6 (08 Jan 2021 14:46)  HR: 122 (09 Jan 2021 03:32) (96 - 122)  BP: 134/73 (09 Jan 2021 03:32) (97/64 - 134/73)  BP(mean): --  RR: 18 (09 Jan 2021 03:32) (18 - 19)  SpO2: 100% (09 Jan 2021 03:32) (97% - 100%)    GENERAL: No acute distress  HEAD: Atraumatic, Normocephalic  EYES: +Scleral icterus. EOMI, PERRLA  NECK: Supple, no lymphadenopathy, no JVD  CHEST/LUNG: CTAB; No wheezes, rales, or rhonchi  HEART: +Tachycardic. No murmurs, rubs, or gallops  ABDOMEN: +Distended. Soft, non-tender; normal bowel sounds, no organomegaly  EXTREMITIES: 2+ peripheral pulses b/l, No clubbing, cyanosis, or edema  NEUROLOGY: A&O x 3, no focal deficits  SKIN: +Jaundiced. No rashes or lesions    LABS:                        8.4    7.82  )-----------( 91       ( 08 Jan 2021 20:46 )             23.7     01-08    137  |  100  |  16  ----------------------------<  89  2.6<LL>   |  27  |  0.69    Ca    8.1<L>      08 Jan 2021 20:46  Phos  1.1     01-08  Mg     1.7     01-08    TPro  4.3<L>  /  Alb  2.7<L>  /  TBili  16.0<H>  /  DBili  x   /  AST  311<H>  /  ALT  82<H>  /  AlkPhos  65  01-08    PT/INR - ( 08 Jan 2021 20:46 )   PT: 35.4 sec;   INR: 3.11 ratio      PTT - ( 08 Jan 2021 20:46 )  PTT:76.0 sec

## 2021-01-09 NOTE — PROVIDER CONTACT NOTE (MEDICATION) - ASSESSMENT
pt a&ox4. pt refusing potassium phosphate/sodium phosphate powder. pt also c/o of back pain and has no meds for pain.

## 2021-01-09 NOTE — PROVIDER CONTACT NOTE (MEDICATION) - ACTION/TREATMENT ORDERED:
MD ordered Toradol 15 mg IVP
MD Cheng made aware. pt educated on risks and benefits. pt getting IV potassium. will give ordered dose of pain meds. will continue to monitor.

## 2021-01-10 LAB
ALBUMIN SERPL ELPH-MCNC: 2.7 G/DL — LOW (ref 3.3–5)
ALBUMIN SERPL ELPH-MCNC: 3.5 G/DL — SIGNIFICANT CHANGE UP (ref 3.3–5)
ALP SERPL-CCNC: 53 U/L — SIGNIFICANT CHANGE UP (ref 40–120)
ALP SERPL-CCNC: 55 U/L — SIGNIFICANT CHANGE UP (ref 40–120)
ALT FLD-CCNC: 73 U/L — HIGH (ref 10–45)
ALT FLD-CCNC: 74 U/L — HIGH (ref 10–45)
ANION GAP SERPL CALC-SCNC: 12 MMOL/L — SIGNIFICANT CHANGE UP (ref 5–17)
ANION GAP SERPL CALC-SCNC: 9 MMOL/L — SIGNIFICANT CHANGE UP (ref 5–17)
APTT BLD: 80.4 SEC — HIGH (ref 27.5–35.5)
AST SERPL-CCNC: 257 U/L — HIGH (ref 10–40)
AST SERPL-CCNC: 267 U/L — HIGH (ref 10–40)
BILIRUB SERPL-MCNC: 14 MG/DL — HIGH (ref 0.2–1.2)
BILIRUB SERPL-MCNC: 14.8 MG/DL — HIGH (ref 0.2–1.2)
BUN SERPL-MCNC: 14 MG/DL — SIGNIFICANT CHANGE UP (ref 7–23)
BUN SERPL-MCNC: 14 MG/DL — SIGNIFICANT CHANGE UP (ref 7–23)
CALCIUM SERPL-MCNC: 8.2 MG/DL — LOW (ref 8.4–10.5)
CALCIUM SERPL-MCNC: 8.5 MG/DL — SIGNIFICANT CHANGE UP (ref 8.4–10.5)
CHLORIDE SERPL-SCNC: 101 MMOL/L — SIGNIFICANT CHANGE UP (ref 96–108)
CHLORIDE SERPL-SCNC: 101 MMOL/L — SIGNIFICANT CHANGE UP (ref 96–108)
CO2 SERPL-SCNC: 27 MMOL/L — SIGNIFICANT CHANGE UP (ref 22–31)
CO2 SERPL-SCNC: 27 MMOL/L — SIGNIFICANT CHANGE UP (ref 22–31)
CREAT SERPL-MCNC: 0.6 MG/DL — SIGNIFICANT CHANGE UP (ref 0.5–1.3)
CREAT SERPL-MCNC: 0.6 MG/DL — SIGNIFICANT CHANGE UP (ref 0.5–1.3)
CULTURE RESULTS: SIGNIFICANT CHANGE UP
D DIMER BLD IA.RAPID-MCNC: 1257 NG/ML DDU — HIGH
D DIMER BLD IA.RAPID-MCNC: 1267 NG/ML DDU — HIGH
FERRITIN SERPL-MCNC: 1457 NG/ML — HIGH (ref 15–150)
FIBRINOGEN PPP-MCNC: 126 MG/DL — LOW (ref 290–520)
FIBRINOGEN PPP-MCNC: 161 MG/DL — LOW (ref 290–520)
GLUCOSE SERPL-MCNC: 106 MG/DL — HIGH (ref 70–99)
GLUCOSE SERPL-MCNC: 87 MG/DL — SIGNIFICANT CHANGE UP (ref 70–99)
HAPTOGLOB SERPL-MCNC: <20 MG/DL — LOW (ref 34–200)
HAPTOGLOB SERPL-MCNC: <20 MG/DL — LOW (ref 34–200)
HCT VFR BLD CALC: 21.9 % — LOW (ref 34.5–45)
HCT VFR BLD CALC: 22.7 % — LOW (ref 34.5–45)
HGB BLD-MCNC: 7.9 G/DL — LOW (ref 11.5–15.5)
HGB BLD-MCNC: 8.1 G/DL — LOW (ref 11.5–15.5)
INR BLD: 3.22 RATIO — HIGH (ref 0.88–1.16)
INR BLD: 3.45 RATIO — HIGH (ref 0.88–1.16)
MAGNESIUM SERPL-MCNC: 1.4 MG/DL — LOW (ref 1.6–2.6)
MAGNESIUM SERPL-MCNC: 1.7 MG/DL — SIGNIFICANT CHANGE UP (ref 1.6–2.6)
MCHC RBC-ENTMCNC: 32.4 PG — SIGNIFICANT CHANGE UP (ref 27–34)
MCHC RBC-ENTMCNC: 32.6 PG — SIGNIFICANT CHANGE UP (ref 27–34)
MCHC RBC-ENTMCNC: 35.7 GM/DL — SIGNIFICANT CHANGE UP (ref 32–36)
MCHC RBC-ENTMCNC: 36.1 GM/DL — HIGH (ref 32–36)
MCV RBC AUTO: 90.5 FL — SIGNIFICANT CHANGE UP (ref 80–100)
MCV RBC AUTO: 90.8 FL — SIGNIFICANT CHANGE UP (ref 80–100)
NRBC # BLD: 1 /100 WBCS — HIGH (ref 0–0)
NRBC # BLD: 1 /100 WBCS — HIGH (ref 0–0)
PHOSPHATE SERPL-MCNC: 1.5 MG/DL — LOW (ref 2.5–4.5)
PHOSPHATE SERPL-MCNC: 2.2 MG/DL — LOW (ref 2.5–4.5)
PLATELET # BLD AUTO: 135 K/UL — LOW (ref 150–400)
PLATELET # BLD AUTO: 146 K/UL — LOW (ref 150–400)
POTASSIUM SERPL-MCNC: 2.6 MMOL/L — CRITICAL LOW (ref 3.5–5.3)
POTASSIUM SERPL-MCNC: 3.2 MMOL/L — LOW (ref 3.5–5.3)
POTASSIUM SERPL-SCNC: 2.6 MMOL/L — CRITICAL LOW (ref 3.5–5.3)
POTASSIUM SERPL-SCNC: 3.2 MMOL/L — LOW (ref 3.5–5.3)
PROT SERPL-MCNC: 4.5 G/DL — LOW (ref 6–8.3)
PROT SERPL-MCNC: 4.6 G/DL — LOW (ref 6–8.3)
PROTHROM AB SERPL-ACNC: 36.6 SEC — HIGH (ref 10.6–13.6)
PROTHROM AB SERPL-ACNC: 39 SEC — HIGH (ref 10.6–13.6)
RBC # BLD: 2.42 M/UL — LOW (ref 3.8–5.2)
RBC # BLD: 2.5 M/UL — LOW (ref 3.8–5.2)
RBC # FLD: 18.5 % — HIGH (ref 10.3–14.5)
RBC # FLD: 18.8 % — HIGH (ref 10.3–14.5)
SODIUM SERPL-SCNC: 137 MMOL/L — SIGNIFICANT CHANGE UP (ref 135–145)
SODIUM SERPL-SCNC: 140 MMOL/L — SIGNIFICANT CHANGE UP (ref 135–145)
SPECIMEN SOURCE: SIGNIFICANT CHANGE UP
WBC # BLD: 9.77 K/UL — SIGNIFICANT CHANGE UP (ref 3.8–10.5)
WBC # BLD: 9.86 K/UL — SIGNIFICANT CHANGE UP (ref 3.8–10.5)
WBC # FLD AUTO: 9.77 K/UL — SIGNIFICANT CHANGE UP (ref 3.8–10.5)
WBC # FLD AUTO: 9.86 K/UL — SIGNIFICANT CHANGE UP (ref 3.8–10.5)

## 2021-01-10 RX ORDER — POTASSIUM CHLORIDE 20 MEQ
40 PACKET (EA) ORAL EVERY 4 HOURS
Refills: 0 | Status: DISCONTINUED | OUTPATIENT
Start: 2021-01-10 | End: 2021-01-10

## 2021-01-10 RX ORDER — MAGNESIUM SULFATE 500 MG/ML
2 VIAL (ML) INJECTION ONCE
Refills: 0 | Status: COMPLETED | OUTPATIENT
Start: 2021-01-10 | End: 2021-01-10

## 2021-01-10 RX ORDER — POTASSIUM PHOSPHATE, MONOBASIC POTASSIUM PHOSPHATE, DIBASIC 236; 224 MG/ML; MG/ML
30 INJECTION, SOLUTION INTRAVENOUS ONCE
Refills: 0 | Status: DISCONTINUED | OUTPATIENT
Start: 2021-01-10 | End: 2021-01-10

## 2021-01-10 RX ORDER — POTASSIUM CHLORIDE 20 MEQ
20 PACKET (EA) ORAL
Refills: 0 | Status: COMPLETED | OUTPATIENT
Start: 2021-01-10 | End: 2021-01-10

## 2021-01-10 RX ORDER — POTASSIUM PHOSPHATE, MONOBASIC POTASSIUM PHOSPHATE, DIBASIC 236; 224 MG/ML; MG/ML
30 INJECTION, SOLUTION INTRAVENOUS ONCE
Refills: 0 | Status: COMPLETED | OUTPATIENT
Start: 2021-01-10 | End: 2021-01-10

## 2021-01-10 RX ADMIN — ONDANSETRON 4 MILLIGRAM(S): 8 TABLET, FILM COATED ORAL at 14:19

## 2021-01-10 RX ADMIN — ONDANSETRON 4 MILLIGRAM(S): 8 TABLET, FILM COATED ORAL at 05:44

## 2021-01-10 RX ADMIN — POTASSIUM PHOSPHATE, MONOBASIC POTASSIUM PHOSPHATE, DIBASIC 83.33 MILLIMOLE(S): 236; 224 INJECTION, SOLUTION INTRAVENOUS at 11:53

## 2021-01-10 RX ADMIN — Medication 50 GRAM(S): at 17:41

## 2021-01-10 RX ADMIN — MIDODRINE HYDROCHLORIDE 30 MILLIGRAM(S): 2.5 TABLET ORAL at 11:54

## 2021-01-10 RX ADMIN — PANTOPRAZOLE SODIUM 40 MILLIGRAM(S): 20 TABLET, DELAYED RELEASE ORAL at 17:40

## 2021-01-10 RX ADMIN — Medication 100 MILLIEQUIVALENT(S): at 14:18

## 2021-01-10 RX ADMIN — Medication 64.69 GRAM(S): at 08:10

## 2021-01-10 RX ADMIN — Medication 100 MILLIGRAM(S): at 17:41

## 2021-01-10 RX ADMIN — CEFEPIME 100 MILLIGRAM(S): 1 INJECTION, POWDER, FOR SOLUTION INTRAMUSCULAR; INTRAVENOUS at 05:43

## 2021-01-10 RX ADMIN — Medication 100 MILLIEQUIVALENT(S): at 17:42

## 2021-01-10 RX ADMIN — Medication 50 MILLILITER(S): at 01:19

## 2021-01-10 RX ADMIN — SIMETHICONE 80 MILLIGRAM(S): 80 TABLET, CHEWABLE ORAL at 17:41

## 2021-01-10 RX ADMIN — Medication 80 MILLIGRAM(S): at 17:40

## 2021-01-10 RX ADMIN — Medication 50 MILLILITER(S): at 17:41

## 2021-01-10 RX ADMIN — SIMETHICONE 80 MILLIGRAM(S): 80 TABLET, CHEWABLE ORAL at 22:23

## 2021-01-10 RX ADMIN — CHLORHEXIDINE GLUCONATE 1 APPLICATION(S): 213 SOLUTION TOPICAL at 14:19

## 2021-01-10 RX ADMIN — DOLUTEGRAVIR SODIUM 50 MILLIGRAM(S): 25 TABLET, FILM COATED ORAL at 11:54

## 2021-01-10 RX ADMIN — Medication 50 MILLILITER(S): at 11:51

## 2021-01-10 RX ADMIN — SIMETHICONE 80 MILLIGRAM(S): 80 TABLET, CHEWABLE ORAL at 05:43

## 2021-01-10 RX ADMIN — Medication 100 MILLIEQUIVALENT(S): at 16:25

## 2021-01-10 RX ADMIN — Medication 50 MILLILITER(S): at 06:26

## 2021-01-10 RX ADMIN — ONDANSETRON 4 MILLIGRAM(S): 8 TABLET, FILM COATED ORAL at 22:43

## 2021-01-10 RX ADMIN — MIDODRINE HYDROCHLORIDE 30 MILLIGRAM(S): 2.5 TABLET ORAL at 17:41

## 2021-01-10 RX ADMIN — PANTOPRAZOLE SODIUM 40 MILLIGRAM(S): 20 TABLET, DELAYED RELEASE ORAL at 05:43

## 2021-01-10 RX ADMIN — EMTRICITABINE AND TENOFOVIR DISOPROXIL FUMARATE 1 TABLET(S): 200; 300 TABLET, FILM COATED ORAL at 11:54

## 2021-01-10 RX ADMIN — ATOVAQUONE 1500 MILLIGRAM(S): 750 SUSPENSION ORAL at 11:55

## 2021-01-10 RX ADMIN — MIDODRINE HYDROCHLORIDE 30 MILLIGRAM(S): 2.5 TABLET ORAL at 05:44

## 2021-01-10 RX ADMIN — Medication 1 TABLET(S): at 11:55

## 2021-01-10 RX ADMIN — Medication 50 MILLILITER(S): at 22:32

## 2021-01-10 RX ADMIN — Medication 80 MILLIGRAM(S): at 05:43

## 2021-01-10 NOTE — PROGRESS NOTE ADULT - ASSESSMENT
50 year old female with past medical history of HIV, CMV, HBV, histoplasmosis, renal vein thrombosis s/p coumadin for 6 months, and strongyloidiasis who presents with abdominal pain found to be COVID-19 PCR positive with acute liver injury 2/2 Hep B reactivation with +SBP, complicated with coagulapathy     #Acute liver injury 2/2 HepB reactivation  Predominantly hepatocellular liver injury, with AST >1800, Alt 600. Also w/ evidence of hepatic synthetic function, with elevated INR, decreased albumin. Etiology likely from HBV, which was previously being treated w/ Symtuza (tenofovir 10mg qd) but patient had been off medication for almost 1 year (until 11/2020) due to insurance lapse. Her outpatient viral load >400,000,000 with hepatitis delta coinfection ruled out. Currently patient is not in acute liver failure, she is mentating well, but is having worsening INR which is concerning.  - Hep B viral load continues to downtrend (21 million 1/6, down from >400 million outpt)  - c/w truvada. Entecavir stopped  - c/w NAC infusion for acute liver failure (100 mg/kg over 16 hours)  - c/w albumin 100cc 25% q8h with lasix 40 mg IBV BID for LE edema/third spacing 2/2 low albumin  - s/p vitamin K 10mg (12/26 - 12/28, 1/1-1/2)  - dilaudid .5 mg IV prn for pain, dose carefully given HOTN. However, pt has not responded to motrin (hold i/s/o GIB), tylenol (wary i/s/o hepatic dysfunction), ketorolac (hold i/s/o GIB), Tramadol (not very effective per pt)  - ascitic fluid: SAAG 2, ascitic fluid protein .8 likely portal HTN however granulocyte count from ascitic fluid >250- consistent with SBP. Grew Klebsiella oxytoca/Raoutella ornithinolytica. Sensitive to many Abx. Initially on Zosyn (12/30)  then switched to Ceftriaxone and then broadened to Cefepime (d/c 1/10)     #Coagulapathy  - worsening coags with initial concern for DIC (thrombocytopenia, elevated coags, d-dimer, low fibrogen). Ep of melena 1/1 s/p cryo, vitamin K, plasma  - heme recs: likely from liver failure. Unlikely hemolysis despite low haptoglobin (liver failure) given rare schistocytes, factor 8 elevated. Initial mixing study negative. Follow up repeat mixing study   - hepatology recs: worsening PTT unlikely from liver failure  - Goal fibrinogen >150, give cyro PRN    #Sepsis 2/2 SBP   Pt noted to be tachy, HOTN, febrile 12/30.   - blood cx 12/31- ngtd  - Ascites fluid 12/31 grew Klebsiella oxytoca/Raoutella ornithinolytica.   - repeat para 1/5 shows SBP but markedly reduced granulocytes  - s/p vanc (12/30-1/1)  - s/p zosyn (12/30-1/3)   - s/p ceftriaxone (1/3-1/6)   - c/w cefepime (1/6-1/10) d\c'd as per ID   - c/w midodrine 30 mg TID     #Anemia:   - Hgb acutely dropped 1/1. FOBT+. c/f GIB s/p cryo 1u, pRBC 1u.   - per heme, coagulopathy 2/2 liver disease  - type and cross x2  - transfuse Hgb <7    #AIDS  needs ARV medications to help with her immunosuppression but need to change ARV meds to ones metabolized by the kidney  continue the TAF for help controlling the Hep B infection  - In light of significant transaminitis, discontinued the Symtuza and changed meds to Truvada/Tivicay  - CD4 116 consistent with AIDS  - RNA viral load in process - slightly detectable but cannot account for her acute decompensation in liver disease  - c/w atovaquone for PCP ppx per ID- no Bactrim given hepatic dysfunction    #COVID+  - not eligible for Remdesivir secondary to her LFT abnormalities  - could consider convalescent plasma if she decompensates  - D- dimer rising, fibrinogen labile, ferritin elevated, stable  - Quant TB neg  - blood cx 12/22- no growth, 12/31 no growth final  - urine histo ag. neg    #CMV viremia   - hold off on Valganciclovir   - CMV viral load <96    #Prophylactic measures  - DVT ppx: hold i/s/o hgb drop  - GERD: pantoprazole   - Diet: CLD 50 year old female with past medical history of HIV, CMV, HBV, histoplasmosis, renal vein thrombosis s/p coumadin for 6 months, and strongyloidiasis who presents with abdominal pain found to be COVID-19 PCR positive with acute liver injury 2/2 Hep B reactivation with +SBP, complicated with coagulapathy     #Acute liver injury 2/2 HepB reactivation  Predominantly hepatocellular liver injury, with AST >1800, Alt 600. Also w/ evidence of hepatic synthetic function, with elevated INR, decreased albumin. Etiology likely from HBV, which was previously being treated w/ Symtuza (tenofovir 10mg qd) but patient had been off medication for almost 1 year (until 11/2020) due to insurance lapse. Her outpatient viral load >400,000,000 with hepatitis delta coinfection ruled out. Currently patient is not in acute liver failure, she is mentating well, but is having worsening INR which is concerning.  - Hep B viral load continues to downtrend (21 million 1/6, down from >400 million outpt)  - c/w truvada. Entecavir stopped  - c/w NAC infusion for acute liver failure (100 mg/kg over 16 hours)  - c/w albumin 100cc 25% q8h with lasix 40 mg IBV BID for LE edema/third spacing 2/2 low albumin  - s/p vitamin K 10mg (12/26 - 12/28, 1/1-1/2)  - dilaudid .5 mg IV prn for pain, dose carefully given HOTN. However, pt has not responded to motrin (hold i/s/o GIB), tylenol (wary i/s/o hepatic dysfunction), ketorolac (hold i/s/o GIB), Tramadol (not very effective per pt)  - ascitic fluid: SAAG 2, ascitic fluid protein .8 likely portal HTN however granulocyte count from ascitic fluid >250- consistent with SBP. Grew Klebsiella oxytoca/Raoutella ornithinolytica. Sensitive to many Abx. Initially on Zosyn (12/30)  then switched to Ceftriaxone and then broadened to Cefepime (d/c 1/10)     #Coagulapathy  - worsening coags with initial concern for DIC (thrombocytopenia, elevated coags, d-dimer, low fibrogen). Ep of melena 1/1 s/p cryo, vitamin K, plasma  - heme recs: likely from liver failure. Unlikely hemolysis despite low haptoglobin (liver failure) given rare schistocytes, factor 8 elevated. Initial mixing study negative. Follow up repeat mixing study   - hepatology recs: worsening PTT unlikely from liver failure  - Goal fibrinogen >150, give cyro PRN    #Sepsis 2/2 SBP   Pt noted to be tachy, HOTN, febrile 12/30.   - blood cx 12/31- ngtd  - Ascites fluid 12/31 grew Klebsiella oxytoca/Raoutella ornithinolytica.   - repeat para 1/5 shows SBP but markedly reduced granulocytes  - s/p vanc (12/30-1/1)  - s/p zosyn (12/30-1/3)   - s/p ceftriaxone (1/3-1/6)   - c/w cefepime (1/6-1/10) d\c'd as per ID   - c/w midodrine 30 mg TID     #Anemia:   - Hgb acutely dropped 1/1. FOBT+. c/f GIB s/p cryo 1u, pRBC 1u.   - per heme, coagulopathy 2/2 liver disease  - type and cross x2  - transfuse Hgb <7    #AIDS  needs ARV medications to help with her immunosuppression but need to change ARV meds to ones metabolized by the kidney  continue the TAF for help controlling the Hep B infection  - In light of significant transaminitis, discontinued the Symtuza and changed meds to Truvada/Tivicay  - CD4 116 consistent with AIDS  - RNA viral load in process - slightly detectable but cannot account for her acute decompensation in liver disease  - c/w atovaquone for PCP ppx per ID- no Bactrim given hepatic dysfunction    #COVID+  - not eligible for Remdesivir secondary to her LFT abnormalities  - could consider convalescent plasma if she decompensates  - D- dimer rising, fibrinogen labile, ferritin elevated, stable  - Quant TB neg  - blood cx 12/22- no growth, 12/31 no growth final  - urine histo ag. neg  - can discontinue after 21 days from first positive test = 1/12/21 (would need to clarify with infection control if needs repeat COVID swab first)     #CMV viremia   - hold off on Valganciclovir   - CMV viral load <96    #Prophylactic measures  - DVT ppx: hold i/s/o hgb drop  - GERD: pantoprazole   - Diet: soft diet

## 2021-01-10 NOTE — PROGRESS NOTE ADULT - SUBJECTIVE AND OBJECTIVE BOX
Department of Internal Medicine  Tia Land M.D. | PGY-3  Pager: [phone] (NS), [phone] ([facility])        Patient is a 50y old  Female who presents with a chief complaint of abdominal pain (10 Mak 2021 07:21)      SUBJECTIVE / OVERNIGHT EVENTS:  - Received 0.5mg IVP of Dilaudid with back pain  - Also received 1U of cryo  - No back pain currently, mild abdominal pain, no shortness of breath, chest pain nausea or vomiting  - Tolerating PO  - Spoke with daughter to answer questions  - Ambulating more and LE edema improving     MEDICATIONS  (STANDING):  acetylcysteine IVPB 7 Gram(s) IV Intermittent <User Schedule>  albumin human 25% IVPB 100 milliLiter(s) IV Intermittent every 6 hours  atovaquone Suspension 1500 milliGRAM(s) Oral daily  chlorhexidine 2% Cloths 1 Application(s) Topical daily  dolutegravir 50 milliGRAM(s) Oral daily  emtricitabine 200 mG/tenofovir 300 mG (TRUVADA) 1 Tablet(s) Oral daily  furosemide   Injectable 80 milliGRAM(s) IV Push every 12 hours  magnesium sulfate  IVPB 2 Gram(s) IV Intermittent once  midodrine. 30 milliGRAM(s) Oral three times a day  multivitamin 1 Tablet(s) Oral daily  pantoprazole  Injectable 40 milliGRAM(s) IV Push every 12 hours  potassium chloride    Tablet ER 40 milliEquivalent(s) Oral every 4 hours  potassium phosphate IVPB 30 milliMole(s) IV Intermittent once  simethicone 80 milliGRAM(s) Chew four times a day  thiamine 100 milliGRAM(s) Oral daily    MEDICATIONS  (PRN):  aluminum hydroxide/magnesium hydroxide/simethicone Suspension 30 milliLiter(s) Oral every 6 hours PRN Dyspepsia  ondansetron Injectable 4 milliGRAM(s) IV Push every 8 hours PRN Nausea and/or Vomiting      CAPILLARY BLOOD GLUCOSE        I&O's Summary    09 Jan 2021 07:01  -  10 Mak 2021 07:00  --------------------------------------------------------  IN: 2075 mL / OUT: 3400 mL / NET: -1325 mL        PHYSICAL EXAM:  Vital Signs Last 24 Hrs  T(C): 37.2 (10 Mak 2021 08:26), Max: 37.3 (10 Mak 2021 01:10)  T(F): 98.9 (10 Mak 2021 08:26), Max: 99.2 (10 Mak 2021 03:29)  HR: 119 (10 Mak 2021 08:26) (100 - 123)  BP: 94/68 (10 Mak 2021 08:26) (92/54 - 115/71)  BP(mean): --  RR: 20 (10 Mak 2021 08:26) (17 - 20)  SpO2: 98% (10 Mak 2021 08:26) (96% - 100%)      GENERAL: No acute distress  HEAD: Atraumatic, Normocephalic  EYES: +Scleral icterus. EOMI, PERRLA  NECK: Supple, no lymphadenopathy, no JVD  CHEST/LUNG: CTAB; No wheezes, rales, or rhonchi  HEART: +Tachycardic. No murmurs, rubs, or gallops  ABDOMEN: +Distended. Soft, non-tender; normal bowel sounds, no organomegaly  EXTREMITIES: 2+ peripheral pulses b/l, 3+ pitting edema   NEUROLOGY: A&O x 3, no focal deficits  SKIN: +Jaundiced. No rashes or lesions    LABS:                        7.9    9.77  )-----------( 135      ( 10 Mak 2021 06:51 )             21.9     01-10    137  |  101  |  14  ----------------------------<  87  2.6<LL>   |  27  |  0.60    Ca    8.2<L>      10 Mak 2021 06:51  Phos  1.5     01-10  Mg     1.7     01-10    TPro  4.5<L>  /  Alb  2.7<L>  /  TBili  14.0<H>  /  DBili  x   /  AST  267<H>  /  ALT  73<H>  /  AlkPhos  53  01-10    PT/INR - ( 10 Mak 2021 06:51 )   PT: 39.0 sec;   INR: 3.45 ratio         PTT - ( 09 Jan 2021 18:35 )  PTT:82.0 sec            RADIOLOGY & ADDITIONAL TESTS:  Results Reviewed:   Imaging Personally Reviewed:  Electrocardiogram Personally Reviewed:    COORDINATION OF CARE:  Care Discussed with Consultants/Other Providers [Y/N]:  Prior or Outpatient Records Reviewed [Y/N]:

## 2021-01-10 NOTE — PROGRESS NOTE ADULT - ATTENDING COMMENTS
feeling much better today  in good spirits    anasarca  - c/w diuretics  - supplement electrolytes    soft diet

## 2021-01-11 ENCOUNTER — TRANSCRIPTION ENCOUNTER (OUTPATIENT)
Age: 51
End: 2021-01-11

## 2021-01-11 LAB
4/8 RATIO: 0.21 RATIO — LOW (ref 0.9–3.6)
ABS CD8: 915 /UL — HIGH (ref 142–740)
ALBUMIN SERPL ELPH-MCNC: 3.2 G/DL — LOW (ref 3.3–5)
ALBUMIN SERPL ELPH-MCNC: 3.7 G/DL — SIGNIFICANT CHANGE UP (ref 3.3–5)
ALP SERPL-CCNC: 41 U/L — SIGNIFICANT CHANGE UP (ref 40–120)
ALP SERPL-CCNC: 53 U/L — SIGNIFICANT CHANGE UP (ref 40–120)
ALT FLD-CCNC: 75 U/L — HIGH (ref 10–45)
ALT FLD-CCNC: 78 U/L — HIGH (ref 10–45)
ANION GAP SERPL CALC-SCNC: 11 MMOL/L — SIGNIFICANT CHANGE UP (ref 5–17)
ANION GAP SERPL CALC-SCNC: 13 MMOL/L — SIGNIFICANT CHANGE UP (ref 5–17)
APTT BLD: 79.2 SEC — HIGH (ref 27.5–35.5)
APTT BLD: 80.8 SEC — HIGH (ref 27.5–35.5)
AST SERPL-CCNC: 282 U/L — HIGH (ref 10–40)
AST SERPL-CCNC: 290 U/L — HIGH (ref 10–40)
BILIRUB SERPL-MCNC: 13.9 MG/DL — HIGH (ref 0.2–1.2)
BILIRUB SERPL-MCNC: 14.5 MG/DL — HIGH (ref 0.2–1.2)
BUN SERPL-MCNC: 14 MG/DL — SIGNIFICANT CHANGE UP (ref 7–23)
BUN SERPL-MCNC: 15 MG/DL — SIGNIFICANT CHANGE UP (ref 7–23)
CALCIUM SERPL-MCNC: 8.3 MG/DL — LOW (ref 8.4–10.5)
CALCIUM SERPL-MCNC: 8.6 MG/DL — SIGNIFICANT CHANGE UP (ref 8.4–10.5)
CD16+CD56+ CELLS NFR BLD: 39 % — HIGH (ref 5–23)
CD16+CD56+ CELLS NFR SPEC: 1120 /UL — HIGH (ref 71–410)
CD19 BLASTS SPEC-ACNC: 20 % — SIGNIFICANT CHANGE UP (ref 6–24)
CD19 BLASTS SPEC-ACNC: 579 /UL — HIGH (ref 84–469)
CD3 BLASTS SPEC-ACNC: 1131 /UL — SIGNIFICANT CHANGE UP (ref 672–1870)
CD3 BLASTS SPEC-ACNC: 41 % — LOW (ref 59–83)
CD4 %: 7 % — LOW (ref 30–62)
CD8 %: 34 % — SIGNIFICANT CHANGE UP (ref 12–36)
CHLORIDE SERPL-SCNC: 100 MMOL/L — SIGNIFICANT CHANGE UP (ref 96–108)
CHLORIDE SERPL-SCNC: 99 MMOL/L — SIGNIFICANT CHANGE UP (ref 96–108)
CO2 SERPL-SCNC: 26 MMOL/L — SIGNIFICANT CHANGE UP (ref 22–31)
CO2 SERPL-SCNC: 28 MMOL/L — SIGNIFICANT CHANGE UP (ref 22–31)
CREAT SERPL-MCNC: 0.58 MG/DL — SIGNIFICANT CHANGE UP (ref 0.5–1.3)
CREAT SERPL-MCNC: 0.63 MG/DL — SIGNIFICANT CHANGE UP (ref 0.5–1.3)
D DIMER BLD IA.RAPID-MCNC: 1898 NG/ML DDU — HIGH
D DIMER BLD IA.RAPID-MCNC: 2046 NG/ML DDU — HIGH
FERRITIN SERPL-MCNC: 1253 NG/ML — HIGH (ref 15–150)
FERRITIN SERPL-MCNC: 1378 NG/ML — HIGH (ref 15–150)
FIBRINOGEN PPP-MCNC: 112 MG/DL — LOW (ref 290–520)
FIBRINOGEN PPP-MCNC: 128 MG/DL — LOW (ref 290–520)
GLUCOSE SERPL-MCNC: 106 MG/DL — HIGH (ref 70–99)
GLUCOSE SERPL-MCNC: 83 MG/DL — SIGNIFICANT CHANGE UP (ref 70–99)
HAPTOGLOB SERPL-MCNC: <20 MG/DL — LOW (ref 34–200)
HAPTOGLOB SERPL-MCNC: <20 MG/DL — LOW (ref 34–200)
HCT VFR BLD CALC: 21.7 % — LOW (ref 34.5–45)
HCT VFR BLD CALC: 22 % — LOW (ref 34.5–45)
HGB BLD-MCNC: 7.5 G/DL — LOW (ref 11.5–15.5)
HGB BLD-MCNC: 7.5 G/DL — LOW (ref 11.5–15.5)
INR BLD: 3.3 RATIO — HIGH (ref 0.88–1.16)
INR BLD: 3.33 RATIO — HIGH (ref 0.88–1.16)
MAGNESIUM SERPL-MCNC: 1.6 MG/DL — SIGNIFICANT CHANGE UP (ref 1.6–2.6)
MAGNESIUM SERPL-MCNC: 1.9 MG/DL — SIGNIFICANT CHANGE UP (ref 1.6–2.6)
MCHC RBC-ENTMCNC: 31.5 PG — SIGNIFICANT CHANGE UP (ref 27–34)
MCHC RBC-ENTMCNC: 32.2 PG — SIGNIFICANT CHANGE UP (ref 27–34)
MCHC RBC-ENTMCNC: 34.1 GM/DL — SIGNIFICANT CHANGE UP (ref 32–36)
MCHC RBC-ENTMCNC: 34.6 GM/DL — SIGNIFICANT CHANGE UP (ref 32–36)
MCV RBC AUTO: 92.4 FL — SIGNIFICANT CHANGE UP (ref 80–100)
MCV RBC AUTO: 93.1 FL — SIGNIFICANT CHANGE UP (ref 80–100)
NRBC # BLD: 1 /100 WBCS — HIGH (ref 0–0)
NRBC # BLD: 1 /100 WBCS — HIGH (ref 0–0)
PHOSPHATE SERPL-MCNC: 1.6 MG/DL — LOW (ref 2.5–4.5)
PHOSPHATE SERPL-MCNC: 1.6 MG/DL — LOW (ref 2.5–4.5)
PLATELET # BLD AUTO: 130 K/UL — LOW (ref 150–400)
PLATELET # BLD AUTO: 176 K/UL — SIGNIFICANT CHANGE UP (ref 150–400)
POTASSIUM SERPL-MCNC: 2.9 MMOL/L — CRITICAL LOW (ref 3.5–5.3)
POTASSIUM SERPL-MCNC: 3.1 MMOL/L — LOW (ref 3.5–5.3)
POTASSIUM SERPL-SCNC: 2.9 MMOL/L — CRITICAL LOW (ref 3.5–5.3)
POTASSIUM SERPL-SCNC: 3.1 MMOL/L — LOW (ref 3.5–5.3)
PROT SERPL-MCNC: 4.7 G/DL — LOW (ref 6–8.3)
PROT SERPL-MCNC: 4.8 G/DL — LOW (ref 6–8.3)
PROTHROM AB SERPL-ACNC: 37.4 SEC — HIGH (ref 10.6–13.6)
PROTHROM AB SERPL-ACNC: 37.7 SEC — HIGH (ref 10.6–13.6)
RBC # BLD: 2.33 M/UL — LOW (ref 3.8–5.2)
RBC # BLD: 2.38 M/UL — LOW (ref 3.8–5.2)
RBC # FLD: 19.1 % — HIGH (ref 10.3–14.5)
RBC # FLD: 19.3 % — HIGH (ref 10.3–14.5)
SARS-COV-2 RNA SPEC QL NAA+PROBE: SIGNIFICANT CHANGE UP
SODIUM SERPL-SCNC: 138 MMOL/L — SIGNIFICANT CHANGE UP (ref 135–145)
SODIUM SERPL-SCNC: 139 MMOL/L — SIGNIFICANT CHANGE UP (ref 135–145)
T-CELL CD4 SUBSET PNL BLD: 189 /UL — LOW (ref 489–1457)
WBC # BLD: 10.02 K/UL — SIGNIFICANT CHANGE UP (ref 3.8–10.5)
WBC # BLD: 10.59 K/UL — HIGH (ref 3.8–10.5)
WBC # FLD AUTO: 10.02 K/UL — SIGNIFICANT CHANGE UP (ref 3.8–10.5)
WBC # FLD AUTO: 10.59 K/UL — HIGH (ref 3.8–10.5)

## 2021-01-11 PROCEDURE — 99232 SBSQ HOSP IP/OBS MODERATE 35: CPT | Mod: GC

## 2021-01-11 PROCEDURE — 99233 SBSQ HOSP IP/OBS HIGH 50: CPT | Mod: GC

## 2021-01-11 RX ORDER — MIDODRINE HYDROCHLORIDE 2.5 MG/1
3 TABLET ORAL
Qty: 0 | Refills: 0 | DISCHARGE
Start: 2021-01-11

## 2021-01-11 RX ORDER — CHOLECALCIFEROL (VITAMIN D3) 125 MCG
1 CAPSULE ORAL
Qty: 0 | Refills: 0 | DISCHARGE

## 2021-01-11 RX ORDER — ACETYLCYSTEINE 200 MG/ML
35 VIAL (ML) MISCELLANEOUS
Qty: 0 | Refills: 0 | DISCHARGE
Start: 2021-01-11

## 2021-01-11 RX ORDER — POTASSIUM CHLORIDE 20 MEQ
10 PACKET (EA) ORAL
Refills: 0 | Status: COMPLETED | OUTPATIENT
Start: 2021-01-11 | End: 2021-01-11

## 2021-01-11 RX ORDER — SIMETHICONE 80 MG/1
1 TABLET, CHEWABLE ORAL
Qty: 0 | Refills: 0 | DISCHARGE
Start: 2021-01-11

## 2021-01-11 RX ORDER — DOLUTEGRAVIR SODIUM 25 MG/1
1 TABLET, FILM COATED ORAL
Qty: 0 | Refills: 0 | DISCHARGE
Start: 2021-01-11

## 2021-01-11 RX ORDER — ALBUMIN HUMAN 25 %
100 VIAL (ML) INTRAVENOUS
Qty: 0 | Refills: 0 | DISCHARGE
Start: 2021-01-11

## 2021-01-11 RX ORDER — PANTOPRAZOLE SODIUM 20 MG/1
40 TABLET, DELAYED RELEASE ORAL
Qty: 0 | Refills: 0 | DISCHARGE
Start: 2021-01-11

## 2021-01-11 RX ORDER — HYDROMORPHONE HYDROCHLORIDE 2 MG/ML
0.25 INJECTION INTRAMUSCULAR; INTRAVENOUS; SUBCUTANEOUS ONCE
Refills: 0 | Status: DISCONTINUED | OUTPATIENT
Start: 2021-01-11 | End: 2021-01-11

## 2021-01-11 RX ORDER — CHLORHEXIDINE GLUCONATE 213 G/1000ML
1 SOLUTION TOPICAL
Qty: 0 | Refills: 0 | DISCHARGE
Start: 2021-01-11

## 2021-01-11 RX ORDER — POTASSIUM CHLORIDE 20 MEQ
40 PACKET (EA) ORAL EVERY 4 HOURS
Refills: 0 | Status: COMPLETED | OUTPATIENT
Start: 2021-01-11 | End: 2021-01-12

## 2021-01-11 RX ORDER — POTASSIUM PHOSPHATE, MONOBASIC POTASSIUM PHOSPHATE, DIBASIC 236; 224 MG/ML; MG/ML
30 INJECTION, SOLUTION INTRAVENOUS ONCE
Refills: 0 | Status: COMPLETED | OUTPATIENT
Start: 2021-01-11 | End: 2021-01-11

## 2021-01-11 RX ORDER — EMTRICITABINE AND TENOFOVIR DISOPROXIL FUMARATE 200; 300 MG/1; MG/1
1 TABLET, FILM COATED ORAL
Qty: 0 | Refills: 0 | DISCHARGE
Start: 2021-01-11

## 2021-01-11 RX ORDER — POTASSIUM CHLORIDE 20 MEQ
40 PACKET (EA) ORAL EVERY 4 HOURS
Refills: 0 | Status: COMPLETED | OUTPATIENT
Start: 2021-01-11 | End: 2021-01-11

## 2021-01-11 RX ORDER — ALBUMIN HUMAN 25 %
100 VIAL (ML) INTRAVENOUS EVERY 6 HOURS
Refills: 0 | Status: DISCONTINUED | OUTPATIENT
Start: 2021-01-11 | End: 2021-01-12

## 2021-01-11 RX ORDER — FUROSEMIDE 40 MG
80 TABLET ORAL
Qty: 0 | Refills: 0 | DISCHARGE
Start: 2021-01-11

## 2021-01-11 RX ORDER — THIAMINE MONONITRATE (VIT B1) 100 MG
1 TABLET ORAL
Qty: 0 | Refills: 0 | DISCHARGE
Start: 2021-01-11

## 2021-01-11 RX ORDER — ATOVAQUONE 750 MG/5ML
10 SUSPENSION ORAL
Qty: 0 | Refills: 0 | DISCHARGE
Start: 2021-01-11

## 2021-01-11 RX ADMIN — Medication 100 MILLIEQUIVALENT(S): at 14:35

## 2021-01-11 RX ADMIN — Medication 50 MILLILITER(S): at 11:56

## 2021-01-11 RX ADMIN — Medication 100 MILLIGRAM(S): at 11:53

## 2021-01-11 RX ADMIN — Medication 50 MILLILITER(S): at 06:31

## 2021-01-11 RX ADMIN — MIDODRINE HYDROCHLORIDE 30 MILLIGRAM(S): 2.5 TABLET ORAL at 06:31

## 2021-01-11 RX ADMIN — POTASSIUM PHOSPHATE, MONOBASIC POTASSIUM PHOSPHATE, DIBASIC 83.33 MILLIMOLE(S): 236; 224 INJECTION, SOLUTION INTRAVENOUS at 15:44

## 2021-01-11 RX ADMIN — Medication 80 MILLIGRAM(S): at 06:31

## 2021-01-11 RX ADMIN — MIDODRINE HYDROCHLORIDE 30 MILLIGRAM(S): 2.5 TABLET ORAL at 11:54

## 2021-01-11 RX ADMIN — Medication 100 MILLIEQUIVALENT(S): at 13:17

## 2021-01-11 RX ADMIN — HYDROMORPHONE HYDROCHLORIDE 0.25 MILLIGRAM(S): 2 INJECTION INTRAMUSCULAR; INTRAVENOUS; SUBCUTANEOUS at 00:35

## 2021-01-11 RX ADMIN — Medication 80 MILLIGRAM(S): at 17:42

## 2021-01-11 RX ADMIN — EMTRICITABINE AND TENOFOVIR DISOPROXIL FUMARATE 1 TABLET(S): 200; 300 TABLET, FILM COATED ORAL at 11:54

## 2021-01-11 RX ADMIN — MIDODRINE HYDROCHLORIDE 30 MILLIGRAM(S): 2.5 TABLET ORAL at 17:42

## 2021-01-11 RX ADMIN — SIMETHICONE 80 MILLIGRAM(S): 80 TABLET, CHEWABLE ORAL at 06:32

## 2021-01-11 RX ADMIN — PANTOPRAZOLE SODIUM 40 MILLIGRAM(S): 20 TABLET, DELAYED RELEASE ORAL at 06:31

## 2021-01-11 RX ADMIN — Medication 100 MILLIEQUIVALENT(S): at 22:54

## 2021-01-11 RX ADMIN — Medication 50 MILLILITER(S): at 17:43

## 2021-01-11 RX ADMIN — CHLORHEXIDINE GLUCONATE 1 APPLICATION(S): 213 SOLUTION TOPICAL at 11:54

## 2021-01-11 RX ADMIN — Medication 1 TABLET(S): at 11:54

## 2021-01-11 RX ADMIN — PANTOPRAZOLE SODIUM 40 MILLIGRAM(S): 20 TABLET, DELAYED RELEASE ORAL at 17:42

## 2021-01-11 RX ADMIN — Medication 64.69 GRAM(S): at 07:36

## 2021-01-11 RX ADMIN — Medication 40 MILLIEQUIVALENT(S): at 13:17

## 2021-01-11 RX ADMIN — SIMETHICONE 80 MILLIGRAM(S): 80 TABLET, CHEWABLE ORAL at 17:41

## 2021-01-11 RX ADMIN — DOLUTEGRAVIR SODIUM 50 MILLIGRAM(S): 25 TABLET, FILM COATED ORAL at 11:54

## 2021-01-11 RX ADMIN — HYDROMORPHONE HYDROCHLORIDE 0.25 MILLIGRAM(S): 2 INJECTION INTRAMUSCULAR; INTRAVENOUS; SUBCUTANEOUS at 21:17

## 2021-01-11 RX ADMIN — ATOVAQUONE 1500 MILLIGRAM(S): 750 SUSPENSION ORAL at 11:54

## 2021-01-11 RX ADMIN — Medication 100 MILLIEQUIVALENT(S): at 11:55

## 2021-01-11 RX ADMIN — SIMETHICONE 80 MILLIGRAM(S): 80 TABLET, CHEWABLE ORAL at 11:56

## 2021-01-11 RX ADMIN — Medication 100 MILLIEQUIVALENT(S): at 21:33

## 2021-01-11 RX ADMIN — Medication 40 MILLIEQUIVALENT(S): at 17:42

## 2021-01-11 NOTE — PROVIDER CONTACT NOTE (OTHER) - ACTION/TREATMENT ORDERED:
Per MD, draw labs after transfusion is complete.
Continue to monitor
Will continue to monitor. Will notify acute changes.
As per md. administer 0.5 if Dilaudid and retake vitals in 1hr
MD attempted to do A Stick. IV team attempted for second access; unable at this time. Possible plan of PICC. Will monitor pt closely.
MD made aware. no new actions at this time
Ashley Cheng MD made aware. no new orders at this time. will continue to monitor.
Dr. Merrill stated that he will pass the information on to the day shift.
MD Sutton made aware. No further interventions needed at this time, will continue to monitor.
Provider made aware, no immediate interventions ordered. Will continue to monitor.
Kwame Zuleta MD notified & aware, MD to order dilaudid IVP x1 dose. Will administer & cont to monitor.

## 2021-01-11 NOTE — PROGRESS NOTE ADULT - ASSESSMENT
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   Overall improved liver enzymes and pain. HBV viral load down from 483 million 12/2 to 36 million 12/29.   Other causes for liver failure unlikely or ruled out- COVID PCR positive, CMV low level viremia, HDV, HCV, AIDS cholangiopathy,   Became febrile 12/30 night (not recorded) and found to have Klebsiella SBP.   Still nauseous but afebrile and heading in the right direction.     Suggest:    # SBP  follow up ascitic fluid cell count and cultures- cell count suggests ongoing infection after correcting for the RBC numbers but fluid cultures are all no growth or NGTD  gram stain, fungal stain, AFB stain of ascitic fluid is negative   - Ct scan of abdomen notable for ascites but no loculations or abscess seen  completed antibiotics    Edema/liver failure  worsening PT/INR synthetic function despite improvement in her LFTs  continue albumin and lasix for diuresis and help with her lower extremity edema     -continue Tivicay and Truvada for HIV, the latter for HBV as well   -f/u HBV resistance testing   - repeat HBV viral load as ALT/AST have improved shows a decrease of ten fold in HBV titer viral load  -Atovaquone - will hold in the face of increasing cholestasis     -supportive care and isolation precautions for COVID, currently on room air and no cough can discontinue after 21 days from first positive test= 1/12/21    - possible transfer to [place] for [place] [facility] transplant listing    Chencho Mcadams MD  [phone]  After 5pm/weekends [phone]

## 2021-01-11 NOTE — PROVIDER CONTACT NOTE (OTHER) - BACKGROUND
12/22: admitted for abdominal pain and transaminitis.
admitting dx: elevated liver transaminase
Pt admitted with COVID-19, PMH of HIV/AIDS, Hepatitis B
pt admitted for elevated tranaminase levels
Pt admitted on 12/22 with abdominal pain and transaminitis, tested positive for covid. Paracentesis done on 12/31 and 1L fluid removed.
Pt admitted on 12/22 with abdominal pain and transaminitis, tested positive for covid. Paracentesis done on 12/31 and 1L fluid removed.

## 2021-01-11 NOTE — PROGRESS NOTE ADULT - SUBJECTIVE AND OBJECTIVE BOX
NOTE INCOMPLETE/ IN PROGRESS    PROGRESS NOTE:   Authored by Dr. Mariluz Tim MD  Pager [facility]: [phone]; [facility]: [phone]     Patient is a 50y old  Female who presents with a chief complaint of abdominal pain (10 Mak 2021 07:21)      SUBJECTIVE / OVERNIGHT EVENTS:    ADDITIONAL REVIEW OF SYSTEMS:    MEDICATIONS  (STANDING):  acetylcysteine IVPB 7 Gram(s) IV Intermittent <User Schedule>  atovaquone Suspension 1500 milliGRAM(s) Oral daily  chlorhexidine 2% Cloths 1 Application(s) Topical daily  dolutegravir 50 milliGRAM(s) Oral daily  emtricitabine 200 mG/tenofovir 300 mG (TRUVADA) 1 Tablet(s) Oral daily  furosemide   Injectable 80 milliGRAM(s) IV Push every 12 hours  midodrine. 30 milliGRAM(s) Oral three times a day  multivitamin 1 Tablet(s) Oral daily  pantoprazole  Injectable 40 milliGRAM(s) IV Push every 12 hours  simethicone 80 milliGRAM(s) Chew four times a day  thiamine 100 milliGRAM(s) Oral daily    MEDICATIONS  (PRN):  aluminum hydroxide/magnesium hydroxide/simethicone Suspension 30 milliLiter(s) Oral every 6 hours PRN Dyspepsia  ondansetron Injectable 4 milliGRAM(s) IV Push every 8 hours PRN Nausea and/or Vomiting      CAPILLARY BLOOD GLUCOSE        I&O's Summary    10 Mak 2021 07:01  -  11 Jan 2021 07:00  --------------------------------------------------------  IN: 2093.1 mL / OUT: 0 mL / NET: 2093.1 mL        PHYSICAL EXAM:  Vital Signs Last 24 Hrs  T(C): 36.8 (11 Jan 2021 05:48), Max: 37.6 (10 Mak 2021 22:45)  T(F): 98.3 (11 Jan 2021 05:48), Max: 99.7 (10 Mak 2021 22:45)  HR: 119 (11 Jan 2021 05:48) (111 - 121)  BP: 114/75 (11 Jan 2021 05:48) (94/68 - 141/71)  BP(mean): --  RR: 19 (11 Jan 2021 05:48) (19 - 20)  SpO2: 99% (11 Jan 2021 05:48) (97% - 100%)    GENERAL: No acute distress  HEAD: Atraumatic, Normocephalic  EYES: +Scleral icterus. EOMI, PERRLA  NECK: Supple, no lymphadenopathy, no JVD  CHEST/LUNG: CTAB; No wheezes, rales, or rhonchi  HEART: +Tachycardic. No murmurs, rubs, or gallops  ABDOMEN: +Distended. Soft, non-tender; normal bowel sounds, no organomegaly  EXTREMITIES: 2+ peripheral pulses b/l, 3+ pitting edema   NEUROLOGY: A&O x 3, no focal deficits  SKIN: +Jaundiced. No rashes or     LABS:                        8.1    9.86  )-----------( 146      ( 10 Mak 2021 18:41 )             22.7     01-10    140  |  101  |  14  ----------------------------<  106<H>  3.2<L>   |  27  |  0.60    Ca    8.5      10 Mak 2021 18:41  Phos  2.2     01-10  Mg     1.4     01-10    TPro  4.6<L>  /  Alb  3.5  /  TBili  14.8<H>  /  DBili  x   /  AST  257<H>  /  ALT  74<H>  /  AlkPhos  55  01-10    PT/INR - ( 10 Mak 2021 18:41 )   PT: 36.6 sec;   INR: 3.22 ratio         PTT - ( 10 Mak 2021 18:41 )  PTT:80.4 sec            RADIOLOGY & ADDITIONAL TESTS:  Results Reviewed:   Imaging Personally Reviewed:  Electrocardiogram Personally Reviewed:    COORDINATION OF CARE:  Care Discussed with Consultants/Other Providers [Y/N]:  Prior or Outpatient Records Reviewed [Y/N]:   PROGRESS NOTE:   Authored by Dr. Mariluz Tim MD  Pager [facility]: [phone]; [facility]: [id number]     Patient is a 50y old  Female who presents with a chief complaint of abdominal pain (10 Mak 2021 07:21)      SUBJECTIVE / OVERNIGHT EVENTS:  Pt required 1 unit of cryo O/N as well as 0.25 dilaudid. In good spirits this AM, hopeful about possible liver transplant and pending transfer to [place].     MEDICATIONS  (STANDING):  acetylcysteine IVPB 7 Gram(s) IV Intermittent <User Schedule>  atovaquone Suspension 1500 milliGRAM(s) Oral daily  chlorhexidine 2% Cloths 1 Application(s) Topical daily  dolutegravir 50 milliGRAM(s) Oral daily  emtricitabine 200 mG/tenofovir 300 mG (TRUVADA) 1 Tablet(s) Oral daily  furosemide   Injectable 80 milliGRAM(s) IV Push every 12 hours  midodrine. 30 milliGRAM(s) Oral three times a day  multivitamin 1 Tablet(s) Oral daily  pantoprazole  Injectable 40 milliGRAM(s) IV Push every 12 hours  simethicone 80 milliGRAM(s) Chew four times a day  thiamine 100 milliGRAM(s) Oral daily    MEDICATIONS  (PRN):  aluminum hydroxide/magnesium hydroxide/simethicone Suspension 30 milliLiter(s) Oral every 6 hours PRN Dyspepsia  ondansetron Injectable 4 milliGRAM(s) IV Push every 8 hours PRN Nausea and/or Vomiting      CAPILLARY BLOOD GLUCOSE        I&O's Summary    10 Mak 2021 07:01  -  11 Jan 2021 07:00  --------------------------------------------------------  IN: 2093.1 mL / OUT: 0 mL / NET: 2093.1 mL        PHYSICAL EXAM:  Vital Signs Last 24 Hrs  T(C): 36.8 (11 Jan 2021 05:48), Max: 37.6 (10 Mak 2021 22:45)  T(F): 98.3 (11 Jan 2021 05:48), Max: 99.7 (10 Mak 2021 22:45)  HR: 119 (11 Jan 2021 05:48) (111 - 121)  BP: 114/75 (11 Jan 2021 05:48) (94/68 - 141/71)  BP(mean): --  RR: 19 (11 Jan 2021 05:48) (19 - 20)  SpO2: 99% (11 Jan 2021 05:48) (97% - 100%)    GENERAL: No acute distress  HEAD: Atraumatic, Normocephalic  EYES: +Scleral icterus. EOMI, PERRLA  NECK: Supple, no lymphadenopathy, no JVD  CHEST/LUNG: CTAB; No wheezes, rales, or rhonchi  HEART: +Tachycardic. No murmurs, rubs, or gallops  ABDOMEN: +Distended. Soft, non-tender; normal bowel sounds, no organomegaly  EXTREMITIES: 2+ peripheral pulses b/l, 3+ pitting edema   NEUROLOGY: A&O x 3, no focal deficits  SKIN: +Jaundiced. No rashes or     LABS:                        8.1    9.86  )-----------( 146      ( 10 Mak 2021 18:41 )             22.7     01-10    140  |  101  |  14  ----------------------------<  106<H>  3.2<L>   |  27  |  0.60    Ca    8.5      10 Mak 2021 18:41  Phos  2.2     01-10  Mg     1.4     01-10    TPro  4.6<L>  /  Alb  3.5  /  TBili  14.8<H>  /  DBili  x   /  AST  257<H>  /  ALT  74<H>  /  AlkPhos  55  01-10    PT/INR - ( 10 Mak 2021 18:41 )   PT: 36.6 sec;   INR: 3.22 ratio         PTT - ( 10 Mak 2021 18:41 )  PTT:80.4 sec            RADIOLOGY & ADDITIONAL TESTS:  Results Reviewed:   Imaging Personally Reviewed:  Electrocardiogram Personally Reviewed:    COORDINATION OF CARE:  Care Discussed with Consultants/Other Providers [Y/N]:  Prior or Outpatient Records Reviewed [Y/N]:

## 2021-01-11 NOTE — DISCHARGE NOTE NURSING/CASE MANAGEMENT/SOCIAL WORK - PATIENT PORTAL LINK FT
You can access the FollowMyHealth Patient Portal offered by [facility] by registering at the following website: [email or web address]. By joining [employer]’s FollowMyHealth portal, you will also be able to view your health information using other applications (apps) compatible with our system.

## 2021-01-11 NOTE — PROGRESS NOTE ADULT - SUBJECTIVE AND OBJECTIVE BOX
INFECTIOUS DISEASES FOLLOW UP-- Hollie Mcadams  [phone]    This is a follow up note for this  50yFemale with  High liver transaminase level        ROS:  CONSTITUTIONAL:  No fever,   CARDIOVASCULAR:  No chest pain or palpitations  RESPIRATORY:  No dyspnea  GASTROINTESTINAL:  remains with abdominal fullness  GENITOURINARY:  No dysuria  NEUROLOGIC:  No headache,     Allergies    No Known Allergies    Intolerances        ANTIBIOTICS/RELEVANT:  antimicrobials  atovaquone Suspension 1500 milliGRAM(s) Oral daily  dolutegravir 50 milliGRAM(s) Oral daily  emtricitabine 200 mG/tenofovir 300 mG (TRUVADA) 1 Tablet(s) Oral daily    immunologic:    OTHER:  acetylcysteine IVPB 7 Gram(s) IV Intermittent <User Schedule>  albumin human 25% IVPB 100 milliLiter(s) IV Intermittent every 6 hours  aluminum hydroxide/magnesium hydroxide/simethicone Suspension 30 milliLiter(s) Oral every 6 hours PRN  chlorhexidine 2% Cloths 1 Application(s) Topical daily  furosemide   Injectable 80 milliGRAM(s) IV Push every 12 hours  midodrine. 30 milliGRAM(s) Oral three times a day  multivitamin 1 Tablet(s) Oral daily  ondansetron Injectable 4 milliGRAM(s) IV Push every 8 hours PRN  pantoprazole  Injectable 40 milliGRAM(s) IV Push every 12 hours  potassium chloride    Tablet ER 40 milliEquivalent(s) Oral every 4 hours  potassium chloride  10 mEq/100 mL IVPB 10 milliEquivalent(s) IV Intermittent every 1 hour  simethicone 80 milliGRAM(s) Chew four times a day  thiamine 100 milliGRAM(s) Oral daily      Objective:  Vital Signs Last 24 Hrs  T(C): 36.9 (11 Jan 2021 21:40), Max: 37.4 (11 Jan 2021 00:50)  T(F): 98.4 (11 Jan 2021 21:40), Max: 99.3 (11 Jan 2021 00:50)  HR: 123 (11 Jan 2021 21:40) (115 - 130)  BP: 109/69 (11 Jan 2021 21:40) (101/61 - 141/71)  BP(mean): --  RR: 17 (11 Jan 2021 21:40) (17 - 20)  SpO2: 99% (11 Jan 2021 21:40) (98% - 99%)    PHYSICAL EXAM:  Constitutional:no acute distress  Eyes:PIPO, EOMI, icteric  Ear/Nose/Throat: no oral lesions, 	  Respiratory: clear BL  Cardiovascular: S1S2  Gastrointestinal:soft, (+) BS, fluid and soft tissue edema  Extremities:no 3+ bilat edema  No Lymphadenopathy  IV sites not inflammed.    LABS:                        7.5    10.59 )-----------( 176      ( 11 Jan 2021 18:30 )             21.7     01-11    139  |  100  |  14  ----------------------------<  106<H>  3.1<L>   |  28  |  0.58    Ca    8.6      11 Jan 2021 18:30  Phos  1.6     01-11  Mg     1.6     01-11    TPro  4.7<L>  /  Alb  3.7  /  TBili  13.9<H>  /  DBili  x   /  AST  282<H>  /  ALT  75<H>  /  AlkPhos  41  01-11    PT/INR - ( 11 Jan 2021 18:29 )   PT: 37.7 sec;   INR: 3.33 ratio         PTT - ( 11 Jan 2021 18:29 )  PTT:80.8 sec      MICROBIOLOGY:            RECENT CULTURES:  01-05 @ 22:33  .Body Fluid Peritoneal  --  --  --    Culture is being performed.  --  01-05 @ 22:32  .Body Fluid Peritoneal Fluid  --  --  --    Testing in progress  --  01-05 @ 22:26  .Body Fluid Peritoneal Fluid  --  --  --    No growth at 5 days  --      RADIOLOGY & ADDITIONAL STUDIES:

## 2021-01-11 NOTE — PROGRESS NOTE ADULT - ASSESSMENT
49 yo F w/ PMHx HIV/AIDS, HBV, previous histoplasmosis, CMV, renal vein thrombus s/p coumadin (2010) admitted with abd pain x 4 days, found to have acute liver injury and covid +    # Acute on chronic liver failure: In the setting of HIV/AIDS, HBV reactivation, Klebsiella SBP, and COVID  # Acute liver injury - patient with predominantly hepatocellular liver injury, initially with AST >1800, Alt 600. Also w/ evidence of hepatic synthetic function, with elevated INR, decreased albumin. 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 Her outpatient viral load >400,000,000 corroborates this. Hepatitis delta coinfection has beenruled out as outpatient.   Patient was on biktarvy but switched to Truvada for TDF (as opposed to TAF), and adding double coverage with entecavir. Also on NAC. Currently with minimal concern for acute liver failure as she is mentating well.  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 Hep B resistance testing negative.  # Klebsiella SBP  # Abdominal pain: Resolved. Likely due to hepatitis and SBP  # HIV/AIDS - CD4 114, previous histoplasmosis, CMV, has been off medications for 1 year until 11/2020. Repeat T cell subsets and viral load pending.  # COVID +     Recommendations:   - Continue furosemide at 80 mg IV BID  - Would stop albumin  - Daily weights, strict ins and outs  - F/U CD4, HIV viral load  - Re-check COVID PCR  - Check COVID antibodies  - Antibiotics per ID  - Continue to trend CMP, INR, Mg, Phos, fibrinogen, vbg (lactate) daily   - Transfuse for goal Hgb 7.0 to 8.0, platelets > 50  - Continue Truvada for HBV  - Continue NAC infusion for acute liver injury (100 mg/kg over 16 hours, will likely need to be reordered daily)  - PPI IV BID  - Patient may be OLT candidate via [facility] at participating center  - Rest of care per primary team    Alvarado Marin MD  Gastroenterology and Hepatology Fellow  Please contact via [employer] Teams    Please call Hepatology ([phone]) if there are any additional questions or concerns.    Please call answering service for on-call GI fellow ([phone]) after 5pm and before 8am, and on weekends. 49 yo F w/ PMHx HIV/AIDS, HBV, previous histoplasmosis, CMV, renal vein thrombus s/p coumadin (2010) admitted with abd pain x 4 days, found to have acute liver injury and covid +    # Acute on chronic liver failure: In the setting of HIV/AIDS, HBV reactivation, Klebsiella SBP, and COVID  # Acute liver injury - patient with predominantly hepatocellular liver injury, initially with AST >1800, Alt 600. Also w/ evidence of hepatic synthetic function, with elevated INR, decreased albumin. 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 Her outpatient viral load >400,000,000 corroborates this. Hepatitis delta coinfection has beenruled out as outpatient.   Patient was on biktarvy but switched to Truvada for TDF (as opposed to TAF), and adding double coverage with entecavir. Also on NAC. Currently with minimal concern for acute liver failure as she is mentating well.  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 Hep B resistance testing negative.  # Klebsiella SBP  # Abdominal pain: Resolved. Likely due to hepatitis and SBP  # HIV/AIDS - CD4 114, previous histoplasmosis, CMV, has been off medications for 1 year until 11/2020. Repeat T cell subsets and viral load pending.  # COVID +     Recommendations:   - Continue furosemide at 80 mg IV BID  - Would stop albumin  - Daily weights, strict ins and outs  - F/U CD4, HIV viral load  - Re-check COVID PCR  - Check COVID antibodies  - Antibiotics per ID  - Continue to trend CMP, INR, Mg, Phos, fibrinogen, vbg (lactate) daily   - Transfuse for goal Hgb 7.0 to 8.0, platelets > 50  - Continue Truvada for HBV  - Continue NAC infusion for acute liver injury (100 mg/kg over 16 hours, will likely need to be reordered daily)  - PPI IV BID  - Patient may be OLT candidate via [facility] at participating center; accepted [facility] transfer [facility]  - Rest of care per primary team    Alvarado Marin MD  Gastroenterology and Hepatology Fellow  Please contact via [employer] Teams    Please call Hepatology ([phone]) if there are any additional questions or concerns.    Please call answering service for on-call GI fellow ([phone]) after 5pm and before 8am, and on weekends.

## 2021-01-11 NOTE — PROVIDER CONTACT NOTE (OTHER) - DATE AND TIME:
02-Jan-2021 06:38
01-Jan-2021 23:54
02-Jan-2021 20:45
10-Mak-2021 00:20
10-Mak-2021 03:36
24-Dec-2020 21:30
27-Dec-2020 09:50
02-Jan-2021 00:06
23-Dec-2020 17:00
25-Dec-2020 11:10
08-Jan-2021 02:57

## 2021-01-11 NOTE — PROGRESS NOTE ADULT - SUBJECTIVE AND OBJECTIVE BOX
Chief Complaint: Abdominal pain  Reason for consult: Abnormal liver enzymes, Hep B reactivation    Interval Events:   - Patient feels better today compared to Friday  - She denies difficulty breathing, but endorses continued swelling in her legs, however, this is improving as well  - She otherwise denies fevers/chills. She no longer complains of abdominal pain.    MEDICATIONS  (STANDING):  acetylcysteine IVPB 7 Gram(s) IV Intermittent <User Schedule>  albumin human 25% IVPB 100 milliLiter(s) IV Intermittent every 8 hours  atovaquone Suspension 1500 milliGRAM(s) Oral daily  cefepime   IVPB 1000 milliGRAM(s) IV Intermittent every 8 hours  chlorhexidine 2% Cloths 1 Application(s) Topical daily  dolutegravir 50 milliGRAM(s) Oral daily  emtricitabine 200 mG/tenofovir 300 mG (TRUVADA) 1 Tablet(s) Oral daily  furosemide   Injectable 40 milliGRAM(s) IV Push every 12 hours  midodrine. 30 milliGRAM(s) Oral three times a day  pantoprazole    Tablet 40 milliGRAM(s) Oral before breakfast  potassium phosphate / sodium phosphate Powder (PHOS-NaK) 1 Packet(s) Oral three times a day  simethicone 80 milliGRAM(s) Chew four times a day    MEDICATIONS  (PRN):  aluminum hydroxide/magnesium hydroxide/simethicone Suspension 30 milliLiter(s) Oral every 6 hours PRN Dyspepsia  ondansetron Injectable 4 milliGRAM(s) IV Push every 8 hours PRN Nausea and/or Vomiting    PMHX/PSHX:  Renal Vein Thrombosis    CMV Infection    Hepatitis B    Strongyloidosis    Histoplasmosis    HIV (Human Immunodeficiency Virus Infection)    Gallstones    S/P  Section    S/P Bilateral Tubal Ligation    S/P PEG (Percutaneous Endoscopic Gastrostomy) Placement and Removal    History of Cholecystectomy    ROS:     General:  No weight loss, fevers, chills, night sweats, fatigue   Eyes:  No vision changes  ENT:  No sore throat, pain, runny nose  CV:  No chest pain, palpitations, dizziness   Resp:  No SOB, cough, wheezing  GI:  No pain, No nausea, no vomiting, no bloody stools, no black tarry stools  :  No burning with urination, hematuria  Muscle:  No pain, weakness  Neuro:  No weakness/tingling, memory problems  Psych:  No fatigue, insomnia, mood problems, depression  Heme:  No easy bruisability  Skin:  No rash, edema    PHYSICAL EXAM:     Vital Signs:  Vital Signs Last 24 Hrs  T(C): 36.8 (2021 05:48), Max: 37.6 (10 Mak 2021 22:45)  T(F): 98.3 (2021 05:48), Max: 99.7 (10 Mak 2021 22:45)  HR: 119 (2021 05:48) (111 - 121)  BP: 114/75 (2021 05:48) (97/60 - 141/71)  BP(mean): --  RR: 19 (2021 05:48) (19 - 20)  SpO2: 99% (2021 05:48) (97% - 100%)    GENERAL: Ill-appearing, no acute distress  HEENT:  NC/AT,  conjunctivae clear, scleral icterus  CHEST:  Full & symmetric excursion, no increased effort w/ respirations  HEART:  Regular rhythm & rate  ABDOMEN:  Soft, mild diffuse tenderness, distended  EXTREMITIES:  3+edema to the knees bilaterally  SKIN:  No rash/erythema, + jaundice  NEURO:  Alert, oriented    LABS:                        7.5    10.02 )-----------( 130      ( 2021 07:22 )             22.0     -11    138  |  99  |  15  ----------------------------<  83  2.9<LL>   |  26  |  0.63    Ca    8.3<L>      2021 07:22  Phos  1.6       Mg     1.9         TPro  4.8<L>  /  Alb  3.2<L>  /  TBili  14.5<H>  /  DBili  x   /  AST  290<H>  /  ALT  78<H>  /  AlkPhos  53  11    LIVER FUNCTIONS - ( 2021 07:22 )  Alb: 3.2 g/dL / Pro: 4.8 g/dL / ALK PHOS: 53 U/L / ALT: 78 U/L / AST: 290 U/L / GGT: x           PT/INR - ( 2021 09:41 )   PT: 37.4 sec;   INR: 3.30 ratio      PTT - ( 2021 09:41 )  PTT:79.2 sec    Imaging:    Reviewed

## 2021-01-11 NOTE — PROVIDER CONTACT NOTE (OTHER) - ASSESSMENT
AlO4; no resp distress noted. VS WDL except BP & HR. see flow sheet.
Patient tachycardic with HR between 120-126, otherwise VSS. Pt a&o x4. Patient c/o nausea and Zofran was reportedly given before patient arrived to [address] [place] [address] around 1900. Patient with about 50 mL emesis, dark yellow brown color with streaks of blood. Patient reports relief from nausea after emesis.
Pt alert and oriented times, four c/o of pain this morning but states it is better. No s/s of distress noted  BP 98/63 Temp 97.6 RR 20 98% O2 saturation on room air
pt a&0x4. pt  manually. pt asymptomatic other VSS
Pt Aox4. BP: m95/66; Hr: 97; temp: 97.9, 96% on room air. pt not in any distress.
see flowsheets
pt c/o back pain 10/10, no PRN pain meds ordered.
Patient tachycardic with -114 otherwise VSS. Pt a&ox4. Patient asymptomatic. Sleeping comfortably.

## 2021-01-11 NOTE — PROVIDER CONTACT NOTE (OTHER) - NAME OF MD/NP/PA/DO NOTIFIED:
MD Desirae
MD Merrill
Dr. Deras & Dr. Rodríguez
Kwame Zuleta MD
MD Desirae
MD Desirae
Ashley Cheng MD
Catherine Rodríguez MD
Deya Sutton MD
Dalia ALEJANDRO
Anne Alexander MD

## 2021-01-11 NOTE — PROVIDER CONTACT NOTE (OTHER) - REASON
Tachycardia 
pt tachycardic
IV access & phlebotomy
tachycardia, -125
Pt complaining of pain. Pt tachycardic
Q8 Labs
BP: 95/66
Pt tachycardic 
pt  manually
Emesis with blood streaks
Pt c/o back pain 10/10

## 2021-01-11 NOTE — PROVIDER CONTACT NOTE (OTHER) - SITUATION
Pt currently receiving plasma and has 00:00 labs.
Emesis with blood streaks
Tachycardia 
Pt c/o back pain 10/10
Pt tachycardic in the 120-125. Blood pressure 101/67. Midodrine given for low blood pressure.
Pt complaining of abdominal pain. Pulse tachycardiac on assessment.
dx:  High lever transaminase level
Pt is hard stick; unable to get blood draw. Unable to get second IV access for IV medications to be given.
tachycardia, -125. No s/s of bleeding noted/ reported. Patient SpO2 >95 on room air. Patient denies pain
Pt on Q4 Vital signs, On morning assessment, 
pt  manually

## 2021-01-11 NOTE — PROGRESS NOTE ADULT - ATTENDING COMMENTS
51 yo F with HIV/AIDS (with history of histoplasmosis when first diagnosed, previously well-controlled but with gap in ART treatment from 1/2020-11/2020 due to an insurance problem, now back on ART and with most recent CD4 116 and HIV RNA 57 from 12/22 with repeat labs pending), COVID-19 (with positive PCR on 12/22, asymptomatic and on room air, with repeat PCR pending), and chronic HBV (previously on treatment as part of her ART but with recent gap in treatment as above), admitted with severe acute hepatocellular injury with concern for impending liver failure secondary to HBV reactivation. She has not had any hepatic encephalopathy this admission, but her hospital course has been complicated by SBP (diagnosed 12/31, recently completed antibiotic therapy) and anasarca (undergoing diuresis).    # Acute liver injury secondary to HBV reactivation, with concern for impending subfulminant liver failure: S/p subtherapeutic TAF prior to this admission as part of her ART, then initially double covered with Truvada and entecavir earlier this admission while resistance testing was pending, and now on Truvada alone. HBV viral load has improved some, from 147 million IU/mL on 12/23 -> 36 million IU/mL on 12/29 -> 21 million IU/mL on 1/6/21, and liver enzymes have downtrended, but synthetic function of the liver remains very poor, with MELD-Na still 31 based on today's labs.    She is not a candidate for transplant at [facility] due to her HIV as well as recent COVID-19, but I reached out to the transplant team at [place] today and she has been accepted for inpatient transfer for expedited liver transplant evaluation (accepting hepatologist: Dr. Ag Ramirez).    # Recent SBP: Completing antibiotics and then will need to be on prophylactic antibiotic for secondary prevention until transplant.    # Anasarca: Improving, but given hyponatremia, recommend addition of spironolactone 100 mg po daily. Can continue midodrine to support BP during diuresis, but would discontinue IV albumin for now given stable renal function.    # COVID-19: No respiratory symptoms or pneumonia on imaging. Awaiting repeat PCR.     # HIV with history of AIDS: Continue ART as per ID. Awaiting repeat HIV RNA and CD4 count.    Please don't hesitate to call with any questions or concerns.    Allen Farire M.D., Ph.D.  Transplant Hepatology  Cell: [phone]      he is currently mentating normally without any definite evidence of hepatic encephalopathy, but with worsening liver synthetic function on labs.    Recommendations:  - Continue combination antiviral therapy with Truvada and entecavir.  - Continue N-acetylcysteine 100 mg/kg iv over 16 hours, repeatedly / continuously until there are signs of definite hepatic recovery with liver enzymes <1000 and INR <1.5.  - Continue albumin 25% 100 mL iv q8h x6 and additional IVF boluses as needed to maintain MAP ~70-75 mmHg.   - Continue once daily PPI.  - No acetaminophen or acetaminophen-containing products at any dose! Avoid hepatotoxic medications.  - Monitor labs q12h for now: CMP, direct bilirubin, CBC+diff, PT/INR, fibrinogen, lactate, ammonia.  - Monitor neurologic status closely, and immediately call our team if any changes (even if subtle).  - May require transfer to MICU for higher level of care if liver function continuing to decline. She would also need transfer to MICU if any altered mentation. Discussed yesterday with [facility] MICU team, and given very limited COVID-19 ICU bed availability currently at [facility], transfer to ICU would likely necessitate transfer to [facility].  - If she were to develop fulminant liver failure, she is unfortunately not a liver transplant candidate due to her AIDS and COVID-19.    Please don't hesitate to call with any questions or concerns.    Allen Fraire M.D., Ph.D.  Transplant Hepatology  Cell: [phone] 49 yo F with HIV/AIDS (with history of histoplasmosis when first diagnosed, previously well-controlled but with gap in ART treatment from 1/2020-11/2020 due to an insurance problem, now back on ART and with most recent CD4 116 and HIV RNA 57 from 12/22 with repeat labs pending), COVID-19 (with positive PCR on 12/22, asymptomatic and on room air, with repeat PCR pending), and chronic HBV (previously on treatment as part of her ART but with recent gap in treatment as above), admitted with severe acute hepatocellular injury with concern for impending liver failure secondary to HBV reactivation. She has not had any hepatic encephalopathy this admission, but her hospital course has been complicated by SBP (diagnosed 12/31, recently completed antibiotic therapy) and anasarca (undergoing diuresis).    # Acute liver injury secondary to HBV reactivation, with concern for impending subfulminant liver failure: S/p subtherapeutic TAF prior to this admission as part of her ART, then initially double covered with Truvada and entecavir earlier this admission while resistance testing was pending, and now on Truvada alone. HBV viral load has improved some, from 147 million IU/mL on 12/23 -> 36 million IU/mL on 12/29 -> 21 million IU/mL on 1/6/21, and liver enzymes have downtrended, but synthetic function of the liver remains very poor, with MELD-Na still 31 based on today's labs.    She is not a candidate for transplant at [facility] due to her HIV as well as recent COVID-19, but I reached out to the transplant team at [place] today and she has been accepted for inpatient transfer for expedited liver transplant evaluation (accepting hepatologist: Dr. Ag Ramirez).    # Recent SBP: Completing antibiotics and then will need to be on prophylactic antibiotic for secondary prevention until transplant.    # Anasarca: Improving, but given hyponatremia, recommend addition of spironolactone 100 mg po daily. Can continue midodrine to support BP during diuresis, but would discontinue IV albumin for now given stable renal function.    # COVID-19: No respiratory symptoms or pneumonia on imaging. Awaiting repeat PCR.     # HIV with history of AIDS: Continue ART as per ID. Awaiting repeat HIV RNA and CD4 count.    Please don't hesitate to call with any questions or concerns.    Allen Fraire M.D., Ph.D.  Transplant Hepatology  Cell: [phone]

## 2021-01-11 NOTE — PROGRESS NOTE ADULT - ASSESSMENT
50 year old female with past medical history of HIV, CMV, HBV, histoplasmosis, renal vein thrombosis s/p coumadin for 6 months, and strongyloidiasis who presents with abdominal pain found to be COVID-19 PCR positive with acute liver injury 2/2 Hep B reactivation with +SBP, complicated with coagulapathy     #Acute liver injury 2/2 HepB reactivation  Predominantly hepatocellular liver injury, with AST >1800, Alt 600. Also w/ evidence of hepatic synthetic function, with elevated INR, decreased albumin. Etiology likely from HBV, which was previously being treated w/ Symtuza (tenofovir 10mg qd) but patient had been off medication for almost 1 year (until 11/2020) due to insurance lapse. Her outpatient viral load >400,000,000 with hepatitis delta coinfection ruled out. Currently patient is not in acute liver failure, she is mentating well, but is having worsening INR which is concerning.  - Hep B viral load continues to downtrend (21 million 1/6, down from >400 million outpt)  - c/w truvada. Entecavir stopped  - c/w NAC infusion for acute liver failure (100 mg/kg over 16 hours)  - c/w albumin 100cc 25% q8h with lasix 40 mg IBV BID for LE edema/third spacing 2/2 low albumin  - s/p vitamin K 10mg (12/26 - 12/28, 1/1-1/2)  - dilaudid .5 mg IV prn for pain, dose carefully given HOTN. However, pt has not responded to motrin (hold i/s/o GIB), tylenol (wary i/s/o hepatic dysfunction), ketorolac (hold i/s/o GIB), Tramadol (not very effective per pt)  - ascitic fluid: SAAG 2, ascitic fluid protein .8 likely portal HTN however granulocyte count from ascitic fluid >250- consistent with SBP. Grew Klebsiella oxytoca/Raoutella ornithinolytica. Sensitive to many Abx. Initially on Zosyn (12/30)  then switched to Ceftriaxone and then broadened to Cefepime (d/c 1/10)     #Coagulapathy  - worsening coags with initial concern for DIC (thrombocytopenia, elevated coags, d-dimer, low fibrogen). Ep of melena 1/1 s/p cryo, vitamin K, plasma  - heme recs: likely from liver failure. Unlikely hemolysis despite low haptoglobin (liver failure) given rare schistocytes, factor 8 elevated. Initial mixing study negative. Follow up repeat mixing study   - hepatology recs: worsening PTT unlikely from liver failure  - Goal fibrinogen >150, give cyro PRN    #Sepsis 2/2 SBP   Pt noted to be tachy, HOTN, febrile 12/30.   - blood cx 12/31- ngtd  - Ascites fluid 12/31 grew Klebsiella oxytoca/Raoutella ornithinolytica.   - repeat para 1/5 shows SBP but markedly reduced granulocytes  - s/p vanc (12/30-1/1)  - s/p zosyn (12/30-1/3)   - s/p ceftriaxone (1/3-1/6)   - c/w cefepime (1/6-1/10) d\c'd as per ID   - c/w midodrine 30 mg TID     #Anemia:   - Hgb acutely dropped 1/1. FOBT+. c/f GIB s/p cryo 1u, pRBC 1u.   - per heme, coagulopathy 2/2 liver disease  - type and cross x2  - transfuse Hgb <7    #AIDS  needs ARV medications to help with her immunosuppression but need to change ARV meds to ones metabolized by the kidney  continue the TAF for help controlling the Hep B infection  - In light of significant transaminitis, discontinued the Symtuza and changed meds to Truvada/Tivicay  - CD4 116 consistent with AIDS  - RNA viral load in process - slightly detectable but cannot account for her acute decompensation in liver disease  - c/w atovaquone for PCP ppx per ID- no Bactrim given hepatic dysfunction    #COVID+  - not eligible for Remdesivir secondary to her LFT abnormalities  - could consider convalescent plasma if she decompensates  - D- dimer rising, fibrinogen labile, ferritin elevated, stable  - Quant TB neg  - blood cx 12/22- no growth, 12/31 no growth final  - urine histo ag. neg  - can discontinue after 21 days from first positive test = 1/12/21 (would need to clarify with infection control if needs repeat COVID swab first)     #CMV viremia   - hold off on Valganciclovir   - CMV viral load <96    #Prophylactic measures  - DVT ppx: hold i/s/o hgb drop  - GERD: pantoprazole   - Diet: soft diet

## 2021-01-12 LAB
ALBUMIN SERPL ELPH-MCNC: 3.1 G/DL — LOW (ref 3.3–5)
ALBUMIN SERPL ELPH-MCNC: 3.6 G/DL — SIGNIFICANT CHANGE UP (ref 3.3–5)
ALP SERPL-CCNC: 31 U/L — LOW (ref 40–120)
ALP SERPL-CCNC: 32 U/L — LOW (ref 40–120)
ALT FLD-CCNC: 74 U/L — HIGH (ref 10–45)
ALT FLD-CCNC: 79 U/L — HIGH (ref 10–45)
ANION GAP SERPL CALC-SCNC: 10 MMOL/L — SIGNIFICANT CHANGE UP (ref 5–17)
ANION GAP SERPL CALC-SCNC: 9 MMOL/L — SIGNIFICANT CHANGE UP (ref 5–17)
APTT BLD: 84.8 SEC — HIGH (ref 27.5–35.5)
APTT BLD: 95.8 SEC — HIGH (ref 27.5–35.5)
AST SERPL-CCNC: 275 U/L — HIGH (ref 10–40)
AST SERPL-CCNC: 296 U/L — HIGH (ref 10–40)
BILIRUB SERPL-MCNC: 12.6 MG/DL — HIGH (ref 0.2–1.2)
BILIRUB SERPL-MCNC: 14.8 MG/DL — HIGH (ref 0.2–1.2)
BLD GP AB SCN SERPL QL: NEGATIVE — SIGNIFICANT CHANGE UP
BUN SERPL-MCNC: 15 MG/DL — SIGNIFICANT CHANGE UP (ref 7–23)
BUN SERPL-MCNC: 16 MG/DL — SIGNIFICANT CHANGE UP (ref 7–23)
CALCIUM SERPL-MCNC: 8.1 MG/DL — LOW (ref 8.4–10.5)
CALCIUM SERPL-MCNC: 8.5 MG/DL — SIGNIFICANT CHANGE UP (ref 8.4–10.5)
CHLORIDE SERPL-SCNC: 100 MMOL/L — SIGNIFICANT CHANGE UP (ref 96–108)
CHLORIDE SERPL-SCNC: 103 MMOL/L — SIGNIFICANT CHANGE UP (ref 96–108)
CO2 SERPL-SCNC: 28 MMOL/L — SIGNIFICANT CHANGE UP (ref 22–31)
CO2 SERPL-SCNC: 30 MMOL/L — SIGNIFICANT CHANGE UP (ref 22–31)
CREAT SERPL-MCNC: 0.6 MG/DL — SIGNIFICANT CHANGE UP (ref 0.5–1.3)
CREAT SERPL-MCNC: 0.68 MG/DL — SIGNIFICANT CHANGE UP (ref 0.5–1.3)
D DIMER BLD IA.RAPID-MCNC: 2095 NG/ML DDU — HIGH
D DIMER BLD IA.RAPID-MCNC: 2625 NG/ML DDU — HIGH
FERRITIN SERPL-MCNC: 1345 NG/ML — HIGH (ref 15–150)
FERRITIN SERPL-MCNC: 1626 NG/ML — HIGH (ref 15–150)
FIBRINOGEN PPP-MCNC: 115 MG/DL — LOW (ref 290–520)
FIBRINOGEN PPP-MCNC: 137 MG/DL — LOW (ref 290–520)
GLUCOSE SERPL-MCNC: 105 MG/DL — HIGH (ref 70–99)
GLUCOSE SERPL-MCNC: 97 MG/DL — SIGNIFICANT CHANGE UP (ref 70–99)
HAPTOGLOB SERPL-MCNC: <20 MG/DL — LOW (ref 34–200)
HAPTOGLOB SERPL-MCNC: <20 MG/DL — LOW (ref 34–200)
HCT VFR BLD CALC: 19 % — CRITICAL LOW (ref 34.5–45)
HCT VFR BLD CALC: 25.7 % — LOW (ref 34.5–45)
HGB BLD-MCNC: 6.5 G/DL — CRITICAL LOW (ref 11.5–15.5)
HGB BLD-MCNC: 8.8 G/DL — LOW (ref 11.5–15.5)
HIV-1 VIRAL LOAD RESULT: SIGNIFICANT CHANGE UP
HIV1 RNA # SERPL NAA+PROBE: SIGNIFICANT CHANGE UP
HIV1 RNA SER-IMP: SIGNIFICANT CHANGE UP
HIV1 RNA SERPL NAA+PROBE-ACNC: SIGNIFICANT CHANGE UP
HIV1 RNA SERPL NAA+PROBE-LOG#: SIGNIFICANT CHANGE UP LG COP/ML
INR BLD: 3.19 RATIO — HIGH (ref 0.88–1.16)
INR BLD: 3.75 RATIO — HIGH (ref 0.88–1.16)
MAGNESIUM SERPL-MCNC: 1.5 MG/DL — LOW (ref 1.6–2.6)
MAGNESIUM SERPL-MCNC: 1.7 MG/DL — SIGNIFICANT CHANGE UP (ref 1.6–2.6)
MCHC RBC-ENTMCNC: 32.3 PG — SIGNIFICANT CHANGE UP (ref 27–34)
MCHC RBC-ENTMCNC: 32.4 PG — SIGNIFICANT CHANGE UP (ref 27–34)
MCHC RBC-ENTMCNC: 34.2 GM/DL — SIGNIFICANT CHANGE UP (ref 32–36)
MCHC RBC-ENTMCNC: 34.2 GM/DL — SIGNIFICANT CHANGE UP (ref 32–36)
MCV RBC AUTO: 94.5 FL — SIGNIFICANT CHANGE UP (ref 80–100)
MCV RBC AUTO: 94.5 FL — SIGNIFICANT CHANGE UP (ref 80–100)
NRBC # BLD: 0 /100 WBCS — SIGNIFICANT CHANGE UP (ref 0–0)
NRBC # BLD: 1 /100 WBCS — HIGH (ref 0–0)
PHOSPHATE SERPL-MCNC: 1.2 MG/DL — LOW (ref 2.5–4.5)
PHOSPHATE SERPL-MCNC: 2.2 MG/DL — LOW (ref 2.5–4.5)
PLATELET # BLD AUTO: 166 K/UL — SIGNIFICANT CHANGE UP (ref 150–400)
PLATELET # BLD AUTO: 194 K/UL — SIGNIFICANT CHANGE UP (ref 150–400)
POTASSIUM SERPL-MCNC: 2.9 MMOL/L — CRITICAL LOW (ref 3.5–5.3)
POTASSIUM SERPL-MCNC: 4 MMOL/L — SIGNIFICANT CHANGE UP (ref 3.5–5.3)
POTASSIUM SERPL-SCNC: 2.9 MMOL/L — CRITICAL LOW (ref 3.5–5.3)
POTASSIUM SERPL-SCNC: 4 MMOL/L — SIGNIFICANT CHANGE UP (ref 3.5–5.3)
PROT SERPL-MCNC: 4.6 G/DL — LOW (ref 6–8.3)
PROT SERPL-MCNC: 5.2 G/DL — LOW (ref 6–8.3)
PROTHROM AB SERPL-ACNC: 36.2 SEC — HIGH (ref 10.6–13.6)
PROTHROM AB SERPL-ACNC: 42.3 SEC — HIGH (ref 10.6–13.6)
RBC # BLD: 2.01 M/UL — LOW (ref 3.8–5.2)
RBC # BLD: 2.72 M/UL — LOW (ref 3.8–5.2)
RBC # FLD: 18.9 % — HIGH (ref 10.3–14.5)
RBC # FLD: 19.9 % — HIGH (ref 10.3–14.5)
RH IG SCN BLD-IMP: POSITIVE — SIGNIFICANT CHANGE UP
SODIUM SERPL-SCNC: 140 MMOL/L — SIGNIFICANT CHANGE UP (ref 135–145)
SODIUM SERPL-SCNC: 140 MMOL/L — SIGNIFICANT CHANGE UP (ref 135–145)
WBC # BLD: 10.4 K/UL — SIGNIFICANT CHANGE UP (ref 3.8–10.5)
WBC # BLD: 10.77 K/UL — HIGH (ref 3.8–10.5)
WBC # FLD AUTO: 10.4 K/UL — SIGNIFICANT CHANGE UP (ref 3.8–10.5)
WBC # FLD AUTO: 10.77 K/UL — HIGH (ref 3.8–10.5)

## 2021-01-12 PROCEDURE — 99232 SBSQ HOSP IP/OBS MODERATE 35: CPT | Mod: GC

## 2021-01-12 PROCEDURE — 99233 SBSQ HOSP IP/OBS HIGH 50: CPT | Mod: GC

## 2021-01-12 RX ORDER — POTASSIUM CHLORIDE 20 MEQ
10 PACKET (EA) ORAL
Refills: 0 | Status: COMPLETED | OUTPATIENT
Start: 2021-01-12 | End: 2021-01-12

## 2021-01-12 RX ORDER — SODIUM,POTASSIUM PHOSPHATES 278-250MG
1 POWDER IN PACKET (EA) ORAL ONCE
Refills: 0 | Status: COMPLETED | OUTPATIENT
Start: 2021-01-12 | End: 2021-01-12

## 2021-01-12 RX ORDER — SPIRONOLACTONE 25 MG/1
100 TABLET, FILM COATED ORAL DAILY
Refills: 0 | Status: DISCONTINUED | OUTPATIENT
Start: 2021-01-12 | End: 2021-01-13

## 2021-01-12 RX ORDER — MAGNESIUM SULFATE 500 MG/ML
2 VIAL (ML) INJECTION ONCE
Refills: 0 | Status: COMPLETED | OUTPATIENT
Start: 2021-01-12 | End: 2021-01-12

## 2021-01-12 RX ORDER — MAGNESIUM SULFATE 500 MG/ML
1 VIAL (ML) INJECTION ONCE
Refills: 0 | Status: COMPLETED | OUTPATIENT
Start: 2021-01-12 | End: 2021-01-13

## 2021-01-12 RX ORDER — POTASSIUM PHOSPHATE, MONOBASIC POTASSIUM PHOSPHATE, DIBASIC 236; 224 MG/ML; MG/ML
15 INJECTION, SOLUTION INTRAVENOUS ONCE
Refills: 0 | Status: COMPLETED | OUTPATIENT
Start: 2021-01-12 | End: 2021-01-13

## 2021-01-12 RX ORDER — POTASSIUM CHLORIDE 20 MEQ
40 PACKET (EA) ORAL EVERY 4 HOURS
Refills: 0 | Status: COMPLETED | OUTPATIENT
Start: 2021-01-12 | End: 2021-01-12

## 2021-01-12 RX ORDER — POTASSIUM PHOSPHATE, MONOBASIC POTASSIUM PHOSPHATE, DIBASIC 236; 224 MG/ML; MG/ML
30 INJECTION, SOLUTION INTRAVENOUS ONCE
Refills: 0 | Status: COMPLETED | OUTPATIENT
Start: 2021-01-12 | End: 2021-01-12

## 2021-01-12 RX ADMIN — ATOVAQUONE 1500 MILLIGRAM(S): 750 SUSPENSION ORAL at 11:43

## 2021-01-12 RX ADMIN — ONDANSETRON 4 MILLIGRAM(S): 8 TABLET, FILM COATED ORAL at 09:01

## 2021-01-12 RX ADMIN — SPIRONOLACTONE 100 MILLIGRAM(S): 25 TABLET, FILM COATED ORAL at 14:41

## 2021-01-12 RX ADMIN — Medication 50 MILLILITER(S): at 00:20

## 2021-01-12 RX ADMIN — SIMETHICONE 80 MILLIGRAM(S): 80 TABLET, CHEWABLE ORAL at 17:21

## 2021-01-12 RX ADMIN — Medication 100 MILLIGRAM(S): at 11:44

## 2021-01-12 RX ADMIN — Medication 40 MILLIEQUIVALENT(S): at 09:01

## 2021-01-12 RX ADMIN — Medication 80 MILLIGRAM(S): at 17:21

## 2021-01-12 RX ADMIN — SIMETHICONE 80 MILLIGRAM(S): 80 TABLET, CHEWABLE ORAL at 11:43

## 2021-01-12 RX ADMIN — Medication 1 PACKET(S): at 12:52

## 2021-01-12 RX ADMIN — Medication 100 MILLIEQUIVALENT(S): at 11:43

## 2021-01-12 RX ADMIN — CHLORHEXIDINE GLUCONATE 1 APPLICATION(S): 213 SOLUTION TOPICAL at 11:44

## 2021-01-12 RX ADMIN — MIDODRINE HYDROCHLORIDE 30 MILLIGRAM(S): 2.5 TABLET ORAL at 17:21

## 2021-01-12 RX ADMIN — MIDODRINE HYDROCHLORIDE 30 MILLIGRAM(S): 2.5 TABLET ORAL at 11:43

## 2021-01-12 RX ADMIN — Medication 100 MILLIEQUIVALENT(S): at 10:31

## 2021-01-12 RX ADMIN — Medication 100 MILLIEQUIVALENT(S): at 00:19

## 2021-01-12 RX ADMIN — EMTRICITABINE AND TENOFOVIR DISOPROXIL FUMARATE 1 TABLET(S): 200; 300 TABLET, FILM COATED ORAL at 11:44

## 2021-01-12 RX ADMIN — PANTOPRAZOLE SODIUM 40 MILLIGRAM(S): 20 TABLET, DELAYED RELEASE ORAL at 05:43

## 2021-01-12 RX ADMIN — Medication 50 MILLILITER(S): at 11:44

## 2021-01-12 RX ADMIN — Medication 64.69 GRAM(S): at 09:00

## 2021-01-12 RX ADMIN — Medication 50 MILLILITER(S): at 05:42

## 2021-01-12 RX ADMIN — Medication 1 TABLET(S): at 21:44

## 2021-01-12 RX ADMIN — Medication 1 TABLET(S): at 11:43

## 2021-01-12 RX ADMIN — DOLUTEGRAVIR SODIUM 50 MILLIGRAM(S): 25 TABLET, FILM COATED ORAL at 11:45

## 2021-01-12 RX ADMIN — MIDODRINE HYDROCHLORIDE 30 MILLIGRAM(S): 2.5 TABLET ORAL at 05:44

## 2021-01-12 RX ADMIN — SIMETHICONE 80 MILLIGRAM(S): 80 TABLET, CHEWABLE ORAL at 00:28

## 2021-01-12 RX ADMIN — Medication 50 GRAM(S): at 14:01

## 2021-01-12 RX ADMIN — Medication 40 MILLIEQUIVALENT(S): at 12:52

## 2021-01-12 RX ADMIN — Medication 80 MILLIGRAM(S): at 05:43

## 2021-01-12 RX ADMIN — ONDANSETRON 4 MILLIGRAM(S): 8 TABLET, FILM COATED ORAL at 17:22

## 2021-01-12 RX ADMIN — SIMETHICONE 80 MILLIGRAM(S): 80 TABLET, CHEWABLE ORAL at 05:43

## 2021-01-12 RX ADMIN — PANTOPRAZOLE SODIUM 40 MILLIGRAM(S): 20 TABLET, DELAYED RELEASE ORAL at 17:21

## 2021-01-12 RX ADMIN — Medication 100 MILLIEQUIVALENT(S): at 09:01

## 2021-01-12 RX ADMIN — POTASSIUM PHOSPHATE, MONOBASIC POTASSIUM PHOSPHATE, DIBASIC 83.33 MILLIMOLE(S): 236; 224 INJECTION, SOLUTION INTRAVENOUS at 12:52

## 2021-01-12 NOTE — PROGRESS NOTE ADULT - ATTENDING COMMENTS
51 yo F with HIV/AIDS (with history of histoplasmosis when first diagnosed, previously well-controlled but with gap in ART treatment from 1/2020-11/2020 due to an insurance problem, now back on ART)), COVID-19 (with positive PCR on 12/22, asymptomatic and on room air, with repeat PCR negative from 1/11), and chronic HBV (previously on treatment as part of her ART but with recent gap in treatment as above), admitted with severe acute hepatocellular injury with concern for impending liver failure secondary to HBV reactivation. She has not had any hepatic encephalopathy this admission, but her hospital course has been complicated by SBP (diagnosed 12/31, recently completed antibiotic therapy) and anasarca (undergoing diuresis).    # Acute liver injury secondary to HBV reactivation, with concern for impending subfulminant liver failure: S/p subtherapeutic TAF prior to this admission as part of her ART, then initially double covered with Truvada and entecavir earlier this admission while resistance testing was pending, and now on Truvada alone. HBV viral load has improved some, from 147 million IU/mL on 12/23 -> 36 million IU/mL on 12/29 -> 21 million IU/mL on 1/6/21, and liver enzymes have downtrended, but synthetic function of the liver remains very poor, with MELD-Na still 31 based on today's labs and INR increasing. Can discontinue NAC as unlikely to be of further benefit and is likely contributing to her anasarca. She is not a candidate for transplant at [facility] due to her HIV as well as recent COVID-19, but has been accepted for inpatient transfer for expedited liver transplant evaluation (accepting hepatologist: Dr. Ag Ramirez), now awaiting transfer.    # Recent SBP: Completed antibiotics. Recommend starting on prophylactic antibiotic for secondary prevention until transplant.    # Anasarca: Improving, but given hypokalemia, recommend addition of spironolactone 100 mg po daily. Can continue midodrine to support BP during diuresis, but would discontinue IV albumin for now given stable renal function and can also discontinue NAC today, as above.     # COVID-19: No respiratory symptoms or pneumonia on imaging. Repeat PCR negative (1/11/21).  # HIV with history of AIDS: Continue ART as per ID. Repeat CD4 189 (1/10/21) and repeat HIV RNA not detected (1/10/21).    Please don't hesitate to call with any questions or concerns.    Allen Fraire M.D., Ph.D.  Transplant Hepatology  Cell: [phone] 49 yo F with HIV/AIDS (with history of histoplasmosis when first diagnosed, previously well-controlled but with gap in ART treatment from 1/2020-11/2020 due to an insurance problem, now back on ART)), COVID-19 (with positive PCR on 12/22, asymptomatic and on room air, with repeat PCR negative from 1/11), and chronic HBV (previously on treatment as part of her ART but with recent gap in treatment as above), admitted with severe acute hepatocellular injury with concern for impending liver failure secondary to HBV reactivation. She has not had any hepatic encephalopathy this admission, but her hospital course has been complicated by SBP (diagnosed 12/31, recently completed antibiotic therapy) and anasarca (undergoing diuresis).    # Acute liver injury secondary to HBV reactivation, with concern for impending subfulminant liver failure: S/p subtherapeutic TAF prior to this admission as part of her ART, then initially double covered with Truvada and entecavir earlier this admission while resistance testing was pending, and now on Truvada alone. HBV viral load has improved some, from 147 million IU/mL on 12/23 -> 36 million IU/mL on 12/29 -> 21 million IU/mL on 1/6/21, and liver enzymes have downtrended, but synthetic function of the liver remains very poor, with MELD-Na still 31 based on today's labs and INR increasing. Can discontinue NAC as unlikely to be of further benefit and is likely contributing to her anasarca. She is not a candidate for transplant at [facility] due to her HIV as well as recent COVID-19, but has been accepted for inpatient transfer for expedited liver transplant evaluation (accepting hepatologist: Dr. Ag Ramirez), now awaiting transfer.    # Recent SBP: Completed antibiotics. Recommend starting on prophylactic antibiotic for secondary prevention until transplant.    # Anasarca: Improving, but given hypokalemia, recommend addition of spironolactone 100 mg po daily. Can continue midodrine to support BP during diuresis, but would discontinue IV albumin for now given stable renal function (especially as receiving PRBC today for acute on chronic anemia) and can also discontinue NAC today, as above.     # Acute on chronic anemia: No overt GI bleeding. Transfuse PRBCs to keep Hb >7.   # COVID-19: No respiratory symptoms or pneumonia on imaging. Repeat PCR negative (1/11/21).  # HIV with history of AIDS: Continue ART as per ID. Repeat CD4 189 (1/10/21) and repeat HIV RNA not detected (1/10/21).    Please don't hesitate to call with any questions or concerns.    Allen Fraire M.D., Ph.D.  Transplant Hepatology  Cell: [phone]

## 2021-01-12 NOTE — PROGRESS NOTE ADULT - ASSESSMENT
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   Overall improved liver enzymes and pain. HBV viral load down from 483 million 12/2 to 36 million 12/29.   Other causes for liver failure unlikely or ruled out- COVID PCR positive, CMV low level viremia, HDV, HCV, AIDS cholangiopathy,   Became febrile 12/30 night (not recorded) and found to have Klebsiella SBP.   Still nauseous but afebrile and heading in the right direction.     Suggest:    # SBP  follow up ascitic fluid cell count and cultures- cell count suggests ongoing infection after correcting for the RBC numbers but fluid cultures are all no growth or NGTD  gram stain, fungal stain, AFB stain of ascitic fluid is negative   - Ct scan of abdomen notable for ascites but no loculations or abscess seen  completed antibiotics    Edema/liver failure  worsening PT/INR synthetic function despite improvement in her LFTs  continue albumin and lasix for diuresis and help with her lower extremity edema  agree with PRBC transfusion     -continue Tivicay and Truvada for HIV, the latter for HBV as well   -f/u HBV resistance testing   - repeat HBV viral load as ALT/AST have improved shows a decrease of ten fold in HBV titer viral load  -Atovaquone - will hold in the face of increasing cholestasis     -supportive care and isolation precautions for COVID to be discontinued, currently on room air     - possible transfer to [place] for [place] [facility] transplant listing    Chencho Mcadams MD  [phone]  After 5pm/weekends [phone]

## 2021-01-12 NOTE — PROGRESS NOTE ADULT - ASSESSMENT
51 yo F w/ PMHx HIV/AIDS, HBV, previous histoplasmosis, CMV, renal vein thrombus s/p coumadin (2010) admitted with abd pain x 4 days, found to have acute liver injury and covid +    # Acute on chronic liver failure: In the setting of HIV/AIDS, HBV reactivation, Klebsiella SBP, and COVID  # Acute liver injury - patient with predominantly hepatocellular liver injury, initially with AST >1800, Alt 600. Also w/ evidence of hepatic synthetic function, with elevated INR, decreased albumin. 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 Her outpatient viral load >400,000,000 corroborates this. Hepatitis delta coinfection has beenruled out as outpatient.   Patient was on biktarvy but switched to Truvada for TDF (as opposed to TAF), and adding double coverage with entecavir. Also on NAC. Currently with minimal concern for acute liver failure as she is mentating well.  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 Hep B resistance testing negative.  # Klebsiella SBP  # Abdominal pain: Resolved. Likely due to hepatitis and SBP  # HIV/AIDS - CD4 114, previous histoplasmosis, CMV, has been off medications for 1 year until 11/2020. Repeat T cell subsets and viral load pending.  # COVID +     Recommendations:   - Continue furosemide at 80 mg IV BID  - Start spironolactone 100 mg PO daily  - Would stop albumin  - Can stop NAC  - Daily weights, strict ins and outs  - F/U CD4, HIV viral load  - F/U repeat COVID PCR and F/U COVID antibodies  - Antibiotics per ID  - Continue to trend CMP, INR, Mg, Phos, fibrinogen, vbg (lactate) daily   - Transfuse for goal Hgb 7.0 to 8.0, platelets > 50  - Continue Truvada for HBV  - PPI IV BID  - Patient may be OLT candidate via [facility] at participating center; accepted for transfer to [facility]  - Rest of care per primary team    Alvarado Marin MD  Gastroenterology and Hepatology Fellow  Please contact via [employer] Teams    Please call Hepatology ([phone]) if there are any additional questions or concerns.    Please call answering service for on-call GI fellow ([phone]) after 5pm and before 8am, and on weekends. 51 yo F w/ PMHx HIV/AIDS, HBV, previous histoplasmosis, CMV, renal vein thrombus s/p coumadin (2010) admitted with abd pain x 4 days, found to have acute liver injury and covid +    # Acute on chronic liver failure: In the setting of HIV/AIDS, HBV reactivation, Klebsiella SBP, and COVID  # Acute liver injury - patient with predominantly hepatocellular liver injury, initially with AST >1800, Alt 600. Also w/ evidence of hepatic synthetic function, with elevated INR, decreased albumin. 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 Her outpatient viral load >400,000,000 corroborates this. Hepatitis delta coinfection has beenruled out as outpatient.   Patient was on biktarvy but switched to Truvada for TDF (as opposed to TAF), and adding double coverage with entecavir. Also on NAC. Currently with minimal concern for acute liver failure as she is mentating well.  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 Hep B resistance testing negative.  # Klebsiella SBP  # Abdominal pain: Resolved. Likely due to hepatitis and SBP  # HIV/AIDS - CD4 114, previous histoplasmosis, CMV, has been off medications for 1 year until 11/2020. Repeat T cell subsets and viral load pending.  # COVID +     Recommendations:   - Start secondary SBP prophylaxis  - Continue furosemide at 80 mg IV BID  - Start spironolactone 100 mg PO daily  - Would stop albumin  - Can stop NAC  - Daily weights, strict ins and outs  - F/U CD4, HIV viral load  - F/U repeat COVID PCR and F/U COVID antibodies  - Antibiotics per ID  - Continue to trend CMP, INR, Mg, Phos, fibrinogen, vbg (lactate) daily   - Transfuse for goal Hgb 7.0 to 8.0, platelets > 50  - Continue Truvada for HBV  - PPI IV BID  - Patient may be OLT candidate via [facility] at participating center; accepted for transfer to [facility]  - Rest of care per primary team    Alvarado Marin MD  Gastroenterology and Hepatology Fellow  Please contact via [employer] Teams    Please call Hepatology ([phone]) if there are any additional questions or concerns.    Please call answering service for on-call GI fellow ([phone]) after 5pm and before 8am, and on weekends. 51 yo F w/ PMHx HIV/AIDS, HBV, previous histoplasmosis, CMV, renal vein thrombus s/p coumadin (2010) admitted with abd pain x 4 days, found to have acute liver injury and covid +    # Acute on chronic liver failure: In the setting of HIV/AIDS, HBV reactivation, Klebsiella SBP, and COVID  # Acute liver injury - patient with predominantly hepatocellular liver injury, initially with AST >1800, Alt 600. Also w/ evidence of hepatic synthetic function, with elevated INR, decreased albumin. 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 Her outpatient viral load >400,000,000 corroborates this. Hepatitis delta coinfection has beenruled out as outpatient.   Patient was on biktarvy but switched to Truvada for TDF (as opposed to TAF), and adding double coverage with entecavir. Also on NAC. Currently with minimal concern for acute liver failure as she is mentating well.  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 Hep B resistance testing negative.  # Klebsiella SBP  # Abdominal pain: Resolved. Likely due to hepatitis and SBP  # HIV/AIDS - CD4 114, previous histoplasmosis, CMV, has been off medications for 1 year until 11/2020. Repeat T cell subsets and viral load pending.  # COVID +     Recommendations:   - Start secondary SBP prophylaxis  - Continue furosemide at 80 mg IV BID  - Start spironolactone 100 mg PO daily  - Would stop albumin, receiving PRBC today for acute on chronic anemia  - Can stop NAC  - Daily weights, strict ins and outs  - F/U CD4, HIV viral load  - F/U repeat COVID PCR and F/U COVID antibodies  - Antibiotics per ID  - Continue to trend CMP, INR, Mg, Phos, fibrinogen, lactate, ammonia level  - Transfuse for goal Hgb 7.0 to 8.0, avoid platelet transfusion unless actively bleeding  - Continue Truvada for HBV  - PPI IV BID  - Patient may be OLT candidate via [facility] at participating center; accepted for transfer to [facility]  - Rest of care per primary team    Alvarado Marin MD  Gastroenterology and Hepatology Fellow  Please contact via [employer] Teams    Please call Hepatology ([phone]) if there are any additional questions or concerns.    Please call answering service for on-call GI fellow ([phone]) after 5pm and before 8am, and on weekends.

## 2021-01-12 NOTE — PROGRESS NOTE ADULT - PROVIDER SPECIALTY LIST ADULT
Heme/Onc
Hepatology
Infectious Disease
Infectious Disease
Internal Medicine
Hepatology
Hepatology
Infectious Disease
Internal Medicine
Intervent Radiology
Intervent Radiology
Heme/Onc
Hepatology
Infectious Disease
Internal Medicine
Hepatology
Infectious Disease
Internal Medicine
Hepatology

## 2021-01-12 NOTE — PROGRESS NOTE ADULT - ASSESSMENT
50 year old female with past medical history of HIV, CMV, HBV, histoplasmosis, renal vein thrombosis s/p coumadin for 6 months, and strongyloidiasis who presents with abdominal pain found to be COVID-19 PCR positive with acute liver injury 2/2 Hep B reactivation with +SBP, complicated with coagulapathy     #Acute liver injury 2/2 HepB reactivation  Predominantly hepatocellular liver injury, with AST >1800, Alt 600. Also w/ evidence of hepatic synthetic function, with elevated INR, decreased albumin. Etiology likely from HBV, which was previously being treated w/ Symtuza (tenofovir 10mg qd) but patient had been off medication for almost 1 year (until 11/2020) due to insurance lapse. Her outpatient viral load >400,000,000 with hepatitis delta coinfection ruled out. Currently patient is not in acute liver failure, she is mentating well, but is having worsening INR which is concerning.  - Hep B viral load continues to downtrend (21 million 1/6, down from >400 million outpt)  - c/w truvada. Entecavir stopped  - c/w NAC infusion for acute liver failure (100 mg/kg over 16 hours)  - c/w albumin 100cc 25% q8h with lasix 40 mg IBV BID for LE edema/third spacing 2/2 low albumin  - s/p vitamin K 10mg (12/26 - 12/28, 1/1-1/2)  - dilaudid .5 mg IV prn for pain, dose carefully given HOTN. However, pt has not responded to motrin (hold i/s/o GIB), tylenol (wary i/s/o hepatic dysfunction), ketorolac (hold i/s/o GIB), Tramadol (not very effective per pt)  - ascitic fluid: SAAG 2, ascitic fluid protein .8 likely portal HTN however granulocyte count from ascitic fluid >250- consistent with SBP. Grew Klebsiella oxytoca/Raoutella ornithinolytica. Sensitive to many Abx. Initially on Zosyn (12/30)  then switched to Ceftriaxone and then broadened to Cefepime (d/c 1/10)     #Coagulapathy  - worsening coags with initial concern for DIC (thrombocytopenia, elevated coags, d-dimer, low fibrogen). Ep of melena 1/1 s/p cryo, vitamin K, plasma  - heme recs: likely from liver failure. Unlikely hemolysis despite low haptoglobin (liver failure) given rare schistocytes, factor 8 elevated. Initial mixing study negative. Follow up repeat mixing study   - hepatology recs: worsening PTT unlikely from liver failure  - Goal fibrinogen >150, give cyro PRN    #Sepsis 2/2 SBP   Pt noted to be tachy, HOTN, febrile 12/30.   - blood cx 12/31- ngtd  - Ascites fluid 12/31 grew Klebsiella oxytoca/Raoutella ornithinolytica.   - repeat para 1/5 shows SBP but markedly reduced granulocytes  - s/p vanc (12/30-1/1)  - s/p zosyn (12/30-1/3)   - s/p ceftriaxone (1/3-1/6)   - c/w cefepime (1/6-1/10) d\c'd as per ID   - c/w midodrine 30 mg TID     #Anemia:   - Hgb acutely dropped 1/1. FOBT+. c/f GIB s/p cryo 1u, pRBC 1u.   - per heme, coagulopathy 2/2 liver disease  - type and cross x2  - transfuse Hgb <7    #AIDS  needs ARV medications to help with her immunosuppression but need to change ARV meds to ones metabolized by the kidney  continue the TAF for help controlling the Hep B infection  - In light of significant transaminitis, discontinued the Symtuza and changed meds to Truvada/Tivicay  - CD4 116 consistent with AIDS  - RNA viral load in process - slightly detectable but cannot account for her acute decompensation in liver disease  - c/w atovaquone for PCP ppx per ID- no Bactrim given hepatic dysfunction    #COVID+  - not eligible for Remdesivir secondary to her LFT abnormalities  - could consider convalescent plasma if she decompensates  - D- dimer rising, fibrinogen labile, ferritin elevated, stable  - Quant TB neg  - blood cx 12/22- no growth, 12/31 no growth final  - urine histo ag. neg  - can discontinue after 21 days from first positive test = 1/12/21 (would need to clarify with infection control if needs repeat COVID swab first)     #CMV viremia   - hold off on Valganciclovir   - CMV viral load <96    #Prophylactic measures  - DVT ppx: hold i/s/o hgb drop  - GERD: pantoprazole   - Diet: soft diet  50 year old female with past medical history of HIV, CMV, HBV, histoplasmosis, renal vein thrombosis s/p coumadin for 6 months, and strongyloidiasis who presents with abdominal pain found to be COVID-19 PCR positive with acute liver injury 2/2 Hep B reactivation with +SBP, complicated with coagulapathy     #Acute liver injury 2/2 HepB reactivation  Predominantly hepatocellular liver injury, with AST >1800, Alt 600. Also w/ evidence of hepatic synthetic function, with elevated INR, decreased albumin. Etiology likely from HBV, which was previously being treated w/ Symtuza (tenofovir 10mg qd) but patient had been off medication for almost 1 year (until 11/2020) due to insurance lapse. Her outpatient viral load >400,000,000 with hepatitis delta coinfection ruled out. Currently patient is not in acute liver failure, she is mentating well, but is having worsening INR which is concerning.  - Hep B viral load continues to downtrend (21 million 1/6, down from >400 million outpt)  - c/w truvada. Entecavir stopped  - S/p NAC infusion for acute liver failure (100 mg/kg over 16 hours)  - S/p albumin 100cc 25% q8h with lasix 40 mg IBV BID for LE edema/third spacing 2/2 low albumin  - s/p vitamin K 10mg (12/26 - 12/28, 1/1-1/2)  - dilaudid .5 mg IV prn for pain, dose carefully given HOTN. However, pt has not responded to motrin (hold i/s/o GIB), tylenol (wary i/s/o hepatic dysfunction), ketorolac (hold i/s/o GIB), Tramadol (not very effective per pt)  - ascitic fluid: SAAG 2, ascitic fluid protein .8 likely portal HTN however granulocyte count from ascitic fluid >250- consistent with SBP. Grew Klebsiella oxytoca/Raoutella ornithinolytica. Sensitive to many Abx. Initially on Zosyn (12/30)  then switched to Ceftriaxone and then broadened to Cefepime (d/c 1/10)   - Now on aldactone 100 mg qd starting 1/12  - Pending transfer to [place]    #Coagulapathy  - worsening coags with initial concern for DIC (thrombocytopenia, elevated coags, d-dimer, low fibrogen). Ep of melena 1/1 s/p cryo, vitamin K, plasma  - heme recs: likely from liver failure. Unlikely hemolysis despite low haptoglobin (liver failure) given rare schistocytes, factor 8 elevated. Initial mixing study negative. Follow up repeat mixing study   - hepatology recs: worsening PTT unlikely from liver failure  - Goal fibrinogen >150, give cyro PRN    #Sepsis 2/2 SBP   Pt noted to be tachy, HOTN, febrile 12/30.   - blood cx 12/31- ngtd  - Ascites fluid 12/31 grew Klebsiella oxytoca/Raoutella ornithinolytica.   - repeat para 1/5 shows SBP but markedly reduced granulocytes  - s/p vanc (12/30-1/1)  - s/p zosyn (12/30-1/3)   - s/p ceftriaxone (1/3-1/6)   - S/p cefepime (1/6-1/10) d\c'd as per ID   - c/w midodrine 30 mg TID     #Anemia:   - Hgb acutely dropped 1/1. FOBT+. c/f GIB s/p cryo 1u, pRBC 1u.   - per heme, coagulopathy 2/2 liver disease  - type and cross x2  - transfuse Hgb <7    #AIDS  needs ARV medications to help with her immunosuppression but need to change ARV meds to ones metabolized by the kidney  continue the TAF for help controlling the Hep B infection  - In light of significant transaminitis, discontinued the Symtuza and changed meds to Truvada/Tivicay  - CD4 116 consistent with AIDS  - RNA viral load in process - slightly detectable but cannot account for her acute decompensation in liver disease  - c/w atovaquone for PCP ppx per ID- no Bactrim given hepatic dysfunction    #COVID+  - not eligible for Remdesivir secondary to her LFT abnormalities  - could consider convalescent plasma if she decompensates  - D- dimer rising, fibrinogen labile, ferritin elevated, stable  - Quant TB neg  - blood cx 12/22- no growth, 12/31 no growth final  - urine histo ag. neg  - can discontinue after 21 days from first positive test = 1/12/21 (would need to clarify with infection control if needs repeat COVID swab first)   - COVID PCR on 1/11 negative and patient now transferred     #CMV viremia   - hold off on Valganciclovir   - CMV viral load <96    #Prophylactic measures  - DVT ppx: hold i/s/o hgb drop  - GERD: pantoprazole   - Diet: soft diet   - Dispo: [place], awaiting bed

## 2021-01-12 NOTE — PROGRESS NOTE ADULT - SUBJECTIVE AND OBJECTIVE BOX
NOTE INCOMPLETE/ IN PROGRESS    PROGRESS NOTE:   Authored by Dr. Mariluz Tim MD  Pager [facility]: [phone]; [facility]: [id number]     Patient is a 50y old  Female who presents with a chief complaint of abdominal pain (11 Jan 2021 23:01)      SUBJECTIVE / OVERNIGHT EVENTS:    ADDITIONAL REVIEW OF SYSTEMS:    MEDICATIONS  (STANDING):  acetylcysteine IVPB 7 Gram(s) IV Intermittent <User Schedule>  albumin human 25% IVPB 100 milliLiter(s) IV Intermittent every 6 hours  atovaquone Suspension 1500 milliGRAM(s) Oral daily  chlorhexidine 2% Cloths 1 Application(s) Topical daily  dolutegravir 50 milliGRAM(s) Oral daily  emtricitabine 200 mG/tenofovir 300 mG (TRUVADA) 1 Tablet(s) Oral daily  furosemide   Injectable 80 milliGRAM(s) IV Push every 12 hours  midodrine. 30 milliGRAM(s) Oral three times a day  multivitamin 1 Tablet(s) Oral daily  pantoprazole  Injectable 40 milliGRAM(s) IV Push every 12 hours  simethicone 80 milliGRAM(s) Chew four times a day  thiamine 100 milliGRAM(s) Oral daily    MEDICATIONS  (PRN):  aluminum hydroxide/magnesium hydroxide/simethicone Suspension 30 milliLiter(s) Oral every 6 hours PRN Dyspepsia  ondansetron Injectable 4 milliGRAM(s) IV Push every 8 hours PRN Nausea and/or Vomiting      CAPILLARY BLOOD GLUCOSE        I&O's Summary    11 Jan 2021 07:01  -  12 Jan 2021 07:00  --------------------------------------------------------  IN: 1283.1 mL / OUT: 0 mL / NET: 1283.1 mL        PHYSICAL EXAM:  Vital Signs Last 24 Hrs  T(C): 37.4 (12 Jan 2021 05:40), Max: 37.4 (12 Jan 2021 05:40)  T(F): 99.4 (12 Jan 2021 05:40), Max: 99.4 (12 Jan 2021 05:40)  HR: 128 (12 Jan 2021 05:40) (115 - 132)  BP: 102/64 (12 Jan 2021 05:40) (101/61 - 112/74)  BP(mean): --  RR: 17 (12 Jan 2021 05:40) (16 - 19)  SpO2: 98% (12 Jan 2021 05:40) (98% - 100%)    GENERAL: No acute distress  HEAD: Atraumatic, Normocephalic  EYES: +Scleral icterus. EOMI, PERRLA  NECK: Supple, no lymphadenopathy, no JVD  CHEST/LUNG: CTAB; No wheezes, rales, or rhonchi  HEART: +Tachycardic. No murmurs, rubs, or gallops  ABDOMEN: +Distended. Soft, non-tender; normal bowel sounds, no organomegaly  EXTREMITIES: 2+ peripheral pulses b/l, 3+ pitting edema   NEUROLOGY: A&O x 3, no focal deficits  SKIN: +Jaundiced. No rashes or lesions.     LABS:                        7.5    10.59 )-----------( 176      ( 11 Jan 2021 18:30 )             21.7     01-11    139  |  100  |  14  ----------------------------<  106<H>  3.1<L>   |  28  |  0.58    Ca    8.6      11 Jan 2021 18:30  Phos  1.6     01-11  Mg     1.6     01-11    TPro  4.7<L>  /  Alb  3.7  /  TBili  13.9<H>  /  DBili  x   /  AST  282<H>  /  ALT  75<H>  /  AlkPhos  41  01-11    PT/INR - ( 11 Jan 2021 18:29 )   PT: 37.7 sec;   INR: 3.33 ratio         PTT - ( 11 Jan 2021 18:29 )  PTT:80.8 sec            RADIOLOGY & ADDITIONAL TESTS:  Results Reviewed:   Imaging Personally Reviewed:  Electrocardiogram Personally Reviewed:    COORDINATION OF CARE:  Care Discussed with Consultants/Other Providers [Y/N]:  Prior or Outpatient Records Reviewed [Y/N]:   PROGRESS NOTE:   Authored by Dr. Mariluz Tim MD  Pager [facility]: [phone]; [facility]: [id number]     Patient is a 50y old  Female who presents with a chief complaint of abdominal pain (11 Jan 2021 23:01)      SUBJECTIVE / OVERNIGHT EVENTS:  O/N, pt again required cryo and 0.25 mg dilaudid for back pain. Also required 1 unit prbcs and electrolyte repletion. Per hepatology, added spironolactone and d/c NAC, albumin. Repeat COVID swab was negative, w/ pt now transferred to different unit. Continuing w/ process of transferring to [place] for LT.     MEDICATIONS  (STANDING):  acetylcysteine IVPB 7 Gram(s) IV Intermittent <User Schedule>  albumin human 25% IVPB 100 milliLiter(s) IV Intermittent every 6 hours  atovaquone Suspension 1500 milliGRAM(s) Oral daily  chlorhexidine 2% Cloths 1 Application(s) Topical daily  dolutegravir 50 milliGRAM(s) Oral daily  emtricitabine 200 mG/tenofovir 300 mG (TRUVADA) 1 Tablet(s) Oral daily  furosemide   Injectable 80 milliGRAM(s) IV Push every 12 hours  midodrine. 30 milliGRAM(s) Oral three times a day  multivitamin 1 Tablet(s) Oral daily  pantoprazole  Injectable 40 milliGRAM(s) IV Push every 12 hours  simethicone 80 milliGRAM(s) Chew four times a day  thiamine 100 milliGRAM(s) Oral daily    MEDICATIONS  (PRN):  aluminum hydroxide/magnesium hydroxide/simethicone Suspension 30 milliLiter(s) Oral every 6 hours PRN Dyspepsia  ondansetron Injectable 4 milliGRAM(s) IV Push every 8 hours PRN Nausea and/or Vomiting      CAPILLARY BLOOD GLUCOSE        I&O's Summary    11 Jan 2021 07:01  -  12 Jan 2021 07:00  --------------------------------------------------------  IN: 1283.1 mL / OUT: 0 mL / NET: 1283.1 mL        PHYSICAL EXAM:  Vital Signs Last 24 Hrs  T(C): 37.4 (12 Jan 2021 05:40), Max: 37.4 (12 Jan 2021 05:40)  T(F): 99.4 (12 Jan 2021 05:40), Max: 99.4 (12 Jan 2021 05:40)  HR: 128 (12 Jan 2021 05:40) (115 - 132)  BP: 102/64 (12 Jan 2021 05:40) (101/61 - 112/74)  BP(mean): --  RR: 17 (12 Jan 2021 05:40) (16 - 19)  SpO2: 98% (12 Jan 2021 05:40) (98% - 100%)    GENERAL: No acute distress  HEAD: Atraumatic, Normocephalic  EYES: +Scleral icterus. EOMI, PERRLA  NECK: Supple, no lymphadenopathy, no JVD  CHEST/LUNG: CTAB; No wheezes, rales, or rhonchi  HEART: +Tachycardic. No murmurs, rubs, or gallops  ABDOMEN: +Distended. Soft, non-tender; normal bowel sounds, no organomegaly  EXTREMITIES: 2+ peripheral pulses b/l, 3+ pitting edema   NEUROLOGY: A&O x 3, no focal deficits  SKIN: +Jaundiced. No rashes or lesions.     LABS:                        7.5    10.59 )-----------( 176      ( 11 Jan 2021 18:30 )             21.7     01-11    139  |  100  |  14  ----------------------------<  106<H>  3.1<L>   |  28  |  0.58    Ca    8.6      11 Jan 2021 18:30  Phos  1.6     01-11  Mg     1.6     01-11    TPro  4.7<L>  /  Alb  3.7  /  TBili  13.9<H>  /  DBili  x   /  AST  282<H>  /  ALT  75<H>  /  AlkPhos  41  01-11    PT/INR - ( 11 Jan 2021 18:29 )   PT: 37.7 sec;   INR: 3.33 ratio         PTT - ( 11 Jan 2021 18:29 )  PTT:80.8 sec            RADIOLOGY & ADDITIONAL TESTS:  Results Reviewed:   Imaging Personally Reviewed:  Electrocardiogram Personally Reviewed:    COORDINATION OF CARE:  Care Discussed with Consultants/Other Providers [Y/N]:  Prior or Outpatient Records Reviewed [Y/N]:

## 2021-01-12 NOTE — PROVIDER CONTACT NOTE (CRITICAL VALUE NOTIFICATION) - ASSESSMENT
pt K 2.7 no complaints of chest pain or palpitations.
Md made aware, no immediate interventions ordered at this time.
patient stable at this time
pt feeling weak
No s/s of distress or discomfort noted.
Pt Aox4. temp: 97.9; HR: 95; BP: 99/69; Respiration:18; Sp02: 98% room air
Pt a&o x4 with VSS.
pt stable receiving blood products at this time.
pt asymptomatic
GIB
pt a&ox4. pt potassium came back 2.9
Pt A&Ox4. VSS.
pt stable
pt stable
pt asymptomatic

## 2021-01-12 NOTE — PROVIDER CONTACT NOTE (CRITICAL VALUE NOTIFICATION) - RECOMMENDATIONS
Supplemental potassium
prbc
K+ to be given
Make provider aware. Cryoprecipitate dose
blood transfusion
potassium supplementation
pt to be ordered for K+

## 2021-01-12 NOTE — PROGRESS NOTE ADULT - SUBJECTIVE AND OBJECTIVE BOX
Chief Complaint: Abdominal pain  Reason for consult: Abnormal liver enzymes, Hep B reactivation    Interval Events:   - Patient continues to well, although has continued lower extremity swelling    MEDICATIONS  (STANDING):  atovaquone Suspension 1500 milliGRAM(s) Oral daily  chlorhexidine 2% Cloths 1 Application(s) Topical daily  dolutegravir 50 milliGRAM(s) Oral daily  emtricitabine 200 mG/tenofovir 300 mG (TRUVADA) 1 Tablet(s) Oral daily  furosemide   Injectable 80 milliGRAM(s) IV Push every 12 hours  magnesium sulfate  IVPB 2 Gram(s) IV Intermittent once  midodrine. 30 milliGRAM(s) Oral three times a day  multivitamin 1 Tablet(s) Oral daily  pantoprazole  Injectable 40 milliGRAM(s) IV Push every 12 hours  potassium chloride    Tablet ER 40 milliEquivalent(s) Oral every 4 hours  potassium phosphate / sodium phosphate Powder (PHOS-NaK) 1 Packet(s) Oral once  potassium phosphate IVPB 30 milliMole(s) IV Intermittent once  simethicone 80 milliGRAM(s) Chew four times a day  thiamine 100 milliGRAM(s) Oral daily    MEDICATIONS  (PRN):  aluminum hydroxide/magnesium hydroxide/simethicone Suspension 30 milliLiter(s) Oral every 6 hours PRN Dyspepsia  ondansetron Injectable 4 milliGRAM(s) IV Push every 8 hours PRN Nausea and/or Vomiting    PMHX/PSHX:  Renal Vein Thrombosis    CMV Infection    Hepatitis B    Strongyloidosis    Histoplasmosis    HIV (Human Immunodeficiency Virus Infection)    Gallstones    S/P  Section    S/P Bilateral Tubal Ligation    S/P PEG (Percutaneous Endoscopic Gastrostomy) Placement and Removal    History of Cholecystectomy    ROS:     General:  No weight loss, fevers, chills, night sweats, fatigue   Eyes:  No vision changes  ENT:  No sore throat, pain, runny nose  CV:  No chest pain, palpitations, dizziness   Resp:  No SOB, cough, wheezing  GI:  No pain, No nausea, no vomiting, no bloody stools, no black tarry stools  :  No burning with urination, hematuria  Muscle:  No pain, weakness  Neuro:  No weakness/tingling, memory problems  Psych:  No fatigue, insomnia, mood problems, depression  Heme:  No easy bruisability  Skin:  No rash, edema    PHYSICAL EXAM:     Vital Signs:  Vital Signs Last 24 Hrs  T(C): 36.7 (2021 12:06), Max: 37.4 (2021 05:40)  T(F): 98.1 (2021 12:06), Max: 99.4 (2021 05:40)  HR: 117 (2021 12:06) (115 - 132)  BP: 99/63 (2021 12:06) (99/63 - 112/74)  BP(mean): --  RR: 17 (2021 12:06) (16 - 19)  SpO2: 99% (2021 12:06) (98% - 100%)    GENERAL: Ill-appearing, no acute distress  HEENT:  NC/AT,  conjunctivae clear, scleral icterus  CHEST:  Full & symmetric excursion, no increased effort w/ respirations  HEART:  Regular rhythm & rate  ABDOMEN:  Soft, mild diffuse tenderness, distended  EXTREMITIES:  3+edema to the knees bilaterally  SKIN:  No rash/erythema, + jaundice  NEURO:  AAO x 3, no asterixis    LABS:                        6.5    10.40 )-----------( 166      ( 2021 07:11 )             19.0     01-12    140  |  100  |  15  ----------------------------<  105<H>  2.9<LL>   |  30  |  0.60    Ca    8.1<L>      2021 07:11  Phos  1.2     -12  Mg     1.5     -12    TPro  4.6<L>  /  Alb  3.1<L>  /  TBili  12.6<H>  /  DBili  x   /  AST  275<H>  /  ALT  74<H>  /  AlkPhos  32<L>  -12    LIVER FUNCTIONS - ( 2021 07:11 )  Alb: 3.1 g/dL / Pro: 4.6 g/dL / ALK PHOS: 32 U/L / ALT: 74 U/L / AST: 275 U/L / GGT: x           PT/INR - ( 2021 07:11 )   PT: 42.3 sec;   INR: 3.75 ratio         PTT - ( 2021 07:11 )  PTT:95.8 sec    Imaging:    Reviewed

## 2021-01-12 NOTE — PROGRESS NOTE ADULT - REASON FOR ADMISSION
Abdominal pain

## 2021-01-12 NOTE — PROVIDER CONTACT NOTE (CRITICAL VALUE NOTIFICATION) - NS PROVIDER READ BACK TO LAB
yes
Fibrinogen 97/yes
yes

## 2021-01-12 NOTE — PROVIDER CONTACT NOTE (CRITICAL VALUE NOTIFICATION) - BACKGROUND
Pt admitted for abdominal pain, covid positive 12/22.
pt admitted with high liver enzymes
COVID
hiv, hep b covid
pt admitted with covid and high liver enzymes
pt has hx of Hiv and hep "b"
pt with HIV, ascites and Covid
HIV, Hep B, and COVID
Pt admitted with COVID-19, PMH of HIV/AIDS, Hepatitis B
patient admitted with covid as well as increased abdominal pain 2/2 high liver transaminase level
Pt HIV+ and Hep B+ experiencing exacerbations and admitted with abdominal pain and irregular electrolytes and coagulation studies.
pt with history of HIV, Hep b and COVID
hiv, elevated LFT ascitits CMV
Pt admitted with abdominal pain and vomiting, covid positive. Paracentesis done on 12/31 and 1/5. Electrolyte imbalances since admission. History of HIV, Hep B.
pt admitted with covid and high liver levels

## 2021-01-12 NOTE — PROGRESS NOTE ADULT - SUBJECTIVE AND OBJECTIVE BOX
INFECTIOUS DISEASES FOLLOW UP-- Hollie Mcadams  [phone]    This is a follow up note for this  50yFemale with  High liver transaminase level and deteriorating synthetic function  drop in hemoglobin- receiving PRBC transfusion        ROS:  CONSTITUTIONAL:  No fever, good appetite  CARDIOVASCULAR:  No chest pain or palpitations  RESPIRATORY:  No dyspnea  GASTROINTESTINAL:  No nausea, vomiting, diarrhea, or abdominal pain  GENITOURINARY:  No dysuria  NEUROLOGIC:  No headache,     Allergies    No Known Allergies    Intolerances        ANTIBIOTICS/RELEVANT:  antimicrobials  dolutegravir 50 milliGRAM(s) Oral daily  emtricitabine 200 mG/tenofovir 300 mG (TRUVADA) 1 Tablet(s) Oral daily  trimethoprim   80 mG/sulfamethoxazole 400 mG 1 Tablet(s) Oral daily    immunologic:    OTHER:  aluminum hydroxide/magnesium hydroxide/simethicone Suspension 30 milliLiter(s) Oral every 6 hours PRN  chlorhexidine 2% Cloths 1 Application(s) Topical daily  furosemide   Injectable 80 milliGRAM(s) IV Push every 12 hours  midodrine. 30 milliGRAM(s) Oral three times a day  multivitamin 1 Tablet(s) Oral daily  ondansetron Injectable 4 milliGRAM(s) IV Push every 8 hours PRN  pantoprazole  Injectable 40 milliGRAM(s) IV Push every 12 hours  potassium phosphate / sodium phosphate Powder (PHOS-NaK) 1 Packet(s) Oral once  simethicone 80 milliGRAM(s) Chew four times a day  spironolactone 100 milliGRAM(s) Oral daily  thiamine 100 milliGRAM(s) Oral daily      Objective:  Vital Signs Last 24 Hrs  T(C): 37.7 (12 Jan 2021 21:23), Max: 37.7 (12 Jan 2021 21:23)  T(F): 99.9 (12 Jan 2021 21:23), Max: 99.9 (12 Jan 2021 21:23)  HR: 118 (12 Jan 2021 21:23) (116 - 132)  BP: 92/60 (12 Jan 2021 21:23) (92/55 - 112/74)  BP(mean): --  RR: 18 (12 Jan 2021 21:23) (16 - 20)  SpO2: 98% (12 Jan 2021 21:23) (98% - 100%)    PHYSICAL EXAM:  Constitutional:no acute distress  Eyes:PIPO, EOMI  Ear/Nose/Throat: no oral lesions, 	  Respiratory: clear BL  Cardiovascular: S1S2  Gastrointestinal:soft, (+) BS, no tenderness  Extremities:no e/e/c 3+ edema bilateral to thighs  No Lymphadenopathy  IV sites not inflammed.    LABS:                        8.8    10.77 )-----------( 194      ( 12 Jan 2021 19:06 )             25.7     01-12    140  |  103  |  16  ----------------------------<  97  4.0   |  28  |  0.68    Ca    8.5      12 Jan 2021 19:06  Phos  2.2     01-12  Mg     1.7     01-12    TPro  5.2<L>  /  Alb  3.6  /  TBili  14.8<H>  /  DBili  x   /  AST  296<H>  /  ALT  79<H>  /  AlkPhos  31<L>  01-12    PT/INR - ( 12 Jan 2021 19:06 )   PT: 36.2 sec;   INR: 3.19 ratio         PTT - ( 12 Jan 2021 19:06 )  PTT:84.8 sec      MICROBIOLOGY:            RECENT CULTURES:  01-05 @ 22:33  .Body Fluid Peritoneal  --  --  --    Culture is being performed.  --  01-05 @ 22:32  .Body Fluid Peritoneal Fluid  --  --  --    Testing in progress  --  01-05 @ 22:26  .Body Fluid Peritoneal Fluid  --  --  --    No growth at 5 days  --      RADIOLOGY & ADDITIONAL STUDIES:    < from: CT Abdomen and Pelvis w/ IV Cont (01.08.21 @ 14:50) >    IMPRESSION:  Bilateral small pleural effusions.    Moderate to large volume ascites, increased compared to prior. No peritoneal nodularity or enhancement. No abscess.    Anasarca.    < end of copied text >

## 2021-01-13 VITALS
DIASTOLIC BLOOD PRESSURE: 78 MMHG | RESPIRATION RATE: 18 BRPM | HEART RATE: 108 BPM | SYSTOLIC BLOOD PRESSURE: 108 MMHG | TEMPERATURE: 99 F | OXYGEN SATURATION: 96 %

## 2021-01-13 LAB
FERRITIN SERPL-MCNC: 1284 NG/ML — HIGH (ref 15–150)
HAPTOGLOB SERPL-MCNC: <20 MG/DL — LOW (ref 34–200)
SARS-COV-2 IGG SERPL IA-ACNC: 0.9 RATIO — HIGH
SARS-COV-2 IGG SERPL QL IA: ABNORMAL
SARS-COV-2 IGG SERPL QL IA: NEGATIVE — SIGNIFICANT CHANGE UP
SARS-COV-2 IGM SERPL IA-ACNC: 0.22 RATIO — SIGNIFICANT CHANGE UP

## 2021-01-13 RX ORDER — SPIRONOLACTONE 25 MG/1
4 TABLET, FILM COATED ORAL
Qty: 0 | Refills: 0 | DISCHARGE
Start: 2021-01-13

## 2021-01-13 RX ADMIN — ONDANSETRON 4 MILLIGRAM(S): 8 TABLET, FILM COATED ORAL at 03:40

## 2021-01-13 RX ADMIN — Medication 100 GRAM(S): at 01:12

## 2021-01-13 RX ADMIN — SIMETHICONE 80 MILLIGRAM(S): 80 TABLET, CHEWABLE ORAL at 00:45

## 2021-01-13 RX ADMIN — POTASSIUM PHOSPHATE, MONOBASIC POTASSIUM PHOSPHATE, DIBASIC 62.5 MILLIMOLE(S): 236; 224 INJECTION, SOLUTION INTRAVENOUS at 00:44

## 2021-01-15 ENCOUNTER — NON-APPOINTMENT (OUTPATIENT)
Age: 51
End: 2021-01-15

## 2021-01-20 PROCEDURE — 82330 ASSAY OF CALCIUM: CPT

## 2021-01-20 PROCEDURE — 82550 ASSAY OF CK (CPK): CPT

## 2021-01-20 PROCEDURE — 87186 SC STD MICRODIL/AGAR DIL: CPT

## 2021-01-20 PROCEDURE — 87102 FUNGUS ISOLATION CULTURE: CPT

## 2021-01-20 PROCEDURE — 71045 X-RAY EXAM CHEST 1 VIEW: CPT

## 2021-01-20 PROCEDURE — 84156 ASSAY OF PROTEIN URINE: CPT

## 2021-01-20 PROCEDURE — 82728 ASSAY OF FERRITIN: CPT

## 2021-01-20 PROCEDURE — 84560 ASSAY OF URINE/URIC ACID: CPT

## 2021-01-20 PROCEDURE — P9012: CPT

## 2021-01-20 PROCEDURE — 85018 HEMOGLOBIN: CPT

## 2021-01-20 PROCEDURE — 82947 ASSAY GLUCOSE BLOOD QUANT: CPT

## 2021-01-20 PROCEDURE — 87517 HEPATITIS B DNA QUANT: CPT

## 2021-01-20 PROCEDURE — 83540 ASSAY OF IRON: CPT

## 2021-01-20 PROCEDURE — 74177 CT ABD & PELVIS W/CONTRAST: CPT

## 2021-01-20 PROCEDURE — 83935 ASSAY OF URINE OSMOLALITY: CPT

## 2021-01-20 PROCEDURE — 36569 INSJ PICC 5 YR+ W/O IMAGING: CPT

## 2021-01-20 PROCEDURE — 86480 TB TEST CELL IMMUN MEASURE: CPT

## 2021-01-20 PROCEDURE — 87536 HIV-1 QUANT&REVRSE TRNSCRPJ: CPT

## 2021-01-20 PROCEDURE — 86140 C-REACTIVE PROTEIN: CPT

## 2021-01-20 PROCEDURE — 99285 EMERGENCY DEPT VISIT HI MDM: CPT | Mod: 25

## 2021-01-20 PROCEDURE — 86644 CMV ANTIBODY: CPT

## 2021-01-20 PROCEDURE — 86360 T CELL ABSOLUTE COUNT/RATIO: CPT

## 2021-01-20 PROCEDURE — 84443 ASSAY THYROID STIM HORMONE: CPT

## 2021-01-20 PROCEDURE — 86706 HEP B SURFACE ANTIBODY: CPT

## 2021-01-20 PROCEDURE — 83930 ASSAY OF BLOOD OSMOLALITY: CPT

## 2021-01-20 PROCEDURE — 82803 BLOOD GASES ANY COMBINATION: CPT

## 2021-01-20 PROCEDURE — 86357 NK CELLS TOTAL COUNT: CPT

## 2021-01-20 PROCEDURE — 85670 THROMBIN TIME PLASMA: CPT

## 2021-01-20 PROCEDURE — 83605 ASSAY OF LACTIC ACID: CPT

## 2021-01-20 PROCEDURE — P9040: CPT

## 2021-01-20 PROCEDURE — 83615 LACTATE (LD) (LDH) ENZYME: CPT

## 2021-01-20 PROCEDURE — 85240 CLOT FACTOR VIII AHG 1 STAGE: CPT

## 2021-01-20 PROCEDURE — 49083 ABD PARACENTESIS W/IMAGING: CPT

## 2021-01-20 PROCEDURE — C1751: CPT

## 2021-01-20 PROCEDURE — 84133 ASSAY OF URINE POTASSIUM: CPT

## 2021-01-20 PROCEDURE — 80307 DRUG TEST PRSMV CHEM ANLYZR: CPT

## 2021-01-20 PROCEDURE — 87075 CULTR BACTERIA EXCEPT BLOOD: CPT

## 2021-01-20 PROCEDURE — 86705 HEP B CORE ANTIBODY IGM: CPT

## 2021-01-20 PROCEDURE — 84132 ASSAY OF SERUM POTASSIUM: CPT

## 2021-01-20 PROCEDURE — 84100 ASSAY OF PHOSPHORUS: CPT

## 2021-01-20 PROCEDURE — 80074 ACUTE HEPATITIS PANEL: CPT

## 2021-01-20 PROCEDURE — 87205 SMEAR GRAM STAIN: CPT

## 2021-01-20 PROCEDURE — 82962 GLUCOSE BLOOD TEST: CPT

## 2021-01-20 PROCEDURE — 85384 FIBRINOGEN ACTIVITY: CPT

## 2021-01-20 PROCEDURE — 83735 ASSAY OF MAGNESIUM: CPT

## 2021-01-20 PROCEDURE — 86850 RBC ANTIBODY SCREEN: CPT

## 2021-01-20 PROCEDURE — 86790 VIRUS ANTIBODY NOS: CPT

## 2021-01-20 PROCEDURE — 85730 THROMBOPLASTIN TIME PARTIAL: CPT

## 2021-01-20 PROCEDURE — 82248 BILIRUBIN DIRECT: CPT

## 2021-01-20 PROCEDURE — 87912 NFCT AGT GNTYP ALYS HEP B: CPT

## 2021-01-20 PROCEDURE — 84145 PROCALCITONIN (PCT): CPT

## 2021-01-20 PROCEDURE — 85732 THROMBOPLASTIN TIME PARTIAL: CPT

## 2021-01-20 PROCEDURE — 86901 BLOOD TYPING SEROLOGIC RH(D): CPT

## 2021-01-20 PROCEDURE — 86645 CMV ANTIBODY IGM: CPT

## 2021-01-20 PROCEDURE — 82945 GLUCOSE OTHER FLUID: CPT

## 2021-01-20 PROCEDURE — 96374 THER/PROPH/DIAG INJ IV PUSH: CPT | Mod: XU

## 2021-01-20 PROCEDURE — 85610 PROTHROMBIN TIME: CPT

## 2021-01-20 PROCEDURE — 83010 ASSAY OF HAPTOGLOBIN QUANT: CPT

## 2021-01-20 PROCEDURE — 86704 HEP B CORE ANTIBODY TOTAL: CPT

## 2021-01-20 PROCEDURE — P9047: CPT

## 2021-01-20 PROCEDURE — 80053 COMPREHEN METABOLIC PANEL: CPT

## 2021-01-20 PROCEDURE — 82140 ASSAY OF AMMONIA: CPT

## 2021-01-20 PROCEDURE — C1729: CPT

## 2021-01-20 PROCEDURE — 89051 BODY FLUID CELL COUNT: CPT

## 2021-01-20 PROCEDURE — 86900 BLOOD TYPING SEROLOGIC ABO: CPT

## 2021-01-20 PROCEDURE — 84295 ASSAY OF SERUM SODIUM: CPT

## 2021-01-20 PROCEDURE — 86692 HEPATITIS DELTA AGENT ANTBDY: CPT

## 2021-01-20 PROCEDURE — 36430 TRANSFUSION BLD/BLD COMPNT: CPT

## 2021-01-20 PROCEDURE — 88112 CYTOPATH CELL ENHANCE TECH: CPT

## 2021-01-20 PROCEDURE — 86359 T CELLS TOTAL COUNT: CPT

## 2021-01-20 PROCEDURE — 82746 ASSAY OF FOLIC ACID SERUM: CPT

## 2021-01-20 PROCEDURE — 85014 HEMATOCRIT: CPT

## 2021-01-20 PROCEDURE — 93005 ELECTROCARDIOGRAM TRACING: CPT

## 2021-01-20 PROCEDURE — 87015 SPECIMEN INFECT AGNT CONCNTJ: CPT

## 2021-01-20 PROCEDURE — U0003: CPT

## 2021-01-20 PROCEDURE — 87070 CULTURE OTHR SPECIMN AEROBIC: CPT

## 2021-01-20 PROCEDURE — 82042 OTHER SOURCE ALBUMIN QUAN EA: CPT

## 2021-01-20 PROCEDURE — 80048 BASIC METABOLIC PNL TOTAL CA: CPT

## 2021-01-20 PROCEDURE — 87522 HEPATITIS C REVRS TRNSCRPJ: CPT

## 2021-01-20 PROCEDURE — 82435 ASSAY OF BLOOD CHLORIDE: CPT

## 2021-01-20 PROCEDURE — 82436 ASSAY OF URINE CHLORIDE: CPT

## 2021-01-20 PROCEDURE — 88305 TISSUE EXAM BY PATHOLOGIST: CPT

## 2021-01-20 PROCEDURE — 87040 BLOOD CULTURE FOR BACTERIA: CPT

## 2021-01-20 PROCEDURE — 82247 BILIRUBIN TOTAL: CPT

## 2021-01-20 PROCEDURE — 85027 COMPLETE CBC AUTOMATED: CPT

## 2021-01-20 PROCEDURE — 85611 PROTHROMBIN TEST: CPT

## 2021-01-20 PROCEDURE — 83550 IRON BINDING TEST: CPT

## 2021-01-20 PROCEDURE — 84702 CHORIONIC GONADOTROPIN TEST: CPT

## 2021-01-20 PROCEDURE — 87086 URINE CULTURE/COLONY COUNT: CPT

## 2021-01-20 PROCEDURE — 80202 ASSAY OF VANCOMYCIN: CPT

## 2021-01-20 PROCEDURE — 84157 ASSAY OF PROTEIN OTHER: CPT

## 2021-01-20 PROCEDURE — 87116 MYCOBACTERIA CULTURE: CPT

## 2021-01-20 PROCEDURE — P9059: CPT

## 2021-01-20 PROCEDURE — 82607 VITAMIN B-12: CPT

## 2021-01-20 PROCEDURE — 86666 EHRLICHIA ANTIBODY: CPT

## 2021-01-20 PROCEDURE — 82570 ASSAY OF URINE CREATININE: CPT

## 2021-01-20 PROCEDURE — 84300 ASSAY OF URINE SODIUM: CPT

## 2021-01-20 PROCEDURE — 76705 ECHO EXAM OF ABDOMEN: CPT

## 2021-01-20 PROCEDURE — 86922 COMPATIBILITY TEST ANTIGLOB: CPT

## 2021-01-20 PROCEDURE — 86769 SARS-COV-2 COVID-19 ANTIBODY: CPT

## 2021-01-20 PROCEDURE — 87385 HISTOPLASMA CAPSUL AG IA: CPT

## 2021-01-20 PROCEDURE — 85220 BLOOC CLOT FACTOR V TEST: CPT

## 2021-01-20 PROCEDURE — 96375 TX/PRO/DX INJ NEW DRUG ADDON: CPT

## 2021-01-20 PROCEDURE — 86753 PROTOZOA ANTIBODY NOS: CPT

## 2021-01-20 PROCEDURE — 81001 URINALYSIS AUTO W/SCOPE: CPT

## 2021-01-20 PROCEDURE — 85635 REPTILASE TEST: CPT

## 2021-01-20 PROCEDURE — P9011: CPT

## 2021-01-20 PROCEDURE — 82272 OCCULT BLD FECES 1-3 TESTS: CPT

## 2021-01-20 PROCEDURE — U0005: CPT

## 2021-01-20 PROCEDURE — 86618 LYME DISEASE ANTIBODY: CPT

## 2021-01-20 PROCEDURE — 86355 B CELLS TOTAL COUNT: CPT

## 2021-01-20 PROCEDURE — 85250 CLOT FACTOR IX PTC/CHRSTMAS: CPT

## 2021-01-20 PROCEDURE — 83690 ASSAY OF LIPASE: CPT

## 2021-01-20 PROCEDURE — 85379 FIBRIN DEGRADATION QUANT: CPT

## 2021-01-20 PROCEDURE — 80076 HEPATIC FUNCTION PANEL: CPT

## 2021-01-20 PROCEDURE — 85025 COMPLETE CBC W/AUTO DIFF WBC: CPT

## 2021-01-20 PROCEDURE — 85045 AUTOMATED RETICULOCYTE COUNT: CPT

## 2021-01-20 PROCEDURE — 87206 SMEAR FLUORESCENT/ACID STAI: CPT

## 2021-01-20 PROCEDURE — 86720 LEPTOSPIRA ANTIBODY: CPT

## 2021-01-20 PROCEDURE — 86965 POOLING BLOOD PLATELETS: CPT

## 2021-01-30 LAB
CULTURE RESULTS: SIGNIFICANT CHANGE UP
SPECIMEN SOURCE: SIGNIFICANT CHANGE UP

## 2021-02-03 LAB
CULTURE RESULTS: SIGNIFICANT CHANGE UP
SPECIMEN SOURCE: SIGNIFICANT CHANGE UP

## 2021-02-24 LAB
CULTURE RESULTS: SIGNIFICANT CHANGE UP
SPECIMEN SOURCE: SIGNIFICANT CHANGE UP

## 2021-03-02 ENCOUNTER — EMERGENCY (EMERGENCY)
Facility: HOSPITAL | Age: 51
LOS: 1 days | Discharge: ANOTHER HOSPITAL | End: 2021-03-02
Attending: EMERGENCY MEDICINE
Payer: MEDICAID

## 2021-03-02 ENCOUNTER — NON-APPOINTMENT (OUTPATIENT)
Age: 51
End: 2021-03-02

## 2021-03-02 VITALS
DIASTOLIC BLOOD PRESSURE: 80 MMHG | TEMPERATURE: 99 F | OXYGEN SATURATION: 97 % | SYSTOLIC BLOOD PRESSURE: 116 MMHG | WEIGHT: 136.91 LBS | RESPIRATION RATE: 18 BRPM | HEART RATE: 107 BPM | HEIGHT: 65 IN

## 2021-03-02 LAB
4/8 RATIO: 0.2 RATIO — LOW (ref 0.9–3.6)
ABS CD8: 140 /UL — LOW (ref 142–740)
ALBUMIN SERPL ELPH-MCNC: 3 G/DL — LOW (ref 3.3–5)
ALP SERPL-CCNC: 403 U/L — HIGH (ref 40–120)
ALT FLD-CCNC: <5 U/L — LOW (ref 10–45)
ANION GAP SERPL CALC-SCNC: 20 MMOL/L — HIGH (ref 5–17)
APPEARANCE UR: CLEAR — SIGNIFICANT CHANGE UP
APTT BLD: 26.2 SEC — LOW (ref 27.5–35.5)
AST SERPL-CCNC: 13 U/L — SIGNIFICANT CHANGE UP (ref 10–40)
BASOPHILS # BLD AUTO: 0 K/UL — SIGNIFICANT CHANGE UP (ref 0–0.2)
BASOPHILS NFR BLD AUTO: 0 % — SIGNIFICANT CHANGE UP (ref 0–2)
BILIRUB SERPL-MCNC: 1.4 MG/DL — HIGH (ref 0.2–1.2)
BILIRUB UR-MCNC: NEGATIVE — SIGNIFICANT CHANGE UP
BLD GP AB SCN SERPL QL: NEGATIVE — SIGNIFICANT CHANGE UP
BUN SERPL-MCNC: 21 MG/DL — SIGNIFICANT CHANGE UP (ref 7–23)
CALCIUM SERPL-MCNC: 9.3 MG/DL — SIGNIFICANT CHANGE UP (ref 8.4–10.5)
CD3 BLASTS SPEC-ACNC: 169 /UL — LOW (ref 672–1870)
CD3 BLASTS SPEC-ACNC: 57 % — LOW (ref 59–83)
CD4 %: 10 % — LOW (ref 30–62)
CD8 %: 48 % — HIGH (ref 12–36)
CHLORIDE SERPL-SCNC: 90 MMOL/L — LOW (ref 96–108)
CO2 SERPL-SCNC: 20 MMOL/L — LOW (ref 22–31)
COLOR SPEC: YELLOW — SIGNIFICANT CHANGE UP
CREAT SERPL-MCNC: 1.64 MG/DL — HIGH (ref 0.5–1.3)
DIFF PNL FLD: NEGATIVE — SIGNIFICANT CHANGE UP
EOSINOPHIL # BLD AUTO: 0 K/UL — SIGNIFICANT CHANGE UP (ref 0–0.5)
EOSINOPHIL NFR BLD AUTO: 0 % — SIGNIFICANT CHANGE UP (ref 0–6)
GAS PNL BLDV: SIGNIFICANT CHANGE UP
GLUCOSE SERPL-MCNC: 91 MG/DL — SIGNIFICANT CHANGE UP (ref 70–99)
GLUCOSE UR QL: NEGATIVE — SIGNIFICANT CHANGE UP
HCG SERPL-ACNC: <2 MIU/ML — SIGNIFICANT CHANGE UP
HCT VFR BLD CALC: 29.5 % — LOW (ref 34.5–45)
HGB BLD-MCNC: 9.4 G/DL — LOW (ref 11.5–15.5)
INR BLD: 1.48 RATIO — HIGH (ref 0.88–1.16)
KETONES UR-MCNC: ABNORMAL
LEUKOCYTE ESTERASE UR-ACNC: NEGATIVE — SIGNIFICANT CHANGE UP
LYMPHOCYTES # BLD AUTO: 0.2 K/UL — LOW (ref 1–3.3)
LYMPHOCYTES # BLD AUTO: 3.7 % — LOW (ref 13–44)
MAGNESIUM SERPL-MCNC: 1.3 MG/DL — LOW (ref 1.6–2.6)
MCHC RBC-ENTMCNC: 30 PG — SIGNIFICANT CHANGE UP (ref 27–34)
MCHC RBC-ENTMCNC: 31.9 GM/DL — LOW (ref 32–36)
MCV RBC AUTO: 94.2 FL — SIGNIFICANT CHANGE UP (ref 80–100)
MONOCYTES # BLD AUTO: 0.2 K/UL — SIGNIFICANT CHANGE UP (ref 0–0.9)
MONOCYTES NFR BLD AUTO: 3.6 % — SIGNIFICANT CHANGE UP (ref 2–14)
NEUTROPHILS # BLD AUTO: 5.08 K/UL — SIGNIFICANT CHANGE UP (ref 1.8–7.4)
NEUTROPHILS NFR BLD AUTO: 92.7 % — HIGH (ref 43–77)
NITRITE UR-MCNC: NEGATIVE — SIGNIFICANT CHANGE UP
PH UR: 7 — SIGNIFICANT CHANGE UP (ref 5–8)
PHOSPHATE SERPL-MCNC: 3.5 MG/DL — SIGNIFICANT CHANGE UP (ref 2.5–4.5)
PLATELET # BLD AUTO: 419 K/UL — HIGH (ref 150–400)
POTASSIUM SERPL-MCNC: 3.9 MMOL/L — SIGNIFICANT CHANGE UP (ref 3.5–5.3)
POTASSIUM SERPL-SCNC: 3.9 MMOL/L — SIGNIFICANT CHANGE UP (ref 3.5–5.3)
PROT SERPL-MCNC: 7 G/DL — SIGNIFICANT CHANGE UP (ref 6–8.3)
PROT UR-MCNC: ABNORMAL
PROTHROM AB SERPL-ACNC: 17.4 SEC — HIGH (ref 10.6–13.6)
RBC # BLD: 3.13 M/UL — LOW (ref 3.8–5.2)
RBC # FLD: 16.9 % — HIGH (ref 10.3–14.5)
RH IG SCN BLD-IMP: POSITIVE — SIGNIFICANT CHANGE UP
SARS-COV-2 RNA SPEC QL NAA+PROBE: SIGNIFICANT CHANGE UP
SODIUM SERPL-SCNC: 130 MMOL/L — LOW (ref 135–145)
SP GR SPEC: 1.05 — HIGH (ref 1.01–1.02)
T-CELL CD4 SUBSET PNL BLD: 28 /UL — LOW (ref 489–1457)
TACROLIMUS SERPL-MCNC: 16.5 NG/ML — SIGNIFICANT CHANGE UP
UROBILINOGEN FLD QL: NEGATIVE — SIGNIFICANT CHANGE UP
WBC # BLD: 5.48 K/UL — SIGNIFICANT CHANGE UP (ref 3.8–10.5)
WBC # FLD AUTO: 5.48 K/UL — SIGNIFICANT CHANGE UP (ref 3.8–10.5)

## 2021-03-02 PROCEDURE — 74177 CT ABD & PELVIS W/CONTRAST: CPT | Mod: 26,MA

## 2021-03-02 PROCEDURE — 93010 ELECTROCARDIOGRAM REPORT: CPT

## 2021-03-02 PROCEDURE — 99285 EMERGENCY DEPT VISIT HI MDM: CPT

## 2021-03-02 PROCEDURE — 99283 EMERGENCY DEPT VISIT LOW MDM: CPT | Mod: GC

## 2021-03-02 RX ORDER — ACETAMINOPHEN 500 MG
975 TABLET ORAL ONCE
Refills: 0 | Status: DISCONTINUED | OUTPATIENT
Start: 2021-03-02 | End: 2021-03-02

## 2021-03-02 RX ORDER — PIPERACILLIN AND TAZOBACTAM 4; .5 G/20ML; G/20ML
3.38 INJECTION, POWDER, LYOPHILIZED, FOR SOLUTION INTRAVENOUS ONCE
Refills: 0 | Status: COMPLETED | OUTPATIENT
Start: 2021-03-02 | End: 2021-03-02

## 2021-03-02 RX ORDER — METOCLOPRAMIDE HCL 10 MG
10 TABLET ORAL ONCE
Refills: 0 | Status: COMPLETED | OUTPATIENT
Start: 2021-03-02 | End: 2021-03-02

## 2021-03-02 RX ORDER — SODIUM CHLORIDE 9 MG/ML
1000 INJECTION, SOLUTION INTRAVENOUS ONCE
Refills: 0 | Status: COMPLETED | OUTPATIENT
Start: 2021-03-02 | End: 2021-03-02

## 2021-03-02 RX ORDER — ONDANSETRON 8 MG/1
4 TABLET, FILM COATED ORAL ONCE
Refills: 0 | Status: COMPLETED | OUTPATIENT
Start: 2021-03-02 | End: 2021-03-02

## 2021-03-02 RX ORDER — MORPHINE SULFATE 50 MG/1
4 CAPSULE, EXTENDED RELEASE ORAL ONCE
Refills: 0 | Status: DISCONTINUED | OUTPATIENT
Start: 2021-03-02 | End: 2021-03-02

## 2021-03-02 RX ORDER — MORPHINE SULFATE 50 MG/1
2 CAPSULE, EXTENDED RELEASE ORAL ONCE
Refills: 0 | Status: DISCONTINUED | OUTPATIENT
Start: 2021-03-02 | End: 2021-03-02

## 2021-03-02 RX ORDER — MAGNESIUM SULFATE 500 MG/ML
2 VIAL (ML) INJECTION ONCE
Refills: 0 | Status: COMPLETED | OUTPATIENT
Start: 2021-03-02 | End: 2021-03-02

## 2021-03-02 RX ORDER — METRONIDAZOLE 500 MG
500 TABLET ORAL ONCE
Refills: 0 | Status: DISCONTINUED | OUTPATIENT
Start: 2021-03-02 | End: 2021-03-02

## 2021-03-02 RX ADMIN — Medication 50 GRAM(S): at 17:03

## 2021-03-02 RX ADMIN — MORPHINE SULFATE 2 MILLIGRAM(S): 50 CAPSULE, EXTENDED RELEASE ORAL at 17:02

## 2021-03-02 RX ADMIN — SODIUM CHLORIDE 1000 MILLILITER(S): 9 INJECTION, SOLUTION INTRAVENOUS at 18:11

## 2021-03-02 RX ADMIN — ONDANSETRON 4 MILLIGRAM(S): 8 TABLET, FILM COATED ORAL at 17:02

## 2021-03-02 RX ADMIN — ONDANSETRON 4 MILLIGRAM(S): 8 TABLET, FILM COATED ORAL at 14:28

## 2021-03-02 RX ADMIN — MORPHINE SULFATE 2 MILLIGRAM(S): 50 CAPSULE, EXTENDED RELEASE ORAL at 21:49

## 2021-03-02 RX ADMIN — MORPHINE SULFATE 4 MILLIGRAM(S): 50 CAPSULE, EXTENDED RELEASE ORAL at 14:28

## 2021-03-02 RX ADMIN — PIPERACILLIN AND TAZOBACTAM 200 GRAM(S): 4; .5 INJECTION, POWDER, LYOPHILIZED, FOR SOLUTION INTRAVENOUS at 19:47

## 2021-03-02 RX ADMIN — MORPHINE SULFATE 2 MILLIGRAM(S): 50 CAPSULE, EXTENDED RELEASE ORAL at 19:38

## 2021-03-02 RX ADMIN — SODIUM CHLORIDE 1000 MILLILITER(S): 9 INJECTION, SOLUTION INTRAVENOUS at 14:28

## 2021-03-02 RX ADMIN — Medication 10 MILLIGRAM(S): at 19:38

## 2021-03-02 NOTE — CONSULT NOTE ADULT - ATTENDING COMMENTS
Patient seen and evaluated with Dr Lopez  I agree with his history, exam and plans as noted above  Patient with HIV+/AIDS, hepatitis B chronic infection flare, COVID infection in 12/20 ultimately requiring OLTx at [place] in 1/20/21. Discharged from [facility] about a week ago but shortly after discharge developed nausea/vomiting followed by a lack of bowel movements  Seen in ED afebrile, [id number]  lungs- decreased BS right base  Abd- distended with ascites, surgical OLTx wound healing, no drainage, tender on exam    Labs notable for leukocytosis, mildly elevated LFTs, bili, ANTOINETTE    Please send   blood cultures x2 sets  serum CMV PCR  HIV viral load  HCV viral load  Will need paracentesis to r/o SBP or blood in abdomen  check C.diff specimen if able to provide    For possible transfer to [place]  Would start antibiotics pending transfer with  Meropenem 1 gram IV q 12hr  Vancomycin 750mg x 1 dose    Chencho Mcadams MD  [phone]  After 5pm/weekends [phone]

## 2021-03-02 NOTE — CONSULT NOTE ADULT - SUBJECTIVE AND OBJECTIVE BOX
Patient is a 50y old  Female who presents with a chief complaint of abd pain  HPI:  50y Tunisian speaking F with a PMHx significant for HIV/AIDS, HBV, renal vein thrombosis in , now s/p liver transplant at [facility] in 2021 discharged from [facility] 7 days ago presents with 6 days of abdominal pain, emesis and anorexia. She describes the pain as a shooting, constant 10/10 pain in the b/l lower quadrants radiating to the back. She says she has not had a BM or passed gas from below in 6 days, despite feeling the need to. She also describes severe nausea and emesis. The emesis is green and nonbloody. She has not be able to keep any food or medication down in over a day, but was taking it as prescribed before.  She denies fevers, rash, dysuria, SOB and chest pain.      prior hospital charts reviewed [V]  primary team notes reviewed [V]  other consultant notes reviewed [V]    PAST MEDICAL & SURGICAL HISTORY:  Renal Vein Thrombosis  , s/p Coumadin for 6 months    CMV Infection    Hepatitis B    Strongyloidosis    Histoplasmosis    HIV (Human Immunodeficiency Virus Infection)    Gallstones    S/P  Section    S/P Bilateral Tubal Ligation    S/P PEG (Percutaneous Endoscopic Gastrostomy) Placement and Removal    History of Cholecystectomy        SOCIAL HISTORY:    - Denied smoking/vaping/alcohol/recreational drug use      FAMILY HISTORY:      Allergies  No Known Allergies        ANTIMICROBIALS:      ANTIMICROBIALS (past 90 days):  MEDICATIONS  (STANDING):        OTHER MEDS:   MEDICATIONS  (STANDING):      REVIEW OF SYSTEMS  [  ] ROS unobtainable because:    [  ] All other systems negative except as noted below:	    Constitutional:  [ ] fever [ ] chills  [ ] weight loss  [ ] weakness  Skin:  [ ] rash [ ] phlebitis	  Eyes: [ ] icterus [ ] pain  [ ] discharge	  ENMT: [ ] sore throat  [ ] thrush [ ] ulcers [ ] exudates  Respiratory: [ ] dyspnea [ ] hemoptysis [ ] cough [ ] sputum	  Cardiovascular:  [ ] chest pain [ ] palpitations [ ] edema	  Gastrointestinal:  [ ] nausea [ ] vomiting [ ] diarrhea [ ] constipation [ ] pain	  Genitourinary:  [ ] dysuria [ ] frequency [ ] hematuria [ ] discharge [ ] flank pain  [ ] incontinence  Musculoskeletal:  [ ] myalgias [ ] arthralgias [ ] arthritis  [ ] back pain  Neurological:  [ ] headache [ ] seizures  [ ] confusion/altered mental status  Psychiatric:  [ ] anxiety [ ] depression	  Hematology/Lymphatics:  [ ] lymphadenopathy  Endocrine:  [ ] adrenal [ ] thyroid  Allergic/Immunologic:	 [ ] transplant [ ] seasonal    VITALS:  Vital Signs Last 24 Hrs  T(F): 98.8 (21 @ 14:15), Max: 98.9 (21 @ 12:30)    Vital Signs Last 24 Hrs  HR: 119 (21 @ 14:15) (107 - 119)  BP: 102/78 (21 @ 14:15) (102/78 - 116/80)  RR: 20 (21 @ 14:15)  SpO2: 98% (21 @ 14:15) (97% - 98%)  Wt(kg): --    EXAM:  GA: NAD  HEENT: oral cavity no lesion  CV: nl S1/S2, no RMG  Lungs: CTAB  Abd: BS+, soft, nontender, no rebounding pain  Ext: no edema  Skin:  IV: no phlebitis    Labs:                        9.4    5.48  )-----------( 419      ( 02 Mar 2021 14:54 )             29.5     03-02    130<L>  |  90<L>  |  21  ----------------------------<  91  3.9   |  20<L>  |  1.64<H>    Ca    9.3      02 Mar 2021 14:54  Phos  3.5     -  Mg     1.3         TPro  7.0  /  Alb  3.0<L>  /  TBili  1.4<H>  /  DBili  x   /  AST  13  /  ALT  <5<L>  /  AlkPhos  403<H>        WBC Trend:  WBC Count: 5.48 (21 @ 14:54)      Auto Neutrophil #: 5.08 K/uL (21 @ 14:54)  Auto Neutrophil #: 3.13 K/uL (20 @ 06:58)  Auto Neutrophil #: 3.41 K/uL (20 @ 06:25)  Auto Neutrophil #: 3.22 K/uL (20 @ 11:49)  Auto Neutrophil #: 2.53 K/uL (20 @ 10:15)      Creatine Trend:  Creatinine, Serum: 1.64 ()      Liver Biochemical Testing Trend:  Alanine Aminotransferase (ALT/SGPT): <5 <L> ()  Alanine Aminotransferase (ALT/SGPT): 79 <H> ()  Alanine Aminotransferase (ALT/SGPT): 74 <H> ()  Alanine Aminotransferase (ALT/SGPT): 75 <H> ()  Alanine Aminotransferase (ALT/SGPT): 78 <H> ()  Aspartate Aminotransferase (AST/SGOT): 13 (21 @ 14:54)  Aspartate Aminotransferase (AST/SGOT): 296 (21 @ 19:06)  Aspartate Aminotransferase (AST/SGOT): 275 (21 @ 07:11)  Aspartate Aminotransferase (AST/SGOT): 282 (21 @ 18:30)  Aspartate Aminotransferase (AST/SGOT): 290 (21 @ 07:22)  Bilirubin Total, Serum: 1.4 ()  Bilirubin Total, Serum: 14.8 ()  Bilirubin Total, Serum: 12.6 ()  Bilirubin Total, Serum: 13.9 ()  Bilirubin Total, Serum: 14.5 ()      Trend LDH  21 @ 06:43  179  20 @ 06:52  378<H>  20 @ 12:44  320<H>  20 @ 09:09  449<H>  19 @ 10:54  276<H>      MICROBIOLOGY:      Culture - Acid Fast - Body Fluid w/Smear (collected 2021 22:33)  Source: .Body Fluid Peritoneal  Final Report:    No acid fast bacilli isolated after 6 weeks.    Culture - Fungal, Body Fluid (collected 2021 22:32)  Source: .Body Fluid Peritoneal Fluid  Final Report:    No fungus isolated after 4 weeks.    Culture - Body Fluid with Gram Stain (collected 2021 22:26)  Source: .Body Fluid Peritoneal Fluid  Final Report:    No growth at 5 days    Culture - Fungal, Body Fluid (collected 31 Dec 2020 20:02)  Source: .Body Fluid Peritoneal Fluid  Final Report:    No fungus isolated after 4 weeks.    Culture - Body Fluid with Gram Stain (collected 31 Dec 2020 20:02)  Source: .Body Fluid Peritoneal Fluid  Final Report:    Growth in aerobic and anaerobic bottles: Klebsiella oxytoca/Raoutella    ornithinolytica  Organism: Klebsiella oxytoca /Raoutella ornithinolytica  Organism: Klebsiella oxytoca /Raoutella ornithinolytica    Sensitivities:      -  Amikacin: S <=16      -  Amoxicillin/Clavulanic Acid: S <=8/4      -  Ampicillin: R >16 These ampicillin results predict results for amoxicillin      -  Ampicillin/Sulbactam: S 8/4 Enterobacter, Citrobacter, and Serratia may develop resistance during prolonged therapy (3-4 days)      -  Aztreonam: S <=4      -  Cefazolin: I 4 Enterobacter, Citrobacter, and Serratia may develop resistance during prolonged therapy (3-4 days)      -  Cefepime: S <=2      -  Cefoxitin: S <=8      -  Ceftriaxone: S <=1 Enterobacter, Citrobacter, and Serratia may develop resistance during prolonged therapy      -  Ciprofloxacin: S <=0.25      -  Ertapenem: S <=0.5      -  Gentamicin: S <=2      -  Imipenem: S <=1      -  Levofloxacin: S <=0.5      -  Meropenem: S <=1      -  Piperacillin/Tazobactam: S <=8      -  Tobramycin: S <=2      -  Trimethoprim/Sulfamethoxazole: S <=0.5/9.5      Method Type: FLORIDALMA    Culture - Blood (collected 31 Dec 2020 00:22)  Source: .Blood Blood-Peripheral  Final Report:    No Growth Final    Culture - Blood (collected 31 Dec 2020 00:22)  Source: .Blood Blood-Venous  Final Report:    No Growth Final    Culture - Urine (collected 22 Dec 2020 18:26)  Source: .Urine Clean Catch (Midstream)  Final Report:    <10,000 CFU/mL Normal Urogenital Celestina    Culture - Blood (collected 22 Dec 2020 13:03)  Source: .Blood Blood  Final Report:    No Growth Final    Culture - Blood (collected 22 Dec 2020 13:03)  Source: .Blood Blood  Final Report:    No Growth Final      ABS CD4: 189 /uL (01-10-21 @ 06:51)  ABS CD4: 116 /uL (20 @ 11:01)  ABS CD4: 291 /uL (05-15-19 @ 02:11)  ABS CD4: 258 /uL (19 @ 14:32)  ABS CD4: 231 /uL (01-15-19 @ 16:56)    HIV-1 RNA Quantitative, Viral Load: NOT DET. (01-10-21 @ 09:10)  HIV-1 Viral Load Result: NOT DET. (01-10-21 @ 09:10)  HIV-1 RNA Quantitative, Viral Load: 57 (20 @ 14:37)  HIV-1 RNA Quantitative, Viral Load: NOT DET. (19 @ 23:09)    Cytomegalovirus By PCR: Detected:<96 (20 @ 13:30)    Angelina-Hoffman Nuclear Antigen(09 @ 18:14)    Hepatitis B Drug Resistance Genotype (20 @ 04:58)    Hepatitis B Drug Resistance Genotype: F    Precore Mutations: DETECTED    BCP Mutations: NOT OBTAINED "Not obtained" indicates test was performed, however, poor  sequence data was obtained for the BCP and/or the PreCore  regions. Therefore, the presence or absence of mutations could  not be determined. Possible reasons for poor sequence are low  viral load or polymorphisms in the primer binding site.  _______________________________________________________  Antiviral drugs             Resistance    Mutations                              Predicted  _______________________________________________________                                  !   !   Polymerase Inhibitors    !   !  3TC  (lamivudine or Epivir)     ! NO!  LDT  (telbivudine or Tyzeka)    ! NO!  ADV  (adefovir or Hepsera)      ! NO!  ETV  (entecavir or Baraclude)   ! NO!  _______________________________________________________          Precore and BCP Mutations  _______________________________________________________  Precore (TAG)                      [id number]  BCP                                N/A  _____________________________________________________  OTHER MUTATIONS DETECTED:  POLYMERASE:                        Not Detected  PRECORE:                           [id number]  BCP:                               N/A  The method used in this test is PCR and sequencing of the pati  gene and BCP/precore region of HBV.   A resistance profile for tenofovir has not yet been established.  This test was developed and its analytical performance  characteristics have been determined by [facility]. It has not been cleared or approved by the  U.S. Food and Drug Administration.  The FDA has determined that  such clearance or approval is not necessary. This assay has been  validated pursuant to the CLIA regulations and is used for  clinical purposes.   Test(s) performed at:      [facility]      Leif Ivan M.D., Medical Director      [id number] [place], [place] [address]IA #[id number]    Polymerase Mutations: NotDetec    Hepatitis B PCR Quantitative: [id number] IU/mL (21 @ 02:29)      OTHER TESTS:  COVID-19 PCR: NotDetec (21 @ 18:29)  COVID-19 IgA Antibody Interpretation: Equiv (21 @ 18:52)  COVID-19 IgG Antibody Interpretation: Negative (21 @ 18:52)  COVID-19 IgG Antibody Interpretation: Negative (20 @ 09:24)      Blood Gas Venous - Lactate: 1.1 ( @ 14:50)        RADIOLOGY:  imaging below personally reviewed     Patient is a 50y old  Female who presents with a chief complaint of abd pain  HPI:  50y Montenegrin speaking F with a PMHx significant for HIV/AIDS, HBV, renal vein thrombosis in , now s/p liver transplant at [facility] on  discharged from [facility] 7 days ago presents with 6 days of abdominal pain, emesis and anorexia. She describes the pain as a shooting, constant 10/10 pain in the b/l lower quadrants radiating to the back. She says she has not had a BM or passed gas from below in 6 days, despite feeling the need to. She also describes severe nausea and emesis. The emesis is green and nonbloody. She has not be able to keep any food or medication down in over a day, but was taking it as prescribed before.  She denies fevers, rash, dysuria, SOB and chest pain.      She was discharged on the following abx: Biktarvy 1 tab daily, Bactrim SS 1 tab daily, Valgnciclovir 450mg daily.  On the discharge summary there was also Tenofovir (Viread) 25mg daily listed, however not in the actual pill bottles.   Stated had been taking meds, but can't tolerate PO.    Denies fever, chills, SOB, cough, dysuria.  Has lower abd pain, however not incision site pain from liver surgery.    prior hospital charts reviewed [V]  primary team notes reviewed [V]  other consultant notes reviewed [V]    PAST MEDICAL & SURGICAL HISTORY:  Renal Vein Thrombosis  , s/p Coumadin for 6 months    CMV Infection    Hepatitis B    Strongyloidosis    Histoplasmosis    HIV (Human Immunodeficiency Virus Infection)    Gallstones    S/P  Section    S/P Bilateral Tubal Ligation    S/P PEG (Percutaneous Endoscopic Gastrostomy) Placement and Removal    History of Cholecystectomy        SOCIAL HISTORY:    - Denied smoking/vaping/alcohol/recreational drug use  - Born in [facility], came to [facility] [place] 20 years ago, did not travel back  - Lives with family  - 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    FAMILY HISTORY:  Denies HTN, HIV in family    Allergies  No Known Allergies        REVIEW OF SYSTEMS  [  ] ROS unobtainable because:    [V  ] All other systems negative except as noted below:	    Constitutional:  [ ] fever [ ] chills  [ ] weight loss  [V ] weakness  Skin:  [ ] rash [ ] phlebitis	  Eyes: [ ] icterus [ ] pain  [ ] discharge	  ENMT: [ ] sore throat  [ ] thrush [ ] ulcers [ ] exudates  Respiratory: [ ] dyspnea [ ] hemoptysis [ ] cough [ ] sputum	  Cardiovascular:  [ ] chest pain [ ] palpitations [ ] edema	  Gastrointestinal:  [ V] nausea [V ] vomiting [ ] diarrhea [V] constipation [V ] pain	  Genitourinary:  [ ] dysuria [ ] frequency [ ] hematuria [ ] discharge [ ] flank pain  [ ] incontinence  Musculoskeletal:  [ ] myalgias [ ] arthralgias [ ] arthritis  [ ] back pain  Neurological:  [ ] headache [ ] seizures  [ ] confusion/altered mental status  Psychiatric:  [ ] anxiety [ ] depression	  Hematology/Lymphatics:  [ ] lymphadenopathy  Endocrine:  [ ] adrenal [ ] thyroid  Allergic/Immunologic:	 [ ] transplant [ ] seasonal    VITALS:  Vital Signs Last 24 Hrs  T(F): 98.8 (21 @ 14:15), Max: 98.9 (21 @ 12:30)    Vital Signs Last 24 Hrs  HR: 119 (21 @ 14:15) (107 - 119)  BP: 102/78 (21 @ 14:15) (102/78 - 116/80)  RR: 20 (21 @ 14:15)  SpO2: 98% (21 @ 14:15) (97% - 98%)  Wt(kg): --    EXAM:  GA: mildly distress from pain  HEENT: oral cavity no lesion  CV: nl S1/S2, no RMG  Lungs: CTAB  Abd: BS+, distended, hepatectomy surgical site clean, lower abd tenderness  Ext: leg edema +++  Skin: multiple ecchomysis on arms  IV: no phlebitis    Labs:                        9.4    5.48  )-----------( 419      ( 02 Mar 2021 14:54 )             29.5     03-02    130<L>  |  90<L>  |  21  ----------------------------<  91  3.9   |  20<L>  |  1.64<H>    Ca    9.3      02 Mar 2021 14:54  Phos  3.5     03-02  Mg     1.3     03-02    TPro  7.0  /  Alb  3.0<L>  /  TBili  1.4<H>  /  DBili  x   /  AST  13  /  ALT  <5<L>  /  AlkPhos  403<H>        WBC Trend:  WBC Count: 5.48 (21 @ 14:54)      Auto Neutrophil #: 5.08 K/uL (21 @ 14:54)  Auto Neutrophil #: 3.13 K/uL (20 @ 06:58)  Auto Neutrophil #: 3.41 K/uL (20 @ 06:25)  Auto Neutrophil #: 3.22 K/uL (20 @ 11:49)  Auto Neutrophil #: 2.53 K/uL (20 @ 10:15)      Creatine Trend:  Creatinine, Serum: 1.64 ()      Liver Biochemical Testing Trend:  Alanine Aminotransferase (ALT/SGPT): <5 <L> ()  Alanine Aminotransferase (ALT/SGPT): 79 <H> ()  Alanine Aminotransferase (ALT/SGPT): 74 <H> ()  Alanine Aminotransferase (ALT/SGPT): 75 <H> ()  Alanine Aminotransferase (ALT/SGPT): 78 <H> ()  Aspartate Aminotransferase (AST/SGOT): 13 (21 @ 14:54)  Aspartate Aminotransferase (AST/SGOT): 296 (21 @ 19:06)  Aspartate Aminotransferase (AST/SGOT): 275 (21 @ 07:11)  Aspartate Aminotransferase (AST/SGOT): 282 (21 @ 18:30)  Aspartate Aminotransferase (AST/SGOT): 290 (21 @ 07:22)  Bilirubin Total, Serum: 1.4 ()  Bilirubin Total, Serum: 14.8 ()  Bilirubin Total, Serum: 12.6 ()  Bilirubin Total, Serum: 13.9 ()  Bilirubin Total, Serum: 14.5 ()      Trend LDH  21 @ 06:43  179  20 @ 06:52  378<H>  20 @ 12:44  320<H>  20 @ 09:09  449<H>  19 @ 10:54  276<H>      MICROBIOLOGY:      Culture - Acid Fast - Body Fluid w/Smear (collected 2021 22:33)  Source: .Body Fluid Peritoneal  Final Report:    No acid fast bacilli isolated after 6 weeks.    Culture - Fungal, Body Fluid (collected 2021 22:32)  Source: .Body Fluid Peritoneal Fluid  Final Report:    No fungus isolated after 4 weeks.    Culture - Body Fluid with Gram Stain (collected 2021 22:26)  Source: .Body Fluid Peritoneal Fluid  Final Report:    No growth at 5 days    Culture - Fungal, Body Fluid (collected 31 Dec 2020 20:02)  Source: .Body Fluid Peritoneal Fluid  Final Report:    No fungus isolated after 4 weeks.    Culture - Body Fluid with Gram Stain (collected 31 Dec 2020 20:02)  Source: .Body Fluid Peritoneal Fluid  Final Report:    Growth in aerobic and anaerobic bottles: Klebsiella oxytoca/Raoutella    ornithinolytica  Organism: Klebsiella oxytoca /Raoutella ornithinolytica  Organism: Klebsiella oxytoca /Raoutella ornithinolytica    Sensitivities:      -  Amikacin: S <=16      -  Amoxicillin/Clavulanic Acid: S <=8/4      -  Ampicillin: R >16 These ampicillin results predict results for amoxicillin      -  Ampicillin/Sulbactam: S 8/4 Enterobacter, Citrobacter, and Serratia may develop resistance during prolonged therapy (3-4 days)      -  Aztreonam: S <=4      -  Cefazolin: I 4 Enterobacter, Citrobacter, and Serratia may develop resistance during prolonged therapy (3-4 days)      -  Cefepime: S <=2      -  Cefoxitin: S <=8      -  Ceftriaxone: S <=1 Enterobacter, Citrobacter, and Serratia may develop resistance during prolonged therapy      -  Ciprofloxacin: S <=0.25      -  Ertapenem: S <=0.5      -  Gentamicin: S <=2      -  Imipenem: S <=1      -  Levofloxacin: S <=0.5      -  Meropenem: S <=1      -  Piperacillin/Tazobactam: S <=8      -  Tobramycin: S <=2      -  Trimethoprim/Sulfamethoxazole: S <=0.5/9.5      Method Type: FLORIDALMA    Culture - Blood (collected 31 Dec 2020 00:22)  Source: .Blood Blood-Peripheral  Final Report:    No Growth Final    Culture - Blood (collected 31 Dec 2020 00:22)  Source: .Blood Blood-Venous  Final Report:    No Growth Final    Culture - Urine (collected 22 Dec 2020 18:26)  Source: .Urine Clean Catch (Midstream)  Final Report:    <10,000 CFU/mL Normal Urogenital Celestina    Culture - Blood (collected 22 Dec 2020 13:03)  Source: .Blood Blood  Final Report:    No Growth Final    Culture - Blood (collected 22 Dec 2020 13:03)  Source: .Blood Blood  Final Report:    No Growth Final      ABS CD4: 189 /uL (01-10-21 @ 06:51)  ABS CD4: 116 /uL (20 @ 11:01)  ABS CD4: 291 /uL (05-15-19 @ 02:11)  ABS CD4: 258 /uL (19 @ 14:32)  ABS CD4: 231 /uL (01-15-19 @ 16:56)    HIV-1 RNA Quantitative, Viral Load: NOT DET. (01-10-21 @ 09:10)  HIV-1 Viral Load Result: NOT DET. (01-10-21 @ 09:10)  HIV-1 RNA Quantitative, Viral Load: 57 (20 @ 14:37)  HIV-1 RNA Quantitative, Viral Load: NOT DET. (19 @ 23:09)    Cytomegalovirus By PCR: Detected:<96 (12.29.20 @ 13:30)    Angelina-Hoffman Nuclear Antigen(12.22.09 @ 18:14)    Hepatitis B Drug Resistance Genotype (12.23.20 @ 04:58)    Hepatitis B Drug Resistance Genotype: F    Precore Mutations: DETECTED    BCP Mutations: NOT OBTAINED "Not obtained" indicates test was performed, however, poor  sequence data was obtained for the BCP and/or the PreCore  regions. Therefore, the presence or absence of mutations could  not be determined. Possible reasons for poor sequence are low  viral load or polymorphisms in the primer binding site.  _______________________________________________________  Antiviral drugs             Resistance    Mutations                              Predicted  _______________________________________________________                                  !   !   Polymerase Inhibitors    !   !  3TC  (lamivudine or Epivir)     ! NO!  LDT  (telbivudine or Tyzeka)    ! NO!  ADV  (adefovir or Hepsera)      ! NO!  ETV  (entecavir or Baraclude)   ! NO!  _______________________________________________________          Precore and BCP Mutations  _______________________________________________________  Precore (TAG)                      [id number]  BCP                                N/A  _____________________________________________________  OTHER MUTATIONS DETECTED:  POLYMERASE:                        Not Detected  PRECORE:                           [id number]  BCP:                               N/A  The method used in this test is PCR and sequencing of the pati  gene and BCP/precore region of HBV.   A resistance profile for tenofovir has not yet been established.  This test was developed and its analytical performance  characteristics have been determined by [facility]. It has not been cleared or approved by the  U.S. Food and Drug Administration.  The FDA has determined that  such clearance or approval is not necessary. This assay has been  validated pursuant to the CLIA regulations and is used for  clinical purposes.   Test(s) performed at:      [facility]      Leif Ivan M.D., Medical Director      [id number] [place] [id number]      [facility] #[id number]    Polymerase Mutations: NotDetec    Hepatitis B PCR Quantitative: [id number] IU/mL (21 @ 02:29)      OTHER TESTS:  COVID-19 PCR: NotDetec (21 @ 18:29)  COVID-19 IgA Antibody Interpretation: Equiv (21 @ 18:52)  COVID-19 IgG Antibody Interpretation: Negative (21 @ 18:52)  COVID-19 IgG Antibody Interpretation: Negative (20 @ 09:24)    Blood Gas Venous - Lactate: 1.1 ( @ 14:50)     Patient is a 50y old  Female who presents with a chief complaint of abd pain  HPI:  50y Haitian speaking F with a PMHx significant for HIV/AIDS, HBV, renal vein thrombosis in , now s/p liver transplant at [facility] on  discharged from [facility] 7 days ago presents with 6 days of abdominal pain, emesis and anorexia. She describes the pain as a shooting, constant 10/10 pain in the b/l lower quadrants radiating to the back. She says she has not had a BM or passed gas from below in 6 days, despite feeling the need to. She also describes severe nausea and emesis. The emesis is green and nonbloody. She has not be able to keep any food or medication down in over a day, but was taking it as prescribed before.  She denies fevers, rash, dysuria, SOB and chest pain.      She was discharged on the following abx: Biktarvy 1 tab daily, Bactrim SS 1 tab daily, Valgnciclovir 450mg daily.  On the discharge summary there was also Tenofovir (Viread) 25mg daily listed, however not in the actual pill bottles.   Stated had been taking meds, but can't tolerate PO.    Denies fever, chills, SOB, cough, dysuria.  Has lower abd pain, however not incision site pain from liver surgery.    She thinks she got HIV from blood transfusion when she was giving birth to her daughter.    prior hospital charts reviewed [V]  primary team notes reviewed [V]  other consultant notes reviewed [V]    PAST MEDICAL & SURGICAL HISTORY:  Renal Vein Thrombosis  , s/p Coumadin for 6 months    CMV Infection    Hepatitis B    Strongyloidosis    Histoplasmosis    HIV (Human Immunodeficiency Virus Infection)    Gallstones    S/P  Section    S/P Bilateral Tubal Ligation    S/P PEG (Percutaneous Endoscopic Gastrostomy) Placement and Removal    History of Cholecystectomy        SOCIAL HISTORY:    - Denied smoking/vaping/alcohol/recreational drug use  - Born in [place], came to [facility] 20 years ago, did not travel back  - Lives with family  - 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    FAMILY HISTORY:  Denies HTN, HIV in family    Allergies  No Known Allergies        REVIEW OF SYSTEMS  [  ] ROS unobtainable because:    [V  ] All other systems negative except as noted below:	    Constitutional:  [ ] fever [ ] chills  [ ] weight loss  [V ] weakness  Skin:  [ ] rash [ ] phlebitis	  Eyes: [ ] icterus [ ] pain  [ ] discharge	  ENMT: [ ] sore throat  [ ] thrush [ ] ulcers [ ] exudates  Respiratory: [ ] dyspnea [ ] hemoptysis [ ] cough [ ] sputum	  Cardiovascular:  [ ] chest pain [ ] palpitations [ ] edema	  Gastrointestinal:  [ V] nausea [V ] vomiting [ ] diarrhea [V] constipation [V ] pain	  Genitourinary:  [ ] dysuria [ ] frequency [ ] hematuria [ ] discharge [ ] flank pain  [ ] incontinence  Musculoskeletal:  [ ] myalgias [ ] arthralgias [ ] arthritis  [ ] back pain  Neurological:  [ ] headache [ ] seizures  [ ] confusion/altered mental status  Psychiatric:  [ ] anxiety [ ] depression	  Hematology/Lymphatics:  [ ] lymphadenopathy  Endocrine:  [ ] adrenal [ ] thyroid  Allergic/Immunologic:	 [ ] transplant [ ] seasonal    VITALS:  Vital Signs Last 24 Hrs  T(F): 98.8 (21 @ 14:15), Max: 98.9 (21 @ 12:30)    Vital Signs Last 24 Hrs  HR: 119 (21 @ 14:15) (107 - 119)  BP: 102/78 (21 @ 14:15) (102/78 - 116/80)  RR: 20 (21 @ 14:15)  SpO2: 98% (21 @ 14:15) (97% - 98%)  Wt(kg): --    EXAM:  GA: mildly distress from pain  HEENT: oral cavity no lesion  CV: nl S1/S2, no RMG  Lungs: CTAB  Abd: BS+, distended, hepatectomy surgical site clean, lower abd tenderness  Ext: leg edema +++  Skin: multiple ecchomysis on arms  IV: no phlebitis    Labs:                        9.4    5.48  )-----------( 419      ( 02 Mar 2021 14:54 )             29.5     03-02    130<L>  |  90<L>  |  21  ----------------------------<  91  3.9   |  20<L>  |  1.64<H>    Ca    9.3      02 Mar 2021 14:54  Phos  3.5     03-02  Mg     1.3         TPro  7.0  /  Alb  3.0<L>  /  TBili  1.4<H>  /  DBili  x   /  AST  13  /  ALT  <5<L>  /  AlkPhos  403<H>        WBC Trend:  WBC Count: 5.48 (21 @ 14:54)      Auto Neutrophil #: 5.08 K/uL (21 @ 14:54)  Auto Neutrophil #: 3.13 K/uL (20 @ 06:58)  Auto Neutrophil #: 3.41 K/uL (20 @ 06:25)  Auto Neutrophil #: 3.22 K/uL (20 @ 11:49)  Auto Neutrophil #: 2.53 K/uL (20 @ 10:15)      Creatine Trend:  Creatinine, Serum: 1.64 ()      Liver Biochemical Testing Trend:  Alanine Aminotransferase (ALT/SGPT): <5 <L> ()  Alanine Aminotransferase (ALT/SGPT): 79 <H> ()  Alanine Aminotransferase (ALT/SGPT): 74 <H> ()  Alanine Aminotransferase (ALT/SGPT): 75 <H> ()  Alanine Aminotransferase (ALT/SGPT): 78 <H> ()  Aspartate Aminotransferase (AST/SGOT): 13 (21 @ 14:54)  Aspartate Aminotransferase (AST/SGOT): 296 (21 @ 19:06)  Aspartate Aminotransferase (AST/SGOT): 275 (21 @ 07:11)  Aspartate Aminotransferase (AST/SGOT): 282 (21 @ 18:30)  Aspartate Aminotransferase (AST/SGOT): 290 (21 @ 07:22)  Bilirubin Total, Serum: 1.4 ()  Bilirubin Total, Serum: 14.8 ()  Bilirubin Total, Serum: 12.6 ()  Bilirubin Total, Serum: 13.9 ()  Bilirubin Total, Serum: 14.5 ()      Trend LDH  21 @ 06:43  179  20 @ 06:52  378<H>  20 @ 12:44  320<H>  20 @ 09:09  449<H>  19 @ 10:54  276<H>      MICROBIOLOGY:      Culture - Acid Fast - Body Fluid w/Smear (collected 2021 22:33)  Source: .Body Fluid Peritoneal  Final Report:    No acid fast bacilli isolated after 6 weeks.    Culture - Fungal, Body Fluid (collected 2021 22:32)  Source: .Body Fluid Peritoneal Fluid  Final Report:    No fungus isolated after 4 weeks.    Culture - Body Fluid with Gram Stain (collected 2021 22:26)  Source: .Body Fluid Peritoneal Fluid  Final Report:    No growth at 5 days    Culture - Fungal, Body Fluid (collected 31 Dec 2020 20:02)  Source: .Body Fluid Peritoneal Fluid  Final Report:    No fungus isolated after 4 weeks.    Culture - Body Fluid with Gram Stain (collected 31 Dec 2020 20:02)  Source: .Body Fluid Peritoneal Fluid  Final Report:    Growth in aerobic and anaerobic bottles: Klebsiella oxytoca/Raoutella    ornithinolytica  Organism: Klebsiella oxytoca /Raoutella ornithinolytica  Organism: Klebsiella oxytoca /Raoutella ornithinolytica    Sensitivities:      -  Amikacin: S <=16      -  Amoxicillin/Clavulanic Acid: S <=8/4      -  Ampicillin: R >16 These ampicillin results predict results for amoxicillin      -  Ampicillin/Sulbactam: S 8/4 Enterobacter, Citrobacter, and Serratia may develop resistance during prolonged therapy (3-4 days)      -  Aztreonam: S <=4      -  Cefazolin: I 4 Enterobacter, Citrobacter, and Serratia may develop resistance during prolonged therapy (3-4 days)      -  Cefepime: S <=2      -  Cefoxitin: S <=8      -  Ceftriaxone: S <=1 Enterobacter, Citrobacter, and Serratia may develop resistance during prolonged therapy      -  Ciprofloxacin: S <=0.25      -  Ertapenem: S <=0.5      -  Gentamicin: S <=2      -  Imipenem: S <=1      -  Levofloxacin: S <=0.5      -  Meropenem: S <=1      -  Piperacillin/Tazobactam: S <=8      -  Tobramycin: S <=2      -  Trimethoprim/Sulfamethoxazole: S <=0.5/9.5      Method Type: FLORIDALMA    Culture - Blood (collected 31 Dec 2020 00:22)  Source: .Blood Blood-Peripheral  Final Report:    No Growth Final    Culture - Blood (collected 31 Dec 2020 00:22)  Source: .Blood Blood-Venous  Final Report:    No Growth Final    Culture - Urine (collected 22 Dec 2020 18:26)  Source: .Urine Clean Catch (Midstream)  Final Report:    <10,000 CFU/mL Normal Urogenital Celestina    Culture - Blood (collected 22 Dec 2020 13:03)  Source: .Blood Blood  Final Report:    No Growth Final    Culture - Blood (collected 22 Dec 2020 13:03)  Source: .Blood Blood  Final Report:    No Growth Final      ABS CD4: 189 /uL (01-10-21 @ 06:51)  ABS CD4: 116 /uL (20 @ 11:01)  ABS CD4: 291 /uL (05-15-19 @ 02:11)  ABS CD4: 258 /uL (19 @ 14:32)  ABS CD4: 231 /uL (01-15-19 @ 16:56)    HIV-1 RNA Quantitative, Viral Load: NOT DET. (01-10-21 @ 09:10)  HIV-1 Viral Load Result: NOT DET. (01-10-21 @ 09:10)  HIV-1 RNA Quantitative, Viral Load: 57 (20 @ 14:37)  HIV-1 RNA Quantitative, Viral Load: NOT DET. (19 @ 23:09)    Cytomegalovirus By PCR: Detected:<96 (12.29.20 @ 13:30)    Angelina-Hoffman Nuclear Antigen(12.22.09 @ 18:14)    Hepatitis B Drug Resistance Genotype (12.23.20 @ 04:58)    Hepatitis B Drug Resistance Genotype: F    Precore Mutations: DETECTED    BCP Mutations: NOT OBTAINED "Not obtained" indicates test was performed, however, poor  sequence data was obtained for the BCP and/or the PreCore  regions. Therefore, the presence or absence of mutations could  not be determined. Possible reasons for poor sequence are low  viral load or polymorphisms in the primer binding site.  _______________________________________________________  Antiviral drugs             Resistance    Mutations                              Predicted  _______________________________________________________                                  !   !   Polymerase Inhibitors    !   !  3TC  (lamivudine or Epivir)     ! NO!  LDT  (telbivudine or Tyzeka)    ! NO!  ADV  (adefovir or Hepsera)      ! NO!  ETV  (entecavir or Baraclude)   ! NO!  _______________________________________________________          Precore and BCP Mutations  _______________________________________________________  Precore (TAG)                      [id number]  BCP                                N/A  _____________________________________________________  OTHER MUTATIONS DETECTED:  POLYMERASE:                        Not Detected  PRECORE:                           [id number]  BCP:                               N/A  The method used in this test is PCR and sequencing of the pati  gene and BCP/precore region of HBV.   A resistance profile for tenofovir has not yet been established.  This test was developed and its analytical performance  characteristics have been determined by [facility]. It has not been cleared or approved by the  U.S. Food and Drug Administration.  The FDA has determined that  such clearance or approval is not necessary. This assay has been  validated pursuant to the CLIA regulations and is used for  clinical purposes.   Test(s) performed at:      [facility]      Leif Ivan M.D., Medical Director      [id number] [place] [address]      [facility] #[id number]    Polymerase Mutations: NotDetec    Hepatitis B PCR Quantitative: [id number] IU/mL (21 @ 02:29)      OTHER TESTS:  COVID-19 PCR: NotDetec (21 @ 18:29)  COVID-19 IgA Antibody Interpretation: Equiv (21 @ 18:52)  COVID-19 IgG Antibody Interpretation: Negative (21 @ 18:52)  COVID-19 IgG Antibody Interpretation: Negative (20 @ 09:24)    Blood Gas Venous - Lactate: 1.1 ( @ 14:50)     Patient is a 50y old  Female who presents with a chief complaint of abd pain  HPI:  50y Luxembourger speaking F with a PMHx significant for HIV/AIDS, HBV, renal vein thrombosis in , now s/p liver transplant at [facility] on  discharged from [facility] 7 days ago presents with 6 days of abdominal pain, emesis and anorexia. She describes the pain as a shooting, constant 10/10 pain in the b/l lower quadrants radiating to the back. She says she has not had a BM or passed gas from below in 6 days, despite feeling the need to. She also describes severe nausea and emesis. The emesis is green and nonbloody. She has not be able to keep any food or medication down in over a day, but was taking it as prescribed before.  She denies fevers, rash, dysuria, SOB and chest pain.      She was discharged on the following abx: Biktarvy 1 tab daily, Bactrim SS 1 tab daily, Valgnciclovir 450mg daily.  On the discharge summary there was also Tenofovir (Viread) 25mg daily listed, however not in the actual pill bottles.   Stated had been taking meds, but can't tolerate PO.    Denies fever, chills, SOB, cough, dysuria.  Has lower abd pain, however not incision site pain from liver surgery.    She thinks she got HIV from blood transfusion when she was giving birth to her daughter.    prior hospital charts reviewed [V]  primary team notes reviewed [V]  other consultant notes reviewed [V]    PAST MEDICAL & SURGICAL HISTORY:  Renal Vein Thrombosis  , s/p Coumadin for 6 months    CMV Infection    Hepatitis B    Strongyloidosis    Histoplasmosis    HIV (Human Immunodeficiency Virus Infection)    Gallstones    S/P  Section    S/P Bilateral Tubal Ligation    S/P PEG (Percutaneous Endoscopic Gastrostomy) Placement and Removal    History of Cholecystectomy        SOCIAL HISTORY:    - Denied smoking/vaping/alcohol/recreational drug use  - Born in [facility], came to [facility] 20 years ago, did not travel back  - Lives with family  - 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    FAMILY HISTORY:  Denies HTN, HIV in family    Allergies  No Known Allergies        REVIEW OF SYSTEMS  [  ] ROS unobtainable because:    [V  ] All other systems negative except as noted below:	    Constitutional:  [ ] fever [ ] chills  [ ] weight loss  [V ] weakness  Skin:  [ ] rash [ ] phlebitis	  Eyes: [ ] icterus [ ] pain  [ ] discharge	  ENMT: [ ] sore throat  [ ] thrush [ ] ulcers [ ] exudates  Respiratory: [ ] dyspnea [ ] hemoptysis [ ] cough [ ] sputum	  Cardiovascular:  [ ] chest pain [ ] palpitations [ ] edema	  Gastrointestinal:  [ V] nausea [V ] vomiting [ ] diarrhea [V] constipation [V ] pain	  Genitourinary:  [ ] dysuria [ ] frequency [ ] hematuria [ ] discharge [ ] flank pain  [ ] incontinence  Musculoskeletal:  [ ] myalgias [ ] arthralgias [ ] arthritis  [ ] back pain  Neurological:  [ ] headache [ ] seizures  [ ] confusion/altered mental status  Psychiatric:  [ ] anxiety [ ] depression	  Hematology/Lymphatics:  [ ] lymphadenopathy  Endocrine:  [ ] adrenal [ ] thyroid  Allergic/Immunologic:	 [ ] transplant [ ] seasonal    VITALS:  Vital Signs Last 24 Hrs  T(F): 98.8 (21 @ 14:15), Max: 98.9 (21 @ 12:30)    Vital Signs Last 24 Hrs  HR: 119 (21 @ 14:15) (107 - 119)  BP: 102/78 (21 @ 14:15) (102/78 - 116/80)  RR: 20 (21 @ 14:15)  SpO2: 98% (21 @ 14:15) (97% - 98%)  Wt(kg): --    EXAM:  GA: mildly distress from pain  HEENT: oral cavity no lesion  CV: nl S1/S2, no RMG  Lungs: CTAB  Abd: hypoactive BS, distended, hepatectomy surgical site clean, lower abd tenderness  Ext: leg edema +++  Skin: multiple ecchomysis on arms  IV: no phlebitis    Labs:                        9.4    5.48  )-----------( 419      ( 02 Mar 2021 14:54 )             29.5     03-02    130<L>  |  90<L>  |  21  ----------------------------<  91  3.9   |  20<L>  |  1.64<H>    Ca    9.3      02 Mar 2021 14:54  Phos  3.5     03-02  Mg     1.3         TPro  7.0  /  Alb  3.0<L>  /  TBili  1.4<H>  /  DBili  x   /  AST  13  /  ALT  <5<L>  /  AlkPhos  403<H>        WBC Trend:  WBC Count: 5.48 (21 @ 14:54)      Auto Neutrophil #: 5.08 K/uL (21 @ 14:54)  Auto Neutrophil #: 3.13 K/uL (20 @ 06:58)  Auto Neutrophil #: 3.41 K/uL (20 @ 06:25)  Auto Neutrophil #: 3.22 K/uL (20 @ 11:49)  Auto Neutrophil #: 2.53 K/uL (20 @ 10:15)      Creatine Trend:  Creatinine, Serum: 1.64 ()      Liver Biochemical Testing Trend:  Alanine Aminotransferase (ALT/SGPT): <5 <L> ()  Alanine Aminotransferase (ALT/SGPT): 79 <H> ()  Alanine Aminotransferase (ALT/SGPT): 74 <H> ()  Alanine Aminotransferase (ALT/SGPT): 75 <H> ()  Alanine Aminotransferase (ALT/SGPT): 78 <H> ()  Aspartate Aminotransferase (AST/SGOT): 13 (21 @ 14:54)  Aspartate Aminotransferase (AST/SGOT): 296 (21 @ 19:06)  Aspartate Aminotransferase (AST/SGOT): 275 (21 @ 07:11)  Aspartate Aminotransferase (AST/SGOT): 282 (21 @ 18:30)  Aspartate Aminotransferase (AST/SGOT): 290 (21 @ 07:22)  Bilirubin Total, Serum: 1.4 ()  Bilirubin Total, Serum: 14.8 ()  Bilirubin Total, Serum: 12.6 ()  Bilirubin Total, Serum: 13.9 ()  Bilirubin Total, Serum: 14.5 ()      Trend LDH  21 @ 06:43  179  20 @ 06:52  378<H>  20 @ 12:44  320<H>  20 @ 09:09  449<H>  19 @ 10:54  276<H>      MICROBIOLOGY:      Culture - Acid Fast - Body Fluid w/Smear (collected 2021 22:33)  Source: .Body Fluid Peritoneal  Final Report:    No acid fast bacilli isolated after 6 weeks.    Culture - Fungal, Body Fluid (collected 2021 22:32)  Source: .Body Fluid Peritoneal Fluid  Final Report:    No fungus isolated after 4 weeks.    Culture - Body Fluid with Gram Stain (collected 2021 22:26)  Source: .Body Fluid Peritoneal Fluid  Final Report:    No growth at 5 days    Culture - Fungal, Body Fluid (collected 31 Dec 2020 20:02)  Source: .Body Fluid Peritoneal Fluid  Final Report:    No fungus isolated after 4 weeks.    Culture - Body Fluid with Gram Stain (collected 31 Dec 2020 20:02)  Source: .Body Fluid Peritoneal Fluid  Final Report:    Growth in aerobic and anaerobic bottles: Klebsiella oxytoca/Raoutella    ornithinolytica  Organism: Klebsiella oxytoca /Raoutella ornithinolytica  Organism: Klebsiella oxytoca /Raoutella ornithinolytica    Sensitivities:      -  Amikacin: S <=16      -  Amoxicillin/Clavulanic Acid: S <=8/4      -  Ampicillin: R >16 These ampicillin results predict results for amoxicillin      -  Ampicillin/Sulbactam: S 8/4 Enterobacter, Citrobacter, and Serratia may develop resistance during prolonged therapy (3-4 days)      -  Aztreonam: S <=4      -  Cefazolin: I 4 Enterobacter, Citrobacter, and Serratia may develop resistance during prolonged therapy (3-4 days)      -  Cefepime: S <=2      -  Cefoxitin: S <=8      -  Ceftriaxone: S <=1 Enterobacter, Citrobacter, and Serratia may develop resistance during prolonged therapy      -  Ciprofloxacin: S <=0.25      -  Ertapenem: S <=0.5      -  Gentamicin: S <=2      -  Imipenem: S <=1      -  Levofloxacin: S <=0.5      -  Meropenem: S <=1      -  Piperacillin/Tazobactam: S <=8      -  Tobramycin: S <=2      -  Trimethoprim/Sulfamethoxazole: S <=0.5/9.5      Method Type: FLORIDALMA    Culture - Blood (collected 31 Dec 2020 00:22)  Source: .Blood Blood-Peripheral  Final Report:    No Growth Final    Culture - Blood (collected 31 Dec 2020 00:22)  Source: .Blood Blood-Venous  Final Report:    No Growth Final    Culture - Urine (collected 22 Dec 2020 18:26)  Source: .Urine Clean Catch (Midstream)  Final Report:    <10,000 CFU/mL Normal Urogenital Celestina    Culture - Blood (collected 22 Dec 2020 13:03)  Source: .Blood Blood  Final Report:    No Growth Final    Culture - Blood (collected 22 Dec 2020 13:03)  Source: .Blood Blood  Final Report:    No Growth Final      ABS CD4: 189 /uL (01-10-21 @ 06:51)  ABS CD4: 116 /uL (20 @ 11:01)  ABS CD4: 291 /uL (05-15-19 @ 02:11)  ABS CD4: 258 /uL (19 @ 14:32)  ABS CD4: 231 /uL (01-15-19 @ 16:56)    HIV-1 RNA Quantitative, Viral Load: NOT DET. (01-10-21 @ 09:10)  HIV-1 Viral Load Result: NOT DET. (01-10-21 @ 09:10)  HIV-1 RNA Quantitative, Viral Load: 57 (20 @ 14:37)  HIV-1 RNA Quantitative, Viral Load: NOT DET. (19 @ 23:09)    Cytomegalovirus By PCR: Detected:<96 (12.29.20 @ 13:30)    Angelina-Hoffman Nuclear Antigen(12.22.09 @ 18:14)    Hepatitis B Drug Resistance Genotype (12.23.20 @ 04:58)    Hepatitis B Drug Resistance Genotype: F    Precore Mutations: DETECTED    BCP Mutations: NOT OBTAINED "Not obtained" indicates test was performed, however, poor  sequence data was obtained for the BCP and/or the PreCore  regions. Therefore, the presence or absence of mutations could  not be determined. Possible reasons for poor sequence are low  viral load or polymorphisms in the primer binding site.  _______________________________________________________  Antiviral drugs             Resistance    Mutations                              Predicted  _______________________________________________________                                  !   !   Polymerase Inhibitors    !   !  3TC  (lamivudine or Epivir)     ! NO!  LDT  (telbivudine or Tyzeka)    ! NO!  ADV  (adefovir or Hepsera)      ! NO!  ETV  (entecavir or Baraclude)   ! NO!  _______________________________________________________          Precore and BCP Mutations  _______________________________________________________  Precore (TAG)                      [id number]  BCP                                N/A  _____________________________________________________  OTHER MUTATIONS DETECTED:  POLYMERASE:                        Not Detected  PRECORE:                           [id number]  BCP:                               N/A  The method used in this test is PCR and sequencing of the pati  gene and BCP/precore region of HBV.   A resistance profile for tenofovir has not yet been established.  This test was developed and its analytical performance  characteristics have been determined by [facility]. It has not been cleared or approved by the  U.S. Food and Drug Administration.  The FDA has determined that  such clearance or approval is not necessary. This assay has been  validated pursuant to the CLIA regulations and is used for  clinical purposes.   Test(s) performed at:      [facility]      Leif Ivan M.D., Medical Director      [id number] [place] [address]      [facility] #[id number]    Polymerase Mutations: NotDetec    Hepatitis B PCR Quantitative: [id number] IU/mL (21 @ 02:29)      OTHER TESTS:  COVID-19 PCR: NotDetec (21 @ 18:29)  COVID-19 IgA Antibody Interpretation: Equiv (21 @ 18:52)  COVID-19 IgG Antibody Interpretation: Negative (21 @ 18:52)  COVID-19 IgG Antibody Interpretation: Negative (20 @ 09:24)    Blood Gas Venous - Lactate: 1.1 ( @ 14:50)    < from: CT Abdomen and Pelvis w/ IV Cont (21 @ 16:38) >  IMPRESSION:  Status post liver transplant with mild periportal edema.    Colitis.    Large volume ascites with evidence of perihepatic hemorrhage (age- indeterminate), with diffuse peritoneal enhancement, which could represent infection or inflammation.    New low-density right adrenal nodule, question involving adrenal hematoma.    Small right pleural effusion, similar to prior with increased right lower lobe atelectasis.    < end of copied text >

## 2021-03-02 NOTE — ED PROVIDER NOTE - PHYSICAL EXAMINATION
PHYSICAL EXAM:  GENERAL: Pt obviously uncomfortable, appears to be in pain  HEAD:  Atraumatic, Normocephalic  EYES: EOMI, PERRLA, conjunctiva and sclera clear  ENT: Moist mucous membranes  NECK: Supple, No JVD, No LAD  CHEST/LUNG: Clear to auscultation bilaterally; No rales, rhonchi, wheezing.  HEART: Regular rate and rhythm; No murmurs, rubs, or gallops  ABDOMEN: Mildly distended, but soft. Diffusely tender to palpation. Surgical incisions from transplant evident - look clean, nonerythematous without pus.   EXTREMITIES:  Mild lower extremity edema. No clubbing or cyanosis  NERVOUS SYSTEM:  Alert & Oriented X3, speech clear. Grossly intact   MSK: No joint pain or tenderness  SKIN: No rashes or lesions PHYSICAL EXAM:  GENERAL: Alert, in moderate distress due to pain  HEAD:  Atraumatic, dry mucous membranes  EYES: EOMI, PERRLA, conjunctiva and sclera clear  NECK: Supple, No JVD, No LAD  CHEST/LUNG: Clear to auscultation bilaterally; No rales, rhonchi, wheezing.  HEART: Regular rate and rhythm; No murmurs, rubs, or gallops  ABDOMEN: Mildly distended, but soft. Diffusely tender to palpation. Surgical incisions from transplant clean, nonerythematous without drainage.   EXTREMITIES:  Trace lower extremity edema b/l. No clubbing or cyanosis  NERVOUS SYSTEM:  Alert & Oriented X3, speech clear. Grossly intact   MSK: No joint pain or tenderness  SKIN: No rashes or lesions

## 2021-03-02 NOTE — ED PROVIDER NOTE - OBJECTIVE STATEMENT
Ms. Sanchez is a 50y Congolese speaking F with a PMHx significant for HIV/AIDS, HBV, renal vein thrombosis in 2010, now s/p liver transplant at [facility] in Jan 2021 discharged from [facility] 7 days ago presents with 6 days of abdominal pain, emesis and anorexia. She describes the pain as a shooting, constant 10/10 pain in the b/l lower quadrants radiating to the back. She says she has not had a BM or passed gas from below in 6 days, despite feeling the need to. She also describes severe nausea and emesis. The emesis is green and nonbloody. She has not be able to keep any food or medication down in over a day, but was taking it as prescribed before.  She denies fevers, rash, dysuria, SOB and chest pain.     Her transplant surgeon from [facility] is Dr. Nilam Singh.

## 2021-03-02 NOTE — ED ADULT TRIAGE NOTE - CHIEF COMPLAINT QUOTE
Pt presents to ED triage with c/o generalized abdominal pain, +NV  Pt has not had regular bowel movement in 5 days.  Pt takes dilauded for pain, liver transplant 1/20/2021

## 2021-03-02 NOTE — ED PROVIDER NOTE - NS ED ROS FT
REVIEW OF SYSTEMS:    CONSTITUTIONAL: No weakness, fevers or chills  EYES/ENT: No visual changes;  No vertigo or throat pain   RESPIRATORY: No cough, wheezing, hemoptysis; No shortness of breath  CARDIOVASCULAR: No chest pain or palpitations  GASTROINTESTINAL: +abdominal pain, nausea, vomiting, constipation, as per HPI. No melena or hematochezia.  GENITOURINARY: No dysuria, frequency or hematuria  NEUROLOGICAL: No numbness or weakness  SKIN: No itching, rashes  MUSCULOSKELETAL: No joint pain, muscle pain.

## 2021-03-02 NOTE — ED ADULT NURSE NOTE - OBJECTIVE STATEMENT
50 yr old Citizen of [place] speaking female with a PMH of +HIV, Liver transplant(jan 20th) coming in with poor PO intake, nauseam vomiting for the last 7 days. Pt was recently discharged from [facility] x7 days ago, and has not been able to eat or drink normally without being nauseous or vomiting. pt states that she also had not had aa BM in 7 days. Pt states that the pain is in her lower abdomen and radiates to her back. Pt denies any SOB, fever, chills, diarrhea or CP at current time. VSS. Pt Citizen of [place] speaking,  ID [id number]. Pt A&Ox3. Pts abdomen tender to touch. Old healing surgical site noted. Lung sounds clear bilaterally. Skin is normal for race. +2 palpable peripheral pulses. Cap refill <2 seconds. IV morphine given for pain. IV zofran given for nausea. 1L LR hung. Labs drawn and PIV placed. Safety maintained. Call bell at bedsdie. Will continue to monitor.

## 2021-03-02 NOTE — CONSULT NOTE ADULT - ASSESSMENT
50y Tristanian speaking F with a PMHx significant for HIV/AIDS, HBV, renal vein thrombosis in 2010, now s/p liver transplant at [facility] in Jan 2021 discharged from [facility] 7 days ago presents with 6 days of abdominal pain, emesis and anorexia. She describes the pain as a shooting, constant 10/10 pain in the b/l lower quadrants radiating to the back. She says she has not had a BM or passed gas from below in 6 days, despite feeling the need to. She also describes severe nausea and emesis. The emesis is green and nonbloody. She has not be able to keep any food or medication down in over a day, but was taking it as prescribed before.  She denies fevers, rash, dysuria, SOB and chest pain.      At this point, unclear if infection vs acute rejection.  Rejection work up per transplant hepatology team.    #HIV  #HBV  #Hx of CMV viremia  - Check CD4, HIV VL, HBV VL, CMV VL, EBV VL, COVID PCR, COVID IgG, Blood culture, UA/UCx  - CT A/P, RUQ US  - If no bowel obstruction, consider stool disimpaction or enema?  - Resume Truvada 1 tab and Tivicay 50mg daily  - Resume Atovaquone 1500mg (10cc) daily     50y Tristanian speaking F with a PMHx significant for HIV/AIDS, HBV, renal vein thrombosis in 2010, now s/p liver transplant at [facility] on Jan/20/2021 discharged from [facility] 7 days ago presents with 6 days of abdominal pain, emesis and anorexia. She describes the pain as a shooting, constant 10/10 pain in the b/l lower quadrants radiating to the back. She says she has not had a BM or passed gas from below in 6 days, despite feeling the need to. She also describes severe nausea and emesis. The emesis is green and nonbloody. She has not be able to keep any food or medication down in over a day, but was taking it as prescribed before.  She denies fevers, rash, dysuria, SOB and chest pain.      At this point, unclear if infection vs acute rejection.  Rejection work up per transplant hepatology team.    #HIV  #HBV  #Hx of CMV viremia  - Check CD4, HIV VL, HBV VL, CMV VL, EBV VL, COVID PCR, COVID IgG, Blood culture, UA/UCx  - CT A/P  - If no bowel obstruction, consider stool disimpaction or enema?  - Biktarvy 1 tab daily, Bactrim SS 1 tab daily, Valgnciclovir 450mg daily.     50y Thai speaking F with a PMHx significant for HIV/AIDS, HBV, renal vein thrombosis in 2010, now s/p liver transplant at [facility] on Jan/20/2021 discharged from [facility] 7 days ago presents with 6 days of abdominal pain, emesis and anorexia. She describes the pain as a shooting, constant 10/10 pain in the b/l lower quadrants radiating to the back. She says she has not had a BM or passed gas from below in 6 days, despite feeling the need to. She also describes severe nausea and emesis. The emesis is green and nonbloody. She has not be able to keep any food or medication down in over a day, but was taking it as prescribed before.  She denies fevers, rash, dysuria, SOB and chest pain.      At this point, unclear if infection vs acute rejection vs SBO, etc.  Rejection work up per transplant hepatology team.    #HIV  #HBV  #Hx of CMV viremia  - Check CD4, HIV VL, HBV VL, HCV VL, CMV VL, EBV VL, COVID PCR, COVID IgG, Blood culture, UA/UCx  - CT A/P  - Biktarvy 1 tab daily, Bactrim SS 1 tab daily, Valgnciclovir 450mg daily.  - Management of SBO (if any) per primary team or surgery    Michele Lopez MD, PGY4   ID fellow  Pager: [phone]  After 5pm/weekends call [phone]    d/w Dr. Mcadams and ED     50y Czech speaking F with a PMHx significant for HIV/AIDS, HBV, renal vein thrombosis in 2010, now s/p liver transplant at [facility] on Jan/20/2021 discharged from [facility] 7 days ago presents with 6 days of abdominal pain, emesis and anorexia.     CT A/P shows hemorrhagic ascites     #HIV  #HBV  #Hx of CMV viremia  - Check CD4, HIV VL, HBV VL, HCV VL, CMV VL, EBV VL, COVID PCR, COVID IgG, Blood culture, UA/UCx  - Biktarvy 1 tab daily, Bactrim SS 1 tab daily, Valgnciclovir 450mg daily.  - Will not give abx to treat the "colitis" on report  - Likely will need paracentesis  - Further care per transplant hepatology    Michele Lopez MD, PGY4   ID fellow  Pager: [phone]  After 5pm/weekends call [phone]    d/w Dr. Mcadams and ED 50y Bruneian speaking F with a PMHx significant for HIV/AIDS, HBV, renal vein thrombosis in 2010, now s/p liver transplant at [facility] on Jan/20/2021 discharged from [facility] 7 days ago presents with 6 days of abdominal pain, emesis and anorexia.     CT A/P shows hemorrhagic ascites     #HIV  #HBV  #Hx of CMV viremia  - Check CD4, HIV VL, HBV VL, HCV VL, CMV VL, EBV VL, COVID PCR, COVID IgG, Blood culture, UA/UCx  - Biktarvy 1 tab daily, Bactrim SS 1 tab daily, Valgnciclovir 450mg daily.  - Likely will need paracentesis  - Further care per transplant hepatology    Michele Lopez MD, PGY4   ID fellow  Pager: [phone]  After 5pm/weekends call [phone]    d/w Dr. Mcadams and ED

## 2021-03-02 NOTE — ED PROVIDER NOTE - CLINICAL SUMMARY MEDICAL DECISION MAKING FREE TEXT BOX
50y Sammarinese speaking F with a PMHx significant for HIV/AIDS, HBV, renal vein thrombosis in 2010, now s/p liver transplant at [facility] in 1/20/21 discharged from [facility] 7 days ago p/w 6 days abd pain, n/v. Afebrile, tachy, normotensive. In distress due to pain on exam w/ diffuse abd tenderness. Concern for acute rejection v SBO. Will obtain basic labs, LFTs, VBG, coags, CD4 count, HIV viral load, tacro level, UA/culture, CT A/P w/ IV contrast, proceed w/ IVF, pain/nausea medications, reassess.

## 2021-03-02 NOTE — ED ADULT NURSE REASSESSMENT NOTE - NS ED NURSE REASSESS COMMENT FT1
Pt received from [facility] in [facility]. Pt remains in the ED. Resting comfortably in bed. NAD. AOx4. Breathing unlabored and spontaneously on room air. No peripheral edema. Peripheral pulses strong bilaterally. On cardiac monitor, Sinus Tachycardia. Abdomen soft, nontender and nondistended. Positive bowel sounds in all 4 quadrants. Pt safety maintained. Awaiting dispo.

## 2021-03-02 NOTE — ED PROVIDER NOTE - ATTENDING CONTRIBUTION TO CARE
Patient s/p recent liver transplant at [facility], discharged after admission for surgical procedure approximately one week ago, presenting complaining of diffuse abdominal pains, nausea, vomiting, malaise, not tolerating PO, no bowel movements.    A 14 point review of systems is negative except as in HPI or otherwise documented.    Exam:  General: Patient non toxic appearing, tachycardic otherwise vital signs within normal limits  HEENT: airway patent with moist mucous membranes  Cardiac: regular tachycardia S1/S2 with strong peripheral pulses  Respiratory: lungs clear without respiratory distress  GI: abdomen distended, edward jessika incision present well healing no erythema or discharge, diffusely tender to palpation with scant/no bowel sounds ascultated throughout  Neuro: no gross neurologic deficits  Skin: warm, well perfused  Psych: normal mood and affect    Patient with recent liver transplant just discharged from OSH after transplantation now presenting with bowel dysfunction and tachycardia of unclear etiology - plan for labs, CT A/P, discussion with her transplant team at [facility].  I discussed with patient given her recent transplant if there are findings concerning for a complication of her transplant that she may need to be transferred to [facility] which she was resistant to.

## 2021-03-02 NOTE — ED ADULT TRIAGE NOTE - INTERPRETER NAME
Spoke with pt.    She is vomiting 5 or more times per day.    She can keep down some fluids but not food.    This has been going on for weeks like this.    Pt has weight loss, dizzy, lightheaded.    Pt has not taken anything for nausea.    Advised to go to ED and get IV.  Also  vit b6 and unisom.     Scheduled with midwife for tues to follow up in case she needs rx.     Lianet GOLDEN R.N.  [facility]             Priya

## 2021-03-03 ENCOUNTER — NON-APPOINTMENT (OUTPATIENT)
Age: 51
End: 2021-03-03

## 2021-03-03 VITALS
RESPIRATION RATE: 18 BRPM | OXYGEN SATURATION: 95 % | SYSTOLIC BLOOD PRESSURE: 125 MMHG | DIASTOLIC BLOOD PRESSURE: 84 MMHG | HEART RATE: 128 BPM | TEMPERATURE: 98 F

## 2021-03-03 LAB
CULTURE RESULTS: SIGNIFICANT CHANGE UP
HCT VFR BLD CALC: 25.5 % — LOW (ref 34.5–45)
HGB BLD-MCNC: 8.1 G/DL — LOW (ref 11.5–15.5)
HIV-1 VIRAL LOAD RESULT: SIGNIFICANT CHANGE UP
HIV1 RNA # SERPL NAA+PROBE: SIGNIFICANT CHANGE UP
HIV1 RNA SER-IMP: SIGNIFICANT CHANGE UP
HIV1 RNA SERPL NAA+PROBE-ACNC: SIGNIFICANT CHANGE UP
HIV1 RNA SERPL NAA+PROBE-LOG#: SIGNIFICANT CHANGE UP LG COP/ML
MCHC RBC-ENTMCNC: 29.9 PG — SIGNIFICANT CHANGE UP (ref 27–34)
MCHC RBC-ENTMCNC: 31.8 GM/DL — LOW (ref 32–36)
MCV RBC AUTO: 94.1 FL — SIGNIFICANT CHANGE UP (ref 80–100)
NRBC # BLD: 0 /100 WBCS — SIGNIFICANT CHANGE UP (ref 0–0)
PLATELET # BLD AUTO: 380 K/UL — SIGNIFICANT CHANGE UP (ref 150–400)
RBC # BLD: 2.71 M/UL — LOW (ref 3.8–5.2)
RBC # FLD: 16.7 % — HIGH (ref 10.3–14.5)
SPECIMEN SOURCE: SIGNIFICANT CHANGE UP
WBC # BLD: 4.83 K/UL — SIGNIFICANT CHANGE UP (ref 3.8–10.5)
WBC # FLD AUTO: 4.83 K/UL — SIGNIFICANT CHANGE UP (ref 3.8–10.5)

## 2021-03-03 PROCEDURE — 82803 BLOOD GASES ANY COMBINATION: CPT

## 2021-03-03 PROCEDURE — U0005: CPT

## 2021-03-03 PROCEDURE — 82947 ASSAY GLUCOSE BLOOD QUANT: CPT

## 2021-03-03 PROCEDURE — 93005 ELECTROCARDIOGRAM TRACING: CPT

## 2021-03-03 PROCEDURE — 86360 T CELL ABSOLUTE COUNT/RATIO: CPT

## 2021-03-03 PROCEDURE — 80197 ASSAY OF TACROLIMUS: CPT

## 2021-03-03 PROCEDURE — 74177 CT ABD & PELVIS W/CONTRAST: CPT

## 2021-03-03 PROCEDURE — 87086 URINE CULTURE/COLONY COUNT: CPT

## 2021-03-03 PROCEDURE — 84702 CHORIONIC GONADOTROPIN TEST: CPT

## 2021-03-03 PROCEDURE — 85610 PROTHROMBIN TIME: CPT

## 2021-03-03 PROCEDURE — 87517 HEPATITIS B DNA QUANT: CPT

## 2021-03-03 PROCEDURE — 86901 BLOOD TYPING SEROLOGIC RH(D): CPT

## 2021-03-03 PROCEDURE — 84132 ASSAY OF SERUM POTASSIUM: CPT

## 2021-03-03 PROCEDURE — 82330 ASSAY OF CALCIUM: CPT

## 2021-03-03 PROCEDURE — 84100 ASSAY OF PHOSPHORUS: CPT

## 2021-03-03 PROCEDURE — 87799 DETECT AGENT NOS DNA QUANT: CPT

## 2021-03-03 PROCEDURE — 84295 ASSAY OF SERUM SODIUM: CPT

## 2021-03-03 PROCEDURE — 85018 HEMOGLOBIN: CPT

## 2021-03-03 PROCEDURE — U0003: CPT

## 2021-03-03 PROCEDURE — 85027 COMPLETE CBC AUTOMATED: CPT

## 2021-03-03 PROCEDURE — 96375 TX/PRO/DX INJ NEW DRUG ADDON: CPT

## 2021-03-03 PROCEDURE — 81001 URINALYSIS AUTO W/SCOPE: CPT

## 2021-03-03 PROCEDURE — 85730 THROMBOPLASTIN TIME PARTIAL: CPT

## 2021-03-03 PROCEDURE — 96376 TX/PRO/DX INJ SAME DRUG ADON: CPT

## 2021-03-03 PROCEDURE — 96374 THER/PROPH/DIAG INJ IV PUSH: CPT | Mod: XU

## 2021-03-03 PROCEDURE — 83605 ASSAY OF LACTIC ACID: CPT

## 2021-03-03 PROCEDURE — 82435 ASSAY OF BLOOD CHLORIDE: CPT

## 2021-03-03 PROCEDURE — 87536 HIV-1 QUANT&REVRSE TRNSCRPJ: CPT

## 2021-03-03 PROCEDURE — 85014 HEMATOCRIT: CPT

## 2021-03-03 PROCEDURE — 86850 RBC ANTIBODY SCREEN: CPT

## 2021-03-03 PROCEDURE — 86359 T CELLS TOTAL COUNT: CPT

## 2021-03-03 PROCEDURE — 86922 COMPATIBILITY TEST ANTIGLOB: CPT

## 2021-03-03 PROCEDURE — 80180 DRUG SCRN QUAN MYCOPHENOLATE: CPT

## 2021-03-03 PROCEDURE — 80053 COMPREHEN METABOLIC PANEL: CPT

## 2021-03-03 PROCEDURE — 99285 EMERGENCY DEPT VISIT HI MDM: CPT | Mod: 25

## 2021-03-03 PROCEDURE — 87040 BLOOD CULTURE FOR BACTERIA: CPT

## 2021-03-03 PROCEDURE — 87521 HEPATITIS C PROBE&RVRS TRNSC: CPT

## 2021-03-03 PROCEDURE — 85025 COMPLETE CBC W/AUTO DIFF WBC: CPT

## 2021-03-03 PROCEDURE — 83735 ASSAY OF MAGNESIUM: CPT

## 2021-03-03 PROCEDURE — 86900 BLOOD TYPING SEROLOGIC ABO: CPT

## 2021-03-03 RX ORDER — MORPHINE SULFATE 50 MG/1
2 CAPSULE, EXTENDED RELEASE ORAL ONCE
Refills: 0 | Status: DISCONTINUED | OUTPATIENT
Start: 2021-03-03 | End: 2021-03-03

## 2021-03-03 RX ORDER — SODIUM CHLORIDE 9 MG/ML
1000 INJECTION, SOLUTION INTRAVENOUS ONCE
Refills: 0 | Status: COMPLETED | OUTPATIENT
Start: 2021-03-03 | End: 2021-03-03

## 2021-03-03 RX ORDER — ONDANSETRON 8 MG/1
4 TABLET, FILM COATED ORAL ONCE
Refills: 0 | Status: COMPLETED | OUTPATIENT
Start: 2021-03-03 | End: 2021-03-03

## 2021-03-03 RX ADMIN — ONDANSETRON 4 MILLIGRAM(S): 8 TABLET, FILM COATED ORAL at 02:22

## 2021-03-03 RX ADMIN — MORPHINE SULFATE 2 MILLIGRAM(S): 50 CAPSULE, EXTENDED RELEASE ORAL at 02:22

## 2021-03-03 RX ADMIN — SODIUM CHLORIDE 1000 MILLILITER(S): 9 INJECTION, SOLUTION INTRAVENOUS at 02:22

## 2021-03-03 NOTE — ED ADULT NURSE REASSESSMENT NOTE - NS ED NURSE REASSESS COMMENT FT1
Report given to Inpatient [facility] RN, spoke with  Eleazar who informed RN he would coordinate transportation for pt to be brought to [facility]. Pt is aware of transfer, safety maintained.

## 2021-03-04 LAB
CMV DNA CSF QL NAA+PROBE: SIGNIFICANT CHANGE UP
EBV DNA SERPL NAA+PROBE-ACNC: SIGNIFICANT CHANGE UP IU/ML
HBV DNA # SERPL NAA+PROBE: 99 IU/ML — SIGNIFICANT CHANGE UP
HBV DNA SERPL NAA+PROBE-LOG#: 2 LOG10IU/ML — SIGNIFICANT CHANGE UP
MYCOPHENOLATE SERPL-MCNC: ABNORMAL

## 2021-03-05 LAB — HCV RNA FLD QL NAA+PROBE: SIGNIFICANT CHANGE UP

## 2021-03-08 ENCOUNTER — NON-APPOINTMENT (OUTPATIENT)
Age: 51
End: 2021-03-08

## 2021-03-08 LAB
CULTURE RESULTS: SIGNIFICANT CHANGE UP
CULTURE RESULTS: SIGNIFICANT CHANGE UP
SPECIMEN SOURCE: SIGNIFICANT CHANGE UP
SPECIMEN SOURCE: SIGNIFICANT CHANGE UP

## 2021-03-18 ENCOUNTER — APPOINTMENT (OUTPATIENT)
Dept: INTERNAL MEDICINE | Facility: CLINIC | Age: 51
End: 2021-03-18

## 2021-03-26 ENCOUNTER — LABORATORY RESULT (OUTPATIENT)
Age: 51
End: 2021-03-26

## 2021-03-26 ENCOUNTER — NON-APPOINTMENT (OUTPATIENT)
Age: 51
End: 2021-03-26

## 2021-03-26 ENCOUNTER — OUTPATIENT (OUTPATIENT)
Dept: OUTPATIENT SERVICES | Facility: HOSPITAL | Age: 51
LOS: 1 days | End: 2021-03-26
Payer: COMMERCIAL

## 2021-03-26 ENCOUNTER — APPOINTMENT (OUTPATIENT)
Dept: INTERNAL MEDICINE | Facility: CLINIC | Age: 51
End: 2021-03-26

## 2021-03-26 VITALS
OXYGEN SATURATION: 99 % | BODY MASS INDEX: 21.52 KG/M2 | SYSTOLIC BLOOD PRESSURE: 100 MMHG | HEART RATE: 132 BPM | HEIGHT: 61 IN | WEIGHT: 114 LBS | DIASTOLIC BLOOD PRESSURE: 60 MMHG

## 2021-03-26 DIAGNOSIS — Z23 ENCOUNTER FOR IMMUNIZATION: ICD-10-CM

## 2021-03-26 DIAGNOSIS — R19.8 OTHER SPECIFIED SYMPTOMS AND SIGNS INVOLVING THE DIGESTIVE SYSTEM AND ABDOMEN: ICD-10-CM

## 2021-03-26 DIAGNOSIS — Z00.00 ENCOUNTER FOR GENERAL ADULT MEDICAL EXAMINATION W/OUT ABNORMAL FINDINGS: ICD-10-CM

## 2021-03-26 DIAGNOSIS — B07.8 OTHER VIRAL WARTS: ICD-10-CM

## 2021-03-26 DIAGNOSIS — E63.9 NUTRITIONAL DEFICIENCY, UNSPECIFIED: ICD-10-CM

## 2021-03-26 DIAGNOSIS — H53.8 OTHER VISUAL DISTURBANCES: ICD-10-CM

## 2021-03-26 DIAGNOSIS — B39.9 HISTOPLASMOSIS, UNSPECIFIED: ICD-10-CM

## 2021-03-26 DIAGNOSIS — Z01.419 ENCOUNTER FOR GYNECOLOGICAL EXAMINATION (GENERAL) (ROUTINE) W/OUT ABNORMAL FINDINGS: ICD-10-CM

## 2021-03-26 DIAGNOSIS — K25.9 GASTRIC ULCER, UNSPECIFIED AS ACUTE OR CHRONIC, W/OUT HEMORRHAGE OR PERFORATION: ICD-10-CM

## 2021-03-26 DIAGNOSIS — R63.0 ANOREXIA: ICD-10-CM

## 2021-03-26 DIAGNOSIS — K21.9 GASTRO-ESOPHAGEAL REFLUX DISEASE W/OUT ESOPHAGITIS: ICD-10-CM

## 2021-03-26 DIAGNOSIS — I10 ESSENTIAL (PRIMARY) HYPERTENSION: ICD-10-CM

## 2021-03-26 DIAGNOSIS — N63.0 UNSPECIFIED LUMP IN UNSPECIFIED BREAST: ICD-10-CM

## 2021-03-26 DIAGNOSIS — H04.123 DRY EYE SYNDROME OF BILATERAL LACRIMAL GLANDS: ICD-10-CM

## 2021-03-26 DIAGNOSIS — I83.893 VARICOSE VEINS OF BILATERAL LOWER EXTREMITIES WITH OTHER COMPLICATIONS: ICD-10-CM

## 2021-03-26 DIAGNOSIS — N76.0 ACUTE VAGINITIS: ICD-10-CM

## 2021-03-26 DIAGNOSIS — N60.12 DIFFUSE CYSTIC MASTOPATHY OF LEFT BREAST: ICD-10-CM

## 2021-03-26 DIAGNOSIS — K29.70 GASTRITIS, UNSPECIFIED, W/OUT BLEEDING: ICD-10-CM

## 2021-03-26 DIAGNOSIS — Z92.89 PERSONAL HISTORY OF OTHER MEDICAL TREATMENT: ICD-10-CM

## 2021-03-26 DIAGNOSIS — N60.11 DIFFUSE CYSTIC MASTOPATHY OF LEFT BREAST: ICD-10-CM

## 2021-03-26 DIAGNOSIS — B96.89 ACUTE VAGINITIS: ICD-10-CM

## 2021-03-26 PROCEDURE — 87536 HIV-1 QUANT&REVRSE TRNSCRPJ: CPT

## 2021-03-26 PROCEDURE — 85610 PROTHROMBIN TIME: CPT

## 2021-03-26 PROCEDURE — 86359 T CELLS TOTAL COUNT: CPT

## 2021-03-26 PROCEDURE — 87517 HEPATITIS B DNA QUANT: CPT

## 2021-03-26 PROCEDURE — 86360 T CELL ABSOLUTE COUNT/RATIO: CPT

## 2021-03-26 PROCEDURE — 85027 COMPLETE CBC AUTOMATED: CPT

## 2021-03-26 PROCEDURE — 80053 COMPREHEN METABOLIC PANEL: CPT

## 2021-03-26 PROCEDURE — G0463: CPT

## 2021-03-26 RX ORDER — BICTEGRAVIR SODIUM, EMTRICITABINE, AND TENOFOVIR ALAFENAMIDE FUMARATE 50; 200; 25 MG/1; MG/1; MG/1
50-200-25 TABLET ORAL DAILY
Refills: 0 | Status: ACTIVE | COMMUNITY

## 2021-03-26 RX ORDER — VALACYCLOVIR 1 G/1
1 TABLET, FILM COATED ORAL
Qty: 10 | Refills: 0 | Status: DISCONTINUED | COMMUNITY
Start: 2019-01-15 | End: 2021-03-26

## 2021-03-26 RX ORDER — ONDANSETRON 4 MG/1
4 TABLET ORAL EVERY 8 HOURS
Qty: 21 | Refills: 3 | Status: DISCONTINUED | COMMUNITY
Start: 2020-11-09 | End: 2021-03-26

## 2021-03-26 RX ORDER — MAGNESIUM OXIDE 241.3 MG/1000MG
400 TABLET ORAL TWICE DAILY
Refills: 0 | Status: ACTIVE | COMMUNITY

## 2021-03-26 RX ORDER — GUAIFENESIN AND DEXTROMETHORPHAN 10; 100 MG/5ML; MG/5ML
10-100 SYRUP ORAL
Qty: 1 | Refills: 0 | Status: DISCONTINUED | COMMUNITY
Start: 2019-01-15 | End: 2021-03-26

## 2021-03-26 RX ORDER — METRONIDAZOLE 7.5 MG/G
0.75 GEL VAGINAL
Qty: 1 | Refills: 0 | Status: DISCONTINUED | COMMUNITY
Start: 2020-12-02 | End: 2021-03-26

## 2021-03-26 RX ORDER — ERGOCALCIFEROL 1.25 MG/1
1.25 MG CAPSULE, LIQUID FILLED ORAL
Qty: 4 | Refills: 1 | Status: DISCONTINUED | COMMUNITY
Start: 2017-12-19 | End: 2021-03-26

## 2021-03-26 RX ORDER — DARUNAVIR, COBICISTAT, EMTRICITABINE, AND TENOFOVIR ALAFENAMIDE 800; 150; 200; 10 MG/1; MG/1; MG/1; MG/1
800-150-200-10 TABLET, FILM COATED ORAL
Qty: 30 | Refills: 3 | Status: DISCONTINUED | COMMUNITY
Start: 2018-10-16 | End: 2021-03-26

## 2021-03-26 RX ORDER — URSODIOL 500 MG/1
500 TABLET ORAL TWICE DAILY
Refills: 0 | Status: ACTIVE | COMMUNITY

## 2021-03-26 RX ORDER — DEXTRAN 70/HYPROMELLOSE 0.1%-0.3%
0.1-0.3 DROPS OPHTHALMIC (EYE)
Qty: 1 | Refills: 1 | Status: DISCONTINUED | COMMUNITY
Start: 2018-10-16 | End: 2021-03-26

## 2021-03-26 RX ORDER — CALCIUM CITRATE/VITAMIN D3 315MG-6.25
TABLET ORAL DAILY
Refills: 0 | Status: ACTIVE | COMMUNITY

## 2021-03-26 RX ORDER — TACROLIMUS 1 MG/1
1 CAPSULE ORAL TWICE DAILY
Refills: 0 | Status: ACTIVE | COMMUNITY

## 2021-03-26 RX ORDER — POLYVINYL ALCOHOL 14 MG/ML
1.4 SOLUTION/ DROPS OPHTHALMIC
Qty: 15 | Refills: 0 | Status: DISCONTINUED | COMMUNITY
Start: 2019-06-19 | End: 2021-03-26

## 2021-03-26 RX ORDER — MYCOPHENOLATE MOFETIL 500 MG/1
500 TABLET ORAL TWICE DAILY
Refills: 0 | Status: ACTIVE | COMMUNITY

## 2021-03-27 PROBLEM — B07.8 VERRUCA VULGARIS: Status: RESOLVED | Noted: 2017-05-02 | Resolved: 2021-03-27

## 2021-03-27 PROBLEM — H04.123 DRY EYE SYNDROME, BILATERAL: Status: RESOLVED | Noted: 2018-06-22 | Resolved: 2021-03-27

## 2021-03-27 PROBLEM — K25.9: Noted: 2021-03-26

## 2021-03-27 PROBLEM — Z23 ENCOUNTER FOR IMMUNIZATION: Status: RESOLVED | Noted: 2020-11-09 | Resolved: 2021-03-27

## 2021-03-27 PROBLEM — N76.0 BACTERIAL VAGINOSIS: Status: RESOLVED | Noted: 2020-11-25 | Resolved: 2021-03-27

## 2021-03-27 PROBLEM — H53.8 BLURRY VISION, BILATERAL: Status: RESOLVED | Noted: 2017-12-29 | Resolved: 2021-03-27

## 2021-03-27 PROBLEM — K29.70 GASTRITIS: Noted: 2020-11-09

## 2021-03-27 PROBLEM — E63.9 NUTRITIONAL DEFICIENCY: Noted: 2021-03-26

## 2021-03-27 PROBLEM — R63.0 ANOREXIA: Status: ACTIVE | Noted: 2021-03-26

## 2021-03-27 LAB
4/8 RATIO: 0.27 RATIO — LOW (ref 0.9–3.6)
ABS CD8: 113 /UL — LOW (ref 142–740)
ALBUMIN SERPL ELPH-MCNC: 3.2 G/DL — LOW (ref 3.3–5)
ALP SERPL-CCNC: 154 U/L — HIGH (ref 40–120)
ALT FLD-CCNC: 6 U/L — LOW (ref 10–45)
ANION GAP SERPL CALC-SCNC: 16 MMOL/L — SIGNIFICANT CHANGE UP (ref 5–17)
AST SERPL-CCNC: 7 U/L — LOW (ref 10–40)
BILIRUB SERPL-MCNC: 0.7 MG/DL — SIGNIFICANT CHANGE UP (ref 0.2–1.2)
BUN SERPL-MCNC: 28 MG/DL — HIGH (ref 7–23)
CALCIUM SERPL-MCNC: 9.2 MG/DL — SIGNIFICANT CHANGE UP (ref 8.4–10.5)
CD3 BLASTS SPEC-ACNC: 144 /UL — LOW (ref 672–1870)
CD3 BLASTS SPEC-ACNC: 63 % — SIGNIFICANT CHANGE UP (ref 59–83)
CD4 %: 14 % — LOW (ref 30–62)
CD8 %: 50 % — HIGH (ref 12–36)
CHLORIDE SERPL-SCNC: 89 MMOL/L — LOW (ref 96–108)
CO2 SERPL-SCNC: 22 MMOL/L — SIGNIFICANT CHANGE UP (ref 22–31)
CREAT SERPL-MCNC: 2.2 MG/DL — HIGH (ref 0.5–1.3)
GLUCOSE SERPL-MCNC: 147 MG/DL — HIGH (ref 70–99)
HCT VFR BLD CALC: 27.1 % — LOW (ref 34.5–45)
HGB BLD-MCNC: 8.4 G/DL — LOW (ref 11.5–15.5)
INR BLD: 1.6 RATIO — HIGH (ref 0.88–1.16)
MCHC RBC-ENTMCNC: 29.8 PG — SIGNIFICANT CHANGE UP (ref 27–34)
MCHC RBC-ENTMCNC: 31 GM/DL — LOW (ref 32–36)
MCV RBC AUTO: 96.1 FL — SIGNIFICANT CHANGE UP (ref 80–100)
PLATELET # BLD AUTO: 748 K/UL — HIGH (ref 150–400)
POTASSIUM SERPL-MCNC: 4.4 MMOL/L — SIGNIFICANT CHANGE UP (ref 3.5–5.3)
POTASSIUM SERPL-SCNC: 4.4 MMOL/L — SIGNIFICANT CHANGE UP (ref 3.5–5.3)
PROT SERPL-MCNC: 6.7 G/DL — SIGNIFICANT CHANGE UP (ref 6–8.3)
PROTHROM AB SERPL-ACNC: 18.5 SEC — HIGH (ref 10.6–13.6)
RBC # BLD: 2.82 M/UL — LOW (ref 3.8–5.2)
RBC # FLD: 15.3 % — HIGH (ref 10.3–14.5)
SODIUM SERPL-SCNC: 128 MMOL/L — LOW (ref 135–145)
T-CELL CD4 SUBSET PNL BLD: 31 /UL — LOW (ref 489–1457)
WBC # BLD: 4.65 K/UL — SIGNIFICANT CHANGE UP (ref 3.8–10.5)
WBC # FLD AUTO: 4.65 K/UL — SIGNIFICANT CHANGE UP (ref 3.8–10.5)

## 2021-03-29 DIAGNOSIS — R11.2 NAUSEA WITH VOMITING, UNSPECIFIED: ICD-10-CM

## 2021-03-29 DIAGNOSIS — R00.0 TACHYCARDIA, UNSPECIFIED: ICD-10-CM

## 2021-03-29 DIAGNOSIS — I82.90 ACUTE EMBOLISM AND THROMBOSIS OF UNSPECIFIED VEIN: ICD-10-CM

## 2021-03-29 DIAGNOSIS — R63.0 ANOREXIA: ICD-10-CM

## 2021-03-29 DIAGNOSIS — R18.8 OTHER ASCITES: ICD-10-CM

## 2021-03-29 DIAGNOSIS — B20 HUMAN IMMUNODEFICIENCY VIRUS [HIV] DISEASE: ICD-10-CM

## 2021-03-29 DIAGNOSIS — Z98.890 OTHER SPECIFIED POSTPROCEDURAL STATES: ICD-10-CM

## 2021-03-29 DIAGNOSIS — K92.2 GASTROINTESTINAL HEMORRHAGE, UNSPECIFIED: ICD-10-CM

## 2021-03-29 DIAGNOSIS — Z94.4 LIVER TRANSPLANT STATUS: ICD-10-CM

## 2021-03-29 LAB
HBV DNA # SERPL NAA+PROBE: 60 IU/ML — SIGNIFICANT CHANGE UP
HBV DNA SERPL NAA+PROBE-LOG#: 1.78 — SIGNIFICANT CHANGE UP

## 2021-03-29 NOTE — ASSESSMENT
[FreeTextEntry1] : 50F Citizen of [place]-speaking with HIV/AIDS (c/b disseminated histoplasmosis and CMV viremia), remote hx of splenic infarct and renal vein thrombosis (2010) s/p A/C, s/p cholecystectomy, COVID-19 (12/20), chronic HBV with recent reactivation that resulting acute-on-chronic liver failure and eventual liver transplant with multiple complications and protracted hospitalizations, presented to [facility] care, and with persistent nausea, vomiting and anorexia, resulting in FTT. \par \par HCM\par - PAP 11/20 neg in [employer]; Repeat PAP prior to liver transplant, daughter will try to get record faxed to clinic\par - Mammo: per report, performed prior to liver transplant, will obtain record\par - FIT (+) 1/2021 while in [facility]; however, was done at a time of (+) GIB, and no utility as colon CA screening; not a candidate to undergo colonoscopy at this time; Will repeat FIT test and kit given\par - HCV neg 11/20\par - PHQ 9, likely situational and acute adjustment disorder. Offered resources in clinic, and family knows to reach out when in need\par - SBIRT neg\par - Flu 11/20\par - Tdap 11/14\par - PNA 13 3/15, PNA 23 7/15\par - A1C 5.8 11/20,  11/20\par - Hold off COVID vaccine due to current illness; (+) COVID infectionin 12/20 as well\par - Advised patient and family that she may qualify for HHA with her new medicaid. Patient declined at this time, but family knows to reach out if in need to lessen physical burden at home. \par - Patient declined home PT, but may benefit from home or outpatient PT if amendable in future. \par - Nutritional referral and routine skin care for sacral ulcers - diaper cream or Vitamin A & D cream\par - Will provide a letter of proof to apply for temporary disability parking permit\par \par Meds and allergy list UTD.\par  \par Complex care, high likelihood of re-admission within 30-day period. \par Daughter will request medical records from [place] to be faxed over to the clinic. \par \par RTC in 5 weeks if symptoms are tolerable. If unable to tolerate PO, advised to call clinic or proceed to ED \par \par Case d/w Dr. Ca\par \par Arely Deras, PGY-2

## 2021-03-29 NOTE — REVIEW OF SYSTEMS
[Fatigue] : fatigue [Recent Change In Weight] : ~T recent weight change [Dyspnea on Exertion] : dyspnea on exertion [Abdominal Pain] : abdominal pain [Nausea] : nausea [Vomiting] : vomiting [Heartburn] : heartburn [Joint Pain] : joint pain [Muscle Weakness] : muscle weakness [Back Pain] : back pain [Dizziness] : dizziness [Unsteady Walking] : ataxia [Insomnia] : insomnia [Depression] : depression [Negative] : Heme/Lymph [Fever] : no fever [Chills] : no chills [Chest Pain] : no chest pain [Palpitations] : no palpitations [Lower Ext Edema] : no lower extremity edema [Wheezing] : no wheezing [Cough] : no cough [Constipation] : no constipation [Diarrhea] : diarrhea [Melena] : no melena [Dysuria] : no dysuria [Incontinence] : no incontinence [Hematuria] : no hematuria [Frequency] : no frequency [Joint Stiffness] : no joint stiffness [Joint Swelling] : no joint swelling [Muscle Pain] : no muscle pain [Skin Rash] : no skin rash [Headache] : no headache [Fainting] : no fainting [Confusion] : no confusion [Memory Loss] : no memory loss [Suicidal] : not suicidal [Anxiety] : no anxiety [FreeTextEntry5] : Fast heart rate [FreeTextEntry6] : Occasional SOB at rest, but mostly fine [FreeTextEntry7] : See HPI [de-identified] : Wounds on buttock

## 2021-03-29 NOTE — HISTORY OF PRESENT ILLNESS
[Spouse] : spouse [Family Member] : family member [FreeTextEntry1] : Establish care [de-identified] : 50F Trinidadian-speaking with HIV/AIDS (c/b disseminated histoplasmosis and CMV viremia), remote hx of splenic infarct and renal vein thrombosis () s/p A/C, s/p cholecystectomy, COVID-19 (), chronic HBV with recent reactivation that resulting acute-on-chronic liver failure and eventual liver transplant with multiple complications and protracted hospitalizations, presented to establish care, and with persistent nausea, vomiting and anorexia, resulting in FTT. \par \par Accompanied by daughter and  for this visit, and family requested to provide information and interpret. \par \par Timeline:\par Per report, patient contracted HIV via blood transfusion during  for her daughter. Unclear timeline for HBV. Following with ID clinic. HIV/HBV co-infection was well controlled (with laboratory evidence) up to  on Symtuza (multiple regimen changes over the years). Her insurance lapsed in 2019, and was not on medication for over 14 months until re-established care and insurance in 2020. At the time of Symtuza renewal, HIV VL was found to be > 13K with LFT elevation in 190's. At that time, patient already exhibited symptoms of anorexia, nausea and vomiting. During her 1-month follow up, patient was found to have HBV VL > 480 millions, HIV VL 71, T bili 1.9, INR 2.8 and markedly elevated LFT ('s and 's). \par \par [facility] admission (2020 - 2021)\par Patient presented to [facility] due to n/v and new onset of jaundice, and was found to have T bili 9.6,  AST > 1800 and ALT in 600's on admission. Also found to be COVID positive without respiratory symptoms. She was initially treated with NACT infusion, and HIV/HBV regimen was modified in the setting of acute-on-chronic liver failure. Course was complicated by SBP and sepsis, DIC and GIB s/p PRBC, cryo and FFP, as well as anasarca and hyponatremia requiring diuresis. Transaminitis improved with treatment, but synthetic failure persisted. Patient was evaluated for liver transplant, and accepted to [facility] (more experienced with transplant for HIV + patients) after COVID PCR turned negative. Patient was medically optimized and transferred to [facility] on 2021.\par \par [facility] admission #1 ( - 21) - info per daughter's report, unable to reach the transplant team so far\par After transfer, patient underwent full pre-transplant evaluation, including PAP and mammo (both were negative). Patient underwent liver transplant on , and remained intubated for 4 more days due to GIB and concern for possible re-op for ? anastomosis leak. GIB spontaneously resolved, and patient was subsequently extubated. Course was complicated by acute renal failure s/p 2 sessions of hemodialysis and later obstructive uropathy w/ R nephrolithiasis s/p diuresis and spontaneous resolution. Also had ascites s/p therapeutic para w/ 6L drainage, R thoracentesis for pleural effusion, as well as acute DVT in [facility] ([facility]) and initiated on A/C without further GIB. The hospital course was further prolonged due to persistent nausea, vomiting and inability to tolerate PO with severe caloric deficiency. Patient was finally optimized and discharged home on 21. \par \par [facility] admission #2 (3/3 - 3/17) - Info per RUSSEL and daughter's report\par Patient presented to [facility] ED 3/2 for abdominal pain, lack of BM, and n/v. Seen by ID in the ED and rec for transfer to [facility]. Patient was admitted 3/3 to rule out transplant injection, ileus and refractory vomiting. Patient was found to have increased ascites, s/p PleurX. Underwent EGD for refractory vomiting, but was unremarkable. Patient was also found to have bile-like drainage in PleurX bag concerning for anastomosis leak. Underwent a 2nd EGD that revealed no leak but a gastric ulcer (likely stress ulcer), and s/p biliary stent x 1. Admission was again prolonged due to inability to tolerate PO, and patient was discharged home on 3/17. \par \par Today's visit:\par Patient still experiences frequent nausea and vomiting, and vomited 5-6 times yesterday. She is only able to tolerate water and some liquid, and very minimal solid intake. Also had decreased appetite overall. Significant weight loss since the illness. Low energy and generalized weakness, with inability to perform ADLs and ambulations by herself. Daughter is giving patient protein drinks at least 1-2 times daily as maintenance. Also has been having a lot of emotional distress, mostly due to the fear of inability to recover and the frustration of refractory diseases. No further episodes of GIB or other bleeding while on A/C. Detailed ROS see below. \par Patient is currently wheelchair dependent for community mobility, needs assistance in ADL's and IADLs, no HHA. Medicaid just got activated, and still waiting for Medicare part B.

## 2021-03-29 NOTE — PHYSICAL EXAM
[Ill-Appearing] : ill-appearing [Cachectic] : cachexia was observed [Normal Sclera/Conjunctiva] : normal sclera/conjunctiva [PERRL] : pupils equal round and reactive to light [EOMI] : extraocular movements intact [Normal Outer Ear/Nose] : the outer ears and nose were normal in appearance [Normal Oropharynx] : the oropharynx was normal [Normal TMs] : both tympanic membranes were normal [No JVD] : no jugular venous distention [No Lymphadenopathy] : no lymphadenopathy [Supple] : supple [Thyroid Normal, No Nodules] : the thyroid was normal and there were no nodules present [No Respiratory Distress] : no respiratory distress  [No Accessory Muscle Use] : no accessory muscle use [Regular Rhythm] : with a regular rhythm [Normal S1, S2] : normal S1 and S2 [No Murmur] : no murmur heard [No Abdominal Bruit] : a ~M bruit was not heard ~T in the abdomen [Pedal Pulses Present] : the pedal pulses are present [No Edema] : there was no peripheral edema [No Palpable Aorta] : no palpable aorta [No Extremity Clubbing/Cyanosis] : no extremity clubbing/cyanosis [Soft] : abdomen soft [Non-distended] : non-distended [Normal Bowel Sounds] : normal bowel sounds [Normal Posterior Cervical Nodes] : no posterior cervical lymphadenopathy [Normal Anterior Cervical Nodes] : no anterior cervical lymphadenopathy [No CVA Tenderness] : no CVA  tenderness [No Spinal Tenderness] : no spinal tenderness [No Joint Swelling] : no joint swelling [No Rash] : no rash [Coordination Grossly Intact] : coordination grossly intact [No Focal Deficits] : no focal deficits [Speech Grossly Normal] : speech grossly normal [Memory Grossly Normal] : memory grossly normal [Normal Affect] : the affect was normal [Alert and Oriented x3] : oriented to person, place, and time [Normal Insight/Judgement] : insight and judgment were intact [de-identified] : Soft spoken [de-identified] : L lung fields clear to auscultation; RUL clear, minimal to no breath sounds to R mid and lower lobes, no wheezing [de-identified] : Tachycardic [de-identified] : Well healed abdominal scars; minimally tender with mostly sensitivity on entire abdomen, no epigastric tenderness; RLQ PleurX site covered in dressing, dry, intact, no bleeding and no purulent drainage in PleurX bag. Drainage was minimal in bag [de-identified] : Generalized weakness in all extremities; able to lift extremities against gravity, able to sit upright in wheelchair. Requires minimal to moderate assistance with sit to stand transfer and to get on and off treatment table; able to ambulate with assistance in the treatment room.  [de-identified] : (+) ecchymoses at previous IV sites; shallow healing ulcers x 2 on sacrum, no erythema, drainage or bleeding [de-identified] : Sad mood and emotional at times, but reactive and appropriate for situation.

## 2021-03-29 NOTE — HEALTH RISK ASSESSMENT
[Poor] : ~his/her~ current health as poor [Fair] :  ~his/her~ mood as fair [No] : In the past 12 months have you used drugs other than those required for medical reasons? No [No falls in past year] : Patient reported no falls in the past year [Patient reported PAP Smear was normal] : Patient reported PAP Smear was normal [Access to Medications] : access to medications [Health Literacy] : health literacy [Financial] : financial [With Family] : lives with family [Unemployed] : unemployed [] :  [Feels Safe at Home] : Feels safe at home [Reports normal functional visual acuity (ie: able to read med bottle)] : Reports normal functional visual acuity [] : No [FreeTextEntry1] : S [[id number]] : 9 [Change in mental status noted] : No change in mental status noted [Reports changes in hearing] : Reports no changes in hearing [Reports changes in vision] : Reports no changes in vision [Reports changes in dental health] : Reports no changes in dental health [PapSmearDate] : 11/20 [HepatitisCDate] : 11/20 [HepatitisCComments] : Undetected [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

## 2021-03-30 LAB
HIV-1 VIRAL LOAD RESULT: SIGNIFICANT CHANGE UP
HIV1 RNA # SERPL NAA+PROBE: SIGNIFICANT CHANGE UP
HIV1 RNA SER-IMP: SIGNIFICANT CHANGE UP
HIV1 RNA SERPL NAA+PROBE-ACNC: SIGNIFICANT CHANGE UP
HIV1 RNA SERPL NAA+PROBE-LOG#: SIGNIFICANT CHANGE UP LG COP/ML

## 2021-03-31 ENCOUNTER — NON-APPOINTMENT (OUTPATIENT)
Age: 51
End: 2021-03-31

## 2021-04-03 ENCOUNTER — EMERGENCY (EMERGENCY)
Facility: HOSPITAL | Age: 51
LOS: 1 days | Discharge: ANOTHER HOSPITAL | End: 2021-04-03
Attending: EMERGENCY MEDICINE
Payer: MEDICAID

## 2021-04-03 ENCOUNTER — NON-APPOINTMENT (OUTPATIENT)
Age: 51
End: 2021-04-03

## 2021-04-03 VITALS
TEMPERATURE: 98 F | DIASTOLIC BLOOD PRESSURE: 74 MMHG | SYSTOLIC BLOOD PRESSURE: 106 MMHG | HEART RATE: 113 BPM | WEIGHT: 113.1 LBS | OXYGEN SATURATION: 98 % | RESPIRATION RATE: 16 BRPM | HEIGHT: 65 IN

## 2021-04-03 VITALS
OXYGEN SATURATION: 99 % | HEART RATE: 104 BPM | SYSTOLIC BLOOD PRESSURE: 106 MMHG | TEMPERATURE: 98 F | DIASTOLIC BLOOD PRESSURE: 69 MMHG | RESPIRATION RATE: 16 BRPM

## 2021-04-03 DIAGNOSIS — Z94.4 LIVER TRANSPLANT STATUS: Chronic | ICD-10-CM

## 2021-04-03 LAB
ALBUMIN SERPL ELPH-MCNC: 2.8 G/DL — LOW (ref 3.3–5)
ALP SERPL-CCNC: 120 U/L — SIGNIFICANT CHANGE UP (ref 40–120)
ALT FLD-CCNC: 10 U/L — SIGNIFICANT CHANGE UP (ref 10–45)
ANION GAP SERPL CALC-SCNC: 14 MMOL/L — SIGNIFICANT CHANGE UP (ref 5–17)
ANION GAP SERPL CALC-SCNC: 16 MMOL/L — SIGNIFICANT CHANGE UP (ref 5–17)
ANISOCYTOSIS BLD QL: SIGNIFICANT CHANGE UP
AST SERPL-CCNC: 21 U/L — SIGNIFICANT CHANGE UP (ref 10–40)
BASE EXCESS BLDV CALC-SCNC: 0.8 MMOL/L — SIGNIFICANT CHANGE UP (ref -2–2)
BASOPHILS # BLD AUTO: 0.04 K/UL — SIGNIFICANT CHANGE UP (ref 0–0.2)
BASOPHILS NFR BLD AUTO: 0.9 % — SIGNIFICANT CHANGE UP (ref 0–2)
BILIRUB SERPL-MCNC: 0.6 MG/DL — SIGNIFICANT CHANGE UP (ref 0.2–1.2)
BLD GP AB SCN SERPL QL: NEGATIVE — SIGNIFICANT CHANGE UP
BUN SERPL-MCNC: 47 MG/DL — HIGH (ref 7–23)
BUN SERPL-MCNC: 47 MG/DL — HIGH (ref 7–23)
CA-I SERPL-SCNC: 1.19 MMOL/L — SIGNIFICANT CHANGE UP (ref 1.12–1.3)
CALCIUM SERPL-MCNC: 9 MG/DL — SIGNIFICANT CHANGE UP (ref 8.4–10.5)
CALCIUM SERPL-MCNC: 9.4 MG/DL — SIGNIFICANT CHANGE UP (ref 8.4–10.5)
CHLORIDE BLDV-SCNC: 95 MMOL/L — LOW (ref 96–108)
CHLORIDE SERPL-SCNC: 92 MMOL/L — LOW (ref 96–108)
CHLORIDE SERPL-SCNC: 95 MMOL/L — LOW (ref 96–108)
CO2 BLDV-SCNC: 26 MMOL/L — SIGNIFICANT CHANGE UP (ref 22–30)
CO2 SERPL-SCNC: 20 MMOL/L — LOW (ref 22–31)
CO2 SERPL-SCNC: 21 MMOL/L — LOW (ref 22–31)
CREAT SERPL-MCNC: 2.58 MG/DL — HIGH (ref 0.5–1.3)
CREAT SERPL-MCNC: 2.77 MG/DL — HIGH (ref 0.5–1.3)
ELLIPTOCYTES BLD QL SMEAR: SLIGHT — SIGNIFICANT CHANGE UP
EOSINOPHIL # BLD AUTO: 0 K/UL — SIGNIFICANT CHANGE UP (ref 0–0.5)
EOSINOPHIL NFR BLD AUTO: 0 % — SIGNIFICANT CHANGE UP (ref 0–6)
GAS PNL BLDV: 129 MMOL/L — LOW (ref 135–145)
GAS PNL BLDV: SIGNIFICANT CHANGE UP
GAS PNL BLDV: SIGNIFICANT CHANGE UP
GLUCOSE BLDV-MCNC: 117 MG/DL — HIGH (ref 70–99)
GLUCOSE SERPL-MCNC: 114 MG/DL — HIGH (ref 70–99)
GLUCOSE SERPL-MCNC: 115 MG/DL — HIGH (ref 70–99)
HCO3 BLDV-SCNC: 25 MMOL/L — SIGNIFICANT CHANGE UP (ref 21–29)
HCT VFR BLD CALC: 22.9 % — LOW (ref 34.5–45)
HCT VFR BLDA CALC: 23 % — LOW (ref 39–50)
HGB BLD CALC-MCNC: 7.4 G/DL — LOW (ref 11.5–15.5)
HGB BLD-MCNC: 7.2 G/DL — LOW (ref 11.5–15.5)
HYPOCHROMIA BLD QL: SLIGHT — SIGNIFICANT CHANGE UP
LACTATE BLDV-MCNC: 1.2 MMOL/L — SIGNIFICANT CHANGE UP (ref 0.7–2)
LYMPHOCYTES # BLD AUTO: 0.3 K/UL — LOW (ref 1–3.3)
LYMPHOCYTES # BLD AUTO: 6.2 % — LOW (ref 13–44)
MACROCYTES BLD QL: SIGNIFICANT CHANGE UP
MANUAL SMEAR VERIFICATION: SIGNIFICANT CHANGE UP
MCHC RBC-ENTMCNC: 29.1 PG — SIGNIFICANT CHANGE UP (ref 27–34)
MCHC RBC-ENTMCNC: 31.4 GM/DL — LOW (ref 32–36)
MCV RBC AUTO: 92.7 FL — SIGNIFICANT CHANGE UP (ref 80–100)
METAMYELOCYTES # FLD: 2.6 % — HIGH (ref 0–0)
MONOCYTES # BLD AUTO: 0.12 K/UL — SIGNIFICANT CHANGE UP (ref 0–0.9)
MONOCYTES NFR BLD AUTO: 2.6 % — SIGNIFICANT CHANGE UP (ref 2–14)
MYELOCYTES NFR BLD: 0.9 % — HIGH (ref 0–0)
NEUTROPHILS # BLD AUTO: 4.17 K/UL — SIGNIFICANT CHANGE UP (ref 1.8–7.4)
NEUTROPHILS NFR BLD AUTO: 80.7 % — HIGH (ref 43–77)
NEUTS BAND # BLD: 6.1 % — SIGNIFICANT CHANGE UP (ref 0–8)
PAPPENHEIMER BOD BLD QL SMEAR: PRESENT — SIGNIFICANT CHANGE UP
PCO2 BLDV: 43 MMHG — HIGH (ref 39–42)
PH BLDV: 7.39 — SIGNIFICANT CHANGE UP (ref 7.35–7.45)
PLAT MORPH BLD: NORMAL — SIGNIFICANT CHANGE UP
PLATELET # BLD AUTO: 792 K/UL — HIGH (ref 150–400)
PO2 BLDV: 20 MMHG — LOW (ref 25–45)
POLYCHROMASIA BLD QL SMEAR: SLIGHT — SIGNIFICANT CHANGE UP
POTASSIUM BLDV-SCNC: 4 MMOL/L — SIGNIFICANT CHANGE UP (ref 3.5–5.3)
POTASSIUM SERPL-MCNC: 4.5 MMOL/L — SIGNIFICANT CHANGE UP (ref 3.5–5.3)
POTASSIUM SERPL-MCNC: 6.7 MMOL/L — CRITICAL HIGH (ref 3.5–5.3)
POTASSIUM SERPL-SCNC: 4.5 MMOL/L — SIGNIFICANT CHANGE UP (ref 3.5–5.3)
POTASSIUM SERPL-SCNC: 6.7 MMOL/L — CRITICAL HIGH (ref 3.5–5.3)
PROT SERPL-MCNC: 7.9 G/DL — SIGNIFICANT CHANGE UP (ref 6–8.3)
RBC # BLD: 2.47 M/UL — LOW (ref 3.8–5.2)
RBC # FLD: 15.7 % — HIGH (ref 10.3–14.5)
RBC BLD AUTO: ABNORMAL
RH IG SCN BLD-IMP: POSITIVE — SIGNIFICANT CHANGE UP
SAO2 % BLDV: 22 % — LOW (ref 67–88)
SCHISTOCYTES BLD QL AUTO: SLIGHT — SIGNIFICANT CHANGE UP
SODIUM SERPL-SCNC: 128 MMOL/L — LOW (ref 135–145)
SODIUM SERPL-SCNC: 130 MMOL/L — LOW (ref 135–145)
TARGETS BLD QL SMEAR: SLIGHT — SIGNIFICANT CHANGE UP
WBC # BLD: 4.8 K/UL — SIGNIFICANT CHANGE UP (ref 3.8–10.5)
WBC # FLD AUTO: 4.8 K/UL — SIGNIFICANT CHANGE UP (ref 3.8–10.5)

## 2021-04-03 PROCEDURE — 99284 EMERGENCY DEPT VISIT MOD MDM: CPT | Mod: 25

## 2021-04-03 PROCEDURE — 86850 RBC ANTIBODY SCREEN: CPT

## 2021-04-03 PROCEDURE — 86922 COMPATIBILITY TEST ANTIGLOB: CPT

## 2021-04-03 PROCEDURE — 86900 BLOOD TYPING SEROLOGIC ABO: CPT

## 2021-04-03 PROCEDURE — 82435 ASSAY OF BLOOD CHLORIDE: CPT

## 2021-04-03 PROCEDURE — 96374 THER/PROPH/DIAG INJ IV PUSH: CPT

## 2021-04-03 PROCEDURE — 84132 ASSAY OF SERUM POTASSIUM: CPT

## 2021-04-03 PROCEDURE — 93010 ELECTROCARDIOGRAM REPORT: CPT

## 2021-04-03 PROCEDURE — 86901 BLOOD TYPING SEROLOGIC RH(D): CPT

## 2021-04-03 PROCEDURE — 82330 ASSAY OF CALCIUM: CPT

## 2021-04-03 PROCEDURE — 80053 COMPREHEN METABOLIC PANEL: CPT

## 2021-04-03 PROCEDURE — 85025 COMPLETE CBC W/AUTO DIFF WBC: CPT

## 2021-04-03 PROCEDURE — 84295 ASSAY OF SERUM SODIUM: CPT

## 2021-04-03 PROCEDURE — 82947 ASSAY GLUCOSE BLOOD QUANT: CPT

## 2021-04-03 PROCEDURE — 82803 BLOOD GASES ANY COMBINATION: CPT

## 2021-04-03 PROCEDURE — 80048 BASIC METABOLIC PNL TOTAL CA: CPT

## 2021-04-03 PROCEDURE — 99284 EMERGENCY DEPT VISIT MOD MDM: CPT

## 2021-04-03 PROCEDURE — 85014 HEMATOCRIT: CPT

## 2021-04-03 PROCEDURE — 85018 HEMOGLOBIN: CPT

## 2021-04-03 PROCEDURE — 83605 ASSAY OF LACTIC ACID: CPT

## 2021-04-03 RX ORDER — SODIUM CHLORIDE 9 MG/ML
1000 INJECTION INTRAMUSCULAR; INTRAVENOUS; SUBCUTANEOUS ONCE
Refills: 0 | Status: COMPLETED | OUTPATIENT
Start: 2021-04-03 | End: 2021-04-03

## 2021-04-03 RX ORDER — ONDANSETRON 8 MG/1
4 TABLET, FILM COATED ORAL ONCE
Refills: 0 | Status: COMPLETED | OUTPATIENT
Start: 2021-04-03 | End: 2021-04-03

## 2021-04-03 RX ADMIN — ONDANSETRON 4 MILLIGRAM(S): 8 TABLET, FILM COATED ORAL at 12:41

## 2021-04-03 RX ADMIN — SODIUM CHLORIDE 1000 MILLILITER(S): 9 INJECTION INTRAMUSCULAR; INTRAVENOUS; SUBCUTANEOUS at 15:46

## 2021-04-03 NOTE — ED ADULT TRIAGE NOTE - CHIEF COMPLAINT QUOTE
sent for abnormal labs on blood work taken yesterday. elevated creatinine, low sodium. liver transplant 1/20/2021. sent for abnormal labs-elevated creatinine (3/16-0.73, 3/23-1.61, 3/30-2.34, 4/2-2.5) and low sodium. liver transplant 1/20/2021.

## 2021-04-03 NOTE — ED ADULT NURSE NOTE - OBJECTIVE STATEMENT
Pt presents to ED for IV hydration after lab work showed increased creatinine and low Na+, which has been occurring over past few weeks.  C/o fatigue, which is usual for her and LLQ abd pain, also usual for her, abd is soft, oral mucosa slightly dry, not cracked, and she reports nausea and some vomiting, but is able to tolerate water.  Drain in place right side of abd, which has a scant amount of suzie fluid.  She is s/p liver transplant 1/21.

## 2021-04-03 NOTE — ED ADULT NURSE NOTE - EXPLANATION OF PATIENT'S REASON FOR LEAVING
Didn't want to stay in hospital or be transferred to [facility] where her transplant team is located

## 2021-04-03 NOTE — ED PROVIDER NOTE - PHYSICAL EXAMINATION
gen: well appearing  Mentation: AAO x 3  psych: mood appropriate  ENT: airway patent  Eyes: conjunctivae clear bilaterally  Cardio: RRR, no m/r/g  Resp: normal BS b/l  GI: soft, minimal tenderness in lower quadrants, drain in place with bilious output  : no CVA tenderness  Neuro: sensation and motor function intact  Skin: No evidence of rash  MSK: normal movement of all extremities  Lymph/Vasc: no LE edema

## 2021-04-03 NOTE — ED ADULT NURSE NOTE - CHIEF COMPLAINT QUOTE
sent for abnormal labs-elevated creatinine (3/16-0.73, 3/23-1.61, 3/30-2.34, 4/2-2.5) and low sodium. liver transplant 1/20/2021.

## 2021-04-03 NOTE — ED PROVIDER NOTE - PATIENT PORTAL LINK FT
You can access the FollowMyHealth Patient Portal offered by [facility] by registering at the following website: [email or web address]. By joining [employer]s FollowMyHealth portal, you will also be able to view your health information using other applications (apps) compatible with our system.

## 2021-04-03 NOTE — ED PROVIDER NOTE - PROGRESS NOTE DETAILS
DO Griselda PGY-2: put a call out to [place] transplant team to discuss dispo. Pt has progressive worsening in Cr over last few weeks, not acute issue. Sodium at baseline DO Griselda PGY-2: called by hepatology team from [facility], would like to transfer as patient recently had transplant done and would like to admit to Dr. Lozano at [facility] DO Griselda PGY-2: The patient wishes to be discharged against medical advice. I have assessed the patient's mental status and  the patient has capacity to make this decision. I have explained the risks of leaving without full treatment, including severe disability and death, which the patient understands and is willing to accept. I have answered all of the patient's questions. I reiterated my medical opinion and advised the patient to return at any time. We discussed the further workup outside of the current visit and return precautions. Discussed extensively with daughter as  per patient's request. Has f/u with hepatology team on 4/5 Purcell DO: Pt was pending txfr for hyponatremia and ANTOINETTE in setting of AIDs and known liver transplant-pt refusing transfer and asking to leave hospital. L/M with [facility] transfer center to inform them of cancellation of txfr. Multiple efforts by myself and Dr Villalobos made to get pt to stay, pt warned of renal failure and death if not treated in a timely manner. Informed that transfer was at request of hepatology team. Pt states she is 'not afraid' of death and would like to leave despite our advice. States she has appt in 2 days, agreeable to return if symptoms worsen.   The patient has decided to leave against medical advice.  The patient is AAOx3, not intoxicated, and displays normal decision making ability. We discussed all risks, benefits, and alternatives to the progression of treatment and the potential dangers of leaving including but not limited to permanent disability, injury, and death.  The patient was instructed that they are welcome to change their decision to leave against medical advice and return to the emergency department at any time and for any reason in order to allow us to render care.

## 2021-04-03 NOTE — ED PROVIDER NOTE - OBJECTIVE STATEMENT
51 y/o F with PMH of HIV/AIDS, HBV, renal vein thrombosis in 2010, now s/p liver transplant at [facility] in 1/21 presenting with generalized weakness. Patient requesting daughter to translate. Stated sent in for "IV fluids". Has been having increased creatinine and low sodium on outpatient labs. Patient having increasing weakness. Has chronic nausea and abd pain which are at patient's baseline and unchanged from prior. No fevers, chills, dysuria, hematuria, cough, cp, sob. Having normal BMs

## 2021-04-03 NOTE — ED PROVIDER NOTE - CLINICAL SUMMARY MEDICAL DECISION MAKING FREE TEXT BOX
DO Griselda PGY-2: 51 y/o F presenting with generalized weakness sent in by hepatologist for worsening kidney function. Patient has had uptrending Cr for last few weeks, will recheck labs and discuss with pt's transplant team

## 2021-04-03 NOTE — ED ADULT NURSE NOTE - NSIMPLEMENTINTERV_GEN_ALL_ED
Implemented All Fall with Harm Risk Interventions:  [place] to call system. Call bell, personal items and telephone within reach. Instruct patient to call for assistance. Room bathroom lighting operational. Non-slip footwear when patient is off stretcher. Physically safe environment: no spills, clutter or unnecessary equipment. Stretcher in lowest position, wheels locked, appropriate side rails in place. Provide visual cue, wrist band, yellow gown, etc. Monitor gait and stability. Monitor for mental status changes and reorient to person, place, and time. Review medications for side effects contributing to fall risk. Reinforce activity limits and safety measures with patient and family. Provide visual clues: red socks.

## 2021-04-03 NOTE — ED PROVIDER NOTE - ATTENDING CONTRIBUTION TO CARE
50F, pmh hiv/aids, hbv, renal vein thrombosis, s/p liver transplant at [facility] 1/21, presents with generalized weakness, found to have elevated cr, hyponatremia on outside labs. Per daughter over phone (also assisting with translation) pt was told by her gastroenterologist with the transplant team to go to the nearest ED for IVF. +abd pain, nausea, unchanged over last few mo. denies changes to urination or bm's. denies cough, congestion, f/c, cp, sob, or other complaints.    PE: NAD, NCAT, MMM, Trachea midline, Normal conjunctiva, lungs CTAB, S1/S2 RRR, Normal perfusion, 2+ radial pulses bilat, Abdomen Soft, +RUQ drain without surrounding erythema, Mild diffuse lower abd ttp, Non-distended, No rebound/guarding, No LE edema, No deformity of extremities, No rashes,  No focal motor or sensory deficits.     Pt with chronic weakness, elevated cr, hyponatremia. Will obtain labs. D/w pt's transplant team. Dispo per their recs and labs findings. - Gregor Bonner MD

## 2021-04-03 NOTE — ED PROVIDER NOTE - NSFOLLOWUPINSTRUCTIONS_ED_ALL_ED_FT
Please follow up with your hepatology team and primary doctor within the next 24-48 hours.    Return immediately if you have decreased or no urination, fevers, chills, severe nausea/vomiting, or any new or concerning symptoms.    Take all medications as prescribed

## 2021-04-05 ENCOUNTER — NON-APPOINTMENT (OUTPATIENT)
Age: 51
End: 2021-04-05

## 2021-04-06 ENCOUNTER — NON-APPOINTMENT (OUTPATIENT)
Age: 51
End: 2021-04-06

## 2021-04-08 ENCOUNTER — NON-APPOINTMENT (OUTPATIENT)
Age: 51
End: 2021-04-08

## 2021-04-12 ENCOUNTER — NON-APPOINTMENT (OUTPATIENT)
Age: 51
End: 2021-04-12

## 2021-04-26 ENCOUNTER — APPOINTMENT (OUTPATIENT)
Dept: INTERNAL MEDICINE | Facility: CLINIC | Age: 51
End: 2021-04-26

## 2021-04-27 ENCOUNTER — NON-APPOINTMENT (OUTPATIENT)
Age: 51
End: 2021-04-27

## 2021-04-28 ENCOUNTER — APPOINTMENT (OUTPATIENT)
Dept: INTERNAL MEDICINE | Facility: CLINIC | Age: 51
End: 2021-04-28

## 2021-04-28 DIAGNOSIS — R00.0 TACHYCARDIA, UNSPECIFIED: ICD-10-CM

## 2021-04-28 DIAGNOSIS — Z94.4 LIVER TRANSPLANT STATUS: ICD-10-CM

## 2021-04-28 DIAGNOSIS — Z98.890 OTHER SPECIFIED POSTPROCEDURAL STATES: ICD-10-CM

## 2021-04-28 DIAGNOSIS — R11.2 NAUSEA WITH VOMITING, UNSPECIFIED: ICD-10-CM

## 2021-04-28 DIAGNOSIS — N17.9 ACUTE KIDNEY FAILURE, UNSPECIFIED: ICD-10-CM

## 2021-04-28 DIAGNOSIS — D64.9 ANEMIA, UNSPECIFIED: ICD-10-CM

## 2021-04-28 DIAGNOSIS — B18.1 CHRONIC VIRAL HEPATITIS B W/OUT DELTA-AGENT: ICD-10-CM

## 2021-04-28 DIAGNOSIS — B25.9 CYTOMEGALOVIRAL DISEASE, UNSPECIFIED: ICD-10-CM

## 2021-04-28 DIAGNOSIS — B20 HUMAN IMMUNODEFICIENCY VIRUS [HIV] DISEASE: ICD-10-CM

## 2021-04-28 DIAGNOSIS — K21.9 GASTRO-ESOPHAGEAL REFLUX DISEASE W/OUT ESOPHAGITIS: ICD-10-CM

## 2021-04-28 DIAGNOSIS — I82.90 ACUTE EMBOLISM AND THROMBOSIS OF UNSPECIFIED VEIN: ICD-10-CM

## 2021-04-28 DIAGNOSIS — R59.1 GENERALIZED ENLARGED LYMPH NODES: ICD-10-CM

## 2021-04-28 DIAGNOSIS — R18.8 OTHER ASCITES: ICD-10-CM

## 2021-04-28 DIAGNOSIS — Z87.19 PERSONAL HISTORY OF OTHER DISEASES OF THE DIGESTIVE SYSTEM: ICD-10-CM

## 2021-04-28 RX ORDER — METOCLOPRAMIDE 10 MG/1
10 TABLET ORAL 3 TIMES DAILY
Qty: 90 | Refills: 0 | Status: ACTIVE | COMMUNITY
Start: 1900-01-01 | End: 1900-01-01

## 2021-04-28 RX ORDER — PREDNISONE 5 MG/1
5 TABLET ORAL DAILY
Refills: 0 | Status: DISCONTINUED | COMMUNITY
End: 2021-04-28

## 2021-04-28 RX ORDER — FAMOTIDINE 20 MG/1
20 TABLET, FILM COATED ORAL
Qty: 90 | Refills: 1 | Status: ACTIVE | COMMUNITY
Start: 2021-04-28 | End: 1900-01-01

## 2021-04-28 RX ORDER — SULFAMETHOXAZOLE AND TRIMETHOPRIM 400; 80 MG/1; MG/1
400-80 TABLET ORAL DAILY
Refills: 0 | Status: DISCONTINUED | COMMUNITY
End: 2021-04-28

## 2021-04-28 RX ORDER — METOPROLOL TARTRATE 25 MG/1
25 TABLET, FILM COATED ORAL
Qty: 28 | Refills: 0 | Status: DISCONTINUED | COMMUNITY
Start: 2021-03-26 | End: 2021-04-28

## 2021-04-28 RX ORDER — VALGANCICLOVIR 450 MG/1
450 TABLET, FILM COATED ORAL DAILY
Refills: 0 | Status: DISCONTINUED | COMMUNITY
End: 2021-04-28

## 2021-04-28 RX ORDER — PANTOPRAZOLE SODIUM 40 MG/1
40 TABLET, DELAYED RELEASE ORAL DAILY
Qty: 90 | Refills: 2 | Status: DISCONTINUED | COMMUNITY
End: 2021-04-28

## 2021-04-28 RX ORDER — DRONABINOL 2.5 MG/1
2.5 CAPSULE ORAL TWICE DAILY
Refills: 0 | Status: DISCONTINUED | COMMUNITY
End: 2021-04-28

## 2021-04-28 RX ORDER — APIXABAN 5 MG/1
5 TABLET, FILM COATED ORAL
Qty: 120 | Refills: 1 | Status: DISCONTINUED | COMMUNITY
End: 2021-04-28

## 2021-04-29 ENCOUNTER — NON-APPOINTMENT (OUTPATIENT)
Age: 51
End: 2021-04-29

## 2021-04-30 ENCOUNTER — NON-APPOINTMENT (OUTPATIENT)
Age: 51
End: 2021-04-30

## 2021-04-30 NOTE — PHYSICAL EXAM
[No Acute Distress] : no acute distress [Normal Sclera/Conjunctiva] : normal sclera/conjunctiva [EOMI] : extraocular movements intact [Normal Outer Ear/Nose] : the outer ears and nose were normal in appearance [Supple] : supple [No Respiratory Distress] : no respiratory distress  [No Accessory Muscle Use] : no accessory muscle use [No Edema] : there was no peripheral edema [No Extremity Clubbing/Cyanosis] : no extremity clubbing/cyanosis [Coordination Grossly Intact] : coordination grossly intact [No Focal Deficits] : no focal deficits [Speech Grossly Normal] : speech grossly normal [Normal Affect] : the affect was normal [Alert and Oriented x3] : oriented to person, place, and time [Normal Mood] : the mood was normal [Normal Insight/Judgement] : insight and judgment were intact [de-identified] : Thin; unable to performed detailed exam other than noted here [de-identified] : Non-tender on self palpation; soft-appearing; RLQ IR drain site clean and dressing intact, no bleeding; drain bag with yellow-clear ascites fluid, < 100 cc in bag, no bleeding [de-identified] : Grossly normal BUE strength against gravity, full ROM, moving BLE spontaneously [de-identified] : Grossly normal appearing, no overt rashes

## 2021-04-30 NOTE — HISTORY OF PRESENT ILLNESS
[Home] : at home, [unfilled] , at the time of the visit. [Other Location: e.g. Home (Enter Location, City,State)___] : at [unfilled] [Family Member] : family member [Verbal consent obtained from patient] : the patient, [unfilled] [FreeTextEntry1] : Follow up [de-identified] : 50F Gambian-speaking with HIV/AIDS (c/b disseminated histoplasmosis and CMV viremia), remote hx of splenic infarct and renal vein thrombosis (2010) s/p A/C, s/p cholecystectomy, COVID-19 (12/20), chronic HBV with recent reactivation that resulting acute-on-chronic liver failure and eventual liver transplant with multiple complications and protracted hospitalizations, called in for follow up on nausea, vomiting and anorexia.\par Daughter Priya and patient provided the history.\par N/V/anorexia: Taking Reglan 10 BID. PPI and prednisone were stopped by hepatology to minimize nausea. Able to tolerate liquids, oatmeal, soup (lentils), some fruits, but still unable to tolerate high protein foot like meat or seafood. Still feeling nauseous almost hourly, and vomiting once a day at night, hours after dinner, mostly with "bile" and saliva rather than undigested food. Drinking a lot of water. Minimal abdominal pain, most epigastric.\par Hypotension: Weaned off metoprolol (for tachycardia) since last visit. SBP has been in low 100's with recent office visits. Occasionally has dizziness with fast positional change, but otherwise feeling well pressure-wise.\par Anemia: Recent Hgb 7.4 and MCV in 94's on 4/20 (s/p 1U PRBC 4/5). S/p epo shot x 1 last week. Following hematology. Anemia likely due to bone marrow suppression rather than active bleed per workup. Scheduled for IV iron infusion on 5/4 and 5/11, as well as planned epo shot on 5/4. Will repeat CBC on 5/4 with heme. Currently no bleeding anywhere.\par Renal failure: Cr fluctuates between 2.4 and 1.2 for the past month. Most recent was 1.7. Making good urine. Daughter will ask hepatology whether they wants nephro in [facility] system or [facility] system. \par RIJ DVT: Stopped Eliquis as instructed.\par HIV/HBV: Taking Biktarvy regularly; ID clinic had difficulty reaching out to patient per chart note (calling patient's phone, and she usually doesn't answer per daughter). \par Ascites: Repeat CT A/P last week still showed ascites accumulation. IR drain was not removed yet. Plan to remove on 5/6. Minimal yellow-clear fluids drained in bag. No bleeding\par Biliary stent: Removed on 4/7 by GI. Continues to take ursodiol. No jaundice or itchiness.

## 2021-04-30 NOTE — ASSESSMENT
[FreeTextEntry1] : 50F Swedish-speaking with HIV/AIDS (c/b disseminated histoplasmosis and CMV viremia), remote hx of splenic infarct and renal vein thrombosis (2010) s/p A/C, s/p cholecystectomy, COVID-19 (12/20), chronic HBV with recent reactivation that resulting acute-on-chronic liver failure and eventual liver transplant with multiple complications and protracted hospitalizations, called in for follow up on nausea, vomiting and anorexia.\par \par Management per above\par \par - Needs application filled out for temporary disabled parking permit.\par \par RTC in 5 weeks or as needed\par \par Case d/w Dr. Antonio\par \par Arely Deras, PGY-2\par \par

## 2021-05-27 ENCOUNTER — NON-APPOINTMENT (OUTPATIENT)
Age: 51
End: 2021-05-27

## 2021-06-04 NOTE — PROVIDER CONTACT NOTE (CRITICAL VALUE NOTIFICATION) - ACTION/TREATMENT ORDERED:
no new orders Curettage Text: The wound bed was treated with curettage after the biopsy was performed.

## 2021-06-07 ENCOUNTER — NON-APPOINTMENT (OUTPATIENT)
Age: 51
End: 2021-06-07

## 2021-06-08 ENCOUNTER — APPOINTMENT (OUTPATIENT)
Dept: INFECTIOUS DISEASE | Facility: CLINIC | Age: 51
End: 2021-06-08

## 2021-06-21 ENCOUNTER — NON-APPOINTMENT (OUTPATIENT)
Age: 51
End: 2021-06-21

## 2021-07-07 ENCOUNTER — NON-APPOINTMENT (OUTPATIENT)
Age: 51
End: 2021-07-07

## 2021-08-12 ENCOUNTER — NON-APPOINTMENT (OUTPATIENT)
Age: 51
End: 2021-08-12

## 2021-10-05 ENCOUNTER — NON-APPOINTMENT (OUTPATIENT)
Age: 51
End: 2021-10-05

## 2021-10-05 ENCOUNTER — TRANSCRIPTION ENCOUNTER (OUTPATIENT)
Age: 51
End: 2021-10-05

## 2021-10-11 ENCOUNTER — NON-APPOINTMENT (OUTPATIENT)
Age: 51
End: 2021-10-11

## 2021-10-26 ENCOUNTER — APPOINTMENT (OUTPATIENT)
Dept: INFECTIOUS DISEASE | Facility: CLINIC | Age: 51
End: 2021-10-26

## 2021-10-27 ENCOUNTER — NON-APPOINTMENT (OUTPATIENT)
Age: 51
End: 2021-10-27

## 2021-10-29 ENCOUNTER — NON-APPOINTMENT (OUTPATIENT)
Age: 51
End: 2021-10-29

## 2021-11-04 ENCOUNTER — NON-APPOINTMENT (OUTPATIENT)
Age: 51
End: 2021-11-04

## 2021-11-18 ENCOUNTER — NON-APPOINTMENT (OUTPATIENT)
Age: 51
End: 2021-11-18

## 2021-11-19 ENCOUNTER — NON-APPOINTMENT (OUTPATIENT)
Age: 51
End: 2021-11-19

## 2022-04-26 NOTE — ED PROVIDER NOTE - PSH
There are no Wet Read(s) to document.
History of Cholecystectomy    S/P Bilateral Tubal Ligation    S/P  Section    S/P PEG (Percutaneous Endoscopic Gastrostomy) Placement and Removal

## 2022-06-17 ENCOUNTER — TRANSCRIPTION ENCOUNTER (OUTPATIENT)
Age: 52
End: 2022-06-17

## 2022-11-08 NOTE — PROGRESS NOTE ADULT - ATTENDING COMMENTS
Hepatology Staff: Stefany Becker MD    I saw and examined the patient along with  Dr. Marin 12-30-20 @ 14:25  Patient Medical Record, hosptial course was reviewed and summarized as below:    Vitals: Vital Signs Last 24 Hrs  T(C): 36.7 (30 Dec 2020 12:34), Max: 37.6 (30 Dec 2020 01:19)  T(F): 98 (30 Dec 2020 12:34), Max: 99.6 (30 Dec 2020 01:19)  HR: 122 (30 Dec 2020 12:34) (113 - 126)  BP: 91/65 (30 Dec 2020 12:34) (91/65 - 108/71)    RR: 18 (30 Dec 2020 12:34) (18 - 18)  SpO2: 97% (30 Dec 2020 12:34) (97% - 98%)    Labs:Creatinine, Serum: 0.43 mg/dL (12-30-20 @ 07:13)  Bilirubin Total, Serum: 13.3 mg/dL (12-30-20 @ 07:13)  INR: 1.93 ratio (12-30-20 @ 07:13)      I/O: I&O's Summary    29 Dec 2020 07:01  -  30 Dec 2020 07:00  --------------------------------------------------------  IN: 240 mL / OUT: 0 mL / NET: 240 mL      Nutritional Status:   Albumin, Serum: 2.3 g/dL (12-30-20 @ 07:13)    Last 24 hour events: Noted mild worsening of total bili, liver enzymes are essentially same.    Recommendations: Cont with current regimen with TDF/emtricitabine ( part of the HAART regimen) with Entecavir. Await hepatitis B delta antibody results, and HBV resistance test results.      Plan discussed with Primary team. Tazorac Pregnancy And Lactation Text: This medication is not safe during pregnancy. It is unknown if this medication is excreted in breast milk.

## 2023-01-11 NOTE — ED ADULT NURSE NOTE - NS ED NURSE RECORD ANOTHER VITAL SIGN
This will be a problem with any medication - correct?   Samantha Humphrey MD on 1/11/2023 at 8:14 AM   Yes

## 2023-09-30 ENCOUNTER — NON-APPOINTMENT (OUTPATIENT)
Age: 53
End: 2023-09-30

## 2023-10-22 ENCOUNTER — NON-APPOINTMENT (OUTPATIENT)
Age: 53
End: 2023-10-22

## 2024-01-04 NOTE — ED PROVIDER NOTE - CROS ED CARDIOVAS ALL NEG
Medication:   Requested Prescriptions     Pending Prescriptions Disp Refills    lidocaine (LIDODERM) 5 % [Pharmacy Med Name: LIDOCAINE 5% PATCH] 30 patch 0     Sig: APPLY FOR 12 HOURS ON, 12 HOURS OFF        Last Filled:  [id number]    Patient Phone Number: [phone] (home)     Last appt: 8/10/2023   Next appt: 2/12/2024    Last OARRS:        No data to display                   
- - -

## 2024-01-26 NOTE — PROCEDURE NOTE - NSSPECOBTAIN_GEN_A_CORE
peritoneal fluid
01-26    132<L>  |  97  |  17  ----------------------------<  102<H>  3.2<L>   |  25  |  0.65    Ca    8.1<L>      26 Jan 2024 07:19  Phos  2.7     01-26  Mg     2.0     01-26    TPro  6.1  /  Alb  3.2<L>  /  TBili  0.2  /  DBili  x   /  AST  74<H>  /  ALT  42  /  AlkPhos  67  01-26

## 2024-03-07 ENCOUNTER — NON-APPOINTMENT (OUTPATIENT)
Age: 54
End: 2024-03-07

## 2024-12-19 NOTE — ED PROVIDER NOTE - PROGRESS NOTE DETAILS
Letter sent to schedule an appointment. Medications refilled for one refill.    Yes Whit ALEJANDRO: Patient signed out by Dr. Holbrook pending imaging. CT shows: "Status post liver transplant with mild periportal edema. Colitis. Large volume ascites with evidence of perihepatic hemorrhage (age- indeterminate), with diffuse peritoneal enhancement, which could represent infection or inflammation. New low-density right adrenal nodule, question involving adrenal hematoma. Small right pleural effusion, similar to prior with increased right lower lobe atelectasis." Patient given zosyn. Importance of transfer, continuity of care discussed with patient by Dr. Maravilla. Patient and her daughter is agreeable to transfer to [place]. Discussed with Cookie, Transplant coordinator by Dr. Villalobos who discussed with her team and accepted transfer to [place]. Accepting physician is Dr. Purcell. Discussed with [place] transfer center via CTC. Initially, transplant team requesting ED to ED transfer. ED at [facility] requesting to speak to transplant team and will call us back. Whit ALEJANDRO: Update from Transfer Center - patient to go to bed [facility], requested that transfer be held pending confirmation that it is available.